# Patient Record
Sex: FEMALE | Race: WHITE | Employment: PART TIME | URBAN - METROPOLITAN AREA
[De-identification: names, ages, dates, MRNs, and addresses within clinical notes are randomized per-mention and may not be internally consistent; named-entity substitution may affect disease eponyms.]

---

## 2018-03-30 ENCOUNTER — HOSPITAL ENCOUNTER (EMERGENCY)
Age: 63
Discharge: HOME | End: 2018-03-30
Payer: COMMERCIAL

## 2018-03-30 VITALS
OXYGEN SATURATION: 95 % | WEIGHT: 235 LBS | BODY MASS INDEX: 39.15 KG/M2 | DIASTOLIC BLOOD PRESSURE: 82 MMHG | SYSTOLIC BLOOD PRESSURE: 130 MMHG | HEART RATE: 72 BPM | RESPIRATION RATE: 16 BRPM | TEMPERATURE: 98.1 F | HEIGHT: 65 IN

## 2018-03-30 DIAGNOSIS — H61.22 IMPACTED CERUMEN OF LEFT EAR: ICD-10-CM

## 2018-03-30 DIAGNOSIS — H92.01 RIGHT EAR PAIN: Primary | ICD-10-CM

## 2018-03-30 PROCEDURE — 99282 EMERGENCY DEPT VISIT SF MDM: CPT

## 2018-03-30 ASSESSMENT — PAIN DESCRIPTION - ORIENTATION: ORIENTATION: RIGHT;LEFT

## 2018-03-30 ASSESSMENT — PAIN SCALES - GENERAL: PAINLEVEL_OUTOF10: 7

## 2018-03-30 ASSESSMENT — PAIN DESCRIPTION - LOCATION: LOCATION: EAR

## 2018-04-02 ENCOUNTER — CARE COORDINATION (OUTPATIENT)
Dept: CARE COORDINATION | Age: 63
End: 2018-04-02

## 2018-05-07 ENCOUNTER — TELEPHONE (OUTPATIENT)
Dept: FAMILY MEDICINE CLINIC | Age: 63
End: 2018-05-07

## 2018-05-29 ENCOUNTER — OFFICE VISIT (OUTPATIENT)
Dept: FAMILY MEDICINE CLINIC | Age: 63
End: 2018-05-29
Payer: COMMERCIAL

## 2018-05-29 ENCOUNTER — HOSPITAL ENCOUNTER (OUTPATIENT)
Dept: ULTRASOUND IMAGING | Age: 63
Discharge: HOME | End: 2018-05-31
Payer: COMMERCIAL

## 2018-05-29 VITALS
DIASTOLIC BLOOD PRESSURE: 76 MMHG | HEART RATE: 68 BPM | TEMPERATURE: 98 F | OXYGEN SATURATION: 96 % | BODY MASS INDEX: 46.15 KG/M2 | WEIGHT: 277 LBS | HEIGHT: 65 IN | SYSTOLIC BLOOD PRESSURE: 132 MMHG

## 2018-05-29 DIAGNOSIS — M71.22 BAKER CYST, LEFT: ICD-10-CM

## 2018-05-29 DIAGNOSIS — M79.662 PAIN OF LEFT CALF: Primary | ICD-10-CM

## 2018-05-29 DIAGNOSIS — M79.662 PAIN OF LEFT CALF: ICD-10-CM

## 2018-05-29 PROCEDURE — 99214 OFFICE O/P EST MOD 30 MIN: CPT | Performed by: PHYSICIAN ASSISTANT

## 2018-05-29 PROCEDURE — 93971 EXTREMITY STUDY: CPT

## 2018-05-29 RX ORDER — NAPROXEN 500 MG/1
500 TABLET ORAL 2 TIMES DAILY
Qty: 14 TABLET | Refills: 0 | Status: SHIPPED | OUTPATIENT
Start: 2018-05-29 | End: 2019-04-17

## 2018-07-19 ENCOUNTER — OFFICE VISIT (OUTPATIENT)
Dept: FAMILY MEDICINE CLINIC | Age: 63
End: 2018-07-19
Payer: COMMERCIAL

## 2018-07-19 VITALS
SYSTOLIC BLOOD PRESSURE: 120 MMHG | WEIGHT: 279 LBS | HEART RATE: 78 BPM | DIASTOLIC BLOOD PRESSURE: 66 MMHG | HEIGHT: 65 IN | TEMPERATURE: 98.4 F | BODY MASS INDEX: 46.48 KG/M2 | OXYGEN SATURATION: 94 %

## 2018-07-19 DIAGNOSIS — H10.32 ACUTE BACTERIAL CONJUNCTIVITIS OF LEFT EYE: Primary | ICD-10-CM

## 2018-07-19 PROCEDURE — 99213 OFFICE O/P EST LOW 20 MIN: CPT | Performed by: NURSE PRACTITIONER

## 2018-07-19 NOTE — LETTER
[facility]  [address] [place] [id number]  Phone: [phone]  Fax: [address], APRN - CNP        July 19, 2018     Patient: Denise Hurtado   YOB: 1955   Date of Visit: 7/19/2018       To Whom it May Concern:    Denise Hurtado was seen in my clinic on 7/19/2018. She may return to work on 7/23/18. If you have any questions or concerns, please don't hesitate to call.     Sincerely,         Carlitos Lyons, GENTRY - CNP

## 2018-07-26 ENCOUNTER — OFFICE VISIT (OUTPATIENT)
Dept: FAMILY MEDICINE CLINIC | Age: 63
End: 2018-07-26
Payer: COMMERCIAL

## 2018-07-26 VITALS
BODY MASS INDEX: 46.23 KG/M2 | TEMPERATURE: 97.7 F | HEART RATE: 73 BPM | OXYGEN SATURATION: 94 % | DIASTOLIC BLOOD PRESSURE: 66 MMHG | WEIGHT: 277.5 LBS | HEIGHT: 65 IN | SYSTOLIC BLOOD PRESSURE: 128 MMHG

## 2018-07-26 DIAGNOSIS — E78.00 PURE HYPERCHOLESTEROLEMIA: Chronic | ICD-10-CM

## 2018-07-26 DIAGNOSIS — G89.29 CHRONIC PAIN OF BOTH KNEES: ICD-10-CM

## 2018-07-26 DIAGNOSIS — M25.562 CHRONIC PAIN OF BOTH KNEES: ICD-10-CM

## 2018-07-26 DIAGNOSIS — G47.33 OSA (OBSTRUCTIVE SLEEP APNEA): Chronic | ICD-10-CM

## 2018-07-26 DIAGNOSIS — M79.89 LEG SWELLING: ICD-10-CM

## 2018-07-26 DIAGNOSIS — I10 ESSENTIAL HYPERTENSION: Primary | Chronic | ICD-10-CM

## 2018-07-26 DIAGNOSIS — M25.561 CHRONIC PAIN OF BOTH KNEES: ICD-10-CM

## 2018-07-26 PROCEDURE — 99213 OFFICE O/P EST LOW 20 MIN: CPT | Performed by: FAMILY MEDICINE

## 2018-07-26 RX ORDER — CHLORTHALIDONE 25 MG/1
25 TABLET ORAL DAILY
Qty: 30 TABLET | Refills: 3 | Status: SHIPPED | OUTPATIENT
Start: 2018-07-26 | End: 2019-01-07 | Stop reason: SDUPTHER

## 2018-07-26 RX ORDER — LOSARTAN POTASSIUM 100 MG/1
100 TABLET ORAL DAILY
Qty: 30 TABLET | Refills: 3 | Status: SHIPPED | OUTPATIENT
Start: 2018-07-26 | End: 2018-12-07 | Stop reason: SDUPTHER

## 2018-07-26 RX ORDER — LOSARTAN POTASSIUM AND HYDROCHLOROTHIAZIDE 25; 100 MG/1; MG/1
TABLET ORAL
Qty: 90 TABLET | Refills: 0 | Status: CANCELLED | OUTPATIENT
Start: 2018-07-26

## 2018-07-26 ASSESSMENT — ENCOUNTER SYMPTOMS
WHEEZING: 0
COUGH: 0

## 2018-07-26 NOTE — PATIENT INSTRUCTIONS
Patient Education        Kegel Exercises: Care Instructions  Your Care Instructions    Kegel exercises strengthen muscles around the bladder. These muscles control the flow of urine. Kegel exercises are sometime called \"pelvic floor\" exercises. They can help prevent urine leakage and keep the pelvic organs in place. A woman who just had a baby might want to try Kegel exercises. They can strengthen pelvic muscles that have been weakened by pregnancy and childbirth. A man or woman may use Kegel exercises to treat urine leakage. You do Kegel exercises by tightening the muscles you use when you urinate. You will likely need to do these exercises for several weeks to get better. Follow-up care is a key part of your treatment and safety. Be sure to make and go to all appointments, and call your doctor if you are having problems. It's also a good idea to know your test results and keep a list of the medicines you take. How can you care for yourself at home? · Do Kegel exercises. ¨ Find the muscles you need to strengthen. To do this, tighten the muscles that stop your urine while you are going to the bathroom. These are the same muscles you squeeze during Kegel exercises. ¨ Squeeze the muscles as hard as you can. Your belly and thighs should not move. ¨ Hold the squeeze for 3 seconds. Then relax for 3 seconds. ¨ Start with 3 seconds, and then add 1 second each week until you are able to squeeze for 10 seconds. ¨ Repeat the exercise 10 to 15 times for each session. Do three or more sessions each day. · You can check to see if you are using the right muscles. Place a finger in your vagina and squeeze around it. You are doing them right when you feel pressure around your finger. Your doctor may also suggest that you put special weights in your vagina while you do the exercises. · Do not smoke. It can irritate the bladder. If you need help quitting, talk to your doctor about stop-smoking programs and medicines.

## 2018-07-26 NOTE — PROGRESS NOTES
tablet Take 1 tablet by mouth daily.  neomycin-polymyxin-hydrocortisone (CORTISPORIN) SUSP ophthalmic suspension 2 drops to left eye every 6 hours for 7 days. 1 Bottle 0    Homeopathic Products (EARACHE RELIEF) SOLN Place 3 drops in ear(s) 4 times daily as needed (ringing in ears) 1 Bottle 0     No current facility-administered medications for this visit. Past Medical/Surgical Hx;  Reviewed with patient      Diagnosis Date    Anxiety     Family history of early CAD     Hyperlipemia     Hypertension     Lightheadedness     Sleep apnea     SOBOE (shortness of breath on exertion)      Past Surgical History:   Procedure Laterality Date    BREAST SURGERY  2014    biopsy    CARDIOVASCULAR STRESS TEST      COLONOSCOPY      CYST REMOVAL  05/2017    mouth/under tongue    HYSTERECTOMY  1990    KNEE ARTHROSCOPY  2005    LEG SURGERY  1963    ligament repair    SALIVARY GLAND SURGERY Left 05/24/2017    TINO AND BSO      age 19 for menometrorrhagia    TONSILLECTOMY      childhood       Past Family Hx:  Reviewed with patient  Family History   Problem Relation Age of Onset   Annamaria Atul Pacemaker Mother     COPD Mother     Heart Failure Mother     Cancer Father         prostate    Hypertension Father     Hypertension Sister     Heart Disease Brother        Social Hx:  Reviewed with patient  Social History   Substance Use Topics    Smoking status: Never Smoker    Smokeless tobacco: Never Used    Alcohol use No       Immunization History   Administered Date(s) Administered    Influenza, Quadv, 3 yrs and older, IM, Preservative Free 10/25/2016, 11/02/2017    Tdap (Boostrix, Adacel) 10/25/2016    Zoster Live (Zostavax) 08/01/2017       Review of Systems  Review of Systems   Constitutional: Negative for chills and fever. Respiratory: Negative for cough and wheezing. Cardiovascular: Positive for leg swelling. Negative for chest pain.    Genitourinary:        Nocturia  Urinary incontinence Musculoskeletal: Negative for falls. Neurological: Negative for dizziness and headaches. PE:  VS:  /66   Pulse 73   Temp 97.7 °F (36.5 °C) (Oral)   Ht 5' 5\" (1.651 m)   Wt 277 lb 8 oz (125.9 kg)   SpO2 94%   Breastfeeding? No   BMI 46.18 kg/m²   Physical Exam   Constitutional: She is oriented to person, place, and time. She appears well-developed and well-nourished. Obesity     HENT:   Head: Normocephalic and atraumatic. Cardiovascular: Normal rate and regular rhythm. Exam reveals no gallop and no friction rub. No murmur heard. Pulmonary/Chest: Effort normal and breath sounds normal. She has no wheezes. She has no rales. Musculoskeletal: She exhibits edema (trace to 1+) and tenderness (joint line tenderness, posterior tenderness bilateral knees). Neurological: She is alert and oriented to person, place, and time. Skin: Skin is warm and dry. Psychiatric: She has a normal mood and affect. Assessment/Plan:  Neto Gilliland was seen today for 3 month follow-up and health maintenance. Diagnoses and all orders for this visit:    Essential hypertension  bp at goal but c/o edema  Change hctz to chlorthalidone  Cont losartan  -     losartan (COZAAR) 100 MG tablet; Take 1 tablet by mouth daily  -     chlorthalidone (HYGROTON) 25 MG tablet; Take 1 tablet by mouth daily  -     Comprehensive Metabolic Panel; Future  -     Lipid Panel; Future  -     CBC Auto Differential; Future    JES (obstructive sleep apnea)  Adherent to CPAP  -     CBC Auto Differential; Future    Pure hypercholesterolemia  Due for repeat flp  -     Comprehensive Metabolic Panel; Future  -     Lipid Panel;  Future    Leg swelling  As above    Chronic pain of both knees  Reluctant to consider injections, etc      Other orders  -     Cancel: losartan-hydrochlorothiazide (HYZAAR) 100-25 MG per tablet; TAKE 1 TABLET DAILY    Also discussed kegels for urinary incontinence    Return in about 2 months (around 9/26/2018) for

## 2018-09-28 ENCOUNTER — OFFICE VISIT (OUTPATIENT)
Dept: FAMILY MEDICINE CLINIC | Age: 63
End: 2018-09-28
Payer: COMMERCIAL

## 2018-09-28 ENCOUNTER — HOSPITAL ENCOUNTER (OUTPATIENT)
Age: 63
Discharge: HOME | End: 2018-09-30
Payer: COMMERCIAL

## 2018-09-28 VITALS
OXYGEN SATURATION: 93 % | RESPIRATION RATE: 16 BRPM | HEART RATE: 84 BPM | DIASTOLIC BLOOD PRESSURE: 78 MMHG | HEIGHT: 65 IN | WEIGHT: 272 LBS | BODY MASS INDEX: 45.32 KG/M2 | TEMPERATURE: 98.8 F | SYSTOLIC BLOOD PRESSURE: 120 MMHG

## 2018-09-28 DIAGNOSIS — G47.33 OSA (OBSTRUCTIVE SLEEP APNEA): Chronic | ICD-10-CM

## 2018-09-28 DIAGNOSIS — I10 ESSENTIAL HYPERTENSION: Chronic | ICD-10-CM

## 2018-09-28 DIAGNOSIS — E78.00 PURE HYPERCHOLESTEROLEMIA: Chronic | ICD-10-CM

## 2018-09-28 LAB
ALBUMIN SERPL-MCNC: 4 G/DL (ref 3.5–5.2)
ALP BLD-CCNC: 101 U/L (ref 35–104)
ALT SERPL-CCNC: 14 U/L (ref 0–32)
ANION GAP SERPL CALCULATED.3IONS-SCNC: 13 MMOL/L (ref 7–16)
AST SERPL-CCNC: 20 U/L (ref 0–31)
BASOPHILS ABSOLUTE: 0.06 E9/L (ref 0–0.2)
BASOPHILS RELATIVE PERCENT: 0.7 % (ref 0–2)
BILIRUB SERPL-MCNC: 0.5 MG/DL (ref 0–1.2)
BUN BLDV-MCNC: 16 MG/DL (ref 8–23)
CALCIUM SERPL-MCNC: 9.6 MG/DL (ref 8.6–10.2)
CHLORIDE BLD-SCNC: 100 MMOL/L (ref 98–107)
CHOLESTEROL, TOTAL: 190 MG/DL (ref 0–199)
CO2: 27 MMOL/L (ref 22–29)
CREAT SERPL-MCNC: 0.8 MG/DL (ref 0.5–1)
EOSINOPHILS ABSOLUTE: 0.17 E9/L (ref 0.05–0.5)
EOSINOPHILS RELATIVE PERCENT: 1.9 % (ref 0–6)
GFR AFRICAN AMERICAN: >60
GFR NON-AFRICAN AMERICAN: >60 ML/MIN/1.73
GLUCOSE BLD-MCNC: 97 MG/DL (ref 74–109)
HCT VFR BLD CALC: 42.3 % (ref 34–48)
HDLC SERPL-MCNC: 59 MG/DL
HEMOGLOBIN: 13.3 G/DL (ref 11.5–15.5)
IMMATURE GRANULOCYTES #: 0.04 E9/L
IMMATURE GRANULOCYTES %: 0.5 % (ref 0–5)
LDL CHOLESTEROL CALCULATED: 107 MG/DL (ref 0–99)
LYMPHOCYTES ABSOLUTE: 3.09 E9/L (ref 1.5–4)
LYMPHOCYTES RELATIVE PERCENT: 35.3 % (ref 20–42)
MCH RBC QN AUTO: 29.5 PG (ref 26–35)
MCHC RBC AUTO-ENTMCNC: 31.4 % (ref 32–34.5)
MCV RBC AUTO: 93.8 FL (ref 80–99.9)
MONOCYTES ABSOLUTE: 0.68 E9/L (ref 0.1–0.95)
MONOCYTES RELATIVE PERCENT: 7.8 % (ref 2–12)
NEUTROPHILS ABSOLUTE: 4.71 E9/L (ref 1.8–7.3)
NEUTROPHILS RELATIVE PERCENT: 53.8 % (ref 43–80)
PDW BLD-RTO: 14.3 FL (ref 11.5–15)
PLATELET # BLD: 324 E9/L (ref 130–450)
PMV BLD AUTO: 11.1 FL (ref 7–12)
POTASSIUM SERPL-SCNC: 4.5 MMOL/L (ref 3.5–5)
RBC # BLD: 4.51 E12/L (ref 3.5–5.5)
SODIUM BLD-SCNC: 140 MMOL/L (ref 132–146)
TOTAL PROTEIN: 7.3 G/DL (ref 6.4–8.3)
TRIGL SERPL-MCNC: 122 MG/DL (ref 0–149)
VLDLC SERPL CALC-MCNC: 24 MG/DL
WBC # BLD: 8.8 E9/L (ref 4.5–11.5)

## 2018-09-28 PROCEDURE — 80061 LIPID PANEL: CPT

## 2018-09-28 PROCEDURE — 85025 COMPLETE CBC W/AUTO DIFF WBC: CPT

## 2018-09-28 PROCEDURE — 80053 COMPREHEN METABOLIC PANEL: CPT

## 2018-09-28 PROCEDURE — 99213 OFFICE O/P EST LOW 20 MIN: CPT | Performed by: FAMILY MEDICINE

## 2018-09-28 ASSESSMENT — PATIENT HEALTH QUESTIONNAIRE - PHQ9
2. FEELING DOWN, DEPRESSED OR HOPELESS: 0
SUM OF ALL RESPONSES TO PHQ9 QUESTIONS 1 & 2: 0
SUM OF ALL RESPONSES TO PHQ QUESTIONS 1-9: 0
1. LITTLE INTEREST OR PLEASURE IN DOING THINGS: 0
SUM OF ALL RESPONSES TO PHQ QUESTIONS 1-9: 0

## 2018-09-28 NOTE — PROGRESS NOTES
9/28/2018    Bill Jasmine is a 70 y.o. female here for   Chief Complaint   Patient presents with    Medication Check     med check for hygroton, pt states she has been doing okay. Here for f/u change from hctz to chlorthalidone at last visit. She is feeling well. Her leg swelling has improved. She gets painbehind right knee - has been told she has a bakers cyst, but no leg cramps. She is taking as prescribed. Wt Readings from Last 3 Encounters:   09/28/18 272 lb (123.4 kg)   07/26/18 277 lb 8 oz (125.9 kg)   07/19/18 279 lb (126.6 kg)     Regarding hypertension. Patient is not monitoring home blood pressures. Cardiovascular risk factors: advanced age (older than 54 for men, 72 for women), dyslipidemia, hypertension, obesity (BMI >= 30 kg/m2) and sedentary lifestyle. Patient does not smoke. Currently on chlorthalidone, losartan 100 mg daily. Taking as prescribed. No adverse effects. Today,  BP: 120/78 and she is asymptomatic. BP Readings from Last 3 Encounters:   09/28/18 120/78   07/26/18 128/66   07/19/18 120/66     Patient denies chest pain, diaphoresis, dyspnea, dyspnea on exertion, peripheral edema, palpitations, headache, vision changes.       Allergies   Allergen Reactions    Penicillins Hives and Rash       Medications  Current Outpatient Prescriptions   Medication Sig Dispense Refill    losartan (COZAAR) 100 MG tablet Take 1 tablet by mouth daily 30 tablet 3    chlorthalidone (HYGROTON) 25 MG tablet Take 1 tablet by mouth daily 30 tablet 3    Handicap Placard MISC by Does not apply route Patient cannot walk 200 ft without stopping to rest.    Expiration 5/2021 1 each 0    losartan-hydrochlorothiazide (HYZAAR) 100-25 MG per tablet TAKE 1 TABLET DAILY 90 tablet 0    atorvastatin (LIPITOR) 10 MG tablet TAKE 1 TABLET EVERY EVENING 90 tablet 0    aspirin 81 MG tablet Take 81 mg by mouth daily      CPAP Machine MISC Please provide patient with an auto CPAP with ranges 5-15 cm water

## 2018-09-28 NOTE — PATIENT INSTRUCTIONS
You will be due for your mammogram in late November -just give us a call and we can send in your script  You can ask for refills at that time as well

## 2018-09-30 ASSESSMENT — ENCOUNTER SYMPTOMS
COUGH: 0
WHEEZING: 0

## 2018-10-01 ENCOUNTER — TELEPHONE (OUTPATIENT)
Dept: FAMILY MEDICINE CLINIC | Age: 63
End: 2018-10-01

## 2018-10-04 ENCOUNTER — TELEPHONE (OUTPATIENT)
Dept: FAMILY MEDICINE CLINIC | Age: 63
End: 2018-10-04

## 2018-10-04 RX ORDER — PRAVASTATIN SODIUM 10 MG
10 TABLET ORAL EVERY EVENING
Qty: 30 TABLET | Refills: 5 | Status: SHIPPED | OUTPATIENT
Start: 2018-10-04 | End: 2019-04-24 | Stop reason: SDUPTHER

## 2018-10-10 ENCOUNTER — TELEPHONE (OUTPATIENT)
Dept: ADMINISTRATIVE | Age: 63
End: 2018-10-10

## 2018-10-10 DIAGNOSIS — Z12.31 ENCOUNTER FOR SCREENING MAMMOGRAM FOR MALIGNANT NEOPLASM OF BREAST: Primary | ICD-10-CM

## 2018-10-10 DIAGNOSIS — Z13.820 SCREENING FOR OSTEOPOROSIS: ICD-10-CM

## 2018-11-28 ENCOUNTER — HOSPITAL ENCOUNTER (OUTPATIENT)
Dept: GENERAL RADIOLOGY | Age: 63
Discharge: HOME | End: 2018-11-30
Payer: COMMERCIAL

## 2018-11-28 DIAGNOSIS — Z12.31 ENCOUNTER FOR SCREENING MAMMOGRAM FOR MALIGNANT NEOPLASM OF BREAST: ICD-10-CM

## 2018-11-28 DIAGNOSIS — Z13.820 SCREENING FOR OSTEOPOROSIS: ICD-10-CM

## 2018-11-28 PROCEDURE — 77067 SCR MAMMO BI INCL CAD: CPT

## 2018-11-28 PROCEDURE — 77080 DXA BONE DENSITY AXIAL: CPT

## 2018-12-07 DIAGNOSIS — I10 ESSENTIAL HYPERTENSION: Chronic | ICD-10-CM

## 2018-12-07 RX ORDER — LOSARTAN POTASSIUM 100 MG/1
100 TABLET ORAL DAILY
Qty: 30 TABLET | Refills: 0 | Status: SHIPPED | OUTPATIENT
Start: 2018-12-07 | End: 2019-01-07 | Stop reason: SDUPTHER

## 2019-01-07 DIAGNOSIS — I10 ESSENTIAL HYPERTENSION: Chronic | ICD-10-CM

## 2019-01-08 RX ORDER — LOSARTAN POTASSIUM 100 MG/1
100 TABLET ORAL DAILY
Qty: 30 TABLET | Refills: 0 | Status: SHIPPED | OUTPATIENT
Start: 2019-01-08 | End: 2019-02-14 | Stop reason: SDUPTHER

## 2019-01-08 RX ORDER — CHLORTHALIDONE 25 MG/1
25 TABLET ORAL DAILY
Qty: 30 TABLET | Refills: 0 | Status: SHIPPED | OUTPATIENT
Start: 2019-01-08 | End: 2019-02-14 | Stop reason: SDUPTHER

## 2019-02-14 DIAGNOSIS — I10 ESSENTIAL HYPERTENSION: Chronic | ICD-10-CM

## 2019-02-14 RX ORDER — LOSARTAN POTASSIUM 100 MG/1
100 TABLET ORAL DAILY
Qty: 30 TABLET | Refills: 0 | Status: SHIPPED | OUTPATIENT
Start: 2019-02-14 | End: 2019-03-21 | Stop reason: SDUPTHER

## 2019-02-14 RX ORDER — CHLORTHALIDONE 25 MG/1
25 TABLET ORAL DAILY
Qty: 30 TABLET | Refills: 0 | Status: SHIPPED | OUTPATIENT
Start: 2019-02-14 | End: 2019-03-21 | Stop reason: SDUPTHER

## 2019-03-21 ENCOUNTER — OFFICE VISIT (OUTPATIENT)
Dept: FAMILY MEDICINE CLINIC | Age: 64
End: 2019-03-21
Payer: COMMERCIAL

## 2019-03-21 VITALS
OXYGEN SATURATION: 97 % | HEART RATE: 80 BPM | SYSTOLIC BLOOD PRESSURE: 112 MMHG | HEIGHT: 65 IN | DIASTOLIC BLOOD PRESSURE: 60 MMHG | TEMPERATURE: 98.1 F | WEIGHT: 266 LBS | BODY MASS INDEX: 44.32 KG/M2

## 2019-03-21 DIAGNOSIS — I10 ESSENTIAL HYPERTENSION: Chronic | ICD-10-CM

## 2019-03-21 DIAGNOSIS — J32.9 SINOBRONCHITIS: Primary | ICD-10-CM

## 2019-03-21 DIAGNOSIS — J40 SINOBRONCHITIS: Primary | ICD-10-CM

## 2019-03-21 PROCEDURE — 99213 OFFICE O/P EST LOW 20 MIN: CPT | Performed by: PHYSICIAN ASSISTANT

## 2019-03-21 RX ORDER — AZITHROMYCIN 250 MG/1
TABLET, FILM COATED ORAL
Qty: 1 PACKET | Refills: 0 | Status: SHIPPED | OUTPATIENT
Start: 2019-03-21 | End: 2019-04-17

## 2019-03-21 RX ORDER — CHLORTHALIDONE 25 MG/1
25 TABLET ORAL DAILY
Qty: 30 TABLET | Refills: 2 | Status: SHIPPED | OUTPATIENT
Start: 2019-03-21 | End: 2019-05-30 | Stop reason: SDUPTHER

## 2019-03-21 RX ORDER — BENZONATATE 100 MG/1
100 CAPSULE ORAL 3 TIMES DAILY PRN
Qty: 30 CAPSULE | Refills: 0 | Status: SHIPPED | OUTPATIENT
Start: 2019-03-21 | End: 2019-03-31

## 2019-03-21 RX ORDER — LOSARTAN POTASSIUM 100 MG/1
100 TABLET ORAL DAILY
Qty: 30 TABLET | Refills: 2 | Status: ON HOLD | OUTPATIENT
Start: 2019-03-21 | End: 2019-05-29 | Stop reason: HOSPADM

## 2019-04-24 RX ORDER — PRAVASTATIN SODIUM 10 MG
10 TABLET ORAL EVERY EVENING
Qty: 30 TABLET | Refills: 0 | Status: SHIPPED | OUTPATIENT
Start: 2019-04-24 | End: 2019-05-30 | Stop reason: SDUPTHER

## 2019-05-27 ENCOUNTER — APPOINTMENT (OUTPATIENT)
Dept: CT IMAGING | Age: 64
End: 2019-05-27
Payer: COMMERCIAL

## 2019-05-27 ENCOUNTER — APPOINTMENT (OUTPATIENT)
Dept: GENERAL RADIOLOGY | Age: 64
End: 2019-05-27
Payer: COMMERCIAL

## 2019-05-27 ENCOUNTER — HOSPITAL ENCOUNTER (EMERGENCY)
Age: 64
Discharge: ANOTHER HOSPITAL | End: 2019-05-28
Attending: EMERGENCY MEDICINE
Payer: COMMERCIAL

## 2019-05-27 ENCOUNTER — HOSPITAL ENCOUNTER (OUTPATIENT)
Age: 64
Discharge: HOME | End: 2019-05-27
Payer: COMMERCIAL

## 2019-05-27 ENCOUNTER — APPOINTMENT (OUTPATIENT)
Dept: ULTRASOUND IMAGING | Age: 64
End: 2019-05-27
Payer: COMMERCIAL

## 2019-05-27 VITALS
DIASTOLIC BLOOD PRESSURE: 85 MMHG | SYSTOLIC BLOOD PRESSURE: 141 MMHG | BODY MASS INDEX: 43.99 KG/M2 | RESPIRATION RATE: 16 BRPM | OXYGEN SATURATION: 96 % | WEIGHT: 264 LBS | HEART RATE: 81 BPM | HEIGHT: 65 IN | TEMPERATURE: 98 F

## 2019-05-27 DIAGNOSIS — Q25.40 ABNORMALITY OF THORACIC AORTA: ICD-10-CM

## 2019-05-27 DIAGNOSIS — I82.402 ACUTE DEEP VEIN THROMBOSIS (DVT) OF LEFT LOWER EXTREMITY, UNSPECIFIED VEIN (HCC): Primary | ICD-10-CM

## 2019-05-27 PROBLEM — H61.22 IMPACTED CERUMEN OF LEFT EAR: Status: RESOLVED | Noted: 2018-03-30 | Resolved: 2019-05-27

## 2019-05-27 PROBLEM — H92.01 RIGHT EAR PAIN: Status: RESOLVED | Noted: 2018-03-30 | Resolved: 2019-05-27

## 2019-05-27 LAB
ANION GAP SERPL CALCULATED.3IONS-SCNC: 12 MMOL/L (ref 7–16)
APTT: 30.4 SEC (ref 24.5–35.1)
BASOPHILS ABSOLUTE: 0.06 E9/L (ref 0–0.2)
BASOPHILS RELATIVE PERCENT: 0.6 % (ref 0–2)
BUN BLDV-MCNC: 25 MG/DL (ref 8–23)
CALCIUM SERPL-MCNC: 10.3 MG/DL (ref 8.6–10.2)
CHLORIDE BLD-SCNC: 100 MMOL/L (ref 98–107)
CO2: 26 MMOL/L (ref 22–29)
CREAT SERPL-MCNC: 0.9 MG/DL (ref 0.5–1)
EOSINOPHILS ABSOLUTE: 0.13 E9/L (ref 0.05–0.5)
EOSINOPHILS RELATIVE PERCENT: 1.3 % (ref 0–6)
GFR AFRICAN AMERICAN: >60
GFR NON-AFRICAN AMERICAN: >60 ML/MIN/1.73
GLUCOSE BLD-MCNC: 104 MG/DL (ref 74–99)
HCT VFR BLD CALC: 40.9 % (ref 34–48)
HCT VFR BLD CALC: 44.3 % (ref 34–48)
HEMOGLOBIN: 13.5 G/DL (ref 11.5–15.5)
HEMOGLOBIN: 14.3 G/DL (ref 11.5–15.5)
IMMATURE GRANULOCYTES #: 0.04 E9/L
IMMATURE GRANULOCYTES %: 0.4 % (ref 0–5)
LYMPHOCYTES ABSOLUTE: 3.1 E9/L (ref 1.5–4)
LYMPHOCYTES RELATIVE PERCENT: 30.9 % (ref 20–42)
MCH RBC QN AUTO: 29.9 PG (ref 26–35)
MCH RBC QN AUTO: 30.5 PG (ref 26–35)
MCHC RBC AUTO-ENTMCNC: 32.3 % (ref 32–34.5)
MCHC RBC AUTO-ENTMCNC: 33 % (ref 32–34.5)
MCV RBC AUTO: 92.5 FL (ref 80–99.9)
MCV RBC AUTO: 92.5 FL (ref 80–99.9)
MONOCYTES ABSOLUTE: 0.9 E9/L (ref 0.1–0.95)
MONOCYTES RELATIVE PERCENT: 9 % (ref 2–12)
NEUTROPHILS ABSOLUTE: 5.79 E9/L (ref 1.8–7.3)
NEUTROPHILS RELATIVE PERCENT: 57.8 % (ref 43–80)
PDW BLD-RTO: 14 FL (ref 11.5–15)
PDW BLD-RTO: 14.1 FL (ref 11.5–15)
PLATELET # BLD: 273 E9/L (ref 130–450)
PLATELET # BLD: 312 E9/L (ref 130–450)
PMV BLD AUTO: 10.1 FL (ref 7–12)
PMV BLD AUTO: 10.6 FL (ref 7–12)
POTASSIUM REFLEX MAGNESIUM: 3.8 MMOL/L (ref 3.5–5)
PRO-BNP: 164 PG/ML (ref 0–125)
RBC # BLD: 4.42 E12/L (ref 3.5–5.5)
RBC # BLD: 4.79 E12/L (ref 3.5–5.5)
SODIUM BLD-SCNC: 138 MMOL/L (ref 132–146)
TROPONIN: <0.01 NG/ML (ref 0–0.03)
WBC # BLD: 10 E9/L (ref 4.5–11.5)
WBC # BLD: 9.8 E9/L (ref 4.5–11.5)

## 2019-05-27 PROCEDURE — 93005 ELECTROCARDIOGRAM TRACING: CPT | Performed by: STUDENT IN AN ORGANIZED HEALTH CARE EDUCATION/TRAINING PROGRAM

## 2019-05-27 PROCEDURE — 84484 ASSAY OF TROPONIN QUANT: CPT

## 2019-05-27 PROCEDURE — 6360000004 HC RX CONTRAST MEDICATION: Performed by: RADIOLOGY

## 2019-05-27 PROCEDURE — 96365 THER/PROPH/DIAG IV INF INIT: CPT

## 2019-05-27 PROCEDURE — 96375 TX/PRO/DX INJ NEW DRUG ADDON: CPT

## 2019-05-27 PROCEDURE — A0426 ALS 1: HCPCS

## 2019-05-27 PROCEDURE — 36415 COLL VENOUS BLD VENIPUNCTURE: CPT

## 2019-05-27 PROCEDURE — 6360000002 HC RX W HCPCS: Performed by: STUDENT IN AN ORGANIZED HEALTH CARE EDUCATION/TRAINING PROGRAM

## 2019-05-27 PROCEDURE — 96376 TX/PRO/DX INJ SAME DRUG ADON: CPT

## 2019-05-27 PROCEDURE — 85730 THROMBOPLASTIN TIME PARTIAL: CPT

## 2019-05-27 PROCEDURE — 71045 X-RAY EXAM CHEST 1 VIEW: CPT

## 2019-05-27 PROCEDURE — 85027 COMPLETE CBC AUTOMATED: CPT

## 2019-05-27 PROCEDURE — 83880 ASSAY OF NATRIURETIC PEPTIDE: CPT

## 2019-05-27 PROCEDURE — 71275 CT ANGIOGRAPHY CHEST: CPT

## 2019-05-27 PROCEDURE — 80048 BASIC METABOLIC PNL TOTAL CA: CPT

## 2019-05-27 PROCEDURE — A0425 GROUND MILEAGE: HCPCS

## 2019-05-27 PROCEDURE — 93970 EXTREMITY STUDY: CPT

## 2019-05-27 PROCEDURE — 85025 COMPLETE CBC W/AUTO DIFF WBC: CPT

## 2019-05-27 PROCEDURE — 99284 EMERGENCY DEPT VISIT MOD MDM: CPT

## 2019-05-27 RX ORDER — SODIUM CHLORIDE 0.9 % (FLUSH) 0.9 %
SYRINGE (ML) INJECTION
Status: DISCONTINUED
Start: 2019-05-27 | End: 2019-05-28 | Stop reason: HOSPADM

## 2019-05-27 RX ORDER — HEPARIN SODIUM 10000 [USP'U]/100ML
18 INJECTION, SOLUTION INTRAVENOUS CONTINUOUS
Status: DISCONTINUED | OUTPATIENT
Start: 2019-05-27 | End: 2019-05-28 | Stop reason: HOSPADM

## 2019-05-27 RX ORDER — HEPARIN SODIUM 1000 [USP'U]/ML
80 INJECTION, SOLUTION INTRAVENOUS; SUBCUTANEOUS PRN
Status: DISCONTINUED | OUTPATIENT
Start: 2019-05-27 | End: 2019-05-28 | Stop reason: HOSPADM

## 2019-05-27 RX ORDER — HEPARIN SODIUM 1000 [USP'U]/ML
40 INJECTION, SOLUTION INTRAVENOUS; SUBCUTANEOUS PRN
Status: DISCONTINUED | OUTPATIENT
Start: 2019-05-27 | End: 2019-05-28 | Stop reason: HOSPADM

## 2019-05-27 RX ORDER — LORAZEPAM 2 MG/ML
1 INJECTION INTRAMUSCULAR ONCE
Status: COMPLETED | OUTPATIENT
Start: 2019-05-27 | End: 2019-05-27

## 2019-05-27 RX ORDER — HEPARIN SODIUM 1000 [USP'U]/ML
80 INJECTION, SOLUTION INTRAVENOUS; SUBCUTANEOUS ONCE
Status: COMPLETED | OUTPATIENT
Start: 2019-05-27 | End: 2019-05-27

## 2019-05-27 RX ADMIN — HEPARIN SODIUM 20.17 UNITS/KG/HR: 10000 INJECTION, SOLUTION INTRAVENOUS at 22:47

## 2019-05-27 RX ADMIN — HEPARIN SODIUM 9576 UNITS: 1000 INJECTION INTRAVENOUS; SUBCUTANEOUS at 22:31

## 2019-05-27 RX ADMIN — LORAZEPAM 1 MG: 2 INJECTION INTRAMUSCULAR; INTRAVENOUS at 20:18

## 2019-05-27 RX ADMIN — IOPAMIDOL 80 ML: 755 INJECTION, SOLUTION INTRAVENOUS at 20:56

## 2019-05-27 RX ADMIN — LORAZEPAM 1 MG: 2 INJECTION INTRAMUSCULAR; INTRAVENOUS at 17:31

## 2019-05-27 RX ADMIN — IOPAMIDOL 90 ML: 755 INJECTION, SOLUTION INTRAVENOUS at 19:19

## 2019-05-27 ASSESSMENT — ENCOUNTER SYMPTOMS
ABDOMINAL PAIN: 0
CONSTIPATION: 0
DIARRHEA: 0
BACK PAIN: 0
CHEST TIGHTNESS: 0
PHOTOPHOBIA: 0
VOMITING: 0
ABDOMINAL DISTENTION: 0
COUGH: 0
NAUSEA: 0
SHORTNESS OF BREATH: 1

## 2019-05-27 ASSESSMENT — PAIN SCALES - GENERAL: PAINLEVEL_OUTOF10: 9

## 2019-05-27 ASSESSMENT — PAIN DESCRIPTION - ORIENTATION: ORIENTATION: LEFT

## 2019-05-27 ASSESSMENT — PAIN DESCRIPTION - LOCATION: LOCATION: LEG

## 2019-05-28 ENCOUNTER — TELEPHONE (OUTPATIENT)
Dept: FAMILY MEDICINE CLINIC | Age: 64
End: 2019-05-28

## 2019-05-28 ENCOUNTER — HOSPITAL ENCOUNTER (INPATIENT)
Age: 64
LOS: 1 days | Discharge: HOME | DRG: 300 | End: 2019-05-29
Attending: HOSPITALIST | Admitting: HOSPITALIST
Payer: COMMERCIAL

## 2019-05-28 PROBLEM — I82.4Z2 ACUTE DEEP VEIN THROMBOSIS (DVT) OF DISTAL END OF LEFT LOWER EXTREMITY (HCC): Status: ACTIVE | Noted: 2019-05-28

## 2019-05-28 LAB
APTT: 182.1 SEC (ref 24.5–35.1)
APTT: 79.4 SEC (ref 24.5–35.1)
APTT: >240 SEC (ref 24.5–35.1)
EKG ATRIAL RATE: 64 BPM
EKG P AXIS: 51 DEGREES
EKG P-R INTERVAL: 152 MS
EKG Q-T INTERVAL: 406 MS
EKG QRS DURATION: 98 MS
EKG QTC CALCULATION (BAZETT): 418 MS
EKG R AXIS: -3 DEGREES
EKG T AXIS: -4 DEGREES
EKG VENTRICULAR RATE: 64 BPM

## 2019-05-28 PROCEDURE — 99223 1ST HOSP IP/OBS HIGH 75: CPT | Performed by: SURGERY

## 2019-05-28 PROCEDURE — 6360000002 HC RX W HCPCS: Performed by: HOSPITALIST

## 2019-05-28 PROCEDURE — 93010 ELECTROCARDIOGRAM REPORT: CPT | Performed by: INTERNAL MEDICINE

## 2019-05-28 PROCEDURE — 36415 COLL VENOUS BLD VENIPUNCTURE: CPT

## 2019-05-28 PROCEDURE — 2060000000 HC ICU INTERMEDIATE R&B

## 2019-05-28 PROCEDURE — 99254 IP/OBS CNSLTJ NEW/EST MOD 60: CPT | Performed by: THORACIC SURGERY (CARDIOTHORACIC VASCULAR SURGERY)

## 2019-05-28 PROCEDURE — 85730 THROMBOPLASTIN TIME PARTIAL: CPT

## 2019-05-28 PROCEDURE — 6370000000 HC RX 637 (ALT 250 FOR IP): Performed by: HOSPITALIST

## 2019-05-28 RX ORDER — PRAVASTATIN SODIUM 20 MG
10 TABLET ORAL EVERY EVENING
Status: DISCONTINUED | OUTPATIENT
Start: 2019-05-28 | End: 2019-05-29 | Stop reason: HOSPADM

## 2019-05-28 RX ORDER — SODIUM CHLORIDE 0.9 % (FLUSH) 0.9 %
10 SYRINGE (ML) INJECTION EVERY 12 HOURS SCHEDULED
Status: DISCONTINUED | OUTPATIENT
Start: 2019-05-28 | End: 2019-05-29 | Stop reason: HOSPADM

## 2019-05-28 RX ORDER — HEPARIN SODIUM 1000 [USP'U]/ML
40 INJECTION, SOLUTION INTRAVENOUS; SUBCUTANEOUS PRN
Status: DISCONTINUED | OUTPATIENT
Start: 2019-05-28 | End: 2019-05-29 | Stop reason: HOSPADM

## 2019-05-28 RX ORDER — HEPARIN SODIUM 10000 [USP'U]/100ML
18 INJECTION, SOLUTION INTRAVENOUS CONTINUOUS
Status: DISCONTINUED | OUTPATIENT
Start: 2019-05-28 | End: 2019-05-28 | Stop reason: CLARIF

## 2019-05-28 RX ORDER — CHLORTHALIDONE 25 MG/1
25 TABLET ORAL DAILY
Status: DISCONTINUED | OUTPATIENT
Start: 2019-05-28 | End: 2019-05-29 | Stop reason: HOSPADM

## 2019-05-28 RX ORDER — HEPARIN SODIUM 10000 [USP'U]/100ML
18 INJECTION, SOLUTION INTRAVENOUS CONTINUOUS
Status: DISCONTINUED | OUTPATIENT
Start: 2019-05-28 | End: 2019-05-29 | Stop reason: HOSPADM

## 2019-05-28 RX ORDER — SODIUM CHLORIDE 0.9 % (FLUSH) 0.9 %
10 SYRINGE (ML) INJECTION PRN
Status: DISCONTINUED | OUTPATIENT
Start: 2019-05-28 | End: 2019-05-29 | Stop reason: HOSPADM

## 2019-05-28 RX ORDER — ASPIRIN 81 MG/1
81 TABLET, CHEWABLE ORAL DAILY
Status: DISCONTINUED | OUTPATIENT
Start: 2019-05-28 | End: 2019-05-29 | Stop reason: HOSPADM

## 2019-05-28 RX ORDER — HEPARIN SODIUM 1000 [USP'U]/ML
80 INJECTION, SOLUTION INTRAVENOUS; SUBCUTANEOUS ONCE
Status: DISCONTINUED | OUTPATIENT
Start: 2019-05-28 | End: 2019-05-28 | Stop reason: ALTCHOICE

## 2019-05-28 RX ORDER — MECLIZINE HCL 12.5 MG/1
25 TABLET ORAL 2 TIMES DAILY PRN
Status: DISCONTINUED | OUTPATIENT
Start: 2019-05-28 | End: 2019-05-29 | Stop reason: HOSPADM

## 2019-05-28 RX ORDER — ONDANSETRON 2 MG/ML
4 INJECTION INTRAMUSCULAR; INTRAVENOUS EVERY 6 HOURS PRN
Status: DISCONTINUED | OUTPATIENT
Start: 2019-05-28 | End: 2019-05-29 | Stop reason: HOSPADM

## 2019-05-28 RX ORDER — M-VIT,TX,IRON,MINS/CALC/FOLIC 27MG-0.4MG
1 TABLET ORAL DAILY
Status: DISCONTINUED | OUTPATIENT
Start: 2019-05-28 | End: 2019-05-29 | Stop reason: HOSPADM

## 2019-05-28 RX ORDER — LOSARTAN POTASSIUM 50 MG/1
100 TABLET ORAL DAILY
Status: DISCONTINUED | OUTPATIENT
Start: 2019-05-28 | End: 2019-05-29 | Stop reason: HOSPADM

## 2019-05-28 RX ORDER — HEPARIN SODIUM 1000 [USP'U]/ML
80 INJECTION, SOLUTION INTRAVENOUS; SUBCUTANEOUS PRN
Status: DISCONTINUED | OUTPATIENT
Start: 2019-05-28 | End: 2019-05-29 | Stop reason: HOSPADM

## 2019-05-28 RX ADMIN — HEPARIN SODIUM 12 UNITS/KG/HR: 10000 INJECTION, SOLUTION INTRAVENOUS at 16:20

## 2019-05-28 RX ADMIN — LOSARTAN POTASSIUM 100 MG: 50 TABLET, FILM COATED ORAL at 09:29

## 2019-05-28 RX ADMIN — ASPIRIN 81 MG 81 MG: 81 TABLET ORAL at 09:29

## 2019-05-28 RX ADMIN — HEPARIN SODIUM 15 UNITS/KG/HR: 10000 INJECTION, SOLUTION INTRAVENOUS at 13:57

## 2019-05-28 RX ADMIN — MULTIPLE VITAMINS W/ MINERALS TAB 1 TABLET: TAB at 09:29

## 2019-05-28 RX ADMIN — PRAVASTATIN SODIUM 10 MG: 20 TABLET ORAL at 17:23

## 2019-05-28 RX ADMIN — CHLORTHALIDONE 25 MG: 25 TABLET ORAL at 09:29

## 2019-05-28 ASSESSMENT — PAIN SCALES - GENERAL
PAINLEVEL_OUTOF10: 9
PAINLEVEL_OUTOF10: 0
PAINLEVEL_OUTOF10: 9
PAINLEVEL_OUTOF10: 0

## 2019-05-28 ASSESSMENT — PAIN DESCRIPTION - DESCRIPTORS: DESCRIPTORS: ACHING;DISCOMFORT;NAGGING

## 2019-05-28 ASSESSMENT — PAIN DESCRIPTION - ONSET: ONSET: ON-GOING

## 2019-05-28 ASSESSMENT — PAIN DESCRIPTION - LOCATION: LOCATION: LEG

## 2019-05-28 ASSESSMENT — PAIN DESCRIPTION - FREQUENCY: FREQUENCY: INTERMITTENT

## 2019-05-28 NOTE — CONSULTS
Vascular Surgery Consultation Note    Reason for Consult:  DVT    HPI :    This is a 70 y.o. female who is admitted to the hospital for treatment of DVT and abnormal CTA. She states that for the last week she has been having SOTO and then 3 days ago she stared noticing LLE>RLE pitting edema. She was found to have a thrombus in her left common femoral and superficial femoral veins she then had a CTA and had an incidental finding of a filling defect in the distal third of her aortic arch . She denies chest pain, SOB, history of MI, A-fib or stroke. She has not traveled lately and states she is usually active. She denies smoking or alcohol use. PMH include HTN, and sleep apnea. Vascular surgery is consulted for evaluation and treatment the filling defect in the distal third of the aortic arch. She denies other complaints.        ROS : Negative if blank [], Positive if [x]  General Vascular   [] Fevers [] Claudication (Blocks)   [] Chills [] Rest Pain   [x] Weight Loss (trying to lose weight) [] Tissue Loss   [] Chest Pain [] Clotting Disorder    [] SOB at rest [] Leg Swelling   [] SOB with exertion [x] DVT/PE      [] Nausea    [] Vomitting [] Stroke/TIA   [] Abdominal Pain [] Focal weakness   [] Melena [] Slurred Speech   [] Hematochezia [] Vision Changes   [] Hematuria    [] Dysuria [] Hx of Central Catheters   [] Wears Glasses/Contacts  [] Dialysis and If so date initiated   [] Blindness    No prior dialysis    [] Difficulty swallowing        Past Medical History:   Diagnosis Date    Anxiety     Family history of early CAD     Hyperlipemia     Hypertension     Lightheadedness     Sleep apnea     SOBOE (shortness of breath on exertion)         Past Surgical History:   Procedure Laterality Date    BREAST SURGERY  2014    biopsy    CARDIOVASCULAR STRESS TEST      COLONOSCOPY      CYST REMOVAL  05/2017    mouth/under tongue    HYSTERECTOMY  1990    KNEE ARTHROSCOPY  2005    LEG SURGERY  1963    ligament right-sided or left-sided pain or discomfort other than her recent DVT. She denies any history of trauma. She denies any history of cellulitis. She denies any history of any long car or plane rides. Gen.: She is alert she's oriented she has a questions appropriately. She is morbidly obese. Skin: Is warm and dry no changes in turgor no jaundice no sclera icterus. HEENT: Head is normocephalic atraumatic trachea is midline no jugular venous distention no carotid bruits  Cardiac: Is currently regular rate and rhythm no murmur rub or gallop  Pulmonary: Clear to auscultation bilaterally no wheezes rales or rhonchi  Extremities: Motor and sensation are intact. Left extremity demonstrates swelling to Brother contralateral right side. It is soft it is nontender. She has palpable dorsalis pedis and posterior tibials bilaterally are symmetric at 3+. Upper extremities demonstrate palpable brachial radial pulses. I personally reviewed the ultrasound. She has a DVT of the left femoral vein. I've also reviewed the CT angiography. This was initially evaluated for PE. She has a very faint intraluminal defect otherwise it does not appear to be any gross dissection. The defect is in the center of the lumen. The rest of the aorta appears essentially unremarkable. When I talked with her she denied any trauma any falls any car accidents any history of chest pain or shortness of breath. Assessment and plan. 1 DVT. At this point she's been placed on anticoagulation I recommend anticoagulation for 6 months. I will leave this at the discretion of the primary care physician to place her on oral anticoagulants of their choice. #2 thoracic aorta filling defect. I would recommend repeat the CT scan in 3 months    Continue with blood pressure control and medical management. We will see her in the office in 3 months with a repeat CT scan I discussed this case with cardiothoracic surgery.       Josefa Witt M.D. 5/28/2019

## 2019-05-28 NOTE — CONSULTS
MD Fran   Stopped at 05/28/19 0750       Past Medical History:  Past Medical History:   Diagnosis Date    Anxiety     Family history of early CAD     Hyperlipemia     Hypertension     Lightheadedness     Sleep apnea     SOBOE (shortness of breath on exertion)        Past Surgical History:  Past Surgical History:   Procedure Laterality Date    BREAST SURGERY  2014    biopsy    CARDIOVASCULAR STRESS TEST      COLONOSCOPY      CYST REMOVAL  05/2017    mouth/under tongue    HYSTERECTOMY  1990    KNEE ARTHROSCOPY  2005    LEG SURGERY  1963    ligament repair    SALIVARY GLAND SURGERY Left 05/24/2017    TINO AND BSO      age 19 for menometrorrhagia    TONSILLECTOMY      childhood       Social History:  Social History     Socioeconomic History    Marital status:      Spouse name: Not on file    Number of children: Not on file    Years of education: Not on file    Highest education level: Not on file   Occupational History    Occupation: maintenance- housekeeping     Comment: [employer]   Social Needs    Financial resource strain: Not on file    Food insecurity:     Worry: Not on file     Inability: Not on file    Transportation needs:     Medical: Not on file     Non-medical: Not on file   Tobacco Use    Smoking status: Never Smoker    Smokeless tobacco: Never Used   Substance and Sexual Activity    Alcohol use: No    Drug use: No    Sexual activity: Not on file   Lifestyle    Physical activity:     Days per week: Not on file     Minutes per session: Not on file    Stress: Not on file   Relationships    Social connections:     Talks on phone: Not on file     Gets together: Not on file     Attends Yazdanism service: Not on file     Active member of club or organization: Not on file     Attends meetings of clubs or organizations: Not on file     Relationship status: Not on file    Intimate partner violence:     Fear of current or ex partner: Not on file     Emotionally abused: lesions. Assessment: Localized intimal flap        Plan: This is a very subtle finding (I can only see it in the coronals if I adjust the contrast) and she is asymptomatic. Her ascending aorta is pristine. I see no signs of dissection. I would defer to vascular surgery who manages the descending aorta here and recommend blood pressure control and beta blockade.       Electronically signed by Wojciech Velarde MD on 5/28/2019 at 8:37 AM

## 2019-05-28 NOTE — PROGRESS NOTES
2132  Called [facility] for transfer to [facility] for Dr. Sachin Connolly for Thoracic aorta abnormality.

## 2019-05-28 NOTE — PROGRESS NOTES
1048 [facility] called with an bed assignment at this time, patient going to room 7413A call Nurse to Nurse to [facility]

## 2019-05-28 NOTE — PROGRESS NOTES
Consult sent to Dr. Jannie Miranda for vascular surgery and Dr. Anisha Vilchis for cardiothoracic surgery. Both via perfect [facility].

## 2019-05-28 NOTE — PROGRESS NOTES
This nurse spoke to pt's son who requested updates on pt condition. This nurse explained that per consults, they are planning anticoagulation therapy. Pt's son became extremely agitated, stating his mother needed a heart doctor because \"her aorta is completely blocked. \" This nurse attempted to explain that pt had received vascular consults d/t their specialization in the vascular system. Pt's son stated that he disagrees with both of the physician's opinions and he is requesting another vascular consult. Nidia Myers RN  7:02 PM    This nurse spoke to pt who stated that she is entirely comfortable with both physicians' opinions and she does not want a second opinion and that Juliann Connor is not her POA and she is capable of making her medical decisions. Son updated per pt's request. Son, Juliann Connor stated \"She will be getting a second opinion I'm calling her right now. She doesn't have a choice. \"  Nidia Myers RN  7:08 PM

## 2019-05-28 NOTE — H&P
Hospital Medicine History & Physical      PCP: David Cheng MD    Date of Admission: 5/28/2019    Date of Service: 5-28-19    Chief Complaint:  Sob/leg edema      History Of Present Illness:     71 yo female hx anxiety htn hlp [facility] came to OSH with 1 wk of SOB SOTO and 3 days of left leg edema/pain, found to have LLE DVT and cta chest done showed negative PE but showed abnormality of thoracic aorta, sent here for vasc/thoraic eval, pt is on RA, no distress, EKG NSR , cxr clear, LLE DVT positive at left comm fem/superficial fem veins    Past Medical History:          Diagnosis Date    Anxiety     Family history of early CAD     Hyperlipemia     Hypertension     Lightheadedness     Sleep apnea     SOBOE (shortness of breath on exertion)        Past Surgical History:          Procedure Laterality Date    BREAST SURGERY  2014    biopsy    CARDIOVASCULAR STRESS TEST      COLONOSCOPY      CYST REMOVAL  05/2017    mouth/under tongue    HYSTERECTOMY  1990    KNEE ARTHROSCOPY  2005    LEG SURGERY  1963    ligament repair    SALIVARY GLAND SURGERY Left 05/24/2017    TINO AND BSO      age 19 for menometrorrhagia    TONSILLECTOMY      childhood       Medications Prior to Admission:      Prior to Admission medications    Medication Sig Start Date End Date Taking?  Authorizing Provider   Handicap Placard MISC by Does not apply route Patient cannot walk 200 ft without stopping to rest.    Expiration 5/2021 5/21/19  Yes David Cheng MD   pravastatin (PRAVACHOL) 10 MG tablet TAKE 1 TABLET BY MOUTH EVERY EVENING 4/24/19  Yes David Cheng MD   losartan (COZAAR) 100 MG tablet TAKE 1 TABLET BY MOUTH DAILY 3/21/19  Yes David Cheng MD   chlorthalidone (HYGROTON) 25 MG tablet TAKE 1 TABLET BY MOUTH DAILY 3/21/19  Yes David Cheng MD   aspirin 81 MG tablet Take 81 mg by mouth daily   Yes Historical Provider, MD   CPAP Machine MISC Please provide patient with an auto CPAP with ranges 5-15 cm water pressure with C-Flex of 3 and heated humidification. Please fax download after 2 weeks. Please also provide mask, tubing, filters, head gear, and water chambers. Dx:JES  Orders faxed to [facility]  Length of need 99 months 10/24/16  Yes Alonso Hernandez MD   meclizine (ANTIVERT) 25 MG tablet TK 1 T PO TID PRN 6/6/16  Yes Historical Provider, MD   therapeutic multivitamin-minerals (THERAGRAN-M) tablet Take 1 tablet by mouth daily. Yes Historical Provider, MD       Allergies:  Penicillins and Tape [adhesive tape]    Social History:      The patient currently lives at home    TOBACCO:   reports that she has never smoked. She has never used smokeless tobacco.  ETOH:   reports that she does not drink alcohol. Family History:       Reviewed in detail and negative for DM, CAD, Cancer, CVA. Positive as follows:        Problem Relation Age of Onset   Miller Pacemaker Mother     COPD Mother     Heart Failure Mother     Cancer Father         prostate    Hypertension Father     Hypertension Sister     Heart Disease Brother        REVIEW OF SYSTEMS:   Pertinent positives as noted in the HPI. All other systems reviewed and negative. PHYSICAL EXAM PERFORMED:    BP (!) 150/67   Pulse 82   Temp 97.8 °F (36.6 °C) (Temporal)   Resp 16   Ht 5' 5\" (1.651 m)   Wt 262 lb 7 oz (119 kg)   SpO2 94%   BMI 43.67 kg/m²     General appearance:  No apparent distress, appears stated age and cooperative. HEENT:  Normal cephalic, atraumatic without obvious deformity. Pupils equal, round, and reactive to light. Extra ocular muscles intact. Conjunctivae/corneas clear. Neck: Supple, with full range of motion. No jugular venous distention. Trachea midline. Respiratory:  Normal respiratory effort. Clear to auscultation, bilaterally without Rales/Wheezes/Rhonchi. Cardiovascular:  Regular rate and rhythm with normal S1/S2 without murmurs, rubs or gallops. Abdomen: Soft, non-tender, non-distended with normal bowel sounds.   Musculoskeletal:

## 2019-05-29 VITALS
RESPIRATION RATE: 17 BRPM | TEMPERATURE: 97.5 F | SYSTOLIC BLOOD PRESSURE: 108 MMHG | DIASTOLIC BLOOD PRESSURE: 57 MMHG | OXYGEN SATURATION: 95 % | WEIGHT: 264.3 LBS | HEART RATE: 86 BPM | BODY MASS INDEX: 44.03 KG/M2 | HEIGHT: 65 IN

## 2019-05-29 LAB
ANION GAP SERPL CALCULATED.3IONS-SCNC: 13 MMOL/L (ref 7–16)
APTT: 77.8 SEC (ref 24.5–35.1)
BASOPHILS ABSOLUTE: 0.05 E9/L (ref 0–0.2)
BASOPHILS RELATIVE PERCENT: 0.5 % (ref 0–2)
BUN BLDV-MCNC: 28 MG/DL (ref 8–23)
CALCIUM SERPL-MCNC: 9.3 MG/DL (ref 8.6–10.2)
CHLORIDE BLD-SCNC: 97 MMOL/L (ref 98–107)
CO2: 28 MMOL/L (ref 22–29)
CREAT SERPL-MCNC: 1.1 MG/DL (ref 0.5–1)
EOSINOPHILS ABSOLUTE: 0.12 E9/L (ref 0.05–0.5)
EOSINOPHILS RELATIVE PERCENT: 1.1 % (ref 0–6)
GFR AFRICAN AMERICAN: >60
GFR NON-AFRICAN AMERICAN: 50 ML/MIN/1.73
GLUCOSE BLD-MCNC: 128 MG/DL (ref 74–99)
HCT VFR BLD CALC: 37.4 % (ref 34–48)
HEMOGLOBIN: 12.1 G/DL (ref 11.5–15.5)
IMMATURE GRANULOCYTES #: 0.06 E9/L
IMMATURE GRANULOCYTES %: 0.6 % (ref 0–5)
LYMPHOCYTES ABSOLUTE: 3.19 E9/L (ref 1.5–4)
LYMPHOCYTES RELATIVE PERCENT: 29.4 % (ref 20–42)
MAGNESIUM: 1.5 MG/DL (ref 1.6–2.6)
MCH RBC QN AUTO: 29.9 PG (ref 26–35)
MCHC RBC AUTO-ENTMCNC: 32.4 % (ref 32–34.5)
MCV RBC AUTO: 92.3 FL (ref 80–99.9)
MONOCYTES ABSOLUTE: 0.82 E9/L (ref 0.1–0.95)
MONOCYTES RELATIVE PERCENT: 7.6 % (ref 2–12)
NEUTROPHILS ABSOLUTE: 6.6 E9/L (ref 1.8–7.3)
NEUTROPHILS RELATIVE PERCENT: 60.8 % (ref 43–80)
PDW BLD-RTO: 14 FL (ref 11.5–15)
PLATELET # BLD: 293 E9/L (ref 130–450)
PMV BLD AUTO: 10.5 FL (ref 7–12)
POTASSIUM REFLEX MAGNESIUM: 3.1 MMOL/L (ref 3.5–5)
RBC # BLD: 4.05 E12/L (ref 3.5–5.5)
SODIUM BLD-SCNC: 138 MMOL/L (ref 132–146)
WBC # BLD: 10.8 E9/L (ref 4.5–11.5)

## 2019-05-29 PROCEDURE — 36415 COLL VENOUS BLD VENIPUNCTURE: CPT

## 2019-05-29 PROCEDURE — 6370000000 HC RX 637 (ALT 250 FOR IP): Performed by: HOSPITALIST

## 2019-05-29 PROCEDURE — 80048 BASIC METABOLIC PNL TOTAL CA: CPT

## 2019-05-29 PROCEDURE — 6360000002 HC RX W HCPCS: Performed by: HOSPITALIST

## 2019-05-29 PROCEDURE — 85025 COMPLETE CBC W/AUTO DIFF WBC: CPT

## 2019-05-29 PROCEDURE — 83735 ASSAY OF MAGNESIUM: CPT

## 2019-05-29 PROCEDURE — 85730 THROMBOPLASTIN TIME PARTIAL: CPT

## 2019-05-29 RX ORDER — METOPROLOL SUCCINATE 25 MG/1
12.5 TABLET, EXTENDED RELEASE ORAL DAILY
Qty: 15 TABLET | Refills: 0 | Status: SHIPPED | OUTPATIENT
Start: 2019-05-29 | End: 2019-05-30 | Stop reason: SDUPTHER

## 2019-05-29 RX ADMIN — ASPIRIN 81 MG 81 MG: 81 TABLET ORAL at 08:43

## 2019-05-29 RX ADMIN — CHLORTHALIDONE 25 MG: 25 TABLET ORAL at 08:42

## 2019-05-29 RX ADMIN — MULTIPLE VITAMINS W/ MINERALS TAB 1 TABLET: TAB at 08:43

## 2019-05-29 RX ADMIN — HEPARIN SODIUM 12 UNITS/KG/HR: 10000 INJECTION, SOLUTION INTRAVENOUS at 08:46

## 2019-05-29 RX ADMIN — LOSARTAN POTASSIUM 100 MG: 50 TABLET, FILM COATED ORAL at 08:43

## 2019-05-29 ASSESSMENT — PAIN SCALES - GENERAL
PAINLEVEL_OUTOF10: 0
PAINLEVEL_OUTOF10: 0

## 2019-05-29 NOTE — PROGRESS NOTES
Called Lab at [facility] at 2130 Lab was not drawn. Recalled at 01-15-22-57 for Draw.

## 2019-05-29 NOTE — CARE COORDINATION
Met with pt and nephew at bedside with attending. Pt from home, denies DME needs, nephew at bedside to provide transportation home today, vouchers provided to charge [employer]. Per Dr. Pranav Hebert discharge home today, with no needs.

## 2019-05-29 NOTE — DISCHARGE SUMMARY
Hospital Medicine Discharge Summary    Patient ID: Manfred Miller      Patient's PCP: Elizabeth Whitmore MD    Admit Date: 5/28/2019     Discharge Date:   05/29/19    Admitting Physician: Lasha Pate MD     Discharge Physician: Raza Barrett MD     Discharge Diagnoses: Active Hospital Problems    Diagnosis Date Noted    Acute deep vein thrombosis (DVT) of distal end of left lower extremity ([address] [I82.4Z2] 05/28/2019    Acute deep vein thrombosis (DVT) of left lower extremity ([address] [I82.402] 05/27/2019       The patient was seen and examined on day of discharge and this discharge summary is in conjunction with any daily progress note from day of discharge. Admission HPI: 69 yo female hx anxiety htn hlp cyrus came to OSH with 1 wk of SOB SOTO and 3 days of left leg edema/pain, found to have LLE DVT and cta chest done showed negative PE but showed abnormality of thoracic aorta, sent here for vasc/thoraic eval, pt is on RA, no distress, EKG NSR , cxr clear, LLE DVT positive at left comm fem/superficial fem veins        Hospital Course:   Ms. Manfred Miller, a 70y.o. year old female  who  has a past medical history of Anxiety, Family history of early CAD, Hyperlipemia, Hypertension, Lightheadedness, Sleep apnea, and SOBOE (shortness of breath on exertion). During the course the patient's hospital stay   69 yo female hx anxiety htn hlp cyrus came to OSH with 1 wk of SOB SOTO and 3 days of left leg edema/pain, found to have LLE DVT and cta chest done showed negative PE but showed abnormality of thoracic aorta, sent here for vasc/thoraic eval, pt is on RA, no distress, EKG NSR , cxr clear, LLE DVT positive at left comm fem/superficial fem veins    VASCULAR SURGERY - thoracic aorta filling defect.  I would recommend repeat the CT scan in 3 months  CTS - No signs of dissection, no intervention needed    Of note as per nursing note patients singh Parra wanted her to have a second opinion, and as per nursing note - nurse spoke to pt who stated that she is entirely comfortable with both physicians' opinions and she does not want a second opinion and that Kameron Kurtz is not her POA and she is capable of making her medical decisions. Son updated per pt's request. Son, Kameron Kurtz stated \"She will be getting a second opinion I'm calling her right now. She doesn't have a choice. \"    Stable at time of discharge to home. Consults:     IP CONSULT TO CARDIOTHORACIC SURGERY  IP CONSULT TO VASCULAR SURGERY    Significant Diagnostic Studies:  As above      Discharge Instructions/Follow-up:  As above       Activity: activity as tolerated    Physical Exam:  Vitals:    05/29/19 0830   BP: (!) 108/57   Pulse: 86   Resp: 17   Temp: 97.5 °F (36.4 °C)   SpO2: 95%       General appearance:  No apparent distress, appears stated age and cooperative. HEENT:  Normal cephalic, atraumatic without obvious deformity. Pupils equal, round, and reactive to light. Extra ocular muscles intact. Conjunctivae/corneas clear. Neck: Supple, with full range of motion. No jugular venous distention. Trachea midline. Respiratory:  Normal respiratory effort. Clear to auscultation, bilaterally without Rales/Wheezes/Rhonchi. Cardiovascular:  Regular rate and rhythm with normal S1/S2 without murmurs, rubs or gallops. Abdomen: Soft, non-tender, non-distended with normal bowel sounds. Musculoskeletal:  No clubbing, cyanosis or edema bilaterally. Full range of motion without deformity. Skin: Skin color, texture, turgor normal.  No rashes or lesions. Neurologic:  Neurovascularly intact without any focal sensory/motor deficits. Cranial nerves: II-XII intact, grossly non-focal.  Psychiatric:  Alert and oriented, thought content appropriate, normal insight      Labs:  For convenience and continuity at follow-up the following most recent labs are provided:      CBC:    Lab Results   Component Value Date    WBC 9.8 05/27/2019    HGB 13.5 05/27/2019 have any questions or concerns please feel free to contact me. NOTE: This report was transcribed using voice recognition software. Every effort was made to ensure accuracy; however, inadvertent computerized transcription errors may be present.

## 2019-05-30 ENCOUNTER — OFFICE VISIT (OUTPATIENT)
Dept: FAMILY MEDICINE CLINIC | Age: 64
End: 2019-05-30
Payer: COMMERCIAL

## 2019-05-30 VITALS
DIASTOLIC BLOOD PRESSURE: 66 MMHG | WEIGHT: 266 LBS | HEART RATE: 78 BPM | OXYGEN SATURATION: 98 % | SYSTOLIC BLOOD PRESSURE: 124 MMHG | HEIGHT: 65 IN | BODY MASS INDEX: 44.32 KG/M2 | RESPIRATION RATE: 18 BRPM

## 2019-05-30 DIAGNOSIS — I10 ESSENTIAL HYPERTENSION: Chronic | ICD-10-CM

## 2019-05-30 DIAGNOSIS — I82.402 ACUTE DEEP VEIN THROMBOSIS (DVT) OF LEFT LOWER EXTREMITY, UNSPECIFIED VEIN (HCC): Primary | ICD-10-CM

## 2019-05-30 DIAGNOSIS — M71.22 BAKER CYST, LEFT: ICD-10-CM

## 2019-05-30 PROCEDURE — 99495 TRANSJ CARE MGMT MOD F2F 14D: CPT | Performed by: FAMILY MEDICINE

## 2019-05-30 RX ORDER — METOPROLOL SUCCINATE 25 MG/1
12.5 TABLET, EXTENDED RELEASE ORAL DAILY
Qty: 30 TABLET | Refills: 3 | Status: SHIPPED | OUTPATIENT
Start: 2019-05-30 | End: 2019-07-02 | Stop reason: SDUPTHER

## 2019-05-30 RX ORDER — LOSARTAN POTASSIUM 100 MG/1
100 TABLET ORAL DAILY
Qty: 30 TABLET | Refills: 3 | Status: SHIPPED | OUTPATIENT
Start: 2019-05-30 | End: 2019-07-29 | Stop reason: SDUPTHER

## 2019-05-30 RX ORDER — CHLORTHALIDONE 25 MG/1
25 TABLET ORAL DAILY
Qty: 30 TABLET | Refills: 3 | Status: SHIPPED | OUTPATIENT
Start: 2019-05-30 | End: 2019-07-29 | Stop reason: SDUPTHER

## 2019-05-30 RX ORDER — LOSARTAN POTASSIUM 100 MG/1
100 TABLET ORAL DAILY
COMMUNITY
End: 2019-05-30 | Stop reason: SDUPTHER

## 2019-05-30 RX ORDER — PRAVASTATIN SODIUM 10 MG
10 TABLET ORAL EVERY EVENING
Qty: 30 TABLET | Refills: 3 | Status: SHIPPED | OUTPATIENT
Start: 2019-05-30 | End: 2019-07-29 | Stop reason: SDUPTHER

## 2019-05-30 ASSESSMENT — PATIENT HEALTH QUESTIONNAIRE - PHQ9
SUM OF ALL RESPONSES TO PHQ QUESTIONS 1-9: 2
1. LITTLE INTEREST OR PLEASURE IN DOING THINGS: 2
SUM OF ALL RESPONSES TO PHQ QUESTIONS 1-9: 2
2. FEELING DOWN, DEPRESSED OR HOPELESS: 0
SUM OF ALL RESPONSES TO PHQ9 QUESTIONS 1 & 2: 2

## 2019-05-30 NOTE — LETTER
[facility] [address] [address]  Phone: [phone]  Fax: [phone]    Sandra Franco MD        May 30, 2019     Patient: Brad You   YOB: 1955   Date of Visit: 5/30/2019       To Whom It May Concern: It is my medical opinion that Brad You may return to work on 5/30/2019 with the following restrictions: no ladders . If you have any questions or concerns, please don't hesitate to call.     Sincerely,        Sandra Franco MD

## 2019-05-30 NOTE — PROGRESS NOTES
Post-Discharge Transitional Care Management Services      Jonas Quinn   YOB: 1955    Date of Visit:  5/30/2019    Allergies   Allergen Reactions    Penicillins Hives and Rash    Tape Meredith Spindle Tape] Hives     Outpatient Medications Marked as Taking for the 5/30/19 encounter (Office Visit) with Barrera Springer MD   Medication Sig Dispense Refill    losartan (COZAAR) 100 MG tablet Take 100 mg by mouth daily      rivaroxaban (XARELTO) 10 MG TABS tablet Take 1 tablet by mouth daily (with breakfast) 30 tablet 1    metoprolol succinate (TOPROL XL) 25 MG extended release tablet Take 0.5 tablets by mouth daily 15 tablet 0    Handicap Placard MISC by Does not apply route Patient cannot walk 200 ft without stopping to rest.    Expiration 5/2021 1 each 0    pravastatin (PRAVACHOL) 10 MG tablet TAKE 1 TABLET BY MOUTH EVERY EVENING 30 tablet 0    chlorthalidone (HYGROTON) 25 MG tablet TAKE 1 TABLET BY MOUTH DAILY 30 tablet 2    aspirin 81 MG tablet Take 81 mg by mouth daily      therapeutic multivitamin-minerals (THERAGRAN-M) tablet Take 1 tablet by mouth daily. Vitals:    05/30/19 0917   BP: 124/66   Pulse: 78   Resp: 18   SpO2: 98%   Weight: 266 lb (120.7 kg)   Height: 5' 5\" (1.651 m)     Body mass index is 44.26 kg/m². Wt Readings from Last 3 Encounters:   05/30/19 266 lb (120.7 kg)   05/29/19 264 lb 4.8 oz (119.9 kg)   05/27/19 264 lb (119.7 kg)     BP Readings from Last 3 Encounters:   05/30/19 124/66   05/29/19 (!) 108/57   05/27/19 (!) 141/85        Patient was admitted to [facility] from 5/28/19 to 5/29/2019 for acute DVT of left leg. There was also concern about a possible aneurysm of aorta - thoracic surgery and vascular surgery were consulted. She was started on xarelto for anticoagulation. She was also started on metoprolol xl to assist with blood pressure control in setting of possible aneurysm.       Inpatient course: Discharge summary reviewed- see

## 2019-06-07 ENCOUNTER — OFFICE VISIT (OUTPATIENT)
Dept: ORTHOPEDIC SURGERY | Age: 64
End: 2019-06-07
Payer: COMMERCIAL

## 2019-06-07 VITALS
WEIGHT: 265 LBS | DIASTOLIC BLOOD PRESSURE: 69 MMHG | BODY MASS INDEX: 44.15 KG/M2 | HEIGHT: 65 IN | SYSTOLIC BLOOD PRESSURE: 110 MMHG | HEART RATE: 71 BPM

## 2019-06-07 DIAGNOSIS — M25.562 LEFT KNEE PAIN, UNSPECIFIED CHRONICITY: Primary | ICD-10-CM

## 2019-06-07 PROCEDURE — 99213 OFFICE O/P EST LOW 20 MIN: CPT | Performed by: ORTHOPAEDIC SURGERY

## 2019-06-07 PROCEDURE — 20610 DRAIN/INJ JOINT/BURSA W/O US: CPT | Performed by: ORTHOPAEDIC SURGERY

## 2019-06-07 RX ORDER — TRIAMCINOLONE ACETONIDE 40 MG/ML
40 INJECTION, SUSPENSION INTRA-ARTICULAR; INTRAMUSCULAR ONCE
Status: COMPLETED | OUTPATIENT
Start: 2019-06-07 | End: 2019-06-07

## 2019-06-07 RX ORDER — LIDOCAINE HYDROCHLORIDE 10 MG/ML
4 INJECTION, SOLUTION INFILTRATION; PERINEURAL ONCE
Status: COMPLETED | OUTPATIENT
Start: 2019-06-07 | End: 2019-06-07

## 2019-06-07 RX ADMIN — LIDOCAINE HYDROCHLORIDE 4 ML: 10 INJECTION, SOLUTION INFILTRATION; PERINEURAL at 11:25

## 2019-06-07 RX ADMIN — TRIAMCINOLONE ACETONIDE 40 MG: 40 INJECTION, SUSPENSION INTRA-ARTICULAR; INTRAMUSCULAR at 11:25

## 2019-06-07 NOTE — PROGRESS NOTES
Follow Up Visit     Sandra Rojo returns today for follow up visit regarding Right knee osteoarthritis mainly of the medial compartment where she is bone-on-bone. We last saw her about 2 years ago and injected her knee. This gave her relief for a short period. She reports her pain has now returned. She complains of posterior knee pain. She was recently in hospital for acute DVT or left lower extremity and now on medication. Physical Exam:     Height: 5' 5\" (1.651 m), Weight: 265 lb (120.2 kg), BP: 110/69    On exam the knee, there is tenderness along the medial joint line. Range of motion is about 5-100°. There is mild stiffness. The knee is stable    Controlled Substances Monitoring:      Imaging:  X-rays of the knee including 4 views of the right knee were obtained today. These show medial compartment bone-on-bone arthritis. Impression: Right knee bone-on-bone arthritis    Procedure Note: Knee Cortisone injection     The right knee was identified as the injection site. The risk and benefits of a cortisone injection were explained and the patient consented to the injection. Under sterile conditions, the knee was injected with a mixture of 40 mg of Kenalog and 4 mL of 1% Lidocaine without complication. A sterile bandage was applied. Administrations This Visit     lidocaine 1 % injection 4 mL     Admin Date  06/07/2019 Action  Given Dose  4 mL Route  Intra-articular Administered By  Ileana Bryson RN          triamcinolone acetonide VIA [facility]) injection 40 mg     Admin Date  06/07/2019 Action  Given Dose  40 mg Route  Intra-articular Administered By  Ileana Bryson RN                    Assessment: Right knee osteoarthritis      Plan:   We discussed her knee today. She would like to try another steroid shot. She continues to work on weight loss but her BMI is 44. She would not be a good candidate for knee replacement without losing a sufficient amount of weight.   She understands. She would like to hold off on surgery for as long as possible.   We will see her back as needed if her pain returns    Garett Jade MD  Orthopaedic Surgery   6/7/19  10:21 AM

## 2019-06-11 ENCOUNTER — PATIENT MESSAGE (OUTPATIENT)
Dept: FAMILY MEDICINE CLINIC | Age: 64
End: 2019-06-11

## 2019-06-11 RX ORDER — MECLIZINE HYDROCHLORIDE 25 MG/1
TABLET ORAL
Qty: 30 TABLET | Refills: 1 | Status: SHIPPED | OUTPATIENT
Start: 2019-06-11 | End: 2019-07-28 | Stop reason: SDUPTHER

## 2019-06-11 NOTE — TELEPHONE ENCOUNTER
From: Verneta Schwab  To:  Jarad Bartlett MD  Sent: 6/11/2019 3:16 PM EDT  Subject: Prescription Question    I dont know the name u could ask the doctor name

## 2019-06-21 ENCOUNTER — HOSPITAL ENCOUNTER (EMERGENCY)
Age: 64
Discharge: HOME | End: 2019-06-21
Attending: EMERGENCY MEDICINE
Payer: COMMERCIAL

## 2019-06-21 VITALS
DIASTOLIC BLOOD PRESSURE: 64 MMHG | BODY MASS INDEX: 41.65 KG/M2 | WEIGHT: 250 LBS | TEMPERATURE: 98.3 F | SYSTOLIC BLOOD PRESSURE: 102 MMHG | HEART RATE: 74 BPM | RESPIRATION RATE: 16 BRPM | OXYGEN SATURATION: 96 % | HEIGHT: 65 IN

## 2019-06-21 DIAGNOSIS — M79.89 LEG SWELLING: Primary | ICD-10-CM

## 2019-06-21 PROCEDURE — 99283 EMERGENCY DEPT VISIT LOW MDM: CPT

## 2019-06-21 PROCEDURE — 6370000000 HC RX 637 (ALT 250 FOR IP): Performed by: EMERGENCY MEDICINE

## 2019-06-21 RX ORDER — NAPROXEN 250 MG/1
500 TABLET ORAL ONCE
Status: COMPLETED | OUTPATIENT
Start: 2019-06-21 | End: 2019-06-21

## 2019-06-21 RX ORDER — NAPROXEN 500 MG/1
500 TABLET ORAL 2 TIMES DAILY WITH MEALS
Qty: 14 TABLET | Refills: 3 | Status: SHIPPED | OUTPATIENT
Start: 2019-06-21 | End: 2019-07-23 | Stop reason: SDUPTHER

## 2019-06-21 RX ADMIN — NAPROXEN 500 MG: 250 TABLET ORAL at 17:37

## 2019-06-21 ASSESSMENT — ENCOUNTER SYMPTOMS
EYE DISCHARGE: 0
SINUS PRESSURE: 0
COUGH: 0
SHORTNESS OF BREATH: 0
WHEEZING: 0
DIARRHEA: 0
VOMITING: 0
SORE THROAT: 0
EYE PAIN: 0
EYE REDNESS: 0
BACK PAIN: 0
ABDOMINAL DISTENTION: 0
NAUSEA: 0

## 2019-06-21 ASSESSMENT — PAIN DESCRIPTION - LOCATION: LOCATION: ANKLE;FOOT

## 2019-06-21 ASSESSMENT — PAIN DESCRIPTION - FREQUENCY: FREQUENCY: CONTINUOUS

## 2019-06-21 ASSESSMENT — PAIN SCALES - GENERAL
PAINLEVEL_OUTOF10: 8
PAINLEVEL_OUTOF10: 8

## 2019-06-21 ASSESSMENT — PAIN DESCRIPTION - DESCRIPTORS: DESCRIPTORS: THROBBING

## 2019-06-21 ASSESSMENT — PAIN DESCRIPTION - PAIN TYPE: TYPE: ACUTE PAIN

## 2019-06-21 ASSESSMENT — PAIN DESCRIPTION - ORIENTATION: ORIENTATION: LEFT

## 2019-06-21 NOTE — ED PROVIDER NOTES
smokeless tobacco. She reports that she does not drink alcohol or use drugs. Family History: family history includes COPD in her mother; Cancer in her father; Heart Disease in her brother; Heart Failure in her mother; Hypertension in her father and sister; Pacemaker in her mother. The patients home medications have been reviewed. Allergies: Penicillins and Tape [adhesive tape]    -------------------------------------------------- RESULTS -------------------------------------------------  Labs:  No results found for this visit on 06/21/19. Radiology:  No orders to display       ------------------------- NURSING NOTES AND VITALS REVIEWED ---------------------------  Date / Time Roomed:  6/21/2019  5:01 PM  ED Bed Assignment:  24/24    The nursing notes within the ED encounter and vital signs as below have been reviewed. BP (!) 112/59   Pulse 66   Temp 98.3 °F (36.8 °C)   Resp 16   Ht 5' 5\" (1.651 m)   Wt 250 lb (113.4 kg)   SpO2 96%   BMI 41.60 kg/m²   Oxygen Saturation Interpretation: Normal      ------------------------------------------ PROGRESS NOTES ------------------------------------------         5:48 PM  I have spoken with the patient and discussed todays results, in addition to providing specific details for the plan of care and counseling regarding the diagnosis and prognosis. Their questions are answered at this time and they are agreeable with the plan. I discussed at length with them reasons for immediate return here for re evaluation. They will followup with their primary care physician by calling their office on Monday.      --------------------------------- ADDITIONAL PROVIDER NOTES ---------------------------------  At this time the patient is without objective evidence of an acute process requiring hospitalization or inpatient management. They have remained hemodynamically stable throughout their entire ED visit and are stable for discharge with outpatient follow-up. The plan has been discussed in detail and they are aware of the specific conditions for emergent return, as well as the importance of follow-up. New Prescriptions    NAPROXEN (NAPROXEN) 500 MG EC TABLET    Take 1 tablet by mouth 2 times daily (with meals)       Diagnosis:  1. Leg swelling        Disposition:  Patient's disposition: Discharge to home  Patient's condition is stable. NOTE: This report was transcribed using voice recognition software. Every effort was made to ensure accuracy; however, inadvertent computerized transcription errors may be present.             George Hamm,   Resident  06/21/19 0635

## 2019-06-21 NOTE — CARE COORDINATION
6/21/2019 Introduced myself to patient and described my role as a . The patient recently discharged to home on 5/28/2019. She is presenting to the ER with leg edema today. She is currently on an anticoagulant Xarelto for a previous DVT in her left leg. She continues to drive. She denies using [facility] or [facility] in the past. She is independent in mobility and has a ramp and a shower chair in her home. She denies any other needs at this time. She sees Dr Barrera Springer as her PCP. She goes to [facility] on [address] [place] [address] for her prescriptions. (PS)

## 2019-06-29 ENCOUNTER — PATIENT MESSAGE (OUTPATIENT)
Dept: FAMILY MEDICINE CLINIC | Age: 64
End: 2019-06-29

## 2019-06-29 DIAGNOSIS — I10 ESSENTIAL HYPERTENSION: Primary | Chronic | ICD-10-CM

## 2019-07-01 NOTE — TELEPHONE ENCOUNTER
From: Rosemarie Morales  To: Timbo Silveira MD  Sent: 6/29/2019 9:30 AM EDT  Subject: Prescription Question    Do l have to take this pill metoprolol er succinate 25 mg half.  If so l need a refill

## 2019-07-02 RX ORDER — METOPROLOL SUCCINATE 25 MG/1
12.5 TABLET, EXTENDED RELEASE ORAL DAILY
Qty: 30 TABLET | Refills: 1 | Status: SHIPPED | OUTPATIENT
Start: 2019-07-02 | End: 2019-07-29 | Stop reason: SDUPTHER

## 2019-07-05 ENCOUNTER — OFFICE VISIT (OUTPATIENT)
Dept: FAMILY MEDICINE CLINIC | Age: 64
End: 2019-07-05
Payer: COMMERCIAL

## 2019-07-05 VITALS
OXYGEN SATURATION: 98 % | RESPIRATION RATE: 18 BRPM | HEART RATE: 80 BPM | WEIGHT: 266 LBS | HEIGHT: 65 IN | BODY MASS INDEX: 44.32 KG/M2 | TEMPERATURE: 97.8 F | DIASTOLIC BLOOD PRESSURE: 86 MMHG | SYSTOLIC BLOOD PRESSURE: 136 MMHG

## 2019-07-05 DIAGNOSIS — R60.0 EDEMA OF LEFT LOWER EXTREMITY: Primary | ICD-10-CM

## 2019-07-05 PROCEDURE — 99213 OFFICE O/P EST LOW 20 MIN: CPT | Performed by: FAMILY MEDICINE

## 2019-07-05 ASSESSMENT — ENCOUNTER SYMPTOMS
DIARRHEA: 0
WHEEZING: 0
ABDOMINAL PAIN: 0
SHORTNESS OF BREATH: 0
BLOOD IN STOOL: 0
NAUSEA: 0
COUGH: 0
VOMITING: 0
CONSTIPATION: 0

## 2019-07-05 NOTE — PROGRESS NOTES
[facility]  Family Medicine Residency Program  Phone: [phone]  Fax: [phone]    Patient:  Wanda Weber 70 y.o. female                                 Date of Service: 7/5/19                            Chief Complaint:   Chief Complaint   Patient presents with   Ronit Christine     went to ed, right leg edematous, and hurting    Edema     left foot         History of Present Illness: The patient is a 70 y.o. female who presents to the clinic today for follow-up from ED. 1. F/u from ED  - Was in ED on 6/21/19 for swelling of left leg, hx of DVT in left leg. No diagnosis of DVT. Was given Naproxen 500 mg BID PRN for pain. - Denies fevers, chills, erythema, pain, edema, shortness of breath, chest pain, palpitations. - Able to ambulate without difficulty     Review of Systems:   Review of Systems   Constitutional: Negative for chills and fever. Respiratory: Negative for cough, shortness of breath and wheezing. Cardiovascular: Negative for chest pain, palpitations and leg swelling. Gastrointestinal: Negative for abdominal pain, blood in stool, constipation, diarrhea, nausea and vomiting.        Past Medical History:      Diagnosis Date    Anxiety     Deep vein blood clot of left lower extremity ([address] 05/27/2019    Family history of early CAD     Hyperlipemia     Hypertension     Lightheadedness     Sleep apnea     SOBOE (shortness of breath on exertion)        Past Surgical History:        Procedure Laterality Date    BREAST SURGERY  2014    biopsy    CARDIOVASCULAR STRESS TEST      COLONOSCOPY      CYST REMOVAL  05/2017    mouth/under tongue    HYSTERECTOMY  1990    KNEE ARTHROSCOPY  2005    LEG SURGERY  1963    ligament repair    SALIVARY GLAND SURGERY Left 05/24/2017    TINO AND BSO      age 19 for menometrorrhagia    TONSILLECTOMY      childhood       Allergies:    Penicillins and Tape [adhesive tape]    Social History:   Social History kg/m²      General Appearance: Well-developed. Awake and alert. In no acute distress. Lungs: Clear to auscultation bilaterally. Respirations unlabored. No rhonchi/wheezing/rales. Heart: RRR. Normal S1 and S2. No murmur. Abdomen: Bowel sounds normoactive. Soft. Nontender. Nondistended. Extremities:  No edema, erythema, or warmth. Mild ecchymosis distal to right patella. Bilateral lower extremities nontender to palpation. Psychiatric: Normal mood and affect. Behavior is normal. Thought content appropriate. Speech is normal.       Assessment and Plan:         1. Edema of left lower extremity  Resolved. Continue Naproxen PRN for pain. Encouraged patient to try Tylenol PRN for mild pain to avoid GI complications. Follow-up with Dr. Norma Dominguez at August appointment. Return to Office: Return in about 8 weeks (around 8/27/2019) for follow-up with Dr. Norma Dominguez . Medication List:    Current Outpatient Medications   Medication Sig Dispense Refill    metoprolol succinate (TOPROL XL) 25 MG extended release tablet Take 0.5 tablets by mouth daily 30 tablet 1    naproxen (NAPROXEN) 500 MG EC tablet Take 1 tablet by mouth 2 times daily (with meals) 14 tablet 3    meclizine (ANTIVERT) 25 MG tablet TK 1 T PO TID PRN vertigo 30 tablet 1    chlorthalidone (HYGROTON) 25 MG tablet Take 1 tablet by mouth daily 30 tablet 3    pravastatin (PRAVACHOL) 10 MG tablet Take 1 tablet by mouth every evening 30 tablet 3    losartan (COZAAR) 100 MG tablet Take 1 tablet by mouth daily 30 tablet 3    rivaroxaban (XARELTO) 10 MG TABS tablet Take 1 tablet by mouth daily (with breakfast) 30 tablet 1    Handicap Placard MISC by Does not apply route Patient cannot walk 200 ft without stopping to rest.    Expiration 5/2021 1 each 0    aspirin 81 MG tablet Take 81 mg by mouth daily      therapeutic multivitamin-minerals (THERAGRAN-M) tablet Take 1 tablet by mouth daily. No current facility-administered medications for this visit.

## 2019-07-05 NOTE — PROGRESS NOTES
S: 70 y.o. female with   Chief Complaint   Patient presents with   Silva Nguyen     went to ed, right leg edematous, and hurting    Edema     left foot       Pt is here because of swelling in her leg and follow up from the ED. BP Readings from Last 3 Encounters:   07/05/19 136/86   06/21/19 102/64   06/07/19 110/69       O: VS:  height is 5' 5\" (1.651 m) and weight is 266 lb (120.7 kg). Her oral temperature is 97.8 °F (36.6 °C). Her blood pressure is 136/86 and her pulse is 80. Her respiration is 18 and oxygen saturation is 98%. As per resident note. Impression/Plan:   1) leg swelling - pt reassured. Ed to use tylenol if possible instead of NSAID. Health Maintenance Due   Topic Date Due    Hepatitis C screen  1955    HIV screen  08/31/1968    Diabetes screen  08/31/1993    Annual Wellness Visit (AWV)  08/31/2016    Shingles Vaccine (2 of 3) 09/26/2017    Pneumococcal 65+ years Vaccine (1 of 2 - PCV13) 08/31/2018         Attending Physician Statement  I have discussed the case, including pertinent history and exam findings with the resident. I agree with the documented assessment and plan.       Kasie Medina MD

## 2019-07-21 ENCOUNTER — HOSPITAL ENCOUNTER (EMERGENCY)
Age: 64
Discharge: HOME | End: 2019-07-21
Attending: EMERGENCY MEDICINE
Payer: COMMERCIAL

## 2019-07-21 ENCOUNTER — APPOINTMENT (OUTPATIENT)
Dept: GENERAL RADIOLOGY | Age: 64
End: 2019-07-21
Payer: COMMERCIAL

## 2019-07-21 ENCOUNTER — APPOINTMENT (OUTPATIENT)
Dept: ULTRASOUND IMAGING | Age: 64
End: 2019-07-21
Payer: COMMERCIAL

## 2019-07-21 VITALS
DIASTOLIC BLOOD PRESSURE: 72 MMHG | WEIGHT: 266 LBS | BODY MASS INDEX: 44.32 KG/M2 | TEMPERATURE: 98.2 F | SYSTOLIC BLOOD PRESSURE: 123 MMHG | HEART RATE: 69 BPM | HEIGHT: 65 IN | OXYGEN SATURATION: 99 % | RESPIRATION RATE: 16 BRPM

## 2019-07-21 DIAGNOSIS — M25.572 ARTHRALGIA OF ANKLE, LEFT: ICD-10-CM

## 2019-07-21 DIAGNOSIS — R60.9 PERIPHERAL EDEMA: Primary | ICD-10-CM

## 2019-07-21 DIAGNOSIS — I87.2 VENOUS INSUFFICIENCY: ICD-10-CM

## 2019-07-21 LAB
ANION GAP SERPL CALCULATED.3IONS-SCNC: 12 MMOL/L (ref 7–16)
BASOPHILS ABSOLUTE: 0.09 E9/L (ref 0–0.2)
BASOPHILS RELATIVE PERCENT: 0.7 % (ref 0–2)
BUN BLDV-MCNC: 29 MG/DL (ref 8–23)
CALCIUM SERPL-MCNC: 9.8 MG/DL (ref 8.6–10.2)
CHLORIDE BLD-SCNC: 98 MMOL/L (ref 98–107)
CO2: 27 MMOL/L (ref 22–29)
CREAT SERPL-MCNC: 0.9 MG/DL (ref 0.5–1)
EOSINOPHILS ABSOLUTE: 0.22 E9/L (ref 0.05–0.5)
EOSINOPHILS RELATIVE PERCENT: 1.8 % (ref 0–6)
GFR AFRICAN AMERICAN: >60
GFR NON-AFRICAN AMERICAN: >60 ML/MIN/1.73
GLUCOSE BLD-MCNC: 96 MG/DL (ref 74–99)
HCT VFR BLD CALC: 40.1 % (ref 34–48)
HEMOGLOBIN: 13 G/DL (ref 11.5–15.5)
IMMATURE GRANULOCYTES #: 0.07 E9/L
IMMATURE GRANULOCYTES %: 0.6 % (ref 0–5)
LYMPHOCYTES ABSOLUTE: 3.47 E9/L (ref 1.5–4)
LYMPHOCYTES RELATIVE PERCENT: 28.2 % (ref 20–42)
MCH RBC QN AUTO: 30.2 PG (ref 26–35)
MCHC RBC AUTO-ENTMCNC: 32.4 % (ref 32–34.5)
MCV RBC AUTO: 93 FL (ref 80–99.9)
MONOCYTES ABSOLUTE: 1.12 E9/L (ref 0.1–0.95)
MONOCYTES RELATIVE PERCENT: 9.1 % (ref 2–12)
NEUTROPHILS ABSOLUTE: 7.35 E9/L (ref 1.8–7.3)
NEUTROPHILS RELATIVE PERCENT: 59.6 % (ref 43–80)
PDW BLD-RTO: 14.3 FL (ref 11.5–15)
PLATELET # BLD: 336 E9/L (ref 130–450)
PMV BLD AUTO: 10.2 FL (ref 7–12)
POTASSIUM SERPL-SCNC: 4.3 MMOL/L (ref 3.5–5)
RBC # BLD: 4.31 E12/L (ref 3.5–5.5)
SODIUM BLD-SCNC: 137 MMOL/L (ref 132–146)
TSH SERPL DL<=0.05 MIU/L-ACNC: 0.53 UIU/ML (ref 0.27–4.2)
WBC # BLD: 12.3 E9/L (ref 4.5–11.5)

## 2019-07-21 PROCEDURE — 99284 EMERGENCY DEPT VISIT MOD MDM: CPT

## 2019-07-21 PROCEDURE — 80048 BASIC METABOLIC PNL TOTAL CA: CPT

## 2019-07-21 PROCEDURE — 6370000000 HC RX 637 (ALT 250 FOR IP): Performed by: EMERGENCY MEDICINE

## 2019-07-21 PROCEDURE — 84443 ASSAY THYROID STIM HORMONE: CPT

## 2019-07-21 PROCEDURE — 85025 COMPLETE CBC W/AUTO DIFF WBC: CPT

## 2019-07-21 PROCEDURE — 73590 X-RAY EXAM OF LOWER LEG: CPT

## 2019-07-21 PROCEDURE — 93970 EXTREMITY STUDY: CPT

## 2019-07-21 RX ORDER — TRAMADOL HYDROCHLORIDE 50 MG/1
50 TABLET ORAL EVERY 8 HOURS PRN
Qty: 6 TABLET | Refills: 0 | Status: SHIPPED | OUTPATIENT
Start: 2019-07-21 | End: 2019-07-26

## 2019-07-21 RX ORDER — POTASSIUM CHLORIDE 20 MEQ/1
20 TABLET, EXTENDED RELEASE ORAL 2 TIMES DAILY
Qty: 12 TABLET | Refills: 0 | Status: SHIPPED | OUTPATIENT
Start: 2019-07-21 | End: 2019-08-27

## 2019-07-21 RX ORDER — FUROSEMIDE 40 MG/1
40 TABLET ORAL DAILY
Qty: 6 TABLET | Refills: 0 | Status: SHIPPED | OUTPATIENT
Start: 2019-07-21 | End: 2019-07-21 | Stop reason: SDUPTHER

## 2019-07-21 RX ORDER — ACETAMINOPHEN 500 MG
1000 TABLET ORAL ONCE
Status: COMPLETED | OUTPATIENT
Start: 2019-07-21 | End: 2019-07-21

## 2019-07-21 RX ORDER — FUROSEMIDE 40 MG/1
40 TABLET ORAL DAILY
Qty: 6 TABLET | Refills: 0 | Status: SHIPPED | OUTPATIENT
Start: 2019-07-21 | End: 2019-08-27

## 2019-07-21 RX ORDER — POTASSIUM CHLORIDE 20 MEQ/1
40 TABLET, EXTENDED RELEASE ORAL DAILY
Status: DISCONTINUED | OUTPATIENT
Start: 2019-07-22 | End: 2019-07-22 | Stop reason: HOSPADM

## 2019-07-21 RX ADMIN — ACETAMINOPHEN 1000 MG: 500 TABLET ORAL at 22:27

## 2019-07-21 ASSESSMENT — PAIN DESCRIPTION - FREQUENCY: FREQUENCY: CONTINUOUS

## 2019-07-21 ASSESSMENT — PAIN SCALES - GENERAL
PAINLEVEL_OUTOF10: 6
PAINLEVEL_OUTOF10: 6

## 2019-07-21 ASSESSMENT — PAIN DESCRIPTION - PROGRESSION: CLINICAL_PROGRESSION: NOT CHANGED

## 2019-07-21 ASSESSMENT — PAIN DESCRIPTION - PAIN TYPE: TYPE: ACUTE PAIN

## 2019-07-21 ASSESSMENT — PAIN DESCRIPTION - DESCRIPTORS: DESCRIPTORS: ACHING;CONSTANT;DISCOMFORT

## 2019-07-21 ASSESSMENT — PAIN DESCRIPTION - LOCATION: LOCATION: LEG

## 2019-07-21 ASSESSMENT — PAIN DESCRIPTION - ORIENTATION: ORIENTATION: RIGHT;LEFT

## 2019-07-21 ASSESSMENT — PAIN DESCRIPTION - ONSET: ONSET: ON-GOING

## 2019-07-22 ENCOUNTER — TELEPHONE (OUTPATIENT)
Dept: FAMILY MEDICINE CLINIC | Age: 64
End: 2019-07-22

## 2019-07-22 DIAGNOSIS — I82.4Z2 ACUTE DEEP VEIN THROMBOSIS (DVT) OF DISTAL END OF LEFT LOWER EXTREMITY (HCC): Primary | ICD-10-CM

## 2019-07-22 NOTE — TELEPHONE ENCOUNTER
Message from patient and hospital .  Patient was d/c'd  from ER yesterday needing an order for arterial studies with Dr. Dandre Ritchie in vascular surgery-only a note was on the d/c paperwork and no order    1 \"Follow up with Farida Castellanos MD (Family Medicine) in 3 days (7/24/2019)   2 Call Berto Woodall MD (Vascular Surgery) in 1 day (7/22/2019); (VASCULAR SURGEON) For follow up appointment RE: Peripheral Artery Disease and Poor Circulation \"

## 2019-07-24 ENCOUNTER — OFFICE VISIT (OUTPATIENT)
Dept: VASCULAR SURGERY | Age: 64
End: 2019-07-24
Payer: COMMERCIAL

## 2019-07-24 VITALS — SYSTOLIC BLOOD PRESSURE: 114 MMHG | DIASTOLIC BLOOD PRESSURE: 66 MMHG | HEART RATE: 80 BPM

## 2019-07-24 DIAGNOSIS — I71.019 DISSECTION OF THORACIC AORTA: ICD-10-CM

## 2019-07-24 DIAGNOSIS — I71.019 THORACIC AORTIC DISSECTION: ICD-10-CM

## 2019-07-24 DIAGNOSIS — I82.4Z2 ACUTE DEEP VEIN THROMBOSIS (DVT) OF DISTAL END OF LEFT LOWER EXTREMITY (HCC): ICD-10-CM

## 2019-07-24 DIAGNOSIS — I82.492 ACUTE DEEP VEIN THROMBOSIS (DVT) OF OTHER SPECIFIED VEIN OF LEFT LOWER EXTREMITY (HCC): Primary | ICD-10-CM

## 2019-07-24 PROCEDURE — 99213 OFFICE O/P EST LOW 20 MIN: CPT | Performed by: SURGERY

## 2019-07-28 ENCOUNTER — PATIENT MESSAGE (OUTPATIENT)
Dept: FAMILY MEDICINE CLINIC | Age: 64
End: 2019-07-28

## 2019-07-28 DIAGNOSIS — I10 ESSENTIAL HYPERTENSION: Chronic | ICD-10-CM

## 2019-07-28 DIAGNOSIS — E78.00 PURE HYPERCHOLESTEROLEMIA: Primary | Chronic | ICD-10-CM

## 2019-07-28 RX ORDER — METOPROLOL SUCCINATE 25 MG/1
12.5 TABLET, EXTENDED RELEASE ORAL DAILY
Qty: 30 TABLET | Refills: 1 | Status: CANCELLED | OUTPATIENT
Start: 2019-07-28 | End: 2019-08-27

## 2019-07-28 RX ORDER — CHLORTHALIDONE 25 MG/1
25 TABLET ORAL DAILY
Qty: 30 TABLET | Refills: 3 | Status: CANCELLED | OUTPATIENT
Start: 2019-07-28

## 2019-07-28 RX ORDER — PRAVASTATIN SODIUM 10 MG
10 TABLET ORAL EVERY EVENING
Qty: 30 TABLET | Refills: 3 | Status: CANCELLED | OUTPATIENT
Start: 2019-07-28

## 2019-07-29 RX ORDER — CHLORTHALIDONE 25 MG/1
25 TABLET ORAL DAILY
Qty: 30 TABLET | Refills: 5 | Status: SHIPPED | OUTPATIENT
Start: 2019-07-29 | End: 2019-12-16 | Stop reason: SDUPTHER

## 2019-07-29 RX ORDER — METOPROLOL SUCCINATE 25 MG/1
12.5 TABLET, EXTENDED RELEASE ORAL DAILY
Qty: 30 TABLET | Refills: 5 | Status: SHIPPED | OUTPATIENT
Start: 2019-07-29 | End: 2019-10-01

## 2019-07-29 RX ORDER — PRAVASTATIN SODIUM 10 MG
10 TABLET ORAL EVERY EVENING
Qty: 30 TABLET | Refills: 5 | Status: SHIPPED | OUTPATIENT
Start: 2019-07-29 | End: 2020-01-20

## 2019-07-29 RX ORDER — LOSARTAN POTASSIUM 100 MG/1
100 TABLET ORAL DAILY
Qty: 30 TABLET | Refills: 5 | Status: SHIPPED | OUTPATIENT
Start: 2019-07-29 | End: 2019-09-24 | Stop reason: DRUGHIGH

## 2019-07-29 RX ORDER — MECLIZINE HYDROCHLORIDE 25 MG/1
TABLET ORAL
Qty: 30 TABLET | Refills: 1 | Status: SHIPPED
Start: 2019-07-29 | End: 2020-02-21 | Stop reason: ALTCHOICE

## 2019-08-01 ENCOUNTER — TELEPHONE (OUTPATIENT)
Dept: VASCULAR SURGERY | Age: 64
End: 2019-08-01

## 2019-08-06 ENCOUNTER — HOSPITAL ENCOUNTER (OUTPATIENT)
Dept: CT IMAGING | Age: 64
Discharge: HOME | End: 2019-08-08
Payer: COMMERCIAL

## 2019-08-06 DIAGNOSIS — I82.492 ACUTE DEEP VEIN THROMBOSIS (DVT) OF OTHER SPECIFIED VEIN OF LEFT LOWER EXTREMITY (HCC): ICD-10-CM

## 2019-08-06 DIAGNOSIS — I82.4Z2 ACUTE DEEP VEIN THROMBOSIS (DVT) OF DISTAL END OF LEFT LOWER EXTREMITY (HCC): ICD-10-CM

## 2019-08-06 PROCEDURE — 71275 CT ANGIOGRAPHY CHEST: CPT

## 2019-08-06 PROCEDURE — 2580000003 HC RX 258: Performed by: RADIOLOGY

## 2019-08-06 PROCEDURE — 74174 CTA ABD&PLVS W/CONTRAST: CPT

## 2019-08-06 PROCEDURE — 6360000004 HC RX CONTRAST MEDICATION: Performed by: RADIOLOGY

## 2019-08-06 RX ORDER — SODIUM CHLORIDE 0.9 % (FLUSH) 0.9 %
10 SYRINGE (ML) INJECTION
Status: COMPLETED | OUTPATIENT
Start: 2019-08-06 | End: 2019-08-06

## 2019-08-06 RX ADMIN — Medication 10 ML: at 13:07

## 2019-08-06 RX ADMIN — IOPAMIDOL 100 ML: 755 INJECTION, SOLUTION INTRAVENOUS at 13:06

## 2019-08-08 ENCOUNTER — TELEPHONE (OUTPATIENT)
Dept: VASCULAR SURGERY | Age: 64
End: 2019-08-08

## 2019-08-27 ENCOUNTER — OFFICE VISIT (OUTPATIENT)
Dept: ORTHOPEDIC SURGERY | Age: 64
End: 2019-08-27
Payer: COMMERCIAL

## 2019-08-27 VITALS — DIASTOLIC BLOOD PRESSURE: 78 MMHG | SYSTOLIC BLOOD PRESSURE: 117 MMHG | HEART RATE: 68 BPM

## 2019-08-27 DIAGNOSIS — M25.562 PAIN IN BOTH KNEES, UNSPECIFIED CHRONICITY: ICD-10-CM

## 2019-08-27 DIAGNOSIS — M25.561 PAIN IN BOTH KNEES, UNSPECIFIED CHRONICITY: ICD-10-CM

## 2019-08-27 DIAGNOSIS — M25.562 LEFT KNEE PAIN, UNSPECIFIED CHRONICITY: Primary | ICD-10-CM

## 2019-08-27 DIAGNOSIS — M17.11 OSTEOARTHRITIS OF RIGHT KNEE, UNSPECIFIED OSTEOARTHRITIS TYPE: ICD-10-CM

## 2019-08-27 PROCEDURE — 99213 OFFICE O/P EST LOW 20 MIN: CPT | Performed by: ORTHOPAEDIC SURGERY

## 2019-08-27 RX ORDER — NAPROXEN 500 MG/1
TABLET ORAL
Refills: 3 | COMMUNITY
Start: 2019-08-06 | End: 2019-09-24

## 2019-09-02 ENCOUNTER — HOSPITAL ENCOUNTER (EMERGENCY)
Age: 64
Discharge: HOME | End: 2019-09-02
Payer: COMMERCIAL

## 2019-09-02 VITALS
SYSTOLIC BLOOD PRESSURE: 139 MMHG | RESPIRATION RATE: 16 BRPM | WEIGHT: 264 LBS | OXYGEN SATURATION: 96 % | BODY MASS INDEX: 43.99 KG/M2 | HEART RATE: 75 BPM | DIASTOLIC BLOOD PRESSURE: 68 MMHG | TEMPERATURE: 98 F | HEIGHT: 65 IN

## 2019-09-02 DIAGNOSIS — S39.012A STRAIN OF LUMBAR REGION, INITIAL ENCOUNTER: Primary | ICD-10-CM
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
## 2019-09-02 PROCEDURE — 6370000000 HC RX 637 (ALT 250 FOR IP): Performed by: PHYSICIAN ASSISTANT

## 2019-09-02 PROCEDURE — 99283 EMERGENCY DEPT VISIT LOW MDM: CPT

## 2019-09-02 RX ORDER — ACETAMINOPHEN 325 MG/1
650 TABLET ORAL ONCE
Status: COMPLETED | OUTPATIENT
Start: 2019-09-02 | End: 2019-09-02

## 2019-09-02 RX ADMIN — ACETAMINOPHEN 650 MG: 325 TABLET ORAL at 22:39

## 2019-09-02 ASSESSMENT — PAIN SCALES - GENERAL: PAINLEVEL_OUTOF10: 8
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
## 2019-09-24 ENCOUNTER — OFFICE VISIT (OUTPATIENT)
Dept: FAMILY MEDICINE CLINIC | Age: 64
End: 2019-09-24
Payer: COMMERCIAL

## 2019-09-24 VITALS
RESPIRATION RATE: 16 BRPM | WEIGHT: 264 LBS | HEIGHT: 65 IN | BODY MASS INDEX: 43.99 KG/M2 | SYSTOLIC BLOOD PRESSURE: 92 MMHG | DIASTOLIC BLOOD PRESSURE: 62 MMHG | OXYGEN SATURATION: 98 % | HEART RATE: 67 BPM

## 2019-09-24 DIAGNOSIS — I10 ESSENTIAL HYPERTENSION: Primary | ICD-10-CM

## 2019-09-24 DIAGNOSIS — G47.33 OSA ON CPAP: ICD-10-CM

## 2019-09-24 DIAGNOSIS — I82.492 ACUTE DEEP VEIN THROMBOSIS (DVT) OF OTHER SPECIFIED VEIN OF LEFT LOWER EXTREMITY (HCC): ICD-10-CM

## 2019-09-24 DIAGNOSIS — Z99.89 OSA ON CPAP: ICD-10-CM

## 2019-09-24 PROCEDURE — 99213 OFFICE O/P EST LOW 20 MIN: CPT | Performed by: FAMILY MEDICINE

## 2019-09-24 RX ORDER — LOSARTAN POTASSIUM 50 MG/1
50 TABLET ORAL DAILY
Qty: 30 TABLET | Refills: 2 | Status: SHIPPED | OUTPATIENT
Start: 2019-09-24 | End: 2019-12-17 | Stop reason: SDUPTHER

## 2019-09-24 NOTE — PROGRESS NOTES
daily (with breakfast) 30 tablet 2    losartan (COZAAR) 100 MG tablet Take 1 tablet by mouth daily 30 tablet 5    metoprolol succinate (TOPROL XL) 25 MG extended release tablet Take 0.5 tablets by mouth daily 30 tablet 5    pravastatin (PRAVACHOL) 10 MG tablet Take 1 tablet by mouth every evening 30 tablet 5    chlorthalidone (HYGROTON) 25 MG tablet Take 1 tablet by mouth daily 30 tablet 5    Handicap Placard MISC by Does not apply route Patient cannot walk 200 ft without stopping to rest.    Expiration 5/2021 1 each 0    therapeutic multivitamin-minerals (THERAGRAN-M) tablet Take 1 tablet by mouth daily. No current facility-administered medications for this visit.          Past Medical/Surgical Hx;  Reviewed with patient         Diagnosis Date    Anxiety     Deep vein blood clot of left lower extremity ([address] 05/27/2019    Family history of early CAD     Hyperlipemia     Hypertension     Lightheadedness     Sleep apnea     SOBOE (shortness of breath on exertion)      Past Surgical History:   Procedure Laterality Date    BREAST SURGERY  2014    biopsy    CARDIOVASCULAR STRESS TEST      COLONOSCOPY      CYST REMOVAL  05/2017    mouth/under tongue    HYSTERECTOMY  1990    KNEE ARTHROSCOPY  2005    LEG SURGERY  1963    ligament repair    SALIVARY GLAND SURGERY Left 05/24/2017    TINO AND BSO      age 19 for menometrorrhagia    TONSILLECTOMY      childhood       Past Family Hx:  Reviewed with patient      Problem Relation Age of Onset   [facility] Pacemaker Mother     COPD Mother     Heart Failure Mother     Cancer Father         prostate    Hypertension Father     Hypertension Sister     Heart Disease Brother        Social Hx:  Reviewed with patient  Social History     Tobacco Use    Smoking status: Never Smoker    Smokeless tobacco: Never Used   Substance Use Topics    Alcohol use: No       Immunization History   Administered Date(s) Administered    Influenza Vaccine, unspecified

## 2019-09-25 ENCOUNTER — TELEPHONE (OUTPATIENT)
Dept: ONCOLOGY | Age: 64
End: 2019-09-25

## 2019-09-25 ENCOUNTER — TELEPHONE (OUTPATIENT)
Dept: FAMILY MEDICINE CLINIC | Age: 64
End: 2019-09-25

## 2019-09-25 DIAGNOSIS — I10 ESSENTIAL HYPERTENSION: Primary | ICD-10-CM

## 2019-09-25 PROBLEM — I82.4Z2 ACUTE DEEP VEIN THROMBOSIS (DVT) OF DISTAL END OF LEFT LOWER EXTREMITY (HCC): Chronic | Status: ACTIVE | Noted: 2019-05-28

## 2019-09-25 RX ORDER — BLOOD PRESSURE TEST KIT
1 KIT MISCELLANEOUS DAILY
Qty: 1 KIT | Refills: 0 | Status: SHIPPED | OUTPATIENT
Start: 2019-09-25 | End: 2019-11-15

## 2019-09-25 ASSESSMENT — ENCOUNTER SYMPTOMS
CONSTIPATION: 0
WHEEZING: 0
DIARRHEA: 0
SHORTNESS OF BREATH: 0

## 2019-09-25 NOTE — TELEPHONE ENCOUNTER
Left message for Piper Luna to call in for appt info.   *She is scheduled with Dr Diogenes Valerio on 10/11/19 @ 3pm.

## 2019-10-01 ENCOUNTER — OFFICE VISIT (OUTPATIENT)
Dept: FAMILY MEDICINE CLINIC | Age: 64
End: 2019-10-01
Payer: COMMERCIAL

## 2019-10-01 VITALS
HEIGHT: 65 IN | WEIGHT: 264 LBS | DIASTOLIC BLOOD PRESSURE: 72 MMHG | BODY MASS INDEX: 43.99 KG/M2 | SYSTOLIC BLOOD PRESSURE: 118 MMHG | HEART RATE: 75 BPM | TEMPERATURE: 98.1 F | OXYGEN SATURATION: 97 %

## 2019-10-01 DIAGNOSIS — J32.9 SINOBRONCHITIS: Primary | ICD-10-CM

## 2019-10-01 DIAGNOSIS — J40 SINOBRONCHITIS: Primary | ICD-10-CM

## 2019-10-01 PROCEDURE — 99213 OFFICE O/P EST LOW 20 MIN: CPT | Performed by: PHYSICIAN ASSISTANT

## 2019-10-01 RX ORDER — AZITHROMYCIN 250 MG/1
TABLET, FILM COATED ORAL
Qty: 1 PACKET | Refills: 0 | Status: SHIPPED | OUTPATIENT
Start: 2019-10-01 | End: 2019-11-01 | Stop reason: ALTCHOICE

## 2019-10-01 RX ORDER — BENZONATATE 100 MG/1
100 CAPSULE ORAL 3 TIMES DAILY PRN
Qty: 30 CAPSULE | Refills: 0 | Status: SHIPPED | OUTPATIENT
Start: 2019-10-01 | End: 2019-10-11

## 2019-10-03 ENCOUNTER — TELEPHONE (OUTPATIENT)
Dept: ORTHOPEDIC SURGERY | Age: 64
End: 2019-10-03

## 2019-10-11 ENCOUNTER — HOSPITAL ENCOUNTER (OUTPATIENT)
Dept: INFUSION THERAPY | Age: 64
Discharge: HOME | End: 2019-10-11
Payer: COMMERCIAL

## 2019-10-11 ENCOUNTER — OFFICE VISIT (OUTPATIENT)
Dept: ONCOLOGY | Age: 64
End: 2019-10-11
Payer: COMMERCIAL

## 2019-10-11 VITALS
DIASTOLIC BLOOD PRESSURE: 74 MMHG | HEART RATE: 72 BPM | HEIGHT: 65 IN | SYSTOLIC BLOOD PRESSURE: 150 MMHG | BODY MASS INDEX: 44.2 KG/M2 | WEIGHT: 265.3 LBS | TEMPERATURE: 97.6 F

## 2019-10-11 DIAGNOSIS — I82.4Z2 ACUTE DEEP VEIN THROMBOSIS (DVT) OF DISTAL END OF LEFT LOWER EXTREMITY (HCC): Chronic | ICD-10-CM

## 2019-10-11 DIAGNOSIS — I82.4Z2 ACUTE DEEP VEIN THROMBOSIS (DVT) OF DISTAL END OF LEFT LOWER EXTREMITY (HCC): Primary | Chronic | ICD-10-CM

## 2019-10-11 LAB — D DIMER: 207 NG/ML DDU

## 2019-10-11 PROCEDURE — 85305 CLOT INHIBIT PROT S TOTAL: CPT

## 2019-10-11 PROCEDURE — 36415 COLL VENOUS BLD VENIPUNCTURE: CPT | Performed by: INTERNAL MEDICINE

## 2019-10-11 PROCEDURE — 85613 RUSSELL VIPER VENOM DILUTED: CPT

## 2019-10-11 PROCEDURE — 99214 OFFICE O/P EST MOD 30 MIN: CPT | Performed by: INTERNAL MEDICINE

## 2019-10-11 PROCEDURE — 86146 BETA-2 GLYCOPROTEIN ANTIBODY: CPT

## 2019-10-11 PROCEDURE — 99205 OFFICE O/P NEW HI 60 MIN: CPT | Performed by: INTERNAL MEDICINE

## 2019-10-11 PROCEDURE — 85303 CLOT INHIBIT PROT C ACTIVITY: CPT

## 2019-10-11 PROCEDURE — 85378 FIBRIN DEGRADE SEMIQUANT: CPT

## 2019-10-11 PROCEDURE — 81240 F2 GENE: CPT

## 2019-10-11 PROCEDURE — 85300 ANTITHROMBIN III ACTIVITY: CPT

## 2019-10-11 PROCEDURE — 86147 CARDIOLIPIN ANTIBODY EA IG: CPT

## 2019-10-14 LAB
ANTICARDIOLIPIN IGA ANTIBODY: 2 APL (ref 0–11)
ANTICARDIOLIPIN IGG ANTIBODY: 0 GPL (ref 0–14)
AT-III ACTIVITY: 115 % ACTIVITY (ref 83–121)
BETA-2 GLYCOPROTEIN 1 IGG ANTIBODY: 0 SGU (ref 0–20)
BETA-2 GLYCOPROTEIN 1 IGM ANTIBODY: 2 SMU (ref 0–20)
CARDIOLIPIN AB IGM: 5 MPL (ref 0–12)
LUPUS ANTICOAG DVVT: ABNORMAL
PROTEIN C ACTIVITY: >120 % ACTIVITY (ref 68–165)
PROTEIN S, FUNCTIONAL: 146 % (ref 57–131)

## 2019-10-18 LAB
PROTHROMBIN G20210A MUTATION: ABNORMAL
PT PCR SPECIMEN: ABNORMAL

## 2019-10-21 DIAGNOSIS — I10 ESSENTIAL HYPERTENSION: Chronic | ICD-10-CM

## 2019-10-21 RX ORDER — PRAVASTATIN SODIUM 10 MG
10 TABLET ORAL EVERY EVENING
Qty: 30 TABLET | Refills: 0 | OUTPATIENT
Start: 2019-10-21

## 2019-10-21 RX ORDER — CHLORTHALIDONE 25 MG/1
25 TABLET ORAL DAILY
Qty: 30 TABLET | Refills: 0 | OUTPATIENT
Start: 2019-10-21

## 2019-10-23 RX ORDER — RIVAROXABAN 10 MG/1
TABLET, FILM COATED ORAL
Qty: 30 TABLET | Refills: 3 | Status: SHIPPED | OUTPATIENT
Start: 2019-10-23 | End: 2019-12-06 | Stop reason: ALTCHOICE

## 2019-10-24 DIAGNOSIS — I10 ESSENTIAL HYPERTENSION: Primary | ICD-10-CM

## 2019-10-25 RX ORDER — LOSARTAN POTASSIUM 100 MG/1
100 TABLET ORAL DAILY
Qty: 90 TABLET | Refills: 1 | OUTPATIENT
Start: 2019-10-25

## 2019-10-31 ENCOUNTER — TELEPHONE (OUTPATIENT)
Dept: ORTHOPEDIC SURGERY | Age: 64
End: 2019-10-31
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
## 2019-11-01 PROBLEM — S39.012A LOW BACK STRAIN: Status: ACTIVE | Noted: 2019-11-01

## 2019-11-15 ENCOUNTER — OFFICE VISIT (OUTPATIENT)
Dept: FAMILY MEDICINE CLINIC | Age: 64
End: 2019-11-15
Payer: COMMERCIAL

## 2019-11-15 ENCOUNTER — HOSPITAL ENCOUNTER (OUTPATIENT)
Age: 64
Discharge: HOME | End: 2019-11-17
Payer: COMMERCIAL

## 2019-11-15 VITALS
HEIGHT: 65 IN | RESPIRATION RATE: 16 BRPM | SYSTOLIC BLOOD PRESSURE: 123 MMHG | DIASTOLIC BLOOD PRESSURE: 78 MMHG | HEART RATE: 71 BPM | WEIGHT: 265 LBS | BODY MASS INDEX: 44.15 KG/M2

## 2019-11-15 DIAGNOSIS — E01.0 THYROMEGALY: ICD-10-CM

## 2019-11-15 DIAGNOSIS — R31.21 ASYMPTOMATIC MICROSCOPIC HEMATURIA: ICD-10-CM

## 2019-11-15 DIAGNOSIS — R35.0 URINARY FREQUENCY: ICD-10-CM

## 2019-11-15 DIAGNOSIS — R32 URINARY INCONTINENCE, UNSPECIFIED TYPE: ICD-10-CM

## 2019-11-15 DIAGNOSIS — R35.0 URINARY FREQUENCY: Primary | ICD-10-CM

## 2019-11-15 LAB
BILIRUBIN, POC: NORMAL
BLOOD URINE, POC: NORMAL
CLARITY, POC: CLEAR
COLOR, POC: YELLOW
GLUCOSE URINE, POC: NORMAL
KETONES, POC: NORMAL
LEUKOCYTE EST, POC: NORMAL
NITRITE, POC: NORMAL
PH, POC: 7
PROTEIN, POC: NORMAL
SPECIFIC GRAVITY, POC: 1.01
UROBILINOGEN, POC: 1

## 2019-11-15 PROCEDURE — 99213 OFFICE O/P EST LOW 20 MIN: CPT | Performed by: FAMILY MEDICINE

## 2019-11-15 PROCEDURE — 87147 CULTURE TYPE IMMUNOLOGIC: CPT

## 2019-11-15 PROCEDURE — 81002 URINALYSIS NONAUTO W/O SCOPE: CPT | Performed by: FAMILY MEDICINE

## 2019-11-15 PROCEDURE — 87088 URINE BACTERIA CULTURE: CPT

## 2019-11-15 PROCEDURE — 87186 SC STD MICRODIL/AGAR DIL: CPT

## 2019-11-17 LAB
ORGANISM: ABNORMAL
URINE CULTURE, ROUTINE: ABNORMAL
URINE CULTURE, ROUTINE: ABNORMAL

## 2019-11-18 ENCOUNTER — TELEPHONE (OUTPATIENT)
Dept: FAMILY MEDICINE CLINIC | Age: 64
End: 2019-11-18

## 2019-11-18 DIAGNOSIS — N30.01 ACUTE CYSTITIS WITH HEMATURIA: Primary | ICD-10-CM

## 2019-11-18 RX ORDER — CEPHALEXIN 500 MG/1
500 CAPSULE ORAL 2 TIMES DAILY
Qty: 14 CAPSULE | Refills: 0 | Status: SHIPPED | OUTPATIENT
Start: 2019-11-18 | End: 2019-11-25

## 2019-11-27 ENCOUNTER — HOSPITAL ENCOUNTER (OUTPATIENT)
Dept: ULTRASOUND IMAGING | Age: 64
Discharge: HOME | End: 2019-11-29
Payer: COMMERCIAL

## 2019-11-27 ENCOUNTER — OFFICE VISIT (OUTPATIENT)
Dept: ORTHOPEDIC SURGERY | Age: 64
End: 2019-11-27
Payer: COMMERCIAL

## 2019-11-27 VITALS — HEIGHT: 65 IN | WEIGHT: 245 LBS | BODY MASS INDEX: 40.82 KG/M2

## 2019-11-27 DIAGNOSIS — M17.11 OSTEOARTHRITIS OF RIGHT KNEE, UNSPECIFIED OSTEOARTHRITIS TYPE: Primary | ICD-10-CM

## 2019-11-27 DIAGNOSIS — E04.1 THYROID NODULE: ICD-10-CM

## 2019-11-27 DIAGNOSIS — E01.0 THYROMEGALY: ICD-10-CM

## 2019-11-27 PROCEDURE — 20610 DRAIN/INJ JOINT/BURSA W/O US: CPT | Performed by: ORTHOPAEDIC SURGERY

## 2019-11-27 PROCEDURE — 76536 US EXAM OF HEAD AND NECK: CPT

## 2019-11-27 RX ORDER — TRIAMCINOLONE ACETONIDE 40 MG/ML
40 INJECTION, SUSPENSION INTRA-ARTICULAR; INTRAMUSCULAR ONCE
Status: COMPLETED | OUTPATIENT
Start: 2019-11-27 | End: 2019-11-27

## 2019-11-27 RX ORDER — LIDOCAINE HYDROCHLORIDE 10 MG/ML
4 INJECTION, SOLUTION INFILTRATION; PERINEURAL ONCE
Status: COMPLETED | OUTPATIENT
Start: 2019-11-27 | End: 2019-11-27

## 2019-11-27 RX ADMIN — TRIAMCINOLONE ACETONIDE 40 MG: 40 INJECTION, SUSPENSION INTRA-ARTICULAR; INTRAMUSCULAR at 13:37

## 2019-11-27 RX ADMIN — LIDOCAINE HYDROCHLORIDE 4 ML: 10 INJECTION, SOLUTION INFILTRATION; PERINEURAL at 13:36

## 2019-12-01 PROBLEM — E04.1 LEFT THYROID NODULE: Status: ACTIVE | Noted: 2019-12-01

## 2019-12-06 ENCOUNTER — HOSPITAL ENCOUNTER (OUTPATIENT)
Dept: INFUSION THERAPY | Age: 64
Discharge: HOME | End: 2019-12-06
Payer: COMMERCIAL

## 2019-12-06 ENCOUNTER — TELEPHONE (OUTPATIENT)
Dept: FAMILY MEDICINE CLINIC | Age: 64
End: 2019-12-06

## 2019-12-06 ENCOUNTER — OFFICE VISIT (OUTPATIENT)
Dept: ONCOLOGY | Age: 64
End: 2019-12-06
Payer: COMMERCIAL

## 2019-12-06 VITALS
SYSTOLIC BLOOD PRESSURE: 126 MMHG | TEMPERATURE: 97.8 F | DIASTOLIC BLOOD PRESSURE: 60 MMHG | OXYGEN SATURATION: 96 % | WEIGHT: 258.1 LBS | HEART RATE: 75 BPM | HEIGHT: 65 IN | BODY MASS INDEX: 43 KG/M2

## 2019-12-06 DIAGNOSIS — I82.492 ACUTE DEEP VEIN THROMBOSIS (DVT) OF OTHER SPECIFIED VEIN OF LEFT LOWER EXTREMITY (HCC): Primary | ICD-10-CM

## 2019-12-06 DIAGNOSIS — I82.4Z2 ACUTE DEEP VEIN THROMBOSIS (DVT) OF DISTAL END OF LEFT LOWER EXTREMITY (HCC): ICD-10-CM

## 2019-12-06 DIAGNOSIS — I10 ESSENTIAL HYPERTENSION: ICD-10-CM

## 2019-12-06 DIAGNOSIS — I82.4Z2 ACUTE DEEP VEIN THROMBOSIS (DVT) OF DISTAL END OF LEFT LOWER EXTREMITY (HCC): Primary | ICD-10-CM

## 2019-12-06 PROCEDURE — 81241 F5 GENE: CPT

## 2019-12-06 PROCEDURE — 85613 RUSSELL VIPER VENOM DILUTED: CPT

## 2019-12-06 PROCEDURE — 99212 OFFICE O/P EST SF 10 MIN: CPT | Performed by: INTERNAL MEDICINE

## 2019-12-06 PROCEDURE — 36415 COLL VENOUS BLD VENIPUNCTURE: CPT | Performed by: INTERNAL MEDICINE

## 2019-12-06 PROCEDURE — 99214 OFFICE O/P EST MOD 30 MIN: CPT | Performed by: INTERNAL MEDICINE

## 2019-12-08 LAB — LUPUS ANTICOAG DVVT: ABNORMAL

## 2019-12-12 ENCOUNTER — TELEPHONE (OUTPATIENT)
Dept: FAMILY MEDICINE CLINIC | Age: 64
End: 2019-12-12

## 2019-12-12 DIAGNOSIS — Z12.31 ENCOUNTER FOR SCREENING MAMMOGRAM FOR BREAST CANCER: Primary | ICD-10-CM

## 2019-12-12 LAB
FACTOR V LEIDEN: NEGATIVE
SPECIMEN: NORMAL

## 2019-12-13 ENCOUNTER — TELEPHONE (OUTPATIENT)
Dept: ONCOLOGY | Age: 64
End: 2019-12-13

## 2019-12-14 ENCOUNTER — PATIENT MESSAGE (OUTPATIENT)
Dept: FAMILY MEDICINE CLINIC | Age: 64
End: 2019-12-14

## 2019-12-14 DIAGNOSIS — E78.00 PURE HYPERCHOLESTEROLEMIA: Chronic | ICD-10-CM

## 2019-12-14 DIAGNOSIS — I10 ESSENTIAL HYPERTENSION: Chronic | ICD-10-CM

## 2019-12-16 RX ORDER — CHLORTHALIDONE 25 MG/1
25 TABLET ORAL DAILY
Qty: 30 TABLET | Refills: 5 | Status: CANCELLED | OUTPATIENT
Start: 2019-12-16

## 2019-12-16 RX ORDER — PRAVASTATIN SODIUM 10 MG
10 TABLET ORAL EVERY EVENING
Qty: 30 TABLET | Refills: 5 | Status: CANCELLED | OUTPATIENT
Start: 2019-12-16

## 2019-12-16 RX ORDER — PRAVASTATIN SODIUM 10 MG
10 TABLET ORAL EVERY EVENING
Qty: 30 TABLET | Refills: 2 | Status: CANCELLED | OUTPATIENT
Start: 2019-12-16

## 2019-12-17 RX ORDER — LOSARTAN POTASSIUM 50 MG/1
50 TABLET ORAL DAILY
Qty: 30 TABLET | Refills: 2 | Status: SHIPPED
Start: 2019-12-17 | End: 2020-03-06 | Stop reason: SDUPTHER

## 2019-12-17 RX ORDER — CHLORTHALIDONE 25 MG/1
25 TABLET ORAL DAILY
Qty: 30 TABLET | Refills: 2 | Status: SHIPPED | OUTPATIENT
Start: 2019-12-17 | End: 2020-01-20 | Stop reason: DRUGHIGH

## 2019-12-19 ENCOUNTER — HOSPITAL ENCOUNTER (OUTPATIENT)
Dept: ULTRASOUND IMAGING | Age: 64
Discharge: HOME | End: 2019-12-21
Payer: COMMERCIAL

## 2019-12-19 DIAGNOSIS — E04.1 THYROID NODULE: ICD-10-CM

## 2019-12-19 PROCEDURE — 88305 TISSUE EXAM BY PATHOLOGIST: CPT

## 2019-12-19 PROCEDURE — 88173 CYTOPATH EVAL FNA REPORT: CPT

## 2019-12-19 PROCEDURE — 10005 FNA BX W/US GDN 1ST LES: CPT

## 2019-12-19 PROCEDURE — 10006 FNA BX W/US GDN EA ADDL: CPT

## 2019-12-26 ENCOUNTER — CARE COORDINATION (OUTPATIENT)
Dept: CARE COORDINATION | Age: 64
End: 2019-12-26

## 2019-12-26 ENCOUNTER — OFFICE VISIT (OUTPATIENT)
Dept: FAMILY MEDICINE CLINIC | Age: 64
End: 2019-12-26
Payer: COMMERCIAL

## 2019-12-26 VITALS
WEIGHT: 263 LBS | HEIGHT: 65 IN | OXYGEN SATURATION: 97 % | BODY MASS INDEX: 43.82 KG/M2 | RESPIRATION RATE: 18 BRPM | DIASTOLIC BLOOD PRESSURE: 74 MMHG | SYSTOLIC BLOOD PRESSURE: 122 MMHG | HEART RATE: 68 BPM

## 2019-12-26 DIAGNOSIS — Z13.220 LIPID SCREENING: ICD-10-CM

## 2019-12-26 DIAGNOSIS — Z23 NEED FOR PROPHYLACTIC VACCINATION AGAINST STREPTOCOCCUS PNEUMONIAE (PNEUMOCOCCUS): ICD-10-CM

## 2019-12-26 DIAGNOSIS — I10 ESSENTIAL HYPERTENSION: Chronic | ICD-10-CM

## 2019-12-26 DIAGNOSIS — E04.1 LEFT THYROID NODULE: Primary | ICD-10-CM

## 2019-12-26 DIAGNOSIS — I82.4Z2 ACUTE DEEP VEIN THROMBOSIS (DVT) OF DISTAL END OF LEFT LOWER EXTREMITY (HCC): Chronic | ICD-10-CM

## 2019-12-26 DIAGNOSIS — Z23 INFLUENZA VACCINE NEEDED: ICD-10-CM

## 2019-12-26 PROCEDURE — 90732 PPSV23 VACC 2 YRS+ SUBQ/IM: CPT | Performed by: FAMILY MEDICINE

## 2019-12-26 PROCEDURE — 90472 IMMUNIZATION ADMIN EACH ADD: CPT | Performed by: FAMILY MEDICINE

## 2019-12-26 PROCEDURE — 90471 IMMUNIZATION ADMIN: CPT | Performed by: FAMILY MEDICINE

## 2019-12-26 PROCEDURE — 90653 IIV ADJUVANT VACCINE IM: CPT | Performed by: FAMILY MEDICINE

## 2019-12-26 PROCEDURE — 99213 OFFICE O/P EST LOW 20 MIN: CPT | Performed by: FAMILY MEDICINE

## 2020-01-06 ENCOUNTER — HOSPITAL ENCOUNTER (OUTPATIENT)
Dept: GENERAL RADIOLOGY | Age: 65
Discharge: HOME | End: 2020-01-08
Payer: COMMERCIAL

## 2020-01-06 ENCOUNTER — HOSPITAL ENCOUNTER (OUTPATIENT)
Age: 65
Discharge: HOME | End: 2020-01-06
Payer: COMMERCIAL

## 2020-01-06 LAB
ANION GAP SERPL CALCULATED.3IONS-SCNC: 15 MMOL/L (ref 7–16)
BUN BLDV-MCNC: 37 MG/DL (ref 8–23)
CALCIUM SERPL-MCNC: 10 MG/DL (ref 8.6–10.2)
CHLORIDE BLD-SCNC: 102 MMOL/L (ref 98–107)
CHOLESTEROL, TOTAL: 204 MG/DL (ref 0–199)
CO2: 23 MMOL/L (ref 22–29)
CREAT SERPL-MCNC: 1.1 MG/DL (ref 0.5–1)
GFR AFRICAN AMERICAN: >60
GFR NON-AFRICAN AMERICAN: 50 ML/MIN/1.73
GLUCOSE BLD-MCNC: 104 MG/DL (ref 74–99)
HDLC SERPL-MCNC: 64 MG/DL
LDL CHOLESTEROL CALCULATED: 120 MG/DL (ref 0–99)
POTASSIUM SERPL-SCNC: 3.8 MMOL/L (ref 3.5–5)
SODIUM BLD-SCNC: 140 MMOL/L (ref 132–146)
TRIGL SERPL-MCNC: 99 MG/DL (ref 0–149)
VLDLC SERPL CALC-MCNC: 20 MG/DL

## 2020-01-06 PROCEDURE — 36415 COLL VENOUS BLD VENIPUNCTURE: CPT

## 2020-01-06 PROCEDURE — 77063 BREAST TOMOSYNTHESIS BI: CPT

## 2020-01-06 PROCEDURE — 80048 BASIC METABOLIC PNL TOTAL CA: CPT

## 2020-01-06 PROCEDURE — 80061 LIPID PANEL: CPT

## 2020-01-20 ENCOUNTER — HOSPITAL ENCOUNTER (OUTPATIENT)
Age: 65
Discharge: HOME | End: 2020-01-22
Payer: COMMERCIAL

## 2020-01-20 ENCOUNTER — OFFICE VISIT (OUTPATIENT)
Dept: FAMILY MEDICINE CLINIC | Age: 65
End: 2020-01-20
Payer: COMMERCIAL

## 2020-01-20 VITALS
WEIGHT: 263 LBS | HEART RATE: 74 BPM | RESPIRATION RATE: 16 BRPM | TEMPERATURE: 97.6 F | SYSTOLIC BLOOD PRESSURE: 126 MMHG | DIASTOLIC BLOOD PRESSURE: 80 MMHG | HEIGHT: 65 IN | BODY MASS INDEX: 43.82 KG/M2 | OXYGEN SATURATION: 99 %

## 2020-01-20 LAB
APPEARANCE FLUID: ABNORMAL
BACTERIA: ABNORMAL /HPF
BILIRUBIN URINE: NEGATIVE
BILIRUBIN, POC: ABNORMAL
BLOOD URINE, POC: ABNORMAL
BLOOD, URINE: NEGATIVE
CLARITY, POC: CLEAR
CLARITY: ABNORMAL
COLOR, POC: YELLOW
COLOR: YELLOW
EPITHELIAL CELLS, UA: ABNORMAL /HPF
GLUCOSE URINE, POC: ABNORMAL
GLUCOSE URINE: NEGATIVE MG/DL
KETONES, POC: ABNORMAL
KETONES, URINE: NEGATIVE MG/DL
LEUKOCYTE EST, POC: ABNORMAL
LEUKOCYTE ESTERASE, URINE: NEGATIVE
NITRITE, POC: ABNORMAL
NITRITE, URINE: NEGATIVE
PH UA: 7 (ref 5–9)
PH, POC: 6.5
PROTEIN UA: NEGATIVE MG/DL
PROTEIN, POC: ABNORMAL
RBC UA: ABNORMAL /HPF (ref 0–2)
RENAL EPITHELIAL, UA: ABNORMAL /HPF
SPECIFIC GRAVITY UA: 1.02 (ref 1–1.03)
SPECIFIC GRAVITY, POC: 1.02
UROBILINOGEN, POC: 2
UROBILINOGEN, URINE: 2 E.U./DL
WBC UA: ABNORMAL /HPF (ref 0–5)

## 2020-01-20 PROCEDURE — 99213 OFFICE O/P EST LOW 20 MIN: CPT | Performed by: FAMILY MEDICINE

## 2020-01-20 PROCEDURE — 87186 SC STD MICRODIL/AGAR DIL: CPT

## 2020-01-20 PROCEDURE — 87088 URINE BACTERIA CULTURE: CPT

## 2020-01-20 PROCEDURE — 87147 CULTURE TYPE IMMUNOLOGIC: CPT

## 2020-01-20 PROCEDURE — 81002 URINALYSIS NONAUTO W/O SCOPE: CPT | Performed by: FAMILY MEDICINE

## 2020-01-20 PROCEDURE — 81001 URINALYSIS AUTO W/SCOPE: CPT

## 2020-01-20 RX ORDER — METOPROLOL SUCCINATE 25 MG/1
12.5 TABLET, EXTENDED RELEASE ORAL DAILY
COMMUNITY
Start: 2020-01-13 | End: 2020-02-21 | Stop reason: SDUPTHER

## 2020-01-20 RX ORDER — OXYBUTYNIN CHLORIDE 5 MG/1
5 TABLET, EXTENDED RELEASE ORAL EVERY EVENING
Qty: 30 TABLET | Refills: 2 | Status: SHIPPED
Start: 2020-01-20 | End: 2020-03-06 | Stop reason: SDUPTHER

## 2020-01-20 RX ORDER — CHLORTHALIDONE 25 MG/1
12.5 TABLET ORAL DAILY
Qty: 30 TABLET | Refills: 2 | Status: SHIPPED
Start: 2020-01-20 | End: 2020-03-06 | Stop reason: SDUPTHER

## 2020-01-20 RX ORDER — PRAVASTATIN SODIUM 20 MG
20 TABLET ORAL EVERY EVENING
Qty: 30 TABLET | Refills: 0
Start: 2020-01-20 | End: 2020-02-28

## 2020-01-20 ASSESSMENT — PATIENT HEALTH QUESTIONNAIRE - PHQ9
SUM OF ALL RESPONSES TO PHQ QUESTIONS 1-9: 0
1. LITTLE INTEREST OR PLEASURE IN DOING THINGS: 0
SUM OF ALL RESPONSES TO PHQ QUESTIONS 1-9: 0
2. FEELING DOWN, DEPRESSED OR HOPELESS: 0
SUM OF ALL RESPONSES TO PHQ9 QUESTIONS 1 & 2: 0

## 2020-01-20 ASSESSMENT — ENCOUNTER SYMPTOMS
WHEEZING: 0
CONSTIPATION: 0
DIARRHEA: 0
SHORTNESS OF BREATH: 0

## 2020-01-20 NOTE — PATIENT INSTRUCTIONS
Patient Education        Bladder Training: Care Instructions  Your Care Instructions    Bladder training is used to treat urge incontinence and stress incontinence. Urge incontinence means that the need to urinate comes on so fast that you can't get to a toilet in time. Stress incontinence means that you leak urine because of pressure on your bladder. For example, it may happen when you laugh, cough, or lift something heavy. Bladder training can increase how long you can wait before you have to urinate. It can also help your bladder hold more urine. And it can give you better control over the urge to urinate. It is important to remember that bladder training takes a few weeks to a few months to make a difference. You may not see results right away, but don't give up. Follow-up care is a key part of your treatment and safety. Be sure to make and go to all appointments, and call your doctor if you are having problems. It's also a good idea to know your test results and keep a list of the medicines you take. How can you care for yourself at home? Work with your doctor to come up with a bladder training program that is right for you. You may use one or more of the following methods. Delayed urination  · In the beginning, try to keep from urinating for 5 minutes after you first feel the need to go. · While you wait, take deep, slow breaths to relax. Kegel exercises can also help you delay the need to go to the bathroom. · After some practice, when you can easily wait 5 minutes to urinate, try to wait 10 minutes before you urinate. · Slowly increase the waiting period until you are able to control when you have to urinate. Scheduled urination  · Empty your bladder when you first wake up in the morning. · Schedule times throughout the day when you will urinate. · Start by going to the bathroom every hour, even if you don't need to go. · Slowly increase the time between trips to the bathroom.   · When you have found a schedule that works well for you, keep doing it. · If you wake up during the night and have to urinate, do it. Apply your schedule to waking hours only. Kegel exercises  These tighten and strengthen pelvic muscles, which can help you control the flow of urine. To do Kegel exercises:  · Squeeze the same muscles you would use to stop your urine. Your belly and thighs should not move. · Hold the squeeze for 3 seconds, and then relax for 3 seconds. · Start with 3 seconds. Then add 1 second each week until you are able to squeeze for 10 seconds. · Repeat the exercise 10 to 15 times a session. Do three or more sessions a day. When should you call for help? Watch closely for changes in your health, and be sure to contact your doctor if:    · Your incontinence is getting worse.     · You do not get better as expected. Where can you learn more? Go to https://[email or web address] Labs.SwipeClock. org and sign in to your [employer]Works account. Enter Q887 in the Cyanogen box to learn more about \"Bladder Training: Care Instructions. \"     If you do not have an account, please click on the \"Sign Up Now\" link. Current as of: August 21, 2019  Content Version: 12.3  © 6959-5034 Healthwise, GRID. Care instructions adapted under license by [facility] ([facility]). If you have questions about a medical condition or this instruction, always ask your healthcare professional. [address] any warranty or liability for your use of this information. Patient Education        Kegel Exercises: Care Instructions  Your Care Instructions    Kegel exercises strengthen muscles around the bladder. These muscles control the flow of urine. Kegel exercises are sometime called \"pelvic floor\" exercises. They can help prevent urine leakage and keep the pelvic organs in place. A woman who just had a baby might want to try Kegel exercises.  They can strengthen pelvic muscles that have been weakened by pregnancy and childbirth. A man or woman may use Kegel exercises to treat urine leakage. You do Kegel exercises by tightening the muscles you use when you urinate. You will likely need to do these exercises for several weeks to get better. Follow-up care is a key part of your treatment and safety. Be sure to make and go to all appointments, and call your doctor if you are having problems. It's also a good idea to know your test results and keep a list of the medicines you take. How can you care for yourself at home? · Do Kegel exercises. ? Find the muscles you need to strengthen. To do this, tighten the muscles that stop your urine while you are going to the bathroom. These are the same muscles you squeeze during Kegel exercises. ? Squeeze the muscles as hard as you can. Your belly and thighs should not move. ? Hold the squeeze for 3 seconds. Then relax for 3 seconds. ? Start with 3 seconds, and then add 1 second each week until you are able to squeeze for 10 seconds. ? Repeat the exercise 10 to 15 times for each session. Do three or more sessions each day. · You can check to see if you are using the right muscles. Place a finger in your vagina and squeeze around it. You are doing them right when you feel pressure around your finger. Your doctor may also suggest that you put special weights in your vagina while you do the exercises. · Do not smoke. It can irritate the bladder. If you need help quitting, talk to your doctor about stop-smoking programs and medicines. These can increase your chances of quitting for good. Where can you learn more? Go to https://Convodirk.Fiberspar. org and sign in to your [email or web address] account. Enter E143 in the Beep box to learn more about \"Kegel Exercises: Care Instructions. \"     If you do not have an account, please click on the \"Sign Up Now\" link. Current as of: August 21, 2019  Content Version: 12.3  © 1586-5643 Healthwise, Incorporated.  Care instructions adapted under license by [facility] ([facility]). If you have questions about a medical condition or this instruction, always ask your healthcare professional. [address] any warranty or liability for your use of this information.

## 2020-01-20 NOTE — PROGRESS NOTES
05/27/2019    Family history of early CAD     Hyperlipemia     Hypertension     Lightheadedness     Sleep apnea     SOBOE (shortness of breath on exertion)      Past Surgical History:   Procedure Laterality Date    BREAST SURGERY  2014    biopsy    CARDIOVASCULAR STRESS TEST      COLONOSCOPY      CYST REMOVAL  05/2017    mouth/under tongue    HYSTERECTOMY  1990    KNEE ARTHROSCOPY  2005    LEG SURGERY  1963    ligament repair    SALIVARY GLAND SURGERY Left 05/24/2017    TINO AND BSO      age 19 for menometrorrhagia    TONSILLECTOMY      childhood       Past Family Hx:  Reviewed with patient      Problem Relation Age of Onset   Gillian Ni Pacemaker Mother     COPD Mother     Heart Failure Mother     Cancer Father         prostate    Hypertension Father     Hypertension Sister     Heart Disease Brother        Social Hx:  Reviewed with patient  Social History     Tobacco Use    Smoking status: Never Smoker    Smokeless tobacco: Never Used   Substance Use Topics    Alcohol use: No       Immunization History   Administered Date(s) Administered    Influenza Vaccine, unspecified formulation 10/25/2016, 11/02/2017    Influenza, Quadv, IM, PF (6 mo and older Fluzone, Flulaval, Fluarix, and 3 yrs and older Afluria) 10/25/2016, 11/02/2017    Influenza, Triv, inactivated, subunit, adjuvanted, IM (Fluad 65 yrs and older) 12/26/2019    Pneumococcal Polysaccharide ([id number]) 12/26/2019    Tdap (Boostrix, Adacel) 10/25/2016    Zoster Live (Zostavax) 08/01/2017       Review of Systems  Review of Systems   Constitutional: Negative for chills, diaphoresis and fever. Eyes: Negative for visual disturbance. Respiratory: Negative for shortness of breath and wheezing. Cardiovascular: Positive for leg swelling. Negative for chest pain. Gastrointestinal: Negative for constipation and diarrhea. Genitourinary: Positive for urgency. Negative for dysuria and flank pain. Neurological: Negative for dizziness.

## 2020-01-23 ENCOUNTER — TELEPHONE (OUTPATIENT)
Dept: FAMILY MEDICINE CLINIC | Age: 65
End: 2020-01-23

## 2020-01-23 NOTE — TELEPHONE ENCOUNTER
Pt has a urinary tract infection. I am waiting on the sensitivities before sending in an antibiotic. We should have them back by tomorrow.

## 2020-01-24 ENCOUNTER — TELEPHONE (OUTPATIENT)
Dept: FAMILY MEDICINE CLINIC | Age: 65
End: 2020-01-24

## 2020-01-24 LAB
ORGANISM: ABNORMAL
URINE CULTURE, ROUTINE: ABNORMAL

## 2020-01-24 RX ORDER — CEFIXIME 400 MG/1
400 CAPSULE ORAL DAILY
Qty: 10 CAPSULE | Refills: 0 | Status: SHIPPED
Start: 2020-01-24 | End: 2020-02-21 | Stop reason: ALTCHOICE

## 2020-01-24 NOTE — RESULT ENCOUNTER NOTE
Pt has a urinary track infection. We have sent in an antibiotic for her to take for 10 days. Please come in for a test of cure in 2 weeks. I have put an order in for another urine culture at that time.

## 2020-02-03 ENCOUNTER — APPOINTMENT (OUTPATIENT)
Dept: GENERAL RADIOLOGY | Age: 65
End: 2020-02-03
Payer: COMMERCIAL

## 2020-02-03 ENCOUNTER — HOSPITAL ENCOUNTER (EMERGENCY)
Age: 65
Discharge: HOME | End: 2020-02-03
Payer: COMMERCIAL

## 2020-02-03 VITALS
DIASTOLIC BLOOD PRESSURE: 84 MMHG | HEART RATE: 76 BPM | BODY MASS INDEX: 44.15 KG/M2 | TEMPERATURE: 98 F | OXYGEN SATURATION: 96 % | HEIGHT: 65 IN | SYSTOLIC BLOOD PRESSURE: 133 MMHG | WEIGHT: 265 LBS | RESPIRATION RATE: 16 BRPM

## 2020-02-03 PROCEDURE — 99283 EMERGENCY DEPT VISIT LOW MDM: CPT

## 2020-02-03 PROCEDURE — 73630 X-RAY EXAM OF FOOT: CPT

## 2020-02-03 PROCEDURE — 73564 X-RAY EXAM KNEE 4 OR MORE: CPT

## 2020-02-03 RX ORDER — NAPROXEN 375 MG/1
375 TABLET ORAL 2 TIMES DAILY
Qty: 14 TABLET | Refills: 0 | Status: SHIPPED | OUTPATIENT
Start: 2020-02-03 | End: 2020-02-21 | Stop reason: ALTCHOICE

## 2020-02-03 ASSESSMENT — PAIN DESCRIPTION - PROGRESSION: CLINICAL_PROGRESSION: GRADUALLY WORSENING

## 2020-02-03 ASSESSMENT — PAIN DESCRIPTION - ORIENTATION: ORIENTATION: RIGHT

## 2020-02-03 ASSESSMENT — PAIN SCALES - WONG BAKER: WONGBAKER_NUMERICALRESPONSE: 2

## 2020-02-03 ASSESSMENT — PAIN - FUNCTIONAL ASSESSMENT: PAIN_FUNCTIONAL_ASSESSMENT: PREVENTS OR INTERFERES SOME ACTIVE ACTIVITIES AND ADLS

## 2020-02-03 ASSESSMENT — PAIN DESCRIPTION - PAIN TYPE: TYPE: ACUTE PAIN

## 2020-02-03 ASSESSMENT — PAIN SCALES - GENERAL: PAINLEVEL_OUTOF10: 9

## 2020-02-03 ASSESSMENT — PAIN DESCRIPTION - DESCRIPTORS: DESCRIPTORS: DISCOMFORT

## 2020-02-03 ASSESSMENT — PAIN DESCRIPTION - ONSET: ONSET: ON-GOING

## 2020-02-03 ASSESSMENT — PAIN DESCRIPTION - FREQUENCY: FREQUENCY: INTERMITTENT

## 2020-02-03 ASSESSMENT — PAIN DESCRIPTION - LOCATION: LOCATION: FOOT;KNEE

## 2020-02-03 NOTE — ED PROVIDER NOTES
HPI:  2/3/20,   Time: 12:58 PM         Dian Higgins is a 75 y.o. female presenting to the ED for right foot pain, beginning yesterday ago. The complaint has been persistent, moderate in severity, and worsened by walking. Denies any injuries or fall. States she is on her feet for several hours a day working with [employer]. She states her pain is 9 out of 10. Pain Is on the bottom of her foot and worse with walking. She denies any calf pain, chest pain, shortness of breath. She has a previous history of blood clots but is not on any blood thinners. He also complains of some right knee pain that is been ongoing for some time now. No injury to her knee either. ROS:   Pertinent positives and negatives are stated within HPI, all other systems reviewed and are negative.  --------------------------------------------- PAST HISTORY ---------------------------------------------  Past Medical History:  has a past medical history of Anxiety, Deep vein blood clot of left lower extremity ([address] Family history of early CAD, Hyperlipemia, Hypertension, Lightheadedness, Sleep apnea, and SOBOE (shortness of breath on exertion). Past Surgical History:  has a past surgical history that includes adeline and bso (cervix removed); Tonsillectomy; Leg Surgery (0897); Colonoscopy; cardiovascular stress test; Hysterectomy (1990); Knee arthroscopy (2005); Breast surgery (2014); Salivary gland surgery (Left, 05/24/2017); and cyst removal (05/2017). Social History:  reports that she has never smoked. She has never used smokeless tobacco. She reports that she does not drink alcohol or use drugs. Family History: family history includes COPD in her mother; Cancer in her father; Heart Disease in her brother; Heart Failure in her mother; Hypertension in her father and sister; Pacemaker in her mother. The patients home medications have been reviewed.     Allergies: Penicillins and Tape Fadi Cords Affect      ------------------------------ ED COURSE/MEDICAL DECISION MAKING----------------------  Medications - No data to display      Medical Decision Making:    Was seen independently. Results reviewed with the patient. Were to place her on an anti-inflammatory have her follow-up with podiatry. Warning signs discussed with the patient. She should return for any worsening symptoms. We will give her a work excuse for couple days to rest, ice and elevate the extremity. Counseling: The emergency provider has spoken with the patient and discussed todays results, in addition to providing specific details for the plan of care and counseling regarding the diagnosis and prognosis. Questions are answered at this time and they are agreeable with the plan.      --------------------------------- IMPRESSION AND DISPOSITION ---------------------------------    IMPRESSION  1. Right foot pain New Problem   2. Chronic pain of right knee New Problem   3.  Arthralgia of right foot New Problem       DISPOSITION  Disposition: Discharge to home  Patient condition is stable                 [address], [place]  02/04/20 [id number]

## 2020-02-21 ENCOUNTER — PATIENT MESSAGE (OUTPATIENT)
Dept: FAMILY MEDICINE CLINIC | Age: 65
End: 2020-02-21

## 2020-02-21 RX ORDER — METOPROLOL SUCCINATE 25 MG/1
12.5 TABLET, EXTENDED RELEASE ORAL DAILY
Qty: 45 TABLET | Refills: 0 | Status: SHIPPED
Start: 2020-02-21 | End: 2020-03-06 | Stop reason: SDUPTHER

## 2020-02-21 NOTE — TELEPHONE ENCOUNTER
From: Sofia Bowers  To:  Geno Kay MD  Sent: 2/21/2020 10:32 AM EST  Subject: Prescription Question    Would you send a prescription in for metoprlol ,25mg to [facility] thank you

## 2020-02-28 ENCOUNTER — OFFICE VISIT (OUTPATIENT)
Dept: ORTHOPEDIC SURGERY | Age: 65
End: 2020-02-28
Payer: COMMERCIAL

## 2020-02-28 VITALS
HEART RATE: 74 BPM | WEIGHT: 260 LBS | BODY MASS INDEX: 43.32 KG/M2 | HEIGHT: 65 IN | SYSTOLIC BLOOD PRESSURE: 157 MMHG | DIASTOLIC BLOOD PRESSURE: 81 MMHG

## 2020-02-28 PROCEDURE — 20610 DRAIN/INJ JOINT/BURSA W/O US: CPT | Performed by: ORTHOPAEDIC SURGERY

## 2020-02-28 PROCEDURE — 99213 OFFICE O/P EST LOW 20 MIN: CPT | Performed by: ORTHOPAEDIC SURGERY

## 2020-02-28 RX ORDER — LIDOCAINE HYDROCHLORIDE 10 MG/ML
4 INJECTION, SOLUTION INFILTRATION; PERINEURAL ONCE
Status: COMPLETED | OUTPATIENT
Start: 2020-02-28 | End: 2020-02-28

## 2020-02-28 RX ORDER — TRIAMCINOLONE ACETONIDE 40 MG/ML
40 INJECTION, SUSPENSION INTRA-ARTICULAR; INTRAMUSCULAR ONCE
Status: COMPLETED | OUTPATIENT
Start: 2020-02-28 | End: 2020-02-28

## 2020-02-28 RX ORDER — PRAVASTATIN SODIUM 10 MG
TABLET ORAL
COMMUNITY
Start: 2020-02-03 | End: 2020-03-02

## 2020-02-28 RX ADMIN — TRIAMCINOLONE ACETONIDE 40 MG: 40 INJECTION, SUSPENSION INTRA-ARTICULAR; INTRAMUSCULAR at 10:44

## 2020-02-28 RX ADMIN — LIDOCAINE HYDROCHLORIDE 4 ML: 10 INJECTION, SOLUTION INFILTRATION; PERINEURAL at 10:43

## 2020-02-28 NOTE — PROGRESS NOTES
Assisted Dr. Gael Hagen with injection of 4 cc lidocaine/1 cc kenalog in ezio knees. Patient tolerated procedure well. Verbal instructions were given.

## 2020-03-02 ENCOUNTER — TELEPHONE (OUTPATIENT)
Dept: FAMILY MEDICINE CLINIC | Age: 65
End: 2020-03-02

## 2020-03-02 NOTE — TELEPHONE ENCOUNTER
Patient called said that she needs a order for new CPAP machine and that our office needs to download all the information for the insurance. - I advised patient that dr placed an order for a repeat sleep study with the CPAP, she needs to have this done and follow up with the sleep center so that they can get a new machine ordered for her. That the insurance will have to have prior approval for this and our office can not do this. - patient verbally understands, she will call and get this set up and let our office know if a new referral or order is needed.

## 2020-03-03 RX ORDER — PRAVASTATIN SODIUM 10 MG
TABLET ORAL
Qty: 90 TABLET | Refills: 1 | Status: SHIPPED
Start: 2020-03-03 | End: 2020-07-21

## 2020-03-06 ENCOUNTER — OFFICE VISIT (OUTPATIENT)
Dept: FAMILY MEDICINE CLINIC | Age: 65
End: 2020-03-06
Payer: COMMERCIAL

## 2020-03-06 ENCOUNTER — HOSPITAL ENCOUNTER (OUTPATIENT)
Age: 65
Discharge: HOME | End: 2020-03-08
Payer: COMMERCIAL

## 2020-03-06 VITALS
HEART RATE: 61 BPM | SYSTOLIC BLOOD PRESSURE: 111 MMHG | DIASTOLIC BLOOD PRESSURE: 64 MMHG | WEIGHT: 265 LBS | RESPIRATION RATE: 16 BRPM | HEIGHT: 65 IN | OXYGEN SATURATION: 98 % | BODY MASS INDEX: 44.15 KG/M2

## 2020-03-06 LAB
ALBUMIN SERPL-MCNC: 3.8 G/DL (ref 3.5–5.2)
ALP BLD-CCNC: 91 U/L (ref 35–104)
ALT SERPL-CCNC: 8 U/L (ref 0–32)
ANION GAP SERPL CALCULATED.3IONS-SCNC: 15 MMOL/L (ref 7–16)
AST SERPL-CCNC: 10 U/L (ref 0–31)
BASOPHILS ABSOLUTE: 0.07 E9/L (ref 0–0.2)
BASOPHILS RELATIVE PERCENT: 0.5 % (ref 0–2)
BILIRUB SERPL-MCNC: 0.4 MG/DL (ref 0–1.2)
BUN BLDV-MCNC: 26 MG/DL (ref 8–23)
CALCIUM SERPL-MCNC: 10.2 MG/DL (ref 8.6–10.2)
CHLORIDE BLD-SCNC: 99 MMOL/L (ref 98–107)
CO2: 23 MMOL/L (ref 22–29)
CREAT SERPL-MCNC: 0.9 MG/DL (ref 0.5–1)
EOSINOPHILS ABSOLUTE: 0.16 E9/L (ref 0.05–0.5)
EOSINOPHILS RELATIVE PERCENT: 1.2 % (ref 0–6)
GFR AFRICAN AMERICAN: >60
GFR NON-AFRICAN AMERICAN: >60 ML/MIN/1.73
GLUCOSE BLD-MCNC: 79 MG/DL (ref 74–99)
HBA1C MFR BLD: 5.9 % (ref 4–5.6)
HCT VFR BLD CALC: 45.7 % (ref 34–48)
HEMOGLOBIN: 13.9 G/DL (ref 11.5–15.5)
IMMATURE GRANULOCYTES #: 0.13 E9/L
IMMATURE GRANULOCYTES %: 1 % (ref 0–5)
LYMPHOCYTES ABSOLUTE: 2.91 E9/L (ref 1.5–4)
LYMPHOCYTES RELATIVE PERCENT: 21.4 % (ref 20–42)
MCH RBC QN AUTO: 29.1 PG (ref 26–35)
MCHC RBC AUTO-ENTMCNC: 30.4 % (ref 32–34.5)
MCV RBC AUTO: 95.6 FL (ref 80–99.9)
MONOCYTES ABSOLUTE: 1.05 E9/L (ref 0.1–0.95)
MONOCYTES RELATIVE PERCENT: 7.7 % (ref 2–12)
NEUTROPHILS ABSOLUTE: 9.26 E9/L (ref 1.8–7.3)
NEUTROPHILS RELATIVE PERCENT: 68.2 % (ref 43–80)
PDW BLD-RTO: 14.4 FL (ref 11.5–15)
PLATELET # BLD: 335 E9/L (ref 130–450)
PMV BLD AUTO: 10.8 FL (ref 7–12)
POTASSIUM SERPL-SCNC: 4 MMOL/L (ref 3.5–5)
RBC # BLD: 4.78 E12/L (ref 3.5–5.5)
SODIUM BLD-SCNC: 137 MMOL/L (ref 132–146)
TOTAL PROTEIN: 7 G/DL (ref 6.4–8.3)
WBC # BLD: 13.6 E9/L (ref 4.5–11.5)

## 2020-03-06 PROCEDURE — 83036 HEMOGLOBIN GLYCOSYLATED A1C: CPT

## 2020-03-06 PROCEDURE — 87088 URINE BACTERIA CULTURE: CPT

## 2020-03-06 PROCEDURE — 86803 HEPATITIS C AB TEST: CPT

## 2020-03-06 PROCEDURE — 99213 OFFICE O/P EST LOW 20 MIN: CPT | Performed by: FAMILY MEDICINE

## 2020-03-06 PROCEDURE — 85025 COMPLETE CBC W/AUTO DIFF WBC: CPT

## 2020-03-06 PROCEDURE — 80053 COMPREHEN METABOLIC PANEL: CPT

## 2020-03-06 RX ORDER — CHLORTHALIDONE 25 MG/1
12.5 TABLET ORAL DAILY
Qty: 45 TABLET | Refills: 1 | Status: SHIPPED
Start: 2020-03-06 | End: 2020-06-05

## 2020-03-06 RX ORDER — LOSARTAN POTASSIUM 50 MG/1
50 TABLET ORAL DAILY
Qty: 90 TABLET | Refills: 1 | Status: SHIPPED
Start: 2020-03-06 | End: 2020-09-01

## 2020-03-06 RX ORDER — OXYBUTYNIN CHLORIDE 5 MG/1
5 TABLET, EXTENDED RELEASE ORAL EVERY EVENING
Qty: 90 TABLET | Refills: 1 | Status: SHIPPED
Start: 2020-03-06 | End: 2020-07-21

## 2020-03-06 RX ORDER — METOPROLOL SUCCINATE 25 MG/1
12.5 TABLET, EXTENDED RELEASE ORAL DAILY
Qty: 45 TABLET | Refills: 1 | Status: SHIPPED
Start: 2020-03-06 | End: 2020-07-31

## 2020-03-06 NOTE — PROGRESS NOTES
Afluria) 10/25/2016, 11/02/2017    Influenza, Triv, inactivated, subunit, adjuvanted, IM (Fluad 65 yrs and older) 12/26/2019    Pneumococcal Polysaccharide ([id number]) 12/26/2019    Tdap (Boostrix, Adacel) 10/25/2016    Zoster Live (Zostavax) 08/01/2017       Review of Systems  Review of Systems    PE:  VS:  /64   Pulse 61   Resp 16   Ht 5' 5\" (1.651 m)   Wt 265 lb (120.2 kg)   SpO2 98%   Breastfeeding No   BMI 44.10 kg/m²   Physical Exam  Constitutional:       Appearance: She is well-developed. She is obese. HENT:      Head: Normocephalic and atraumatic. Cardiovascular:      Rate and Rhythm: Normal rate and regular rhythm. Heart sounds: No murmur. No friction rub. No gallop. Pulmonary:      Effort: Pulmonary effort is normal.      Breath sounds: Normal breath sounds. No wheezing or rales. Musculoskeletal:      Right lower leg: Edema (non pitting) present. Left lower leg: Edema present. Skin:     General: Skin is warm and dry. Neurological:      Mental Status: She is alert and oriented to person, place, and time. Assessment/Plan:  Bernice Moura was seen today for hypertension and urinary frequency. Diagnoses and all orders for this visit:    Essential hypertension  At goal  Cont same meds  -     chlorthalidone (HYGROTON) 25 MG tablet; Take 0.5 tablets by mouth daily  -     losartan (COZAAR) 50 MG tablet; Take 1 tablet by mouth daily  -     metoprolol succinate (TOPROL XL) 25 MG extended release tablet; Take 0.5 tablets by mouth daily  -     CBC Auto Differential; Future  -     Comprehensive Metabolic Panel; Future    Urge incontinence of urine  Improved  Cont oxybutynin  -     POCT Urinalysis no Micro  -     oxybutynin (DITROPAN-XL) 5 MG extended release tablet; Take 1 tablet by mouth every evening    Lipid screening    Diabetes mellitus screening  -     Comprehensive Metabolic Panel;  Future  -     Hemoglobin A1C; Future    Thyroid disorder

## 2020-03-08 LAB — URINE CULTURE, ROUTINE: NORMAL

## 2020-03-09 LAB — HEPATITIS C ANTIBODY INTERPRETATION: NORMAL

## 2020-05-15 ENCOUNTER — HOSPITAL ENCOUNTER (OUTPATIENT)
Dept: INFUSION THERAPY | Age: 65
Discharge: HOME | End: 2020-05-15
Payer: COMMERCIAL

## 2020-05-15 ENCOUNTER — OFFICE VISIT (OUTPATIENT)
Dept: ONCOLOGY | Age: 65
End: 2020-05-15
Payer: COMMERCIAL

## 2020-05-15 VITALS
OXYGEN SATURATION: 95 % | WEIGHT: 265 LBS | HEART RATE: 75 BPM | DIASTOLIC BLOOD PRESSURE: 68 MMHG | SYSTOLIC BLOOD PRESSURE: 138 MMHG | TEMPERATURE: 98 F | BODY MASS INDEX: 44.15 KG/M2 | HEIGHT: 65 IN

## 2020-05-15 DIAGNOSIS — I82.4Z2 ACUTE DEEP VEIN THROMBOSIS (DVT) OF DISTAL END OF LEFT LOWER EXTREMITY (HCC): ICD-10-CM

## 2020-05-15 LAB
BASOPHILS ABSOLUTE: 0.07 E9/L (ref 0–0.2)
BASOPHILS RELATIVE PERCENT: 0.8 % (ref 0–2)
EOSINOPHILS ABSOLUTE: 0.14 E9/L (ref 0.05–0.5)
EOSINOPHILS RELATIVE PERCENT: 1.7 % (ref 0–6)
HCT VFR BLD CALC: 44.4 % (ref 34–48)
HEMOGLOBIN: 14.5 G/DL (ref 11.5–15.5)
IMMATURE GRANULOCYTES #: 0.04 E9/L
IMMATURE GRANULOCYTES %: 0.5 % (ref 0–5)
LUPUS ANTICOAG DVVT: NEGATIVE
LYMPHOCYTES ABSOLUTE: 2.37 E9/L (ref 1.5–4)
LYMPHOCYTES RELATIVE PERCENT: 28.5 % (ref 20–42)
MCH RBC QN AUTO: 29.4 PG (ref 26–35)
MCHC RBC AUTO-ENTMCNC: 32.7 % (ref 32–34.5)
MCV RBC AUTO: 90.1 FL (ref 80–99.9)
MONOCYTES ABSOLUTE: 0.72 E9/L (ref 0.1–0.95)
MONOCYTES RELATIVE PERCENT: 8.7 % (ref 2–12)
NEUTROPHILS ABSOLUTE: 4.98 E9/L (ref 1.8–7.3)
NEUTROPHILS RELATIVE PERCENT: 59.8 % (ref 43–80)
PDW BLD-RTO: 14.3 FL (ref 11.5–15)
PLATELET # BLD: 316 E9/L (ref 130–450)
PMV BLD AUTO: 9.8 FL (ref 7–12)
RBC # BLD: 4.93 E12/L (ref 3.5–5.5)
WBC # BLD: 8.3 E9/L (ref 4.5–11.5)

## 2020-05-15 PROCEDURE — 99212 OFFICE O/P EST SF 10 MIN: CPT

## 2020-05-15 PROCEDURE — 36415 COLL VENOUS BLD VENIPUNCTURE: CPT

## 2020-05-15 PROCEDURE — 85025 COMPLETE CBC W/AUTO DIFF WBC: CPT

## 2020-05-15 PROCEDURE — 99214 OFFICE O/P EST MOD 30 MIN: CPT | Performed by: INTERNAL MEDICINE

## 2020-05-15 PROCEDURE — 85613 RUSSELL VIPER VENOM DILUTED: CPT
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
## 2020-06-02 RX ORDER — CHLORTHALIDONE 25 MG/1
12.5 TABLET ORAL DAILY
Qty: 45 TABLET | Refills: 1 | Status: CANCELLED | OUTPATIENT
Start: 2020-06-02 | End: 2020-08-31

## 2020-06-04 ENCOUNTER — PATIENT MESSAGE (OUTPATIENT)
Dept: FAMILY MEDICINE CLINIC | Age: 65
End: 2020-06-04

## 2020-06-04 RX ORDER — CHLORTHALIDONE 25 MG/1
25 TABLET ORAL DAILY
Qty: 90 TABLET | Refills: 0 | Status: CANCELLED | OUTPATIENT
Start: 2020-06-04 | End: 2020-09-02

## 2020-06-05 RX ORDER — HYDROCHLOROTHIAZIDE 25 MG/1
25 TABLET ORAL EVERY MORNING
Qty: 30 TABLET | Refills: 3 | Status: SHIPPED
Start: 2020-06-05 | End: 2020-07-07 | Stop reason: ALTCHOICE

## 2020-07-07 ENCOUNTER — OFFICE VISIT (OUTPATIENT)
Dept: ENDOCRINOLOGY | Age: 65
End: 2020-07-07
Payer: COMMERCIAL

## 2020-07-07 VITALS — WEIGHT: 268.4 LBS | TEMPERATURE: 98.9 F | BODY MASS INDEX: 44.66 KG/M2

## 2020-07-07 PROCEDURE — 4040F PNEUMOC VAC/ADMIN/RCVD: CPT | Performed by: INTERNAL MEDICINE

## 2020-07-07 PROCEDURE — G8417 CALC BMI ABV UP PARAM F/U: HCPCS | Performed by: INTERNAL MEDICINE

## 2020-07-07 PROCEDURE — 99203 OFFICE O/P NEW LOW 30 MIN: CPT | Performed by: INTERNAL MEDICINE

## 2020-07-07 PROCEDURE — 3017F COLORECTAL CA SCREEN DOC REV: CPT | Performed by: INTERNAL MEDICINE

## 2020-07-07 PROCEDURE — 1090F PRES/ABSN URINE INCON ASSESS: CPT | Performed by: INTERNAL MEDICINE

## 2020-07-07 PROCEDURE — G8399 PT W/DXA RESULTS DOCUMENT: HCPCS | Performed by: INTERNAL MEDICINE

## 2020-07-07 PROCEDURE — 1123F ACP DISCUSS/DSCN MKR DOCD: CPT | Performed by: INTERNAL MEDICINE

## 2020-07-07 PROCEDURE — G8428 CUR MEDS NOT DOCUMENT: HCPCS | Performed by: INTERNAL MEDICINE

## 2020-07-07 PROCEDURE — 1036F TOBACCO NON-USER: CPT | Performed by: INTERNAL MEDICINE

## 2020-07-07 RX ORDER — ASPIRIN 81 MG/1
81 TABLET, CHEWABLE ORAL DAILY
COMMUNITY
End: 2021-08-26

## 2020-07-07 NOTE — PROGRESS NOTES
CC -   In-Person Visit  NT-MNG    Background -     Non-toxic MNG  Discovered incidentally on CT 8/6/19 performed for eval of possible dissection of aortic aneurysm  11/27/19 US thyroid: 1.0 cm solid nodule inf R lobe, 2.0 cm complex predominantly solid nodule with punctate calcifications in mid L lobe and 2.4 cm solid isoechoic nodule inf L lobe    12/19/19 FNA of two left nodules: both non-malignant    Has never had obstructive symptoms of dysphagia or hoarseness    ______________________    [facility] - Medical Prob: Thyroid nodules, JES, HTN, HLD, DVT  Surgeries: Tons, TSH BSO  Fam Hx: Brother - CAD age 54, Dad CAD age 63's  Soc Hx: no Tob or ETOH  Medications: Pravastatin, Ditropan, Metoprolol, ASA, Losartan, HCTZ    REVIEW OF SYSTEMS (Past 1 month): Negative if  [], Positive if [x]     Const  [] F/C  [] Wt change  Psych  [] Depression  [] Anxiety Neuro   [] HA   [] Parasthesias  Eyes  [] Blurriness  [] Dyplopia    ENT  [] Hoarseness  [] Dysphagia  Skin  [] Ulcers  [] Rashes Heme/Lymph  [] Bleeding  [] LA   MS  [] Joint pain  [] Back pain    Lungs  [] Cough  [] SOB   Heart  [] CP   [x] Edema    [x] Frequency  [] Dysuria  GI  [] N/V  [] C/D          PE -   Gen - Temp 98.9 °F (37.2 °C)   Wt 268 lb 6.4 oz (121.7 kg)   BMI 44.66 kg/m²        WN, WD, NAD   Eyes - sclera without injection, no edema of eyelids, EOMI   Neck - + left thyroid nodule, trachea midline   Cardio -  RRR without MGR, + LE edema present b/l   Pulm - CTA b/l, nml respiratory effort   Abd - soft, NT/ND, no masses (examined in seated position)   Neuro - moves all extremities well, biceps reflexes 2+ b/l,         no deficits in soft touch over extremities, no tremors of outstretched hands   Psych -  nml mood, full affect   H/L - No cervical LA    Tests:  Images of 11/27/19 US of thyroid reviewed with pt  ______________________      HPI & A/P -   Problem 1  - Non-toxic MNG (new problem for me)   HPI   - See above background for description      The mid-L nodule is very suspicious, but FNA neg. Most likely needs repeat FNA with Affirma      Need to repeat US to assess for growth/changes in each of the three nodules      She has no hoarseness or dysphagia and no signs of hyper or hypothyroidism  Assessment  - Uncontrolled  Plan   - Repeat Thyroid US now      Check TFT's    Follow up  Return to see me in one month to review images of thyroid US    I spent 40 minutes in a face to face encounter with Ace Arvizu today. >30 minutes of this was in education and counseling regarding management of her thyroid nodule and also in reviewing the information in her medical record with her and updating it as well as reviewing the images of her thyroid US with her.     Funmilayo Yi MD   [address] [facility]  7/7/20

## 2020-07-13 ENCOUNTER — HOSPITAL ENCOUNTER (OUTPATIENT)
Age: 65
Discharge: HOME | End: 2020-07-13
Payer: COMMERCIAL

## 2020-07-13 ENCOUNTER — HOSPITAL ENCOUNTER (OUTPATIENT)
Dept: ULTRASOUND IMAGING | Age: 65
Discharge: HOME | End: 2020-07-15
Payer: COMMERCIAL

## 2020-07-13 LAB
T4 FREE: 1.12 NG/DL (ref 0.93–1.7)
TSH SERPL DL<=0.05 MIU/L-ACNC: 1.17 UIU/ML (ref 0.27–4.2)

## 2020-07-13 PROCEDURE — 84443 ASSAY THYROID STIM HORMONE: CPT

## 2020-07-13 PROCEDURE — 76536 US EXAM OF HEAD AND NECK: CPT

## 2020-07-13 PROCEDURE — 36415 COLL VENOUS BLD VENIPUNCTURE: CPT

## 2020-07-13 PROCEDURE — 84439 ASSAY OF FREE THYROXINE: CPT

## 2020-07-18 ENCOUNTER — TELEPHONE (OUTPATIENT)
Dept: ENDOCRINOLOGY | Age: 65
End: 2020-07-18

## 2020-07-18 NOTE — TELEPHONE ENCOUNTER
Spoke with pt by phone  Labs and [address] from 7/13/20 reviewed with her. TSH 1.170, fT4 1.12  US thyroid: 2.14x1.84x2. 40 cm heterogeneous hyperechoic nodule in left lower pole  2.1x2x1.7 cm spongiform nodule in left upper pole  1.12x0.71x0.84 cm heterogeneous hypoechoic nodule in the right lower pole  Possibly needs FNA of 2.4 and 1.12 cm nodules  Will refer pt to Dr. Ezequiel Washington for further evaluation and management.   Radha 43  07/18/20

## 2020-07-21 ENCOUNTER — OFFICE VISIT (OUTPATIENT)
Dept: ORTHOPEDIC SURGERY | Age: 65
End: 2020-07-21
Payer: COMMERCIAL

## 2020-07-21 VITALS — HEIGHT: 65 IN | BODY MASS INDEX: 44.65 KG/M2 | WEIGHT: 268 LBS | TEMPERATURE: 98.1 F

## 2020-07-21 PROCEDURE — 20610 DRAIN/INJ JOINT/BURSA W/O US: CPT | Performed by: ORTHOPAEDIC SURGERY

## 2020-07-21 PROCEDURE — 99213 OFFICE O/P EST LOW 20 MIN: CPT | Performed by: ORTHOPAEDIC SURGERY

## 2020-07-21 RX ORDER — HYDROCHLOROTHIAZIDE 25 MG/1
25 TABLET ORAL DAILY
COMMUNITY
End: 2020-09-28 | Stop reason: SDUPTHER

## 2020-07-21 RX ORDER — OXYBUTYNIN CHLORIDE 5 MG/1
5 TABLET ORAL 3 TIMES DAILY
COMMUNITY
End: 2020-09-04 | Stop reason: SDUPTHER

## 2020-07-21 RX ORDER — TRIAMCINOLONE ACETONIDE 40 MG/ML
40 INJECTION, SUSPENSION INTRA-ARTICULAR; INTRAMUSCULAR ONCE
Status: COMPLETED | OUTPATIENT
Start: 2020-07-21 | End: 2020-07-21

## 2020-07-21 RX ORDER — PRAVASTATIN SODIUM 10 MG
10 TABLET ORAL DAILY
COMMUNITY
End: 2020-11-30 | Stop reason: SDUPTHER

## 2020-07-21 RX ORDER — CHLORTHALIDONE 25 MG/1
TABLET ORAL
COMMUNITY
Start: 2020-07-02 | End: 2020-07-21

## 2020-07-21 RX ORDER — LIDOCAINE HYDROCHLORIDE 10 MG/ML
4 INJECTION, SOLUTION INFILTRATION; PERINEURAL ONCE
Status: COMPLETED | OUTPATIENT
Start: 2020-07-21 | End: 2020-07-21

## 2020-07-21 RX ADMIN — LIDOCAINE HYDROCHLORIDE 4 ML: 10 INJECTION, SOLUTION INFILTRATION; PERINEURAL at 09:00

## 2020-07-21 RX ADMIN — TRIAMCINOLONE ACETONIDE 40 MG: 40 INJECTION, SUSPENSION INTRA-ARTICULAR; INTRAMUSCULAR at 09:00

## 2020-07-21 RX ADMIN — LIDOCAINE HYDROCHLORIDE 4 ML: 10 INJECTION, SOLUTION INFILTRATION; PERINEURAL at 08:59

## 2020-07-21 NOTE — PROGRESS NOTES
Follow Up Visit     Kenn Syed returns today for follow up visit regarding bilateral knee osteoarthritis. Treatment thus far has included steroid injections with good relief. She reports symptoms after her last injection in February but the pain has returned the last couple of weeks. She would like to proceed with another round of  steroid injections. Physical Exam:     Height: 5' 5\" (1.651 m), Weight: 268 lb (121.6 kg)    General: Alert and oriented x3, no acute distress  Cardiovascular/pulmonary: No labored breathing, peripheral perfusion intact  Musculoskeletal:    On exam, she continues to have medial joint line tenderness. Range of motion is about 5 to 110 degrees. The knees are grossly stable. No swelling    Controlled Substances Monitoring:      Imaging:  No new images. Previous images reviewed showing osteoarthritis    Procedure Note: Knee Cortisone injection     The bilateral knees were identified as the injection site. The risk and benefits of a cortisone injection were explained and the patient consented to the injection. Under sterile conditions, each knee was injected with a mixture of 40 mg of Kenalog and 4 mL of 1% Lidocaine without complication. A sterile bandage was applied.     Administrations This Visit     lidocaine 1 % injection 4 mL     Admin Date  07/21/2020 Action  Given Dose  4 mL Route  Intra-articular Administered By  Elana Glaser RN           Admin Date  07/21/2020 Action  Given Dose  4 mL Route  Intra-articular Administered By  Elana Glaser RN          triamcinolone acetonide [facility]) injection 40 mg     Admin Date  07/21/2020 Action  Given Dose  40 mg Route  Intra-articular Administered By  Elana Glaser RN    Admin Date  07/21/2020 Action  Given Dose  40 mg Route  Intra-articular Administered By  Elana Glaser RN                Assessment: Bilateral knee osteoarthritis      Plan:   We gave her steroid injections in both knees

## 2020-07-30 NOTE — TELEPHONE ENCOUNTER
Last Appointment   3/6/2020  Next Appointment  9/18/2020    Patient due for refill on Metoprolol before upcoming appointment.   Med pending drs approval.

## 2020-07-31 ENCOUNTER — HOSPITAL ENCOUNTER (OUTPATIENT)
Age: 65
Discharge: HOME | End: 2020-07-31
Payer: COMMERCIAL

## 2020-07-31 LAB
ANION GAP SERPL CALCULATED.3IONS-SCNC: 14 MMOL/L (ref 7–16)
BUN BLDV-MCNC: 29 MG/DL (ref 8–23)
CALCIUM SERPL-MCNC: 9.7 MG/DL (ref 8.6–10.2)
CHLORIDE BLD-SCNC: 100 MMOL/L (ref 98–107)
CO2: 26 MMOL/L (ref 22–29)
CREAT SERPL-MCNC: 1.1 MG/DL (ref 0.5–1)
GFR AFRICAN AMERICAN: 60
GFR NON-AFRICAN AMERICAN: 50 ML/MIN/1.73
GLUCOSE BLD-MCNC: 111 MG/DL (ref 74–99)
POTASSIUM SERPL-SCNC: 3.6 MMOL/L (ref 3.5–5)
SODIUM BLD-SCNC: 140 MMOL/L (ref 132–146)

## 2020-07-31 PROCEDURE — 80048 BASIC METABOLIC PNL TOTAL CA: CPT

## 2020-07-31 PROCEDURE — 36415 COLL VENOUS BLD VENIPUNCTURE: CPT

## 2020-07-31 RX ORDER — METOPROLOL SUCCINATE 25 MG/1
TABLET, EXTENDED RELEASE ORAL
Qty: 45 TABLET | Refills: 1 | Status: SHIPPED
Start: 2020-07-31 | End: 2020-09-18 | Stop reason: SDUPTHER

## 2020-08-07 ENCOUNTER — TELEPHONE (OUTPATIENT)
Dept: VASCULAR SURGERY | Age: 65
End: 2020-08-07

## 2020-08-11 ENCOUNTER — TELEPHONE (OUTPATIENT)
Dept: VASCULAR SURGERY | Age: 65
End: 2020-08-11

## 2020-08-11 NOTE — TELEPHONE ENCOUNTER
Message left on pt's voicemail for a return call to schedule CTA chest, abd, pelvis at [facility] as [facility] does not do CT's.

## 2020-09-01 RX ORDER — LOSARTAN POTASSIUM 50 MG/1
50 TABLET ORAL DAILY
Qty: 90 TABLET | Refills: 0 | Status: SHIPPED
Start: 2020-09-01 | End: 2020-11-30 | Stop reason: SDUPTHER

## 2020-09-03 ENCOUNTER — OFFICE VISIT (OUTPATIENT)
Dept: ENDOCRINOLOGY | Age: 65
End: 2020-09-03
Payer: COMMERCIAL

## 2020-09-03 VITALS
BODY MASS INDEX: 44.76 KG/M2 | TEMPERATURE: 97.7 F | HEART RATE: 67 BPM | DIASTOLIC BLOOD PRESSURE: 84 MMHG | SYSTOLIC BLOOD PRESSURE: 128 MMHG | RESPIRATION RATE: 16 BRPM | WEIGHT: 269 LBS | OXYGEN SATURATION: 97 %

## 2020-09-03 PROBLEM — E66.01 MORBIDLY OBESE (HCC): Status: ACTIVE | Noted: 2020-09-03

## 2020-09-03 PROCEDURE — 99215 OFFICE O/P EST HI 40 MIN: CPT | Performed by: INTERNAL MEDICINE

## 2020-09-03 RX ORDER — PRAVASTATIN SODIUM 10 MG
TABLET ORAL
Qty: 90 TABLET | OUTPATIENT
Start: 2020-09-03

## 2020-09-03 NOTE — PROGRESS NOTES
[address] [place] [address] Department of Endocrinology Diabetes and Metabolism   [address] [facility] [address]   Phone: [phone]  Fax: [phone]    Date of Service: 9/3/2020    Primary Care Physician: Evie Tafoya MD  Provider: Jessa Uriostegui MD            Reason for the visit:  Multinodular goiter     History of Present Illness: The history is provided by the patient. No  was used. Accuracy of the patient data is excellent. Jennifer Collins is a very pleasant 77 y.o. female seen today for evaluation and management of multinodular goiter     The patient was found to have thyroid nodule on CT 8/6/19 performed for eval of possible dissection of aortic aneurysm    Dedicated 11/27/19 US thyroid: 1.0 cm solid nodule inf R lobe, 2.0 cm complex predominantly solid nodule with punctate calcifications in mid L lobe and 2.4 cm solid isoechoic nodule inf L lobe     12/19/19 FNA of two left nodules: both non-malignant    Recent thyroid US 7/13/2020, I have reviewed images myself   Rt lobe measures is 4.8 x 1.9 x 1.8 cm --> 1.1 cm hypoechoic nodule in lower pole, stable   Lt lobe measures is 5.2 x 2.4 x 2.8 cm --> two dominant nodules, 2.1  cm nodule in the upper pole, 2.4 cm nodule in lower pole       Jennifer Collins denies any new lumps, bumps in her neck, voice change, or shortness of breath. No family history of thyroid cancer. No prior history of radiation to head or neck region. Lab Results   Component Value Date/Time    TSH 1.170 07/13/2020 01:07 PM    T4FREE 1.12 07/13/2020 01:07 PM     Patient denied any history of  swelling in the area of the thyroid gland, weight loss, change in appetite, nervousness, anxiety, irritability, tremor, sweating, heat intolerance, changes in bowel habits, muscle weakness or difficulty sleeping.   Patient also denied any h/o unexplained weight gain, new fatigue, increased sensitivity to cold, dry skin, brittle fingernails, brittle hair, facial swelling/puffiness, or depressed mood. PAST MEDICAL HISTORY   Past Medical History:   Diagnosis Date    Anxiety     Deep vein blood clot of left lower extremity ([address] 05/27/2019    Family history of early CAD     Hyperlipemia     Hypertension     Lightheadedness     Multinodular goiter     Sleep apnea     SOBOE (shortness of breath on exertion)        PAST SURGICAL HISTORY   Past Surgical History:   Procedure Laterality Date    BREAST SURGERY  2014    biopsy    CARDIOVASCULAR STRESS TEST      COLONOSCOPY      CYST REMOVAL  05/2017    mouth/under tongue    HYSTERECTOMY  1990    KNEE ARTHROSCOPY  2005    LEG SURGERY  1963    ligament repair    SALIVARY GLAND SURGERY Left 05/24/2017    TONSILLECTOMY      childhood       SOCIAL HISTORY   Tobacco:   reports that she has never smoked. She has never used smokeless tobacco.  Alcohol:   reports no history of alcohol use. Drugs:   reports no history of drug use.     FAMILY HISTORY   Family History   Problem Relation Age of Onset   [facility] Pacemaker Mother     COPD Mother     Heart Failure Mother     Cancer Father         prostate    Hypertension Father     Coronary Art Dis Father         63's    Hypertension Sister     Heart Disease Brother         age 54       ALLERGIES AND DRUG REACTIONS   Allergies   Allergen Reactions    Penicillins Hives and Rash    [facility] Hives       CURRENT MEDICATIONS   Current Outpatient Medications   Medication Sig Dispense Refill    losartan (COZAAR) 50 MG tablet TAKE 1 TABLET BY MOUTH DAILY 90 tablet 0    metoprolol succinate (TOPROL XL) 25 MG extended release tablet TAKE 1/2 TABLET BY MOUTH DAILY 45 tablet 1    hydroCHLOROthiazide (HYDRODIURIL) 25 MG tablet Take 25 mg by mouth daily      pravastatin (PRAVACHOL) 10 MG tablet Take 10 mg by mouth daily      oxybutynin (DITROPAN) 5 MG tablet Take 5 mg by mouth 3 times daily      aspirin 81 MG chewable tablet Take 81 mg by mouth daily Data:  I personally reviewed the following labs:   Lab Results   Component Value Date/Time    WBC 8.3 05/15/2020 09:35 AM    RBC 4.93 05/15/2020 09:35 AM    HGB 14.5 05/15/2020 09:35 AM    HCT 44.4 05/15/2020 09:35 AM    MCV 90.1 05/15/2020 09:35 AM    MCH 29.4 05/15/2020 09:35 AM    MCHC 32.7 05/15/2020 09:35 AM    RDW 14.3 05/15/2020 09:35 AM     05/15/2020 09:35 AM    MPV 9.8 05/15/2020 09:35 AM      Lab Results   Component Value Date/Time     07/31/2020 07:15 AM    K 3.6 07/31/2020 07:15 AM    K 3.1 (L) 05/29/2019 09:06 AM    CO2 26 07/31/2020 07:15 AM    BUN 29 (H) 07/31/2020 07:15 AM    CREATININE 1.1 (H) 07/31/2020 07:15 AM    CALCIUM 9.7 07/31/2020 07:15 AM    LABGLOM 50 07/31/2020 07:15 AM    GFRAA 60 07/31/2020 07:15 AM      Lab Results   Component Value Date/Time    TSH 1.170 07/13/2020 01:07 PM    T4FREE 1.12 07/13/2020 01:07 PM     Lab Results   Component Value Date    LABA1C 5.9 03/06/2020    GLUCOSE 111 07/31/2020    GLUCOSE 130 02/10/2012     Lab Results   Component Value Date    TRIG 99 01/06/2020    HDL 64 01/06/2020    LDLCALC 120 01/06/2020    CHOL 204 01/06/2020     No results found for: VITD25    Medical Records/Labs/Images Review:   I personally reviewed and summarized previous records   All labs and imaging were reviewed independently    [address], a 77 y.o.-old female seen in for the following issues     Multinodular goiter   · Prevalence of thyroid nodule on thyroid ultrasound is 50% and 95 % of these nodules are benign. · 12/2019 FNA to dominant Lt side 2.1 and 2.4 cm nodule was benign   · Recent US 7/2020 --> stable   · Will continue following with US. Next US in a year   · Thyroid hormones normal     Return in about 47 weeks (around 7/29/2021) for Multinodular goiter . The above issues were reviewed with the patient who understood and agreed with the plan. Thank you for allowing us to participate in the care of this patient. Please do not hesitate to contact us with any additional questions. Diagnosis Orders   1. Multinodular goiter  TSH without Reflex    T4, Free    US THYROID   2. Morbidly obese ([address]     3. Vitamin D deficiency  Vitamin D 25 Hydroxy       Ceferino Ramírez MD  Endocrinologist, [facility])   [address] [facility], [address] [facility],[address] [address]   Phone: [phone]  Fax: [phone]  -------------------------------------------------------------  An electronic signature was used to authenticate this note.  Kaiden Berumen MD on 9/6/2020 at 2:09 PM

## 2020-09-03 NOTE — LETTER
[address] [place] [address] Department of Endocrinology Diabetes and Metabolism   [address] [place] [address]   Phone: [phone]  Fax: [phone]      Provider: Jessa Uriostegui MD  Primary Care Physician: Evie Tafoya MD   Referring Provider: No ref. provider found    Patient: Jennifer Collins  YOB: 1955  Date of Visit: 9/3/2020      Dear Dr. Evie Tafoya MD   I had the pleasure of seeing your patient Jennifer Collins today at endocrine clinic for consultation visit and I enclosed a copy of the office visit completed today. Thank you very much for asking us to participate in the care of this very pleasant patient. Please don't hesitate to call if there are any further questions or concerns. Sincerely   Jessa Uriostegui MD  Endocrinologist, [address] [address]   Phone: [phone]  Fax: [phone]      [address] [place] [address] Department of Endocrinology Diabetes and Metabolism   [address] [place] [address]   Phone: [phone]  Fax: [phone]    Date of Service: 9/3/2020    Primary Care Physician: Evie Tafoya MD  Provider: Jessa Uriostegui MD            Reason for the visit:  Multinodular goiter     History of Present Illness: The history is provided by the patient. No  was used. Accuracy of the patient data is excellent.   Jennifer Collins is a very pleasant 77 y.o. female seen today for evaluation and management of multinodular goiter     The patient was found to have thyroid nodule on CT 8/6/19 performed for eval of possible dissection of aortic aneurysm    Dedicated 11/27/19 US thyroid: 1.0 cm solid nodule inf R lobe, 2.0 cm complex predominantly solid nodule with punctate calcifications in mid L lobe and 2.4 cm solid isoechoic nodule inf L lobe     12/19/19 FNA of two left nodules: both non-malignant    Recent thyroid US 7/13/2020, I have reviewed images myself Rt lobe measures is 4.8 x 1.9 x 1.8 cm --> 1.1 cm hypoechoic nodule in lower pole, stable   Lt lobe measures is 5.2 x 2.4 x 2.8 cm --> two dominant nodules, 2.1  cm nodule in the upper pole, 2.4 cm nodule in lower pole       Atif Paez denies any new lumps, bumps in her neck, voice change, or shortness of breath. No family history of thyroid cancer. No prior history of radiation to head or neck region. Lab Results   Component Value Date/Time    TSH 1.170 07/13/2020 01:07 PM    T4FREE 1.12 07/13/2020 01:07 PM     Patient denied any history of  swelling in the area of the thyroid gland, weight loss, change in appetite, nervousness, anxiety, irritability, tremor, sweating, heat intolerance, changes in bowel habits, muscle weakness or difficulty sleeping. Patient also denied any h/o unexplained weight gain, new fatigue, increased sensitivity to cold, dry skin, brittle fingernails, brittle hair, facial swelling/puffiness, or depressed mood. PAST MEDICAL HISTORY   Past Medical History:   Diagnosis Date    Anxiety     Deep vein blood clot of left lower extremity ([address] 05/27/2019    Family history of early CAD     Hyperlipemia     Hypertension     Lightheadedness     Multinodular goiter     Sleep apnea     SOBOE (shortness of breath on exertion)        PAST SURGICAL HISTORY   Past Surgical History:   Procedure Laterality Date    BREAST SURGERY  2014    biopsy    CARDIOVASCULAR STRESS TEST      COLONOSCOPY      CYST REMOVAL  05/2017    mouth/under tongue    HYSTERECTOMY  1990    KNEE ARTHROSCOPY  2005    LEG SURGERY  1963    ligament repair    SALIVARY GLAND SURGERY Left 05/24/2017    TONSILLECTOMY      childhood       SOCIAL HISTORY   Tobacco:   reports that she has never smoked. She has never used smokeless tobacco.  Alcohol:   reports no history of alcohol use. Drugs:   reports no history of drug use.     FAMILY HISTORY   Family History   Problem Relation Age of Onset [employer] Pacemaker Mother     COPD Mother     Heart Failure Mother     Cancer Father         prostate    Hypertension Father     Coronary Art Dis Father         63's    Hypertension Sister     Heart Disease Brother         age 54       ALLERGIES AND DRUG REACTIONS   Allergies   Allergen Reactions    Penicillins Hives and Rash    Tape [employer] Collet Tape] Hives       CURRENT MEDICATIONS   Current Outpatient Medications   Medication Sig Dispense Refill    losartan (COZAAR) 50 MG tablet TAKE 1 TABLET BY MOUTH DAILY 90 tablet 0    metoprolol succinate (TOPROL XL) 25 MG extended release tablet TAKE 1/2 TABLET BY MOUTH DAILY 45 tablet 1    hydroCHLOROthiazide (HYDRODIURIL) 25 MG tablet Take 25 mg by mouth daily      pravastatin (PRAVACHOL) 10 MG tablet Take 10 mg by mouth daily      oxybutynin (DITROPAN) 5 MG tablet Take 5 mg by mouth 3 times daily      aspirin 81 MG chewable tablet Take 81 mg by mouth daily       No current facility-administered medications for this visit. Review of Systems  Constitutional: No fever, no chills, no diaphoresis, no generalized weakness. HEENT: No blurred vision, No sore throat, no ear pain, no hair loss  Neck: denied any neck swelling, difficulty swallowing,   Cadrdiopulomary: No CP, SOB or palpitation, No orthopnea or PND. No cough or wheezing. GI: No N/V/D, no constipation, No abdominal pain, no melena or hematochezia   : Denied any dysuria, hematuria, flank pain, discharge, or incontinence. Skin: denied any rash, ulcer, Hirsute, or hyperpigmentation. MSK: denied any joint deformity, joint pain/swelling, muscle pain, or back pain.   Neuro: no numbess, no tingling, no weakness,     OBJECTIVE    /84 (Site: Right Upper Arm, Position: Sitting, Cuff Size: Medium Adult)   Pulse 67   Temp 97.7 °F (36.5 °C)   Resp 16   Wt 269 lb (122 kg)   SpO2 97%   BMI 44.76 kg/m²   BP Readings from Last 4 Encounters:   09/03/20 128/84   05/15/20 138/68   03/06/20 111/64 TRIG 99 01/06/2020    HDL 64 01/06/2020    LDLCALC 120 01/06/2020    CHOL 204 01/06/2020     No results found for: [facility] [address] Records/Labs/Images Review:   I personally reviewed and summarized previous records   All labs and imaging were reviewed independently    [address], a 77 y.o.-old female seen in for the following issues     Multinodular goiter   · Prevalence of thyroid nodule on thyroid ultrasound is 50% and 95 % of these nodules are benign. · 12/2019 FNA to dominant Lt side 2.1 and 2.4 cm nodule was benign   · Recent US 7/2020 --> stable   · Will continue following with US. Next US in a year   · Thyroid hormones normal     Return in about 47 weeks (around 7/29/2021) for Multinodular goiter . The above issues were reviewed with the patient who understood and agreed with the plan. Thank you for allowing us to participate in the care of this patient. Please do not hesitate to contact us with any additional questions. Diagnosis Orders   1. Multinodular goiter  TSH without Reflex    T4, Free    US THYROID   2. Morbidly obese ([address]     3. Vitamin D deficiency  Vitamin D 25 Hydroxy       Rebecca Mcduffie MD  Endocrinologist, [facility])   [address] [facility], [address] [place] [address],[facility] [address] [address]   Phone: [phone]  Fax: [phone]  -------------------------------------------------------------  An electronic signature was used to authenticate this note.  Tamika Quintanilla MD on 9/6/2020 at 2:09 PM

## 2020-09-04 RX ORDER — OXYBUTYNIN CHLORIDE 5 MG/1
5 TABLET ORAL 3 TIMES DAILY
Qty: 90 TABLET | Refills: 0 | OUTPATIENT
Start: 2020-09-04 | End: 2020-09-18 | Stop reason: DRUGHIGH

## 2020-09-05 ENCOUNTER — PATIENT MESSAGE (OUTPATIENT)
Dept: FAMILY MEDICINE CLINIC | Age: 65
End: 2020-09-05

## 2020-09-06 ENCOUNTER — PATIENT MESSAGE (OUTPATIENT)
Dept: FAMILY MEDICINE CLINIC | Age: 65
End: 2020-09-06

## 2020-09-18 ENCOUNTER — OFFICE VISIT (OUTPATIENT)
Dept: FAMILY MEDICINE CLINIC | Age: 65
End: 2020-09-18
Payer: COMMERCIAL

## 2020-09-18 VITALS
OXYGEN SATURATION: 98 % | DIASTOLIC BLOOD PRESSURE: 61 MMHG | RESPIRATION RATE: 16 BRPM | HEIGHT: 65 IN | WEIGHT: 273 LBS | BODY MASS INDEX: 45.48 KG/M2 | HEART RATE: 78 BPM | TEMPERATURE: 97.3 F | SYSTOLIC BLOOD PRESSURE: 133 MMHG

## 2020-09-18 PROBLEM — I71.019 DISSECTION OF THORACIC AORTA (HCC): Status: ACTIVE | Noted: 2020-09-18

## 2020-09-18 PROCEDURE — 90471 IMMUNIZATION ADMIN: CPT | Performed by: FAMILY MEDICINE

## 2020-09-18 PROCEDURE — 99213 OFFICE O/P EST LOW 20 MIN: CPT | Performed by: FAMILY MEDICINE

## 2020-09-18 PROCEDURE — 90694 VACC AIIV4 NO PRSRV 0.5ML IM: CPT | Performed by: FAMILY MEDICINE

## 2020-09-18 RX ORDER — METOPROLOL SUCCINATE 25 MG/1
TABLET, EXTENDED RELEASE ORAL
Qty: 45 TABLET | Refills: 0 | Status: SHIPPED
Start: 2020-09-18 | End: 2020-11-30 | Stop reason: SDUPTHER

## 2020-09-18 RX ORDER — LORAZEPAM 0.5 MG/1
0.5 TABLET ORAL
Qty: 2 TABLET | Refills: 0 | Status: SHIPPED | OUTPATIENT
Start: 2020-09-18 | End: 2020-09-18

## 2020-09-18 RX ORDER — OXYBUTYNIN CHLORIDE 5 MG/1
5 TABLET ORAL DAILY
Qty: 90 TABLET | Refills: 0 | Status: SHIPPED | OUTPATIENT
Start: 2020-09-18 | End: 2020-10-12 | Stop reason: SDUPTHER

## 2020-09-18 NOTE — PROGRESS NOTES
9/18/2020    Terrell Barrett is a 77 y.o. female here for   Chief Complaint   Patient presents with    Hypertension    Anxiety     pt having a CTA Chest,would like med to calm down     Regarding hypertension. Patient is not monitoring home blood pressures. Cardiovascular risk factors: advanced age (older than 54 for men, 72 for women), dyslipidemia, hypertension, obesity (BMI >= 30 kg/m2) and sedentary lifestyle. Patient does not smoke. Currently on metoprolol XL, hctz 25 mg daily and losartan. Taking as prescribed. No adverse effects. Today,  BP: 133/61 and she is asymptomatic. BP Readings from Last 3 Encounters:   09/18/20 133/61   09/03/20 128/84   05/15/20 138/68     Patient denies chest pain, diaphoresis, dyspnea, dyspnea on exertion, peripheral edema, palpitations, headache, vision changes. She has f/u with vascular surgery - she needs to have repeat CTA  Experienced a great deal fo anxiety / claustrophobia  Requests a medications to assist with this  She would have someone driving her to procedure and back. Overall doing well.     Wt Readings from Last 3 Encounters:   09/18/20 273 lb (123.8 kg)   09/03/20 269 lb (122 kg)   07/21/20 268 lb (121.6 kg)       Allergies   Allergen Reactions    Penicillins Hives and Rash    Tape [Adhesive Tape] Hives       Medications  Current Outpatient Medications   Medication Sig Dispense Refill    oxybutynin (DITROPAN) 5 MG tablet Take 1 tablet by mouth daily 90 tablet 0    CALCIUM CITRATE PO Take 1,000 mg by mouth daily OTC      losartan (COZAAR) 50 MG tablet TAKE 1 TABLET BY MOUTH DAILY 90 tablet 0    metoprolol succinate (TOPROL XL) 25 MG extended release tablet TAKE 1/2 TABLET BY MOUTH DAILY 45 tablet 1    hydroCHLOROthiazide (HYDRODIURIL) 25 MG tablet Take 25 mg by mouth daily      pravastatin (PRAVACHOL) 10 MG tablet Take 10 mg by mouth daily      aspirin 81 MG chewable tablet Take 81 mg by mouth daily       No current facility-administered kg)   SpO2 98%   Breastfeeding No   BMI 45.43 kg/m²   Physical Exam  Constitutional:       Appearance: She is well-developed. HENT:      Head: Normocephalic and atraumatic. Cardiovascular:      Rate and Rhythm: Normal rate and regular rhythm. Heart sounds: No murmur. No friction rub. No gallop. Pulmonary:      Effort: Pulmonary effort is normal.      Breath sounds: Normal breath sounds. No wheezing or rales. Musculoskeletal:      Right lower leg: Edema present. Left lower leg: Edema (non pitting) present. Skin:     General: Skin is warm and dry. Neurological:      Mental Status: She is alert and oriented to person, place, and time. Assessment/Plan:  Payton Barrios was seen today for hypertension and anxiety. Diagnoses and all orders for this visit:    Situational anxiety  oarrs reviewed  No driving  Do not mix with alcohol  -     LORazepam (ATIVAN) 0.5 MG tablet; Take 1 tablet by mouth once as needed for Anxiety (15-30 minutes prior to imaging study. may repeat x 1 as needed.) for up to 1 dose. Essential hypertension  At goal on arb, thiazide, bb  Note strict bp control important with known aortic dissection  F/u with vascular  -     metoprolol succinate (TOPROL XL) 25 MG extended release tablet; TAKE 1/2 TABLET BY MOUTH DAILY    Dissection of thoracic aorta ([address]  Annual f/u with vascular is scheduled    Need for influenza vaccination  -     INFLUENZA, QUADV, ADJUVANTED, 65 YRS =, IM, PF, PREFILL SYR, 0.5ML (FLUAD)    Other orders  -     oxybutynin (DITROPAN) 5 MG tablet; Take 1 tablet by mouth daily        Return in about 6 months (around 3/18/2021). Advised patient to call with any new medication issues. Allquestions answered.   Call or go to ED immediately if symptoms worsen or persist.

## 2020-09-18 NOTE — TELEPHONE ENCOUNTER
Oxybutynin frequency updated, previous prescription was reordered at tid, patient takes medication qd.

## 2020-09-18 NOTE — PROGRESS NOTES
Vaccine Information Sheet, \"Influenza - Inactivated\"  given to Anna Bob, or parent/legal guardian of  Anna Bob and verbalized understanding. Patient responses:    Have you ever had a reaction to a flu vaccine? No  Do you have any current illness? No  Have you ever had Guillian Cleveland Syndrome? No  Do you have a serious allergy to any of the follow: Neomycin, Polymyxin, Thimerosal, eggs or egg products? No    Flu vaccine given per order. Please see immunization tab. Risks and benefits explained. Current VIS given.

## 2020-09-22 ENCOUNTER — PATIENT MESSAGE (OUTPATIENT)
Dept: FAMILY MEDICINE CLINIC | Age: 65
End: 2020-09-22

## 2020-09-22 NOTE — TELEPHONE ENCOUNTER
From: Adam Germain  To: Ivana Multani MD  Sent: 9/22/2020 3:44 PM EDT  Subject: Prescription Question    I have that pill at home. I need something  to call my nerves down

## 2020-09-23 RX ORDER — LORAZEPAM 0.5 MG/1
0.5 TABLET ORAL
Qty: 2 TABLET | Refills: 0 | Status: CANCELLED | OUTPATIENT
Start: 2020-09-23 | End: 2020-09-23

## 2020-09-23 NOTE — TELEPHONE ENCOUNTER
Medication was gven and printed at time of visit. If she can't find script then I can resend to pharmacy as needed.

## 2020-09-23 NOTE — TELEPHONE ENCOUNTER
Pt has that prescription, she wants something to take on a daily basis for anxiety.   Video visit scheduled 9/24/20

## 2020-09-24 ENCOUNTER — VIRTUAL VISIT (OUTPATIENT)
Dept: FAMILY MEDICINE CLINIC | Age: 65
End: 2020-09-24
Payer: COMMERCIAL

## 2020-09-24 PROCEDURE — G0444 DEPRESSION SCREEN ANNUAL: HCPCS | Performed by: FAMILY MEDICINE

## 2020-09-24 PROCEDURE — G8431 POS CLIN DEPRES SCRN F/U DOC: HCPCS | Performed by: FAMILY MEDICINE

## 2020-09-24 PROCEDURE — 99213 OFFICE O/P EST LOW 20 MIN: CPT | Performed by: FAMILY MEDICINE

## 2020-09-24 RX ORDER — ESCITALOPRAM OXALATE 5 MG/1
5 TABLET ORAL DAILY
Qty: 30 TABLET | Refills: 0 | Status: SHIPPED
Start: 2020-09-24 | End: 2020-10-09

## 2020-09-24 ASSESSMENT — PATIENT HEALTH QUESTIONNAIRE - PHQ9
1. LITTLE INTEREST OR PLEASURE IN DOING THINGS: 2
4. FEELING TIRED OR HAVING LITTLE ENERGY: 2
SUM OF ALL RESPONSES TO PHQ QUESTIONS 1-9: 14
2. FEELING DOWN, DEPRESSED OR HOPELESS: 3
SUM OF ALL RESPONSES TO PHQ9 QUESTIONS 1 & 2: 5
SUM OF ALL RESPONSES TO PHQ QUESTIONS 1-9: 14
7. TROUBLE CONCENTRATING ON THINGS, SUCH AS READING THE NEWSPAPER OR WATCHING TELEVISION: 0
9. THOUGHTS THAT YOU WOULD BE BETTER OFF DEAD, OR OF HURTING YOURSELF: 0
5. POOR APPETITE OR OVEREATING: 3
10. IF YOU CHECKED OFF ANY PROBLEMS, HOW DIFFICULT HAVE THESE PROBLEMS MADE IT FOR YOU TO DO YOUR WORK, TAKE CARE OF THINGS AT HOME, OR GET ALONG WITH OTHER PEOPLE: 1
8. MOVING OR SPEAKING SO SLOWLY THAT OTHER PEOPLE COULD HAVE NOTICED. OR THE OPPOSITE, BEING SO FIGETY OR RESTLESS THAT YOU HAVE BEEN MOVING AROUND A LOT MORE THAN USUAL: 1
6. FEELING BAD ABOUT YOURSELF - OR THAT YOU ARE A FAILURE OR HAVE LET YOURSELF OR YOUR FAMILY DOWN: 0
3. TROUBLE FALLING OR STAYING ASLEEP: 3

## 2020-09-24 ASSESSMENT — COLUMBIA-SUICIDE SEVERITY RATING SCALE - C-SSRS
1. WITHIN THE PAST MONTH, HAVE YOU WISHED YOU WERE DEAD OR WISHED YOU COULD GO TO SLEEP AND NOT WAKE UP?: NO
2. HAVE YOU ACTUALLY HAD ANY THOUGHTS OF KILLING YOURSELF?: NO
6. HAVE YOU EVER DONE ANYTHING, STARTED TO DO ANYTHING, OR PREPARED TO DO ANYTHING TO END YOUR LIFE?: NO

## 2020-09-24 NOTE — PROGRESS NOTES
9/24/2020    Jesse Ferris is a 77 y.o. female here for   Chief Complaint   Patient presents with    Anxiety     increased anxiety, anger, irratable    Depression     positive score of 14     Primary concern today is mood. Reports irritability, anger, anxiety, down mood for \"a while\", several months at least. Other people have brought this to her attention. She has also noted this herself. +anheondia +psychomotor slowing. +depressed mood. No suicidal ideations. No passive death wis. No auditory or visual hallucinations. She has been having more trouble sleeping. poro appetite as well. She has been feeling anxious as well. PHQ Scores 9/24/2020 1/20/2020 5/30/2019 9/28/2018 11/2/2017 10/25/2016 10/25/2016   PHQ2 Score 5 0 2 0 0 0 0   PHQ9 Score 14 0 2 0 0 0 0     Interpretation of Total Score Depression Severity: 1-4 = Minimal depression, 5-9 = Mild depression, 10-14 = Moderate depression, 15-19 = Moderately severe depression, 20-27 = Severe depression    Wt Readings from Last 3 Encounters:   09/18/20 273 lb (123.8 kg)   09/03/20 269 lb (122 kg)   07/21/20 268 lb (121.6 kg)       She  reports that she has never smoked. She has never used smokeless tobacco.    Medications and allergies reviewed and updated in chart. Current Outpatient Medications   Medication Sig Dispense Refill    oxybutynin (DITROPAN) 5 MG tablet Take 1 tablet by mouth daily 90 tablet 0    CALCIUM CITRATE PO Take 1,000 mg by mouth daily OTC      metoprolol succinate (TOPROL XL) 25 MG extended release tablet TAKE 1/2 TABLET BY MOUTH DAILY 45 tablet 0    losartan (COZAAR) 50 MG tablet TAKE 1 TABLET BY MOUTH DAILY 90 tablet 0    hydroCHLOROthiazide (HYDRODIURIL) 25 MG tablet Take 25 mg by mouth daily      pravastatin (PRAVACHOL) 10 MG tablet Take 10 mg by mouth daily      aspirin 81 MG chewable tablet Take 81 mg by mouth daily       No current facility-administered medications for this visit.          Patient'spa medical, surgical, social and/or family history reviewed, updated in chart, and are non-contributory (unless otherwise stated). Review of Systems  Review of Systems    PE:  VS:    Physical Exam  Mood is depressed  Eye contact is good  No suicidal/ homicidal ideations  Judgement and insight are fair  Assessment/Plan:  Jory Garcia was seen today for anxiety and depression. Diagnoses and all orders for this visit:    Current moderate episode of major depressive disorder without prior episode ([facility] [address] [facility]  New episode  Start ssri at low dose  Will start SSRI. Contracted for safety. Discussed risks and benefits of medication. Advised on side effects. Advised to not abruptly discontinue medication. Advised to call office if any change in symptoms. Short term f/u in 2 weeks to assess for side effects    Positive depression screening  -     Positive Screen for Clinical Depression with a Documented Follow-up Plan     Other orders  -     escitalopram (LEXAPRO) 5 MG tablet; Take 1 tablet by mouth daily        No follow-ups on file. Advised patient to call with any new medication issues. All questions answered.   Call or go to emergency department ifsymptoms worsen or persist.

## 2020-09-27 ENCOUNTER — PATIENT MESSAGE (OUTPATIENT)
Dept: FAMILY MEDICINE CLINIC | Age: 65
End: 2020-09-27

## 2020-09-28 NOTE — TELEPHONE ENCOUNTER
From: Ace Arvizu  To:  Tiffanie Kelly MD  Sent: 9/27/2020 9:19 AM EDT  Subject: Prescription Question    I need a prescription on this pill hydrochlorothiazide 25mg every morning

## 2020-09-29 RX ORDER — HYDROCHLOROTHIAZIDE 25 MG/1
25 TABLET ORAL DAILY
Qty: 90 TABLET | Refills: 0 | Status: SHIPPED
Start: 2020-09-29 | End: 2020-11-30 | Stop reason: SDUPTHER

## 2020-10-01 ENCOUNTER — PATIENT MESSAGE (OUTPATIENT)
Dept: FAMILY MEDICINE CLINIC | Age: 65
End: 2020-10-01

## 2020-10-01 ENCOUNTER — TELEPHONE (OUTPATIENT)
Dept: FAMILY MEDICINE CLINIC | Age: 65
End: 2020-10-01

## 2020-10-01 NOTE — TELEPHONE ENCOUNTER
Derian Reinoso called, he would like to know if  can write a letter stating that patient needs to retire due to medical reasons. I advised that I will check with  and let them know.

## 2020-10-01 NOTE — TELEPHONE ENCOUNTER
This is not something that I have ever done in the past.    A list of job duties/ description may be helpful . FMLA form for medical reason could be completed if time is needed away from work. This is something I would recommend discussing in person.

## 2020-10-02 NOTE — TELEPHONE ENCOUNTER
From: Yves Tavarez  To:  oRdrigo Nicholas MD  Sent: 10/1/2020 3:22 PM EDT  Subject: Prescription Question    I would like to know if send my prescription to the store

## 2020-10-02 NOTE — TELEPHONE ENCOUNTER
9/29/20 hydrochlorothiazide was sent  9/18/20 Oxybutynin, metoprolol was sent  9/1/20 Losartan was sent

## 2020-10-09 ENCOUNTER — VIRTUAL VISIT (OUTPATIENT)
Dept: FAMILY MEDICINE CLINIC | Age: 65
End: 2020-10-09
Payer: COMMERCIAL

## 2020-10-09 PROCEDURE — 99213 OFFICE O/P EST LOW 20 MIN: CPT | Performed by: STUDENT IN AN ORGANIZED HEALTH CARE EDUCATION/TRAINING PROGRAM

## 2020-10-09 RX ORDER — ESCITALOPRAM OXALATE 10 MG/1
10 TABLET ORAL DAILY
Qty: 30 TABLET | Refills: 1 | Status: SHIPPED
Start: 2020-10-10 | End: 2020-10-27 | Stop reason: SDUPTHER

## 2020-10-09 NOTE — PROGRESS NOTES
[address]  Precepting Note    Subjective:  Mood disorder  MDD  Started on Lexapro 2 weeks ago. Takes 5 mg  Feels that it is helping- less anxious, less depressed. Better sleep  No Si or Hi      ROS otherwise negative     Past medical, surgical, family and social history were reviewed, non-contributory, and unchanged unless otherwise stated. Objective: There were no vitals taken for this visit. Exam is as noted by resident with the following changes, additions or corrections:    General:  NAD; alert & oriented x 3   Psych: mood and affect normal      Assessment/Plan:  MDD   Encourage upping dose to 10 mg      Attending Physician Statement  I have reviewed the chart, including any radiology or labs. I have discussed the case, including pertinent history and exam findings with the resident. I agree with the assessment, plan and orders as documented by the resident. Please refer to the resident note for additional information.       Electronically signed by Tammi Stevens MD on 10/9/2020 at 8:43 AM

## 2020-10-09 NOTE — PROGRESS NOTES
10/9/2020    TELEHEALTH EVALUATION -- Audio/Visual (During IKPBI-85 public health emergency)    HPI:    Dona Morse (:  1955) has requested an audio/video evaluation for the following concern(s):    Mood disorder  -f/u  -started on lexapro 2 weeks ago per PCP  -currently, going ok  -patient feels the medication is helping  -less anxiety, less yelling, less depressed  -sleeping better  -denies any HI or SI     Review of Systems - as above     Prior to Visit Medications    Medication Sig Taking?  Authorizing Provider   escitalopram (LEXAPRO) 10 MG tablet Take 1 tablet by mouth daily Yes Kandi Frye DO   hydroCHLOROthiazide (HYDRODIURIL) 25 MG tablet Take 1 tablet by mouth daily Yes Sally Domingo MD   oxybutynin (DITROPAN) 5 MG tablet Take 1 tablet by mouth daily Yes Sally Domingo MD   CALCIUM CITRATE PO Take 1,000 mg by mouth daily OTC Yes Historical Provider, MD   metoprolol succinate (TOPROL XL) 25 MG extended release tablet TAKE 1/2 TABLET BY MOUTH DAILY Yes Sally Domingo MD   losartan (COZAAR) 50 MG tablet TAKE 1 TABLET BY MOUTH DAILY Yes Sally Domingo MD   pravastatin (PRAVACHOL) 10 MG tablet Take 10 mg by mouth daily Yes Historical Provider, MD   aspirin 81 MG chewable tablet Take 81 mg by mouth daily Yes Historical Provider, MD       Social History     Tobacco Use    Smoking status: Never Smoker    Smokeless tobacco: Never Used   Substance Use Topics    Alcohol use: No    Drug use: No        PHYSICAL EXAMINATION:  [ INSTRUCTIONS:  \"[x]\" Indicates a positive item  \"[]\" Indicates a negative item  -- DELETE ALL ITEMS NOT EXAMINED]    Constitutional: [x] Appears well-developed and well-nourished [x] No apparent distress      [] Abnormal-   Mental status  [x] Alert and awake  [] Oriented to person/place/time []Able to follow commands      Eyes:  EOM    [x]  Normal  [] Abnormal-  Sclera  [x]  Normal  [] Abnormal -         Discharge []  None visible  [] Abnormal -    HENT:   [x] Normocephalic, atraumatic. [] Abnormal   [] Mouth/Throat: Mucous membranes are moist.     External Ears [x] Normal  [] Abnormal-     Neck: [x] No visualized mass     Pulmonary/Chest: [x] Respiratory effort normal.  [x] No visualized signs of difficulty breathing or respiratory distress        [] Abnormal-      Musculoskeletal:   [x] Normal gait with no signs of ataxia         [] Normal range of motion of neck        [] Abnormal-       Neurological:        [x] No Facial Asymmetry (Cranial nerve 7 motor function) (limited exam to video visit)          [x] No gaze palsy        [] Abnormal-         Skin:        [x] No significant exanthematous lesions or discoloration noted on facial skin         [] Abnormal-            Psychiatric:       [x] Normal Affect [] No Hallucinations        [] Abnormal-        ASSESSMENT/PLAN:  1. Current moderate episode of major depressive disorder without prior episode ([facility] [address]  F/u of new issue  -Symptoms improving on lexapro, patient agreeable to increasing medication, therefore will increase form 5 to 10 as below and have close follow up in a month to see how her mood is  -INCREASED FROM 5 TO 10 MG DURING THIS VISIT - escitalopram (LEXAPRO) 10 MG tablet; Take 1 tablet by mouth daily  Dispense: 30 tablet; Refill: 1      Return in about 1 month (around 11/9/2020) for f/u mood. Fabian Hercules is a 77 y.o. female being evaluated by a Virtual Visit (video visit) encounter to address concerns as mentioned above. A caregiver was present when appropriate. Due to this being a TeleHealth encounter (During [place] [address] public [facility] emergency), evaluation of the following organ systems was limited: Vitals/Constitutional/EENT/Resp/CV/GI//MS/Neuro/Skin/Heme-Lymph-Imm.   Pursuant to the emergency declaration under the [address] [facility] waiver authority and the [employer] Act, this Virtual Visit was conducted with patient's (and/or legal guardian's) consent, to reduce the patient's risk of exposure to COVID-19 and provide necessary medical care. The patient (and/or legal guardian) has also been advised to contact this office for worsening conditions or problems, and seek emergency medical treatment and/or call 911 if deemed necessary. Patient identification was verified at the start of the visit: Yes    Total time spent on this encounter: Not billed by time    Services were provided through a video synchronous discussion virtually to substitute for in-person clinic visit. Patient and provider were located at their individual homes. --Fantasma Bartlett DO on 10/9/2020 at 9:58 AM    An electronic signature was used to authenticate this note.

## 2020-10-10 ENCOUNTER — PATIENT MESSAGE (OUTPATIENT)
Dept: FAMILY MEDICINE CLINIC | Age: 65
End: 2020-10-10

## 2020-10-12 NOTE — TELEPHONE ENCOUNTER
From: Jenni Doss  To:  Lary Springer MD  Sent: 10/10/2020 12:25 PM EDT  Subject: Prescription Question    I need a refill on oxbutynin 5 mg take one pill three times daily

## 2020-10-13 RX ORDER — OXYBUTYNIN CHLORIDE 5 MG/1
5 TABLET ORAL 3 TIMES DAILY
Qty: 270 TABLET | Refills: 0 | Status: SHIPPED
Start: 2020-10-13 | End: 2020-11-30 | Stop reason: ALTCHOICE

## 2020-10-15 ENCOUNTER — HOSPITAL ENCOUNTER (EMERGENCY)
Age: 65
Discharge: HOME | End: 2020-10-15
Attending: FAMILY MEDICINE
Payer: COMMERCIAL

## 2020-10-15 VITALS
TEMPERATURE: 98.4 F | OXYGEN SATURATION: 95 % | BODY MASS INDEX: 44.15 KG/M2 | HEART RATE: 66 BPM | WEIGHT: 265 LBS | DIASTOLIC BLOOD PRESSURE: 74 MMHG | SYSTOLIC BLOOD PRESSURE: 130 MMHG | HEIGHT: 65 IN | RESPIRATION RATE: 16 BRPM

## 2020-10-15 PROCEDURE — 87077 CULTURE AEROBIC IDENTIFY: CPT

## 2020-10-15 PROCEDURE — 87186 SC STD MICRODIL/AGAR DIL: CPT

## 2020-10-15 PROCEDURE — 87070 CULTURE OTHR SPECIMN AEROBIC: CPT

## 2020-10-15 PROCEDURE — 99282 EMERGENCY DEPT VISIT SF MDM: CPT

## 2020-10-15 RX ORDER — DIAPER,BRIEF,INFANT-TODD,DISP
EACH MISCELLANEOUS ONCE
Status: DISCONTINUED | OUTPATIENT
Start: 2020-10-15 | End: 2020-10-15 | Stop reason: HOSPADM

## 2020-10-15 RX ORDER — SULFAMETHOXAZOLE AND TRIMETHOPRIM 800; 160 MG/1; MG/1
1 TABLET ORAL 2 TIMES DAILY
Qty: 20 TABLET | Refills: 0 | Status: SHIPPED | OUTPATIENT
Start: 2020-10-15 | End: 2020-10-23 | Stop reason: ALTCHOICE

## 2020-10-15 ASSESSMENT — PAIN DESCRIPTION - PAIN TYPE: TYPE: ACUTE PAIN

## 2020-10-15 ASSESSMENT — PAIN DESCRIPTION - LOCATION: LOCATION: LEG

## 2020-10-15 ASSESSMENT — PAIN DESCRIPTION - ORIENTATION: ORIENTATION: RIGHT;LOWER

## 2020-10-15 ASSESSMENT — PAIN SCALES - GENERAL: PAINLEVEL_OUTOF10: 8

## 2020-10-15 NOTE — ED NOTES
Wound to right lower leg clean and dry. Bacitracin and dry dressing applied. Wound culture sent to lab.       Keisha Cisneros RN  10/15/20 [id number]

## 2020-10-15 NOTE — LETTER
[address] [facility] Emergency Department  [id number] [facility] [address]  Phone: [phone]               October 15, 2020    Patient: Mary Laird   YOB: 1955   Date of Visit: 10/15/2020       To Whom It May Concern:    Dora Rizo was seen and treated in our emergency department on 10/15/2020. She may return to work on 10/16/2020.        Sincerely,       Eri Chatman RN         Signature:__________________________________

## 2020-10-15 NOTE — ED PROVIDER NOTES
Department of Emergency Medicine   ED  Provider Note  Admit Date/RoomTime: 10/15/2020  7:49 AM  ED Room: 06/06  Chief Complaint   Wound Check (right lower leg beginning a couple days ago with no injury)    History of Present Illness   Source of history provided by:  patient. History/Exam Limitations: none. Yves Tavarez is a 77 y.o. old female with has a past medical history of:   Past Medical History:   Diagnosis Date    Anxiety     Deep vein blood clot of left lower extremity ([address] 05/27/2019    Family history of early CAD     Hyperlipemia     Hypertension     Lightheadedness     Multinodular goiter     Sleep apnea     SOBOE (shortness of breath on exertion)     presents to the emergency department by private vehicle and ambulatory, for complaint of gradual onset, still present, worse since 2 days red area on  Anteromedial aspect RLL which began 2 day(s) prior to arrival.  The symptoms were caused by unknown cause. Since onset the symptoms have been persistent. Prior history of similar episodes: No.   Her symptoms are associated with erythema with central eschar and relieved by nothing. She denies any drainage any pain any fever. ROS    Pertinent positives and negatives are stated within HPI, all other systems reviewed and are negative. Past Surgical History:   Procedure Laterality Date    BREAST SURGERY  2014    biopsy    CARDIOVASCULAR STRESS TEST      COLONOSCOPY      CYST REMOVAL  05/2017    mouth/under tongue    HYSTERECTOMY  1990    KNEE ARTHROSCOPY  2005    LEG SURGERY  1963    ligament repair    SALIVARY GLAND SURGERY Left 05/24/2017    TONSILLECTOMY      childhood   Social History:  reports that she has never smoked. She has never used smokeless tobacco. She reports that she does not drink alcohol or use drugs.   Family History: family history includes COPD in her mother; Cancer in her father; Coronary Art Dis in her father; Heart Disease in her brother; Heart Failure in her mother; Hypertension in her father and sister; Pacemaker in her mother. Allergies: Penicillins and Tape [adhesive tape]    Physical Exam           ED Triage Vitals   BP Temp Temp Source Pulse Resp SpO2 Height Weight   10/15/20 0750 10/15/20 0750 10/15/20 0750 10/15/20 0750 10/15/20 0750 10/15/20 0750 10/15/20 0754 10/15/20 0754   130/74 98.4 °F (36.9 °C) Temporal 66 16 95 % 5' 5\" (1.651 m) 265 lb (120.2 kg)     Oxygen Saturation Interpretation: Normal.      Constitutional:  Alert, development consistent with age. HEENT:  NC/NT. Airway patent. Eyes:  PERRL, EOMI, no discharge. Respiratory:  Clear to auscultation and breath sounds equal.  CV:  Regular rate and rhythm. Integument:  Skin turgor: Normal.              3 cm erythema round with 0.5 cm central eschar no drainage . Neurological:  Orientation age-appropriate unless noted elseware. Motor functions intact. Lab / Imaging Results   (All laboratory and radiology results have been personally reviewed by myself)  Labs:  No results found for this visit on 10/15/20. Imaging: All Radiology results interpreted by Radiologist unless otherwise noted. No orders to display       ED Course / Medical Decision Making     Medications   bacitracin zinc ointment (has no administration in time range)        Consults:   None    Procedures:   Area cleansed eschar debrided culture obtained dressing applied    MDM:   Possible insect bite with secondary infection no foreign body noted there is been no fluctuance no lymphangitis no signs of DVT will treat for infection follow-up with PMD return as needed    Counseling: The emergency provider has spoken with the patient and discussed todays results, in addition to providing specific details for the plan of care and counseling regarding the diagnosis and prognosis. Questions are answered at this time and they are agreeable with the plan. Assessment      1.  Cellulitis of right lower extremity      Plan Discharge to home  Patient condition is good    New Medications     New Prescriptions    No medications on file     Electronically signed by Hellen Monreal MD   DD: 10/15/20  **This report was transcribed using voice recognition software. Every effort was made to ensure accuracy; however, inadvertent computerized transcription errors may be present.   END OF ED PROVIDER NOTE        Hellen Monreal MD  10/15/20 3540

## 2020-10-17 LAB
ORGANISM: ABNORMAL
WOUND/ABSCESS: ABNORMAL

## 2020-10-18 ENCOUNTER — TELEPHONE (OUTPATIENT)
Dept: FAMILY MEDICINE CLINIC | Age: 65
End: 2020-10-18

## 2020-10-18 NOTE — TELEPHONE ENCOUNTER
Please see if patients infection has improved  Her culture results showed staph which was sensitive to antibiotic prescribed. Let us know.

## 2020-10-18 NOTE — TELEPHONE ENCOUNTER
----- Message from Isabel Ferreira RN sent at 10/18/2020  1:32 PM EDT -----  Patient was placed on bactrim

## 2020-10-22 RX ORDER — ESCITALOPRAM OXALATE 5 MG/1
5 TABLET ORAL DAILY
Qty: 30 TABLET | Refills: 0 | OUTPATIENT
Start: 2020-10-22 | End: 2020-11-21

## 2020-10-23 ENCOUNTER — OFFICE VISIT (OUTPATIENT)
Dept: ORTHOPEDIC SURGERY | Age: 65
End: 2020-10-23
Payer: COMMERCIAL

## 2020-10-23 VITALS — TEMPERATURE: 97.2 F | HEIGHT: 65 IN | BODY MASS INDEX: 44.15 KG/M2 | WEIGHT: 265 LBS

## 2020-10-23 PROCEDURE — 99213 OFFICE O/P EST LOW 20 MIN: CPT | Performed by: ORTHOPAEDIC SURGERY

## 2020-10-23 NOTE — PROGRESS NOTES
Follow Up Visit     Joann Sharma returns today for follow up visit regarding bilateral knee osteoarthritis. Treatment thus far has included steroid injections with good relief. She reports symptoms improved after her last injection in July but the pain has returned the last couple of weeks. She is not sure that it is bad enough for injections at this time    Physical Exam:     Height: 5' 5\" (1.651 m), Weight: 265 lb (120.2 kg) (per pt)    General: Alert and oriented x3, no acute distress  Cardiovascular/pulmonary: No labored breathing, peripheral perfusion intact  Musculoskeletal:    Exam displays some mild joint line tenderness. Range of motion is about 5 to 110 degrees. The knees are stable. There is no swelling    Controlled Substances Monitoring:      Imaging:  No new images. Previous images reviewed showing bilateral knee osteoarthritis      Assessment: Bilateral knee osteoarthritis      Plan:   We discussed her knees today. Overall she is doing okay. She does have some intermittent pain but does not feel that she needs injections just yet. She will continue to work on strengthening and take anti-inflammatories as needed. Follow-up as needed for injections when her pain returns. We also again counseled her on the importance of weight loss.     Aditi Reagan MD  Orthopaedic Surgery   10/23/20  8:09 AM

## 2020-10-24 ENCOUNTER — PATIENT MESSAGE (OUTPATIENT)
Dept: FAMILY MEDICINE CLINIC | Age: 65
End: 2020-10-24

## 2020-10-26 NOTE — TELEPHONE ENCOUNTER
From: Jerson Layton  To:  Jose Stein MD  Sent: 10/24/2020 9:56 AM EDT  Subject: Prescription Question    Good morning would you please refill my escitallpram  10mg daily   Thank You

## 2020-10-28 RX ORDER — ESCITALOPRAM OXALATE 10 MG/1
10 TABLET ORAL DAILY
Qty: 30 TABLET | Refills: 2 | Status: SHIPPED
Start: 2020-10-28 | End: 2020-11-30 | Stop reason: SDUPTHER

## 2020-11-10 ENCOUNTER — PATIENT MESSAGE (OUTPATIENT)
Dept: FAMILY MEDICINE CLINIC | Age: 65
End: 2020-11-10

## 2020-11-11 NOTE — TELEPHONE ENCOUNTER
Let's see if we can get patient on for a video visit to discuss. I think this would be very reasonable, but I would like to see how she is doing.

## 2020-11-13 ENCOUNTER — PATIENT MESSAGE (OUTPATIENT)
Dept: FAMILY MEDICINE CLINIC | Age: 65
End: 2020-11-13

## 2020-11-18 ENCOUNTER — PATIENT MESSAGE (OUTPATIENT)
Dept: FAMILY MEDICINE CLINIC | Age: 65
End: 2020-11-18

## 2020-11-24 ENCOUNTER — PATIENT MESSAGE (OUTPATIENT)
Dept: FAMILY MEDICINE CLINIC | Age: 65
End: 2020-11-24

## 2020-11-30 ENCOUNTER — OFFICE VISIT (OUTPATIENT)
Dept: FAMILY MEDICINE CLINIC | Age: 65
End: 2020-11-30
Payer: COMMERCIAL

## 2020-11-30 VITALS
HEART RATE: 79 BPM | BODY MASS INDEX: 45.15 KG/M2 | WEIGHT: 271 LBS | TEMPERATURE: 97.2 F | HEIGHT: 65 IN | RESPIRATION RATE: 16 BRPM | DIASTOLIC BLOOD PRESSURE: 55 MMHG | OXYGEN SATURATION: 98 % | SYSTOLIC BLOOD PRESSURE: 115 MMHG

## 2020-11-30 PROCEDURE — 99213 OFFICE O/P EST LOW 20 MIN: CPT | Performed by: STUDENT IN AN ORGANIZED HEALTH CARE EDUCATION/TRAINING PROGRAM

## 2020-11-30 RX ORDER — METOPROLOL SUCCINATE 25 MG/1
12.5 TABLET, EXTENDED RELEASE ORAL DAILY
Qty: 45 TABLET | Refills: 0 | Status: SHIPPED
Start: 2020-11-30 | End: 2021-03-02 | Stop reason: SDUPTHER

## 2020-11-30 RX ORDER — HYDROCHLOROTHIAZIDE 25 MG/1
25 TABLET ORAL DAILY
Qty: 90 TABLET | Refills: 0 | Status: SHIPPED
Start: 2020-11-30 | End: 2021-03-02 | Stop reason: SDUPTHER

## 2020-11-30 RX ORDER — OXYBUTYNIN CHLORIDE 10 MG/1
10 TABLET, EXTENDED RELEASE ORAL DAILY
Qty: 30 TABLET | Refills: 3 | Status: SHIPPED
Start: 2020-11-30 | End: 2021-03-02 | Stop reason: SDUPTHER

## 2020-11-30 RX ORDER — LOSARTAN POTASSIUM 50 MG/1
50 TABLET ORAL DAILY
Qty: 90 TABLET | Refills: 0 | Status: SHIPPED
Start: 2020-11-30 | End: 2021-01-04

## 2020-11-30 RX ORDER — PRAVASTATIN SODIUM 10 MG
10 TABLET ORAL DAILY
Qty: 90 TABLET | Refills: 0 | Status: SHIPPED
Start: 2020-11-30 | End: 2021-01-04

## 2020-11-30 RX ORDER — ESCITALOPRAM OXALATE 20 MG/1
20 TABLET ORAL DAILY
Qty: 30 TABLET | Refills: 2 | Status: SHIPPED
Start: 2020-11-30 | End: 2021-01-04

## 2020-11-30 ASSESSMENT — PATIENT HEALTH QUESTIONNAIRE - PHQ9
3. TROUBLE FALLING OR STAYING ASLEEP: 1
2. FEELING DOWN, DEPRESSED OR HOPELESS: 1
10. IF YOU CHECKED OFF ANY PROBLEMS, HOW DIFFICULT HAVE THESE PROBLEMS MADE IT FOR YOU TO DO YOUR WORK, TAKE CARE OF THINGS AT HOME, OR GET ALONG WITH OTHER PEOPLE: 1
8. MOVING OR SPEAKING SO SLOWLY THAT OTHER PEOPLE COULD HAVE NOTICED. OR THE OPPOSITE, BEING SO FIGETY OR RESTLESS THAT YOU HAVE BEEN MOVING AROUND A LOT MORE THAN USUAL: 1
6. FEELING BAD ABOUT YOURSELF - OR THAT YOU ARE A FAILURE OR HAVE LET YOURSELF OR YOUR FAMILY DOWN: 0
4. FEELING TIRED OR HAVING LITTLE ENERGY: 0
1. LITTLE INTEREST OR PLEASURE IN DOING THINGS: 2
SUM OF ALL RESPONSES TO PHQ QUESTIONS 1-9: 7
SUM OF ALL RESPONSES TO PHQ QUESTIONS 1-9: 7
5. POOR APPETITE OR OVEREATING: 2
SUM OF ALL RESPONSES TO PHQ9 QUESTIONS 1 & 2: 3
9. THOUGHTS THAT YOU WOULD BE BETTER OFF DEAD, OR OF HURTING YOURSELF: 0
7. TROUBLE CONCENTRATING ON THINGS, SUCH AS READING THE NEWSPAPER OR WATCHING TELEVISION: 0
SUM OF ALL RESPONSES TO PHQ QUESTIONS 1-9: 7

## 2020-11-30 NOTE — PROGRESS NOTES
S: 77 y.o. female with   Chief Complaint   Patient presents with    Incontinence     Urinary: taking oxybutynin 5 mg tid    Depression     Discuss increasing Lexapro       Pt is here for urinary incontinence and depression. She is feeling more anxious. COVID has made her feel more depressed. She saw Dr. Carmen tSokes and was put on diptropan. BP Readings from Last 3 Encounters:   11/30/20 (!) 115/55   10/15/20 130/74   09/18/20 133/61       O: VS:  height is 5' 5\" (1.651 m) and weight is 271 lb (122.9 kg). Her temporal temperature is 97.2 °F (36.2 °C). Her blood pressure is 115/55 (abnormal) and her pulse is 79. Her respiration is 16 and oxygen saturation is 98%. As per resident note. Impression/Plan:   1) anxiety - try therapy. Also will increase dose until therapy starts to help. 2) overactive bladder - pt given options for her meds. 3) HTN - will stop HCTZ      There are no preventive care reminders to display for this patient. Attending Physician Statement  I have discussed the case, including pertinent history and exam findings with the resident. I agree with the documented assessment and plan.       Monica Jacques MD

## 2020-11-30 NOTE — PROGRESS NOTES
[facility] Medicine Residency Program  Phone: [phone]  Fax: [phone]    Patient:  Chadwick Lamar 77 y.o. female                                 Date of Service: 11/30/20                            Chiefcomplaint:   Chief Complaint   Patient presents with    Incontinence     Urinary: taking oxybutynin 5 mg tid    Depression     Discuss increasing Lexapro         History of Present Illness: The patient is a 77 y.o. 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 She denied any suicidal ideation, hurting someone else. Patient has had urinary incontinence for long time, she takes oxybutynin 5 mg 3 times a day, she visits Dr. Nadine Valdes gynecologist, she has appointment in 3-month. She has had incontinence. She will be happy if we can increase the dose of oxybutynin 5 mg 4 times a day. She denied any dysuria burning suprapubic pain, fever chills. She is a patient with essential hypertension she is on Toprol 25, Cozaar 50 and hydrochlorthiazide 25, her blood pressure normal most of the time, today's blood pressure is 115/55, no dizziness, no headache, no palpitation, no shortness of breath. Patient denied chest pain or tightness, calf swelling, limb weakness, diaphoresis, nausea vomiting, fever chills      Review of Systems:   Review of Systems   Constitutional: Negative. Negative for chills and fever. HENT: Negative for congestion and sore throat. Eyes: Negative. Negative for redness. Respiratory: Negative. Negative for cough, chest tightness and wheezing. Gastrointestinal: Negative. Negative for abdominal distention, diarrhea, nausea and vomiting. Endocrine: Negative. Genitourinary: Negative. Negative for frequency and hematuria. Musculoskeletal: Negative. Skin: Negative. Negative for pallor, rash and wound. Allergic/Immunologic: Negative. Neurological: Negative. Negative for syncope, numbness and headaches. Hematological: Negative. Negative for adenopathy. Psychiatric/Behavioral: Positive for agitation, decreased concentration, dysphoric mood and sleep disturbance. Negative for behavioral problems, confusion, hallucinations and suicidal ideas. The patient is nervous/anxious.         Past Medical History:      Diagnosis Date    Anxiety     Deep vein blood clot of left lower extremity ([facility] [address] 05/27/2019    Family history of early CAD     Hyperlipemia     Hypertension     Lightheadedness     Multinodular goiter     Sleep apnea     SOBOE (shortness of breath on exertion)        Past Surgical History:        Procedure Laterality Date    BREAST SURGERY  2014    biopsy    CARDIOVASCULAR STRESS TEST      COLONOSCOPY      CYST REMOVAL  05/2017    mouth/under tongue    HYSTERECTOMY  1990    KNEE ARTHROSCOPY  2005    LEG SURGERY  1963    ligament repair    SALIVARY GLAND SURGERY Left 05/24/2017    TONSILLECTOMY      childhood       Allergies:    Penicillins and Tape Paramjit Simon tape]    Social History:   Social History     Socioeconomic History    Marital status:      Spouse name: Not on file    Number of children: Not on file    Years of education: Not on file    Highest education level: Not on file   Occupational History    Occupation: maintenance- housekeeping     Comment: [employer]   Social Needs    Financial resource strain: Not on file    Food insecurity     Worry: Not on file     Inability: Not on file    Transportation needs     Medical: Not on file     Non-medical: Not on file   Tobacco Use    Smoking status: no organomegaly  Extremities:  Extremities normal, atraumatic, no cyanosis. no edema. Skin: Skin color, texture, turgor normal, no rashes or lesions  Musculokeletal: ROM grossly normal in all joints of extremities, no obvious joint swelling. Lymph nodes: no lymph node enlargement appreciated  Neurologic:   Alert&Oriented. Normal gait and coordination  No focal neurological deficits appreaciated         Psychiatric: has a normal mood and affect. Behavior is normal.       Assessment and Plan:         Gosia Holguin was seen today for incontinence and depression. Diagnoses and all orders for this visit:    Essential hypertension  -     hydroCHLOROthiazide (HYDRODIURIL) 25 MG tablet; Take 1 tablet by mouth daily  -     metoprolol succinate (TOPROL XL) 25 MG extended release tablet; Take 0.5 tablets by mouth daily TAKE 1/2 TABLET BY MOUTH DAILY  -     losartan (COZAAR) 50 MG tablet; Take 1 tablet by mouth daily  -     pravastatin (PRAVACHOL) 10 MG tablet;  Take 1 tablet by mouth daily  -Known patient with essential hypertension  -Today's blood pressure at 105/55, no dizziness, no headache, no palpitation  -She is on hydrochlorothiazide 25, losartan 50, Toprol 25  -We have suggested to decrease the dose of hydrochlorothiazide to 12.5 and gradually stop        Major depressive disorder with current active episode, unspecified depression episode severity, unspecified whether recurrent  -Patient has had history of depression and anxiety, she is on Lexapro 10mg  -She wants to increase the dose as due to Covid 19 pandemic she is feeling more anxious  -As medication is working well in the past and tolerating well, she wants to increase the dose  -We have given Lexapro 20 mg daily  -We have suggested to visit therapist/psychologist  -She will call us if she does not get better          Urinary incontinence, unspecified type  -She is known patient with urinary incontinence, takes oxybutynin 5 mg 3 times a day  -We have increased extended-release 10 mg daily  -She used to follow-up with Dr. Yeni Park gynecologist, we suggested to make a follow-up visit with her soon      Other orders  -     oxybutynin (DITROPAN XL) 10 MG extended release tablet; Take 1 tablet by mouth daily          I encourage further reading and education about your health conditions. Information on many healthconditions is provided by the American Academy [employer]icians: https://familydoctor. org/  Please bring any questions to me at your next visit. Return to Office: Return in about 6 weeks (around 1/11/2021). Medication List:    Current Outpatient Medications   Medication Sig Dispense Refill    hydroCHLOROthiazide (HYDRODIURIL) 25 MG tablet Take 1 tablet by mouth daily 90 tablet 0    metoprolol succinate (TOPROL XL) 25 MG extended release tablet Take 0.5 tablets by mouth daily TAKE 1/2 TABLET BY MOUTH DAILY 45 tablet 0    losartan (COZAAR) 50 MG tablet Take 1 tablet by mouth daily 90 tablet 0    pravastatin (PRAVACHOL) 10 MG tablet Take 1 tablet by mouth daily 90 tablet 0    oxybutynin (DITROPAN XL) 10 MG extended release tablet Take 1 tablet by mouth daily 30 tablet 3    escitalopram (LEXAPRO) 20 MG tablet Take 1 tablet by mouth daily 30 tablet 2    CALCIUM CITRATE PO Take 1,000 mg by mouth daily OTC      aspirin 81 MG chewable tablet Take 81 mg by mouth daily       No current facility-administered medications for this visit. Olivier Boothe MD       This document may have been prepared at least partiallythrough the use of voice recognition software. Although effort is taken to assure the accuracy of this document, it is possible that grammatical, syntax,  or spelling errors may occur.

## 2020-12-01 RX ORDER — LOSARTAN POTASSIUM 50 MG/1
50 TABLET ORAL DAILY
Qty: 90 TABLET | Refills: 0 | OUTPATIENT
Start: 2020-12-01

## 2020-12-03 ASSESSMENT — ENCOUNTER SYMPTOMS
WHEEZING: 0
RESPIRATORY NEGATIVE: 1
EYES NEGATIVE: 1
ALLERGIC/IMMUNOLOGIC NEGATIVE: 1
CHEST TIGHTNESS: 0
EYE REDNESS: 0
SORE THROAT: 0
GASTROINTESTINAL NEGATIVE: 1
DIARRHEA: 0
VOMITING: 0
ABDOMINAL DISTENTION: 0
NAUSEA: 0
COUGH: 0

## 2021-01-04 DIAGNOSIS — F32.1 CURRENT MODERATE EPISODE OF MAJOR DEPRESSIVE DISORDER WITHOUT PRIOR EPISODE (HCC): ICD-10-CM

## 2021-01-04 DIAGNOSIS — I10 ESSENTIAL HYPERTENSION: ICD-10-CM

## 2021-01-04 RX ORDER — ESCITALOPRAM OXALATE 20 MG/1
TABLET ORAL
Qty: 30 TABLET | Refills: 11 | Status: SHIPPED | OUTPATIENT
Start: 2021-01-04 | End: 2021-12-22 | Stop reason: SDUPTHER

## 2021-01-04 RX ORDER — LOSARTAN POTASSIUM 50 MG/1
TABLET ORAL
Qty: 30 TABLET | Refills: 11 | Status: SHIPPED | OUTPATIENT
Start: 2021-01-04 | End: 2021-11-22

## 2021-01-04 RX ORDER — PRAVASTATIN SODIUM 10 MG
TABLET ORAL
Qty: 30 TABLET | Refills: 11 | Status: SHIPPED
Start: 2021-01-04 | End: 2021-03-18 | Stop reason: ALTCHOICE

## 2021-02-15 ENCOUNTER — HOSPITAL ENCOUNTER (OUTPATIENT)
Dept: GENERAL RADIOLOGY | Age: 66
Discharge: HOME | End: 2021-02-17
Payer: COMMERCIAL

## 2021-02-15 DIAGNOSIS — Z12.31 ENCOUNTER FOR SCREENING MAMMOGRAM FOR MALIGNANT NEOPLASM OF BREAST: ICD-10-CM

## 2021-02-15 PROCEDURE — 77063 BREAST TOMOSYNTHESIS BI: CPT

## 2021-03-01 DIAGNOSIS — I10 ESSENTIAL HYPERTENSION: ICD-10-CM

## 2021-03-02 RX ORDER — OXYBUTYNIN CHLORIDE 10 MG/1
10 TABLET, EXTENDED RELEASE ORAL DAILY
Qty: 30 TABLET | Refills: 0 | Status: SHIPPED
Start: 2021-03-02 | End: 2021-03-02

## 2021-03-02 RX ORDER — METOPROLOL SUCCINATE 25 MG/1
12.5 TABLET, EXTENDED RELEASE ORAL DAILY
Qty: 45 TABLET | Refills: 0 | Status: SHIPPED
Start: 2021-03-02 | End: 2021-03-19 | Stop reason: SDUPTHER

## 2021-03-02 RX ORDER — HYDROCHLOROTHIAZIDE 25 MG/1
25 TABLET ORAL DAILY
Qty: 30 TABLET | Refills: 0 | Status: SHIPPED
Start: 2021-03-02 | End: 2021-03-19 | Stop reason: SDUPTHER

## 2021-03-10 ENCOUNTER — OFFICE VISIT (OUTPATIENT)
Dept: ORTHOPEDIC SURGERY | Age: 66
End: 2021-03-10
Payer: COMMERCIAL

## 2021-03-10 VITALS — BODY MASS INDEX: 43.32 KG/M2 | HEIGHT: 65 IN | WEIGHT: 260 LBS

## 2021-03-10 DIAGNOSIS — M17.0 PRIMARY OSTEOARTHRITIS OF BOTH KNEES: Primary | ICD-10-CM

## 2021-03-10 PROCEDURE — 20610 DRAIN/INJ JOINT/BURSA W/O US: CPT | Performed by: NURSE PRACTITIONER

## 2021-03-10 PROCEDURE — 3017F COLORECTAL CA SCREEN DOC REV: CPT | Performed by: ORTHOPAEDIC SURGERY

## 2021-03-10 PROCEDURE — 99213 OFFICE O/P EST LOW 20 MIN: CPT | Performed by: NURSE PRACTITIONER

## 2021-03-10 PROCEDURE — G8427 DOCREV CUR MEDS BY ELIG CLIN: HCPCS | Performed by: NURSE PRACTITIONER

## 2021-03-10 PROCEDURE — G8417 CALC BMI ABV UP PARAM F/U: HCPCS | Performed by: NURSE PRACTITIONER

## 2021-03-10 PROCEDURE — 1123F ACP DISCUSS/DSCN MKR DOCD: CPT | Performed by: NURSE PRACTITIONER

## 2021-03-10 PROCEDURE — G8484 FLU IMMUNIZE NO ADMIN: HCPCS | Performed by: NURSE PRACTITIONER

## 2021-03-10 PROCEDURE — 1090F PRES/ABSN URINE INCON ASSESS: CPT | Performed by: NURSE PRACTITIONER

## 2021-03-10 PROCEDURE — 4040F PNEUMOC VAC/ADMIN/RCVD: CPT | Performed by: ORTHOPAEDIC SURGERY

## 2021-03-10 PROCEDURE — 1036F TOBACCO NON-USER: CPT | Performed by: ORTHOPAEDIC SURGERY

## 2021-03-10 PROCEDURE — G8399 PT W/DXA RESULTS DOCUMENT: HCPCS | Performed by: NURSE PRACTITIONER

## 2021-03-10 RX ORDER — TRIAMCINOLONE ACETONIDE 40 MG/ML
40 INJECTION, SUSPENSION INTRA-ARTICULAR; INTRAMUSCULAR ONCE
Status: COMPLETED | OUTPATIENT
Start: 2021-03-10 | End: 2021-03-10

## 2021-03-10 RX ORDER — LIDOCAINE HYDROCHLORIDE 10 MG/ML
4 INJECTION, SOLUTION INFILTRATION; PERINEURAL ONCE
Status: COMPLETED | OUTPATIENT
Start: 2021-03-10 | End: 2021-03-10

## 2021-03-10 RX ADMIN — LIDOCAINE HYDROCHLORIDE 4 ML: 10 INJECTION, SOLUTION INFILTRATION; PERINEURAL at 10:24

## 2021-03-10 RX ADMIN — TRIAMCINOLONE ACETONIDE 40 MG: 40 INJECTION, SUSPENSION INTRA-ARTICULAR; INTRAMUSCULAR at 10:25

## 2021-03-10 NOTE — PROGRESS NOTES
Assisted Malvin Allison NP with injection of 4 cc lidocaine/1 cc kenalog in ezio knees. Patient tolerated procedure well. Verbal instructions were given.

## 2021-03-19 ENCOUNTER — OFFICE VISIT (OUTPATIENT)
Dept: FAMILY MEDICINE CLINIC | Age: 66
End: 2021-03-19
Payer: COMMERCIAL

## 2021-03-19 ENCOUNTER — TELEPHONE (OUTPATIENT)
Dept: FAMILY MEDICINE CLINIC | Age: 66
End: 2021-03-19

## 2021-03-19 VITALS
HEIGHT: 65 IN | WEIGHT: 261 LBS | SYSTOLIC BLOOD PRESSURE: 115 MMHG | HEART RATE: 70 BPM | RESPIRATION RATE: 16 BRPM | DIASTOLIC BLOOD PRESSURE: 70 MMHG | OXYGEN SATURATION: 98 % | TEMPERATURE: 97.4 F | BODY MASS INDEX: 43.49 KG/M2

## 2021-03-19 DIAGNOSIS — M15.9 GENERALIZED OSTEOARTHRITIS: Primary | ICD-10-CM

## 2021-03-19 DIAGNOSIS — R32 URINARY INCONTINENCE, UNSPECIFIED TYPE: ICD-10-CM

## 2021-03-19 DIAGNOSIS — I10 ESSENTIAL HYPERTENSION: Primary | ICD-10-CM

## 2021-03-19 DIAGNOSIS — Z13.220 LIPID SCREENING: ICD-10-CM

## 2021-03-19 DIAGNOSIS — Z13.1 DIABETES MELLITUS SCREENING: ICD-10-CM

## 2021-03-19 PROCEDURE — 3017F COLORECTAL CA SCREEN DOC REV: CPT | Performed by: FAMILY MEDICINE

## 2021-03-19 PROCEDURE — 0509F URINE INCON PLAN DOCD: CPT | Performed by: FAMILY MEDICINE

## 2021-03-19 PROCEDURE — G8427 DOCREV CUR MEDS BY ELIG CLIN: HCPCS | Performed by: FAMILY MEDICINE

## 2021-03-19 PROCEDURE — 1036F TOBACCO NON-USER: CPT | Performed by: FAMILY MEDICINE

## 2021-03-19 PROCEDURE — 1123F ACP DISCUSS/DSCN MKR DOCD: CPT | Performed by: FAMILY MEDICINE

## 2021-03-19 PROCEDURE — 4040F PNEUMOC VAC/ADMIN/RCVD: CPT | Performed by: FAMILY MEDICINE

## 2021-03-19 PROCEDURE — 1090F PRES/ABSN URINE INCON ASSESS: CPT | Performed by: FAMILY MEDICINE

## 2021-03-19 PROCEDURE — 99214 OFFICE O/P EST MOD 30 MIN: CPT | Performed by: FAMILY MEDICINE

## 2021-03-19 PROCEDURE — G8417 CALC BMI ABV UP PARAM F/U: HCPCS | Performed by: FAMILY MEDICINE

## 2021-03-19 PROCEDURE — G8484 FLU IMMUNIZE NO ADMIN: HCPCS | Performed by: FAMILY MEDICINE

## 2021-03-19 PROCEDURE — G8399 PT W/DXA RESULTS DOCUMENT: HCPCS | Performed by: FAMILY MEDICINE

## 2021-03-19 RX ORDER — METOPROLOL SUCCINATE 25 MG/1
12.5 TABLET, EXTENDED RELEASE ORAL DAILY
Qty: 15 TABLET | Refills: 5 | Status: SHIPPED
Start: 2021-03-19 | Stop reason: SDUPTHER

## 2021-03-19 RX ORDER — OXYBUTYNIN CHLORIDE 15 MG/1
15 TABLET, EXTENDED RELEASE ORAL DAILY
Qty: 30 TABLET | Refills: 5 | Status: SHIPPED
Start: 2021-03-19 | End: 2021-06-01

## 2021-03-19 RX ORDER — HYDROCHLOROTHIAZIDE 25 MG/1
25 TABLET ORAL DAILY
Qty: 30 TABLET | Refills: 5 | Status: SHIPPED
Start: 2021-03-19 | End: 2021-09-14 | Stop reason: SDUPTHER

## 2021-03-19 ASSESSMENT — ENCOUNTER SYMPTOMS
WHEEZING: 0
COUGH: 0

## 2021-03-19 ASSESSMENT — PATIENT HEALTH QUESTIONNAIRE - PHQ9
SUM OF ALL RESPONSES TO PHQ QUESTIONS 1-9: 0
SUM OF ALL RESPONSES TO PHQ9 QUESTIONS 1 & 2: 0
2. FEELING DOWN, DEPRESSED OR HOPELESS: 0

## 2021-03-19 NOTE — TELEPHONE ENCOUNTER
Mailed to patient as requested. Made her aware there is delay with the [employer], and to let us know if it is not received within 2 weeks.

## 2021-03-19 NOTE — TELEPHONE ENCOUNTER
Patient called requesting a new order for Handicap Placard (renewal).  She'd like to have mailed to her home at [address], [place], [address]

## 2021-03-19 NOTE — PROGRESS NOTES
3/19/2021    Charlyn Sandhoff is a 77 y.o. female here for   Chief Complaint   Patient presents with    Hypertension    Depression     Here for f/u chronic conditions. Also due for routine labs. She is generally feeling well. 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 She had her scond shot a few weeks ago. She is generally feeling a little tire/ somnolent, but overall doing well. Regarding urinary incontinence she is on ditropan 15 mg,   Sometimes has trouble at night with incontinence but does well during the day  She is doing kegels exercises. She has more accidents when she drinks fluids in the evening. Regarding hypertension. Patient is  monitoring home blood pressures. Cardiovascular risk factors: advanced age (older than 54 for men, 72 for women), dyslipidemia, hypertension, obesity (BMI >= 30 kg/m2) and sedentary lifestyle. Patient does not smoke. Currently on metoprolol xl 12.5 mg daily, hctz 25 mg daily, losartan 50 mg daily. Taking as prescribed. No adverse effects. Today,  BP: 115/70 and she is asymptomatic. BP Readings from Last 3 Encounters:   03/19/21 115/70   11/30/20 (!) 115/55   10/15/20 130/74     Patient denies chest pain, diaphoresis, dyspnea, dyspnea on exertion,  palpitations, headache, vision changes. On lexapro 20 mg daily for anxiety. Her mood has been good. \"nothing bothers me\". Still connecting and enjoying the people she cares about , but not as bothered by daily irritations and worries. Wt Readings from Last 3 Encounters:   03/19/21 261 lb (118.4 kg)   03/10/21 260 lb (117.9 kg)   11/30/20 271 lb (122.9 kg)       She  reports that she has never smoked. She has never used smokeless tobacco.    Medications and allergies reviewed and updated in chart.     Current Outpatient Medications   Medication Sig Dispense Refill    metoprolol succinate (TOPROL XL) 25 MG extended release tablet Take 0.5 tablets by mouth daily TAKE 1/2 TABLET BY MOUTH DAILY 45 tablet 0    hydroCHLOROthiazide (HYDRODIURIL) 25 MG tablet Take 1 tablet by mouth daily 30 tablet 0    oxybutynin (DITROPAN XL) 15 MG extended release tablet Take 1 tablet by mouth daily 30 tablet 1    losartan (COZAAR) 50 MG tablet TAKE 1 TABLET BY MOUTH ONCE DAILY 30 tablet 11    escitalopram (LEXAPRO) 20 MG tablet TAKE 1 TABLET BY MOUTH ONCE DAILY 30 tablet 11    CALCIUM CITRATE PO Take 1,000 mg by mouth daily OTC      aspirin 81 MG chewable tablet Take 81 mg by mouth daily       No current facility-administered medications for this visit. Patient'spast medical, surgical, social and/or family history reviewed, updated in chart, and are non-contributory (unless otherwise stated). Review of Systems  Review of Systems   Constitutional: Negative for chills and fever. HENT: Positive for nosebleeds. Respiratory: Negative for cough and wheezing. Cardiovascular: Negative for chest pain and leg swelling. Neurological: Negative for dizziness and headaches. Occasional bloody nose, will wake up with crusting in the front of her nose. PE:  VS:  /70   Pulse 70   Temp 97.4 °F (36.3 °C) (Temporal)   Resp 16   Ht 5' 5\" (1.651 m)   Wt 261 lb (118.4 kg)   SpO2 98%   BMI 43.43 kg/m²   Physical Exam  Constitutional:       Appearance: She is well-developed. HENT:      Head: Normocephalic and atraumatic. Cardiovascular:      Rate and Rhythm: Normal rate and regular rhythm. Heart sounds: No murmur. No friction rub. No gallop. Pulmonary:      Effort: Pulmonary effort is normal.      Breath sounds: Normal breath sounds. No wheezing or rales. Skin:     General: Skin is warm and dry. Neurological:      Mental Status: She is alert and oriented to person, place, and time. Psychiatric:         Mood and Affect: Mood normal.         Behavior: Behavior normal.         Assessment/Plan:  Nessa Ozuna was seen today for hypertension and depression.     Diagnoses and all orders for this visit:    Essential hypertension  At goal   Cont hctz and metoprolol   Routine labs today  -     Hemoglobin A1C; Future  -     metoprolol succinate (TOPROL XL) 25 MG extended release tablet; Take 0.5 tablets by mouth daily TAKE 1/2 TABLET BY MOUTH DAILY  -     hydroCHLOROthiazide (HYDRODIURIL) 25 MG tablet; Take 1 tablet by mouth daily  -     Lipid Panel; Future  -     CBC; Future  -     Comprehensive Metabolic Panel; Future    Lipid screening  -     Lipid Panel; Future    Diabetes mellitus screening  -     Hemoglobin A1C; Future    Urinary incontinence, unspecified typecontinue kegels  Restrict fluids in evening  Cont oxybutynin  -     oxybutynin (DITROPAN XL) 15 MG extended release tablet; Take 1 tablet by mouth daily    Counseled re; use of nasal saline gel raj moisturize nares     Using cpap as prescribed    Return in about 6 months (around 9/19/2021) for hypertension. Advised patient to call with any new medication issues. All questions answered.   Call or go to emergency department ifsymptoms worsen or persist.

## 2021-03-28 ENCOUNTER — APPOINTMENT (OUTPATIENT)
Dept: GENERAL RADIOLOGY | Age: 66
End: 2021-03-28
Payer: COMMERCIAL

## 2021-03-28 ENCOUNTER — HOSPITAL ENCOUNTER (EMERGENCY)
Age: 66
Discharge: HOME | End: 2021-03-28
Payer: COMMERCIAL

## 2021-03-28 VITALS
BODY MASS INDEX: 43.32 KG/M2 | TEMPERATURE: 96 F | OXYGEN SATURATION: 96 % | SYSTOLIC BLOOD PRESSURE: 136 MMHG | DIASTOLIC BLOOD PRESSURE: 84 MMHG | RESPIRATION RATE: 16 BRPM | HEIGHT: 65 IN | WEIGHT: 260 LBS | HEART RATE: 69 BPM

## 2021-03-28 DIAGNOSIS — M19.071 ARTHRITIS OF RIGHT FOOT: ICD-10-CM

## 2021-03-28 DIAGNOSIS — M77.31 CALCANEAL SPUR OF RIGHT FOOT: Primary | ICD-10-CM

## 2021-03-28 DIAGNOSIS — M21.41 PES PLANUS OF RIGHT FOOT: ICD-10-CM

## 2021-03-28 PROCEDURE — 73610 X-RAY EXAM OF ANKLE: CPT

## 2021-03-28 PROCEDURE — 99282 EMERGENCY DEPT VISIT SF MDM: CPT

## 2021-03-28 PROCEDURE — 73630 X-RAY EXAM OF FOOT: CPT

## 2021-03-28 ASSESSMENT — PAIN DESCRIPTION - DESCRIPTORS: DESCRIPTORS: THROBBING;SHARP

## 2021-03-28 ASSESSMENT — PAIN DESCRIPTION - ONSET: ONSET: SUDDEN

## 2021-03-28 ASSESSMENT — PAIN DESCRIPTION - ORIENTATION: ORIENTATION: RIGHT

## 2021-03-28 NOTE — ED NOTES
Patient verbalized understanding of d/c and f/u instructions. Patient wheeled to exit.       Alan Augustine RN  03/28/21 1936

## 2021-03-29 NOTE — ED PROVIDER NOTES
brother; Heart Failure in her mother; Hypertension in her father and sister; Pacemaker in her mother. Allergies: Penicillins and Tape [adhesive tape]    Physical Exam   Oxygen Saturation Interpretation: Normal.        ED Triage Vitals   BP Temp Temp Source Pulse Resp SpO2 Height Weight   03/28/21 1754 03/28/21 1754 03/28/21 1754 03/28/21 1754 03/28/21 1754 03/28/21 1754 03/28/21 1813 03/28/21 1813   136/84 96 °F (35.6 °C) Infrared 69 16 96 % 5' 5\" (1.651 m) 260 lb (117.9 kg)         Constitutional:  Alert, development consistent with age. Neck:  Normal ROM. Supple. Foot: Right posterior 2nd, 3rd, 4th metatarsal(s). Tenderness:  moderate. Swelling: None. Deformity: no.              ROM: full range with pain. Skin:  no erythema, rash or wounds noted. Neurovascular: Motor deficit: none. Sensory deficit:   none. Pulse deficit: none. Capillary refill: normal.  Ankle:               Tenderness:  none. Swelling: None. Deformity: no.             ROM: full range of motion. Skin:  normal exam; no wounds, erythema, or swelling. Gait:  normal.  Lymphatics: No lymphangitis or adenopathy noted. Neurological:  Oriented. Motor functions intact. Lab / Imaging Results   (All laboratory and radiology results have been personally reviewed by myself)  Labs:  No results found for this visit on 03/28/21. Imaging: All Radiology results interpreted by Radiologist unless otherwise noted. XR FOOT RIGHT (MIN 3 VIEWS)   Final Result   No acute bony pathology. Arthritic changes, as noted and calcaneal spur. Pes planus. XR ANKLE RIGHT (MIN 3 VIEWS)   Final Result   No acute bony pathology. Soft tissue swelling. Calcaneal spur. ED Course / Medical Decision Making   Medications - No data to display     Consult(s):   none.     Procedure(s):   none    MDM: Imaging was obtained based on moderate suspicion for fracture / bony abnormality as per history/physical findings. Plan is subsequently for symptom control, limited use and  immobilization with appropriate outpatient follow-up. Patient states that she does have a podiatrist.  And she will follow-up with her primary care physician. Patient at this time is nontoxic in appearance in no distress. Patient agreeable to plan of care. Plan of Care/Counseling:  I reviewed today's visit with the patient in addition to providing specific details for the plan of care and counseling regarding the diagnosis and prognosis. Questions are answered at this time and are agreeable with the plan. Assessment      1. Calcaneal spur of right foot    2. Arthritis of right foot    3. Pes planus of right foot      Plan   Discharge home. Patient condition is stable    New Medications     Discharge Medication List as of 3/28/2021  7:22 PM        Electronically signed by GENTRY Fraire CNP   DD: 3/29/21  **This report was transcribed using voice recognition software. Every effort was made to ensure accuracy; however, inadvertent computerized transcription errors may be present.   END OF ED PROVIDER NOTE       GENTRY Correa CNP  03/29/21 [id number]

## 2021-04-02 ENCOUNTER — HOSPITAL ENCOUNTER (OUTPATIENT)
Age: 66
Discharge: HOME | End: 2021-04-02
Payer: COMMERCIAL

## 2021-04-02 LAB
ANION GAP SERPL CALCULATED.3IONS-SCNC: 10 MMOL/L (ref 7–16)
BUN BLDV-MCNC: 28 MG/DL (ref 8–23)
CALCIUM SERPL-MCNC: 9.4 MG/DL (ref 8.6–10.2)
CHLORIDE BLD-SCNC: 100 MMOL/L (ref 98–107)
CO2: 29 MMOL/L (ref 22–29)
CREAT SERPL-MCNC: 0.9 MG/DL (ref 0.5–1)
GFR AFRICAN AMERICAN: >60
GFR NON-AFRICAN AMERICAN: >60 ML/MIN/1.73
GLUCOSE BLD-MCNC: 78 MG/DL (ref 74–99)
POTASSIUM SERPL-SCNC: 4.3 MMOL/L (ref 3.5–5)
SODIUM BLD-SCNC: 139 MMOL/L (ref 132–146)
URIC ACID, SERUM: 5.7 MG/DL (ref 2.4–5.7)

## 2021-04-02 PROCEDURE — 36415 COLL VENOUS BLD VENIPUNCTURE: CPT

## 2021-04-02 PROCEDURE — 84550 ASSAY OF BLOOD/URIC ACID: CPT

## 2021-04-02 PROCEDURE — 80048 BASIC METABOLIC PNL TOTAL CA: CPT

## 2021-04-15 ENCOUNTER — APPOINTMENT (OUTPATIENT)
Dept: ULTRASOUND IMAGING | Age: 66
End: 2021-04-15
Payer: COMMERCIAL

## 2021-04-15 ENCOUNTER — HOSPITAL ENCOUNTER (EMERGENCY)
Age: 66
Discharge: HOME | End: 2021-04-15
Attending: EMERGENCY MEDICINE
Payer: COMMERCIAL

## 2021-04-15 VITALS
OXYGEN SATURATION: 94 % | BODY MASS INDEX: 44.15 KG/M2 | HEART RATE: 69 BPM | TEMPERATURE: 98 F | DIASTOLIC BLOOD PRESSURE: 79 MMHG | WEIGHT: 265 LBS | HEIGHT: 65 IN | RESPIRATION RATE: 16 BRPM | SYSTOLIC BLOOD PRESSURE: 129 MMHG

## 2021-04-15 DIAGNOSIS — M79.89 RIGHT LEG SWELLING: Primary | ICD-10-CM

## 2021-04-15 DIAGNOSIS — R29.898 WEAKNESS OF BOTH LEGS: ICD-10-CM

## 2021-04-15 DIAGNOSIS — M71.21 BAKER CYST, RIGHT: ICD-10-CM

## 2021-04-15 LAB
ALBUMIN SERPL-MCNC: 3.6 G/DL (ref 3.5–5.2)
ALP BLD-CCNC: 101 U/L (ref 35–104)
ALT SERPL-CCNC: 9 U/L (ref 0–32)
ANION GAP SERPL CALCULATED.3IONS-SCNC: 12 MMOL/L (ref 7–16)
APTT: 32 SEC (ref 24.5–35.1)
AST SERPL-CCNC: 15 U/L (ref 0–31)
BASOPHILS ABSOLUTE: 0.06 E9/L (ref 0–0.2)
BASOPHILS RELATIVE PERCENT: 0.6 % (ref 0–2)
BILIRUB SERPL-MCNC: 0.3 MG/DL (ref 0–1.2)
BUN BLDV-MCNC: 31 MG/DL (ref 8–23)
CALCIUM SERPL-MCNC: 9.6 MG/DL (ref 8.6–10.2)
CHLORIDE BLD-SCNC: 99 MMOL/L (ref 98–107)
CO2: 26 MMOL/L (ref 22–29)
CREAT SERPL-MCNC: 0.9 MG/DL (ref 0.5–1)
EOSINOPHILS ABSOLUTE: 0.13 E9/L (ref 0.05–0.5)
EOSINOPHILS RELATIVE PERCENT: 1.3 % (ref 0–6)
GFR AFRICAN AMERICAN: >60
GFR NON-AFRICAN AMERICAN: >60 ML/MIN/1.73
GLUCOSE BLD-MCNC: 92 MG/DL (ref 74–99)
HCT VFR BLD CALC: 42.2 % (ref 34–48)
HEMOGLOBIN: 13.8 G/DL (ref 11.5–15.5)
IMMATURE GRANULOCYTES #: 0.06 E9/L
IMMATURE GRANULOCYTES %: 0.6 % (ref 0–5)
INR BLD: 1
LYMPHOCYTES ABSOLUTE: 2.21 E9/L (ref 1.5–4)
LYMPHOCYTES RELATIVE PERCENT: 21.4 % (ref 20–42)
MCH RBC QN AUTO: 29.6 PG (ref 26–35)
MCHC RBC AUTO-ENTMCNC: 32.7 % (ref 32–34.5)
MCV RBC AUTO: 90.6 FL (ref 80–99.9)
MONOCYTES ABSOLUTE: 1.05 E9/L (ref 0.1–0.95)
MONOCYTES RELATIVE PERCENT: 10.2 % (ref 2–12)
NEUTROPHILS ABSOLUTE: 6.83 E9/L (ref 1.8–7.3)
NEUTROPHILS RELATIVE PERCENT: 65.9 % (ref 43–80)
PDW BLD-RTO: 14.6 FL (ref 11.5–15)
PLATELET # BLD: 330 E9/L (ref 130–450)
PMV BLD AUTO: 9.9 FL (ref 7–12)
POTASSIUM REFLEX MAGNESIUM: 3.8 MMOL/L (ref 3.5–5)
PROTHROMBIN TIME: 11.3 SEC (ref 9.3–12.4)
RBC # BLD: 4.66 E12/L (ref 3.5–5.5)
SODIUM BLD-SCNC: 137 MMOL/L (ref 132–146)
TOTAL PROTEIN: 6.8 G/DL (ref 6.4–8.3)
TROPONIN: <0.01 NG/ML (ref 0–0.03)
WBC # BLD: 10.3 E9/L (ref 4.5–11.5)

## 2021-04-15 PROCEDURE — 85730 THROMBOPLASTIN TIME PARTIAL: CPT

## 2021-04-15 PROCEDURE — 80053 COMPREHEN METABOLIC PANEL: CPT

## 2021-04-15 PROCEDURE — 85025 COMPLETE CBC W/AUTO DIFF WBC: CPT

## 2021-04-15 PROCEDURE — 99283 EMERGENCY DEPT VISIT LOW MDM: CPT

## 2021-04-15 PROCEDURE — 85610 PROTHROMBIN TIME: CPT

## 2021-04-15 PROCEDURE — 93005 ELECTROCARDIOGRAM TRACING: CPT

## 2021-04-15 PROCEDURE — 84484 ASSAY OF TROPONIN QUANT: CPT

## 2021-04-15 PROCEDURE — 93971 EXTREMITY STUDY: CPT

## 2021-04-15 ASSESSMENT — PAIN DESCRIPTION - FREQUENCY: FREQUENCY: CONTINUOUS

## 2021-04-15 ASSESSMENT — PAIN DESCRIPTION - ORIENTATION: ORIENTATION: RIGHT;POSTERIOR

## 2021-04-15 ASSESSMENT — PAIN DESCRIPTION - PAIN TYPE: TYPE: ACUTE PAIN

## 2021-04-15 ASSESSMENT — PAIN SCALES - GENERAL: PAINLEVEL_OUTOF10: 8

## 2021-04-15 ASSESSMENT — PAIN DESCRIPTION - DESCRIPTORS: DESCRIPTORS: ACHING;SORE

## 2021-04-15 NOTE — ED PROVIDER NOTES
Patient is a 49-year-old female with past medical history of DVT not on anticoagulation, hypertension, hyperlipidemia who presents to the emergency department with weakness and numbness to bilateral legs. Patient states she was able to ambulate this morning and went to sit down on the couch at approximately 11:30 AM.  Patient went to get up approximately 1 hour later and had bilateral numbness to her legs, was unable to bear weight. Patient stayed on the couch until approximately 145pm when son returned home and patient stating she had feeling to her bilateral legs, but was weak when standing. Patient was unable to bear weight and stand. Patient states recent pain noted to the right lower extremity when walking and was seen by her PCP diagnosed with a fracture in the ankle and was placed in a brace. Patient has been ambulatory since that. Patient has a history of a DVT to the right lower extremity that was treated with anticoagulation, patient is not currently on anticoagulation. Patient denies any recent trauma, no other neuro deficits. Patient denies blurred vision, double vision, headache, paresthesias or numbness to the upper extremities, nausea vomiting, diarrhea, chills, fever. Review of Systems   Constitutional: Negative for appetite change, chills, fatigue and fever. HENT: Negative for congestion, rhinorrhea, trouble swallowing and voice change. Eyes: Negative for photophobia and visual disturbance. Respiratory: Negative for cough and shortness of breath. Cardiovascular: Positive for leg swelling. Negative for chest pain and palpitations. Gastrointestinal: Negative for abdominal distention, blood in stool, constipation, diarrhea, nausea and vomiting. Endocrine: Negative for polyuria. Genitourinary: Negative for dysuria, frequency, hematuria and urgency. Musculoskeletal: Negative for arthralgias, back pain, myalgias and neck pain. Skin: Negative for rash and wound. Allergic/Immunologic: Negative for immunocompromised state. Neurological: Positive for weakness and numbness. Negative for dizziness, syncope, speech difficulty, light-headedness and headaches. Psychiatric/Behavioral: Negative for agitation. The patient is not nervous/anxious. Physical Exam  Vitals signs and nursing note reviewed. Constitutional:       General: She is not in acute distress. Appearance: She is not ill-appearing or toxic-appearing. HENT:      Head: Normocephalic and atraumatic. Nose: Nose normal.      Mouth/Throat:      Mouth: Mucous membranes are moist.      Pharynx: Oropharynx is clear. Eyes:      Extraocular Movements: Extraocular movements intact. Conjunctiva/sclera: Conjunctivae normal.      Pupils: Pupils are equal, round, and reactive to light. Neck:      Musculoskeletal: Normal range of motion and neck supple. Cardiovascular:      Rate and Rhythm: Normal rate and regular rhythm. Pulses: Normal pulses. Radial pulses are 2+ on the right side and 2+ on the left side. Dorsalis pedis pulses are 2+ on the right side and 2+ on the left side. Heart sounds: Normal heart sounds. Pulmonary:      Effort: Pulmonary effort is normal.      Breath sounds: Normal breath sounds. Abdominal:      General: Bowel sounds are normal. There is no distension. Palpations: Abdomen is soft. Tenderness: There is no abdominal tenderness. Musculoskeletal:      Right lower leg: 3+ Edema present. Left lower leg: She exhibits tenderness and swelling. Comments: Right lower extremity from the knee distal is noted to be swollen, tender especially at the calf. Pulses, sensation and motor are intact. There is no evidence of cellulitis, limb is not hot to the touch. Skin:     General: Skin is warm and dry. Capillary Refill: Capillary refill takes less than 2 seconds. Neurological:      General: No focal deficit present.       Mental Status: She is alert and oriented to person, place, and time. GCS: GCS eye subscore is 4. GCS verbal subscore is 5. GCS motor subscore is 6. Cranial Nerves: Cranial nerves are intact. Sensory: Sensation is intact. Motor: Motor function is intact. MDM  Number of Diagnoses or Management Options  Baker cyst, right  Right leg swelling  Weakness of both legs  Diagnosis management comments: Patient is a 68-year-old female presented to the emergency department with a complaint that earlier she had bilateral leg numbness and weakness. Patient states she had been on the couch and when she went to get up she had weakness to her legs and was unable to remain standing, also stated bilateral leg numbness that has resolved. Patient with no other neuro deficits recorded, no problem speaking, slurred speech. Patient was in the presence of her son who verifies this. Patient presents awake, alert, neurologically intact, hemodynamically stable. Physical exam shows swelling to the right lower extremity, no evidence of cellulitis or infection. No lesions. Patient dorsalis pedis pulse as well as sensation and motor are equal in both limbs. Patient with good strength. Patient without any other neuro deficits. There is suspicion for DVT to the right leg as patient has history of DVT to that leg as well as a Baker's cyst.  Labs were obtained showing no leukocytosis or anemia, electrolytes within normal limits, troponin negative. EKG shows sinus bradycardia without ectopy or signs of ischemia. DVT study of the right lower extremity was obtained showing no DVT however Baker's cyst is still present. Swelling from the leg is likely due to Baker's cyst as well as weakness and pain. Patient was monitored in the emergency department and ambulated with nurses prior to discharge. Patient ambulated well and had good strength. Patient was safe for discharge and able to be discharged in stable condition. Patient will follow up outpatient. Patient given return instructions. Patient is agreeable to this plan. Amount and/or Complexity of Data Reviewed  Clinical lab tests: ordered and reviewed  Tests in the radiology section of CPT®: ordered and reviewed         ED Course as of Apr 15 2217   Thu Apr 15, 2021   1508 Patient declines analgesia or antiemetic.    [QC]   [phone] [id number] Nurse reporting patient did well ambulating. Would like to go home.    [QC]      ED Course User Index  [QC] Antonia Woodruff MD        ED Course as of Apr 15 2217   Thu Apr 15, 2021   1508 Patient declines analgesia or antiemetic.    [QC]   [phone] Nurse reporting patient did well ambulating. Would like to go home.    [QC]      ED Course User Index  [QC] Antonia Woodruff MD       --------------------------------------------- PAST HISTORY ---------------------------------------------  Past Medical History:  has a past medical history of Anxiety, Deep vein blood clot of left lower extremity ([address] Family history of early CAD, Hyperlipemia, Hypertension, Lightheadedness, Multinodular goiter, Sleep apnea, and SOBOE (shortness of breath on exertion). Past Surgical History:  has a past surgical history that includes Tonsillectomy; Leg Surgery (4124); Colonoscopy; cardiovascular stress test; Hysterectomy (1990); Knee arthroscopy (2005); Breast surgery (2014); Salivary gland surgery (Left, 05/24/2017); and cyst removal (05/2017). Social History:  reports that she has never smoked. She has never used smokeless tobacco. She reports that she does not drink alcohol or use drugs. Family History: family history includes COPD in her mother; Cancer in her father; Coronary Art Dis in her father; Heart Disease in her brother; Heart Failure in her mother; Hypertension in her father and sister; Pacemaker in her mother. The patients home medications have been reviewed.     Allergies: Penicillins and Tape Liudmila Hull 51 BPM    P-R Interval 172 ms    QRS Duration 96 ms    Q-T Interval 486 ms    QTc Calculation (Bazett) 447 ms    P Axis 70 degrees    R Axis 3 degrees    T Axis 0 degrees       Radiology:  US DUP LOWER EXTREMITY RIGHT PERICO   Final Result   No evidence of DVT in the right lower extremity. Popliteal cyst.           EKG: This EKG is signed and interpreted by me. Rate: 51  Rhythm: Sinus  Interpretation: Sinus bradycardia, normal SC interval, normal QRS, normal QT interval, no acute ST or T wave changes  Comparison: stable as compared to patient's most recent EKG     ------------------------- NURSING NOTES AND VITALS REVIEWED ---------------------------  Date / Time Roomed:  4/15/2021  2:43 PM  ED Bed Assignment:  26/26    The nursing notes within the ED encounter and vital signs as below have been reviewed. /79   Pulse 69   Temp 98 °F (36.7 °C) (Oral)   Resp 16   Ht 5' 5\" (1.651 m)   Wt 265 lb (120.2 kg)   SpO2 94%   BMI 44.10 kg/m²   Oxygen Saturation Interpretation: Normal      ------------------------------------------ PROGRESS NOTES ------------------------------------------  10:17 PM EDT  I have spoken with the patient and discussed todays results, in addition to providing specific details for the plan of care and counseling regarding the diagnosis and prognosis. Their questions are answered at this time and they are agreeable with the plan. I discussed at length with them reasons for immediate return here for re evaluation. They will followup with their primary care physician by calling their office tomorrow. --------------------------------- ADDITIONAL PROVIDER NOTES ---------------------------------  At this time the patient is without objective evidence of an acute process requiring hospitalization or inpatient management. They have remained hemodynamically stable throughout their entire ED visit and are stable for discharge with outpatient follow-up.      The plan has been discussed in detail and they are aware of the specific conditions for emergent return, as well as the importance of follow-up. Discharge Medication List as of 4/15/2021  6:03 PM        Diagnosis:  1. Right leg swelling    2. Baker cyst, right    3. Weakness of both legs      Disposition:  Patient's disposition: Discharge to home  Patient's condition is stable. 4/15/21, 10:17 PM EDT.     This note is prepared by Romain Willis MD -PGY-2           Romain Willis MD  Resident  04/16/21 2005

## 2021-04-16 ENCOUNTER — TELEPHONE (OUTPATIENT)
Dept: ORTHOPEDIC SURGERY | Age: 66
End: 2021-04-16

## 2021-04-16 LAB
EKG ATRIAL RATE: 51 BPM
EKG P AXIS: 70 DEGREES
EKG P-R INTERVAL: 172 MS
EKG Q-T INTERVAL: 486 MS
EKG QRS DURATION: 96 MS
EKG QTC CALCULATION (BAZETT): 447 MS
EKG R AXIS: 3 DEGREES
EKG T AXIS: 0 DEGREES
EKG VENTRICULAR RATE: 51 BPM

## 2021-04-16 ASSESSMENT — ENCOUNTER SYMPTOMS
RHINORRHEA: 0
TROUBLE SWALLOWING: 0
SHORTNESS OF BREATH: 0
COUGH: 0
PHOTOPHOBIA: 0
DIARRHEA: 0
VOICE CHANGE: 0
BLOOD IN STOOL: 0
CONSTIPATION: 0
ABDOMINAL DISTENTION: 0
NAUSEA: 0
BACK PAIN: 0
VOMITING: 0

## 2021-04-16 NOTE — TELEPHONE ENCOUNTER
pt has questions about Bakers cyst and having it removed. I explained to pt that we don't typically remove Baker's cyst but, treat the OA. Pt verbalized understanding and she is agreeable.

## 2021-04-20 ENCOUNTER — TELEPHONE (OUTPATIENT)
Dept: ADMINISTRATIVE | Age: 66
End: 2021-04-20

## 2021-04-20 NOTE — TELEPHONE ENCOUNTER
Pt is being referred by Dr. Elisabeth Calix to see Dr. Jetty Councilman for Essential hypertension, Family history of early CAD, SOTO (dyspnea on exertion). Her last visit with Dr. Jetty Councilman was in 2016 and she wants to be seen by him again. 's schedule is full through May. Please advise.

## 2021-04-21 PROBLEM — M71.20 BAKER'S CYST: Status: ACTIVE | Noted: 2021-04-21

## 2021-04-23 ENCOUNTER — OFFICE VISIT (OUTPATIENT)
Dept: FAMILY MEDICINE CLINIC | Age: 66
End: 2021-04-23
Payer: COMMERCIAL

## 2021-04-23 VITALS
WEIGHT: 279.4 LBS | RESPIRATION RATE: 16 BRPM | TEMPERATURE: 97.5 F | DIASTOLIC BLOOD PRESSURE: 72 MMHG | BODY MASS INDEX: 46.55 KG/M2 | OXYGEN SATURATION: 96 % | HEART RATE: 63 BPM | SYSTOLIC BLOOD PRESSURE: 126 MMHG | HEIGHT: 65 IN

## 2021-04-23 DIAGNOSIS — R06.09 DOE (DYSPNEA ON EXERTION): ICD-10-CM

## 2021-04-23 DIAGNOSIS — I71.019 DISSECTION OF THORACIC AORTA: Primary | ICD-10-CM

## 2021-04-23 DIAGNOSIS — M79.89 LEG SWELLING: ICD-10-CM

## 2021-04-23 PROCEDURE — 99213 OFFICE O/P EST LOW 20 MIN: CPT | Performed by: STUDENT IN AN ORGANIZED HEALTH CARE EDUCATION/TRAINING PROGRAM

## 2021-04-23 PROCEDURE — 4040F PNEUMOC VAC/ADMIN/RCVD: CPT | Performed by: STUDENT IN AN ORGANIZED HEALTH CARE EDUCATION/TRAINING PROGRAM

## 2021-04-23 PROCEDURE — G8399 PT W/DXA RESULTS DOCUMENT: HCPCS | Performed by: STUDENT IN AN ORGANIZED HEALTH CARE EDUCATION/TRAINING PROGRAM

## 2021-04-23 PROCEDURE — 1090F PRES/ABSN URINE INCON ASSESS: CPT | Performed by: STUDENT IN AN ORGANIZED HEALTH CARE EDUCATION/TRAINING PROGRAM

## 2021-04-23 PROCEDURE — G8417 CALC BMI ABV UP PARAM F/U: HCPCS | Performed by: STUDENT IN AN ORGANIZED HEALTH CARE EDUCATION/TRAINING PROGRAM

## 2021-04-23 PROCEDURE — G8427 DOCREV CUR MEDS BY ELIG CLIN: HCPCS | Performed by: STUDENT IN AN ORGANIZED HEALTH CARE EDUCATION/TRAINING PROGRAM

## 2021-04-23 PROCEDURE — 1123F ACP DISCUSS/DSCN MKR DOCD: CPT | Performed by: STUDENT IN AN ORGANIZED HEALTH CARE EDUCATION/TRAINING PROGRAM

## 2021-04-23 PROCEDURE — 1036F TOBACCO NON-USER: CPT | Performed by: STUDENT IN AN ORGANIZED HEALTH CARE EDUCATION/TRAINING PROGRAM

## 2021-04-23 PROCEDURE — 3017F COLORECTAL CA SCREEN DOC REV: CPT | Performed by: STUDENT IN AN ORGANIZED HEALTH CARE EDUCATION/TRAINING PROGRAM

## 2021-04-23 RX ORDER — PRAVASTATIN SODIUM 10 MG
10 TABLET ORAL DAILY
COMMUNITY
End: 2021-11-24 | Stop reason: SDUPTHER

## 2021-04-23 NOTE — PROGRESS NOTES
S: 77 y.o. female with   Chief Complaint   Patient presents with    Leg Swelling     ED 4/15    Leg Pain     R side       LE swelling and some SOTO. Neg for DVT at ER.     O: VS:  height is 5' 5\" (1.651 m) and weight is 279 lb 6.4 oz (126.7 kg). Her temporal temperature is 97.5 °F (36.4 °C). Her blood pressure is 126/72 and her pulse is 63. Her respiration is 16 and oxygen saturation is 96%. BP Readings from Last 3 Encounters:   04/23/21 126/72   04/15/21 129/79   03/28/21 136/84     See resident note      Impression/Plan:   1) LE swelling and SOTO - will get ECHO and cardio eval        Health Maintenance Due   Topic Date Due    A1C test (Diabetic or Prediabetic)  03/06/2021         Attending Physician Statement  I have discussed the case, including pertinent history and exam findings with the resident. I agree with the documented assessment and plan.       Linda Hawk MD

## 2021-04-23 NOTE — PROGRESS NOTES
Patient:  Richa Tan 77 y.o. female     Date of Service: 4/23/21      Chiefcomplaint:   Chief Complaint   Patient presents with    Leg Swelling     ED 4/15    Leg Pain     R side     History of Present Illness     ED fu: went in for b/l leg weakness. Evaluated for dvt that was negative. Weakness resolved and patient sent home without other intervention. Has hx of aortic dissection. Not evaluated in 2 years. Has some soto, increased leg swelling. Has risk factors for cvd. Pertinent Medical, Family, Surgical, Social History:  Past Medical History:   Diagnosis Date    Anxiety     Deep vein blood clot of left lower extremity ([address] 05/27/2019    Family history of early CAD     Hyperlipemia     Hypertension     Lightheadedness     Multinodular goiter     Sleep apnea     SOBOE (shortness of breath on exertion)        Physical Exam   Vitals: /72 (Site: Left Upper Arm, Position: Sitting, Cuff Size: Large Adult)   Pulse 63   Temp 97.5 °F (36.4 °C) (Temporal)   Resp 16   Ht 5' 5\" (1.651 m)   Wt 279 lb 6.4 oz (126.7 kg)   SpO2 96%   BMI 46.49 kg/m²   General Appearance: Alert, oriented, no acute distress  HEENT: No scleral icterus. No visible discharge from eyes  Neck: Not rigid. No visible masses  Chest wall/Lung: Clear to auscultation bilaterally,  respirations unlabored. No ronchi/wheezing/rales  Heart: RRR, no murmur  Abdomen: Soft, nontender  Extremities:  Edema to b/l lower extremities 2+. Bakers cyst behind right knee. Skin: No rashes. No jaundice  Neuro: Alert and oriented        Psych: Appropriate mood and appropriate affect    Assessment and Plan     1. Dissection of thoracic aorta (HCC)  Chronic problem. Uncertain if this has progressed. Needs reimaging. Plan to see cardio as well. Has appointment in a month. No chest pain. Does have soto and swelling.   - Echocardiogram complete; Future    2. SOTO (dyspnea on exertion)  Newer problem. May represent cardiac exacerbation. Recommend echo for further eval given risk factors and history. Last echo 9432 with diastolic dysfunction. Heart sounds ok today. Lungs clear. - Echocardiogram complete; Future    3. Leg swelling  As above. Echo and cardio follow up. Advised to go to ED if sx worsen or develops mor sob, cough, chest pain. - Echocardiogram complete; Future    Medical Decision Making  Chronic illness with exacerbation, progression or side effects of treatment, Undiagnosed new problem with uncertain prognosis  Review of prior notes from external source, Review of results of unique test, Ordering of unique test  Moderate risk      Return to Office: Return in about 2 weeks (around 5/7/2021) for results of echo. Javier Holliday, DO       This document may have been prepared at least partially through the use of voice recognition software. Although effort is taken to assure the accuracy of this document, it is possible that grammatical, syntax,  or spelling errors may occur.

## 2021-04-27 ENCOUNTER — TELEPHONE (OUTPATIENT)
Dept: CARDIOLOGY CLINIC | Age: 66
End: 2021-04-27

## 2021-04-27 NOTE — TELEPHONE ENCOUNTER
I called the patient to schedule an appt. With  after receiving a referral from , I offered her an appt. On 5/14 but she declined due to her work schedule and asked to be put on the cancellation list for a morning appt.

## 2021-04-29 ENCOUNTER — HOSPITAL ENCOUNTER (OUTPATIENT)
Dept: NON INVASIVE DIAGNOSTICS | Age: 66
Discharge: HOME | End: 2021-04-29
Payer: COMMERCIAL

## 2021-04-29 DIAGNOSIS — I71.019 DISSECTION OF THORACIC AORTA: ICD-10-CM

## 2021-04-29 DIAGNOSIS — R06.09 DOE (DYSPNEA ON EXERTION): ICD-10-CM

## 2021-04-29 DIAGNOSIS — M79.89 LEG SWELLING: ICD-10-CM

## 2021-04-29 LAB
LV EF: 60 %
LVEF MODALITY: NORMAL

## 2021-04-29 PROCEDURE — 93306 TTE W/DOPPLER COMPLETE: CPT

## 2021-05-10 PROBLEM — Z92.89 HX OF ECHOCARDIOGRAM: Status: ACTIVE | Noted: 2021-05-10

## 2021-05-10 NOTE — PROGRESS NOTES
Patient:  Haley Gusman 77 y.o. female     Date of Service: 4/23/21      Chiefcomplaint:   Chief Complaint   Patient presents with    2 Week Follow-Up     echo results      History of Present Illness     Leg swelling  Echo reviewed. No significant abnormalities to explain swelling. Still having swelling. Mostly feet to calves b/l. No change. No new sob. No new cough. No orthopnea. Using cpap. Diet is fair. Lots of salt. Using nsaids. The 10-year ASCVD risk score (Toan Calderon, et al., 2013) is: 8.8%    Values used to calculate the score:      Age: 77 years      Sex: Female      Is Non- : No      Diabetic: No      Tobacco smoker: No      Systolic Blood Pressure: 498 mmHg      Is BP treated: Yes      HDL Cholesterol: 64 mg/dL      Total Cholesterol: 204 mg/dL       Pertinent Medical, Family, Surgical, Social History:  Past Medical History:   Diagnosis Date    Anxiety     Deep vein blood clot of left lower extremity ([address] 05/27/2019    Family history of early CAD     Hyperlipemia     Hypertension     Lightheadedness     Multinodular goiter     Sleep apnea     SOBOE (shortness of breath on exertion)        Physical Exam   Vitals: /66   Pulse 64   Temp 97.3 °F (36.3 °C) (Temporal)   Ht 5' 5\" (1.651 m)   Wt 280 lb 6.4 oz (127.2 kg)   BMI 46.66 kg/m²   General Appearance: Alert, oriented, no acute distress  HEENT: No scleral icterus. No visible discharge from eyes  Neck: Not rigid. No visible masses  Chest wall/Lung: Clear to auscultation bilaterally,  respirations unlabored. No ronchi/wheezing/rales  Heart: RRR, no murmur  Abdomen: Soft, nontender  Extremities:  Edema to b/l lower extremities 2+ up to calves. Bakers cyst behind right knee. Skin: No rashes. No jaundice  Neuro: Alert and oriented        Psych: Appropriate mood and appropriate affect    Assessment and Plan       Magdalene Rinne was seen today for 2 week follow-up.     Diagnoses and all orders for this visit:    Leg swelling  Plan to change diet to decrease salt intake. Some vascular insufficiency likely as well. Recommend elevating legs as much as possible and use compression as able. Stop nsaids and switch to tylenol. Will monitor for a couple weeks with these changes and then see her again soon. No significant issues on echo to suggest cardiac cause. Renal and hepatic function good. Thyroid nodule  -     TSH; Future  -     T4, FREE; Future          Return to Office: Return in about 2 weeks (around 5/25/2021). Aaron Almaguer,        This document may have been prepared at least partially through the use of voice recognition software. Although effort is taken to assure the accuracy of this document, it is possible that grammatical, syntax,  or spelling errors may occur.

## 2021-05-11 ENCOUNTER — OFFICE VISIT (OUTPATIENT)
Dept: FAMILY MEDICINE CLINIC | Age: 66
End: 2021-05-11
Payer: MEDICARE

## 2021-05-11 VITALS
SYSTOLIC BLOOD PRESSURE: 128 MMHG | WEIGHT: 280.4 LBS | DIASTOLIC BLOOD PRESSURE: 66 MMHG | HEART RATE: 64 BPM | TEMPERATURE: 97.3 F | BODY MASS INDEX: 46.72 KG/M2 | HEIGHT: 65 IN

## 2021-05-11 DIAGNOSIS — M79.89 LEG SWELLING: Primary | ICD-10-CM

## 2021-05-11 DIAGNOSIS — E04.1 THYROID NODULE: ICD-10-CM

## 2021-05-11 PROCEDURE — 1123F ACP DISCUSS/DSCN MKR DOCD: CPT | Performed by: STUDENT IN AN ORGANIZED HEALTH CARE EDUCATION/TRAINING PROGRAM

## 2021-05-11 PROCEDURE — G8427 DOCREV CUR MEDS BY ELIG CLIN: HCPCS | Performed by: STUDENT IN AN ORGANIZED HEALTH CARE EDUCATION/TRAINING PROGRAM

## 2021-05-11 PROCEDURE — 99213 OFFICE O/P EST LOW 20 MIN: CPT | Performed by: STUDENT IN AN ORGANIZED HEALTH CARE EDUCATION/TRAINING PROGRAM

## 2021-05-11 PROCEDURE — G8417 CALC BMI ABV UP PARAM F/U: HCPCS | Performed by: STUDENT IN AN ORGANIZED HEALTH CARE EDUCATION/TRAINING PROGRAM

## 2021-05-11 PROCEDURE — 1090F PRES/ABSN URINE INCON ASSESS: CPT | Performed by: STUDENT IN AN ORGANIZED HEALTH CARE EDUCATION/TRAINING PROGRAM

## 2021-05-11 PROCEDURE — G8399 PT W/DXA RESULTS DOCUMENT: HCPCS | Performed by: STUDENT IN AN ORGANIZED HEALTH CARE EDUCATION/TRAINING PROGRAM

## 2021-05-11 PROCEDURE — 4040F PNEUMOC VAC/ADMIN/RCVD: CPT | Performed by: STUDENT IN AN ORGANIZED HEALTH CARE EDUCATION/TRAINING PROGRAM

## 2021-05-11 PROCEDURE — 3017F COLORECTAL CA SCREEN DOC REV: CPT | Performed by: STUDENT IN AN ORGANIZED HEALTH CARE EDUCATION/TRAINING PROGRAM

## 2021-05-11 PROCEDURE — 1036F TOBACCO NON-USER: CPT | Performed by: STUDENT IN AN ORGANIZED HEALTH CARE EDUCATION/TRAINING PROGRAM

## 2021-05-11 NOTE — PROGRESS NOTES
S: 77 y.o. female with   Chief Complaint   Patient presents with    2 Week Follow-Up     echo results        LE swelling - had ECHO. O: VS:  height is 5' 5\" (1.651 m) and weight is 280 lb 6.4 oz (127.2 kg). Her temporal temperature is 97.3 °F (36.3 °C). Her blood pressure is 128/66 and her pulse is 64. BP Readings from Last 3 Encounters:   05/11/21 128/66   04/23/21 126/72   04/15/21 129/79     See resident note      Impression/Plan:   1) LE swelling - reduce salt, stop NSAID, ECHO normal        Health Maintenance Due   Topic Date Due    Lipid screen  01/06/2021    A1C test (Diabetic or Prediabetic)  03/06/2021         Attending Physician Statement  I have discussed the case, including pertinent history and exam findings with the resident. I agree with the documented assessment and plan.       Claudean Heater, MD

## 2021-05-15 ENCOUNTER — HOSPITAL ENCOUNTER (OUTPATIENT)
Age: 66
Discharge: HOME | End: 2021-05-15
Payer: COMMERCIAL

## 2021-05-15 DIAGNOSIS — Z13.1 DIABETES MELLITUS SCREENING: ICD-10-CM

## 2021-05-15 DIAGNOSIS — Z13.220 LIPID SCREENING: ICD-10-CM

## 2021-05-15 DIAGNOSIS — E04.1 THYROID NODULE: ICD-10-CM

## 2021-05-15 DIAGNOSIS — I10 ESSENTIAL HYPERTENSION: ICD-10-CM

## 2021-05-15 LAB
ALBUMIN SERPL-MCNC: 3.6 G/DL (ref 3.5–5.2)
ALP BLD-CCNC: 91 U/L (ref 35–104)
ALT SERPL-CCNC: 11 U/L (ref 0–32)
ANION GAP SERPL CALCULATED.3IONS-SCNC: 12 MMOL/L (ref 7–16)
AST SERPL-CCNC: 14 U/L (ref 0–31)
BILIRUB SERPL-MCNC: 0.4 MG/DL (ref 0–1.2)
BUN BLDV-MCNC: 31 MG/DL (ref 6–23)
CALCIUM SERPL-MCNC: 9.6 MG/DL (ref 8.6–10.2)
CHLORIDE BLD-SCNC: 98 MMOL/L (ref 98–107)
CHOLESTEROL, TOTAL: 168 MG/DL (ref 0–199)
CO2: 25 MMOL/L (ref 22–29)
CREAT SERPL-MCNC: 0.9 MG/DL (ref 0.5–1)
GFR AFRICAN AMERICAN: >60
GFR NON-AFRICAN AMERICAN: >60 ML/MIN/1.73
GLUCOSE BLD-MCNC: 134 MG/DL (ref 74–99)
HBA1C MFR BLD: 6.1 % (ref 4–5.6)
HCT VFR BLD CALC: 41.8 % (ref 34–48)
HDLC SERPL-MCNC: 59 MG/DL
HEMOGLOBIN: 13.2 G/DL (ref 11.5–15.5)
LDL CHOLESTEROL CALCULATED: 85 MG/DL (ref 0–99)
MCH RBC QN AUTO: 28.9 PG (ref 26–35)
MCHC RBC AUTO-ENTMCNC: 31.6 % (ref 32–34.5)
MCV RBC AUTO: 91.7 FL (ref 80–99.9)
PDW BLD-RTO: 14.7 FL (ref 11.5–15)
PLATELET # BLD: 278 E9/L (ref 130–450)
PMV BLD AUTO: 10.3 FL (ref 7–12)
POTASSIUM SERPL-SCNC: 3.3 MMOL/L (ref 3.5–5)
RBC # BLD: 4.56 E12/L (ref 3.5–5.5)
SODIUM BLD-SCNC: 135 MMOL/L (ref 132–146)
T4 FREE: 1.35 NG/DL (ref 0.93–1.7)
TOTAL PROTEIN: 6.4 G/DL (ref 6.4–8.3)
TRIGL SERPL-MCNC: 118 MG/DL (ref 0–149)
TSH SERPL DL<=0.05 MIU/L-ACNC: 0.77 UIU/ML (ref 0.27–4.2)
VLDLC SERPL CALC-MCNC: 24 MG/DL
WBC # BLD: 7.1 E9/L (ref 4.5–11.5)

## 2021-05-15 PROCEDURE — 36415 COLL VENOUS BLD VENIPUNCTURE: CPT

## 2021-05-15 PROCEDURE — 84443 ASSAY THYROID STIM HORMONE: CPT

## 2021-05-15 PROCEDURE — 84439 ASSAY OF FREE THYROXINE: CPT

## 2021-05-15 PROCEDURE — 80053 COMPREHEN METABOLIC PANEL: CPT

## 2021-05-15 PROCEDURE — 85027 COMPLETE CBC AUTOMATED: CPT

## 2021-05-15 PROCEDURE — 83036 HEMOGLOBIN GLYCOSYLATED A1C: CPT

## 2021-05-15 PROCEDURE — 80061 LIPID PANEL: CPT

## 2021-05-17 DIAGNOSIS — I10 ESSENTIAL HYPERTENSION: Primary | ICD-10-CM

## 2021-05-24 ENCOUNTER — TELEPHONE (OUTPATIENT)
Dept: FAMILY MEDICINE CLINIC | Age: 66
End: 2021-05-24

## 2021-05-25 ENCOUNTER — OFFICE VISIT (OUTPATIENT)
Dept: FAMILY MEDICINE CLINIC | Age: 66
End: 2021-05-25
Payer: COMMERCIAL

## 2021-05-25 VITALS
TEMPERATURE: 97.4 F | SYSTOLIC BLOOD PRESSURE: 110 MMHG | OXYGEN SATURATION: 96 % | DIASTOLIC BLOOD PRESSURE: 67 MMHG | WEIGHT: 274.4 LBS | HEIGHT: 65 IN | RESPIRATION RATE: 20 BRPM | HEART RATE: 68 BPM | BODY MASS INDEX: 45.72 KG/M2

## 2021-05-25 DIAGNOSIS — M79.89 LEG SWELLING: Primary | ICD-10-CM

## 2021-05-25 DIAGNOSIS — N39.41 URGE INCONTINENCE: ICD-10-CM

## 2021-05-25 DIAGNOSIS — E87.6 HYPOKALEMIA: ICD-10-CM

## 2021-05-25 PROCEDURE — 1090F PRES/ABSN URINE INCON ASSESS: CPT | Performed by: STUDENT IN AN ORGANIZED HEALTH CARE EDUCATION/TRAINING PROGRAM

## 2021-05-25 PROCEDURE — 0509F URINE INCON PLAN DOCD: CPT | Performed by: STUDENT IN AN ORGANIZED HEALTH CARE EDUCATION/TRAINING PROGRAM

## 2021-05-25 PROCEDURE — G8427 DOCREV CUR MEDS BY ELIG CLIN: HCPCS | Performed by: STUDENT IN AN ORGANIZED HEALTH CARE EDUCATION/TRAINING PROGRAM

## 2021-05-25 PROCEDURE — 4040F PNEUMOC VAC/ADMIN/RCVD: CPT | Performed by: STUDENT IN AN ORGANIZED HEALTH CARE EDUCATION/TRAINING PROGRAM

## 2021-05-25 PROCEDURE — A4335 INCONTINENCE SUPPLY: HCPCS | Performed by: STUDENT IN AN ORGANIZED HEALTH CARE EDUCATION/TRAINING PROGRAM

## 2021-05-25 PROCEDURE — 1036F TOBACCO NON-USER: CPT | Performed by: STUDENT IN AN ORGANIZED HEALTH CARE EDUCATION/TRAINING PROGRAM

## 2021-05-25 PROCEDURE — G8417 CALC BMI ABV UP PARAM F/U: HCPCS | Performed by: STUDENT IN AN ORGANIZED HEALTH CARE EDUCATION/TRAINING PROGRAM

## 2021-05-25 PROCEDURE — 3017F COLORECTAL CA SCREEN DOC REV: CPT | Performed by: STUDENT IN AN ORGANIZED HEALTH CARE EDUCATION/TRAINING PROGRAM

## 2021-05-25 PROCEDURE — 1123F ACP DISCUSS/DSCN MKR DOCD: CPT | Performed by: STUDENT IN AN ORGANIZED HEALTH CARE EDUCATION/TRAINING PROGRAM

## 2021-05-25 PROCEDURE — 99213 OFFICE O/P EST LOW 20 MIN: CPT | Performed by: STUDENT IN AN ORGANIZED HEALTH CARE EDUCATION/TRAINING PROGRAM

## 2021-05-25 PROCEDURE — G8399 PT W/DXA RESULTS DOCUMENT: HCPCS | Performed by: STUDENT IN AN ORGANIZED HEALTH CARE EDUCATION/TRAINING PROGRAM

## 2021-05-25 RX ORDER — POTASSIUM CHLORIDE 20 MEQ/1
20 TABLET, EXTENDED RELEASE ORAL EVERY OTHER DAY
Qty: 30 TABLET | Refills: 0 | Status: CANCELLED | OUTPATIENT
Start: 2021-05-25

## 2021-05-25 NOTE — PROGRESS NOTES
and all orders for this visit:    Leg swelling  Await results of MRI. Asked her to have those faxed when done. Continue other interventions as discussed. Hypokalemia  Recheck labs. -     BASIC METABOLIC PANEL; Future    Urge incontinence  Adult diapers. Consider change in medication dosing at fu. Return to Office: No follow-ups on file. Roxann Jolly,        This document may have been prepared at least partially through the use of voice recognition software. Although effort is taken to assure the accuracy of this document, it is possible that grammatical, syntax,  or spelling errors may occur.

## 2021-05-25 NOTE — PROGRESS NOTES
[facility]  Precepting Note    Subjective:  78 yo F here for f/u leg swelling  She had an ankle swelling   Having lateral ankle pain  Has been seeing podiatry  Though she has had bilateral leg swelling  Reducing nsaids, salt, elevating extremities  She is on hctz and metoprolol xl 12.5 mg daily, as wlel as losartan 50 mg daily    Regarding hypertension. Patient is  monitoring home blood pressures. Cardiovascular risk factors: advanced age (older than 54 for men, 72 for women), dyslipidemia, hypertension, obesity (BMI >= 30 kg/m2) and sedentary lifestyle. Patient does not smoke. Currently on metoprolol xl 12.5 mg daily, hctz 25 mg daily, losartan 50 mg daily. Taking as prescribed. No adverse effects. Today,  BP: 105/61 and she is asymptomatic. BP Readings from Last 3 Encounters:   05/25/21 105/61   05/11/21 128/66   04/23/21 126/72     Patient denies chest pain, diaphoresis, dyspnea, dyspnea on exertion, peripheral edema, palpitations, headache, vision changes. Wt Readings from Last 3 Encounters:   05/25/21 274 lb 6.4 oz (124.5 kg)   05/11/21 280 lb 6.4 oz (127.2 kg)   04/23/21 279 lb 6.4 oz (126.7 kg)       ROS  Urinary incontinence - urge incontinence   For which she takes oxybutynin XL 15 mg   Otherwise as per resident note    Past medical, surgical, family and social history were reviewed, non-contributory, and unchanged unless otherwise stated. Objective:    /61   Pulse 68   Temp 97.4 °F (36.3 °C)   Resp 20   Ht 5' 5\" (1.651 m)   Wt 274 lb 6.4 oz (124.5 kg)   SpO2 96%   BMI 45.66 kg/m²     Exam is as noted by resident with the following changes, additions or corrections:    General:  NAD; alert & oriented x 3   Heart:  RRR, no murmurs, gallops, or rubs.   Lungs:  CTA bilaterally, no wheeze, rales or rhonchi  Abd: bowel sounds present, nontender, nondistended, no masses  Extrem:  +edema right greater than left to level of ankle    Assessment/Plan:  Edema - improved  Hypokalemia - repeat BMP, may need potassium supplement pending results  HTN - bp on lower end of normal; will monitor and consider adjusting medications     Attending Physician Statement  I have reviewed the chart, including any radiology or labs. I have discussed the case, including pertinent history and exam findings with the resident. I agree with the assessment, plan and orders as documented by the resident. Please refer to the resident note for additional information.       Electronically signed by Alejandro Pascual MD on 5/25/2021 at 10:59 AM

## 2021-05-27 ENCOUNTER — TELEPHONE (OUTPATIENT)
Dept: FAMILY MEDICINE CLINIC | Age: 66
End: 2021-05-27

## 2021-05-27 NOTE — TELEPHONE ENCOUNTER
----- Message from Roe Bailey DO sent at 5/26/2021  4:57 PM EDT -----  Regarding: incontinence supplies  Can we help patient get adult diapers. I think this would be through a med supply company?

## 2021-05-28 ENCOUNTER — PATIENT MESSAGE (OUTPATIENT)
Dept: FAMILY MEDICINE CLINIC | Age: 66
End: 2021-05-28

## 2021-05-28 DIAGNOSIS — N39.41 URGE INCONTINENCE: Primary | ICD-10-CM

## 2021-05-28 NOTE — TELEPHONE ENCOUNTER
Contacted [employer] to set up account for incontinence supplies. Company will reach out to patient to confirm orders and shipment/payment information. Detailed message left for patient informing her to expect a call from [facility] so supplies can be sent out.

## 2021-06-01 DIAGNOSIS — N39.41 URGE INCONTINENCE: Primary | ICD-10-CM

## 2021-06-01 RX ORDER — OXYBUTYNIN CHLORIDE 10 MG/1
20 TABLET, EXTENDED RELEASE ORAL DAILY
Qty: 60 TABLET | Refills: 1 | Status: SHIPPED
Start: 2021-06-01 | End: 2021-06-01 | Stop reason: SDUPTHER

## 2021-06-01 RX ORDER — OXYBUTYNIN CHLORIDE 10 MG/1
20 TABLET, EXTENDED RELEASE ORAL DAILY
Qty: 60 TABLET | Refills: 1 | Status: SHIPPED
Start: 2021-06-01 | Stop reason: SDUPTHER

## 2021-06-01 NOTE — TELEPHONE ENCOUNTER
From: Denise Espinosa  To:  Mckenzie Crocker DO  Sent: 5/28/2021 8:48 PM EDT  Subject: Prescription Question    Did he send a script to [facility] babber pill he up it my phone ,[phone] [birth year]

## 2021-06-01 NOTE — TELEPHONE ENCOUNTER
After reviewing the last note. I do not see where patient was instructed to increase the Oxybutynin.   Please advise

## 2021-06-15 ENCOUNTER — TELEPHONE (OUTPATIENT)
Dept: FAMILY MEDICINE CLINIC | Age: 66
End: 2021-06-15

## 2021-06-15 NOTE — TELEPHONE ENCOUNTER
Patient called requesting order for new CPAP machine (used for almost 5 years, not working properly). Order can be faxed to [facility] in [place]. She is not currently following with pulmonology, last saw Dr. Frederick Son over 3 years ago. She was told she would be a new patient if she returns to see him and needs a new referral if necessary.

## 2021-06-16 NOTE — TELEPHONE ENCOUNTER
Locate sleep study and prepare paper script - I will probably be able to sign.   If too old we may need to retest/refer

## 2021-06-17 NOTE — TELEPHONE ENCOUNTER
Last sleep study was in 2016.  See notes from 9/28/16  Paper script prepared and ready for signature

## 2021-06-22 ENCOUNTER — APPOINTMENT (OUTPATIENT)
Dept: GENERAL RADIOLOGY | Age: 66
DRG: 176 | End: 2021-06-22
Payer: COMMERCIAL

## 2021-06-22 ENCOUNTER — HOSPITAL ENCOUNTER (INPATIENT)
Age: 66
LOS: 2 days | Discharge: HOME | DRG: 176 | End: 2021-06-24
Attending: EMERGENCY MEDICINE | Admitting: FAMILY MEDICINE
Payer: COMMERCIAL

## 2021-06-22 ENCOUNTER — APPOINTMENT (OUTPATIENT)
Dept: CT IMAGING | Age: 66
DRG: 176 | End: 2021-06-22
Payer: COMMERCIAL

## 2021-06-22 DIAGNOSIS — I26.99 PULMONARY EMBOLISM ON LEFT (HCC): Primary | ICD-10-CM

## 2021-06-22 LAB
ALBUMIN SERPL-MCNC: 3.5 G/DL (ref 3.5–5.2)
ALP BLD-CCNC: 94 U/L (ref 35–104)
ALT SERPL-CCNC: 10 U/L (ref 0–32)
ANION GAP SERPL CALCULATED.3IONS-SCNC: 12 MMOL/L (ref 7–16)
AST SERPL-CCNC: 13 U/L (ref 0–31)
BACTERIA: ABNORMAL /HPF
BILIRUB SERPL-MCNC: 0.5 MG/DL (ref 0–1.2)
BILIRUBIN URINE: NEGATIVE
BLOOD, URINE: NEGATIVE
BUN BLDV-MCNC: 37 MG/DL (ref 6–23)
CALCIUM SERPL-MCNC: 9.7 MG/DL (ref 8.6–10.2)
CHLORIDE BLD-SCNC: 102 MMOL/L (ref 98–107)
CLARITY: CLEAR
CO2: 26 MMOL/L (ref 22–29)
COLOR: YELLOW
CREAT SERPL-MCNC: 1.1 MG/DL (ref 0.5–1)
EKG ATRIAL RATE: 66 BPM
EKG P AXIS: 69 DEGREES
EKG P-R INTERVAL: 166 MS
EKG Q-T INTERVAL: 410 MS
EKG QRS DURATION: 82 MS
EKG QTC CALCULATION (BAZETT): 429 MS
EKG R AXIS: 7 DEGREES
EKG T AXIS: 25 DEGREES
EKG VENTRICULAR RATE: 66 BPM
EPITHELIAL CELLS, UA: ABNORMAL /HPF
GFR AFRICAN AMERICAN: 60
GFR NON-AFRICAN AMERICAN: 49 ML/MIN/1.73
GLUCOSE BLD-MCNC: 136 MG/DL (ref 74–99)
GLUCOSE URINE: NEGATIVE MG/DL
HCT VFR BLD CALC: 41.8 % (ref 34–48)
HEMOGLOBIN: 13.7 G/DL (ref 11.5–15.5)
KETONES, URINE: NEGATIVE MG/DL
LEUKOCYTE ESTERASE, URINE: ABNORMAL
MCH RBC QN AUTO: 30 PG (ref 26–35)
MCHC RBC AUTO-ENTMCNC: 32.8 % (ref 32–34.5)
MCV RBC AUTO: 91.7 FL (ref 80–99.9)
NITRITE, URINE: NEGATIVE
PDW BLD-RTO: 14.3 FL (ref 11.5–15)
PH UA: 6 (ref 5–9)
PLATELET # BLD: 298 E9/L (ref 130–450)
PMV BLD AUTO: 10.2 FL (ref 7–12)
POTASSIUM SERPL-SCNC: 4 MMOL/L (ref 3.5–5)
PRO-BNP: 533 PG/ML (ref 0–125)
PROTEIN UA: NEGATIVE MG/DL
RBC # BLD: 4.56 E12/L (ref 3.5–5.5)
RBC UA: ABNORMAL /HPF (ref 0–2)
SARS-COV-2, NAAT: NOT DETECTED
SODIUM BLD-SCNC: 140 MMOL/L (ref 132–146)
SPECIFIC GRAVITY UA: 1.02 (ref 1–1.03)
TOTAL PROTEIN: 6.6 G/DL (ref 6.4–8.3)
TROPONIN, HIGH SENSITIVITY: 12 NG/L (ref 0–9)
TROPONIN, HIGH SENSITIVITY: 14 NG/L (ref 0–9)
TROPONIN, HIGH SENSITIVITY: 15 NG/L (ref 0–9)
UROBILINOGEN, URINE: 0.2 E.U./DL
WBC # BLD: 9.8 E9/L (ref 4.5–11.5)
WBC UA: ABNORMAL /HPF (ref 0–5)

## 2021-06-22 PROCEDURE — 99284 EMERGENCY DEPT VISIT MOD MDM: CPT

## 2021-06-22 PROCEDURE — 81001 URINALYSIS AUTO W/SCOPE: CPT

## 2021-06-22 PROCEDURE — 84484 ASSAY OF TROPONIN QUANT: CPT

## 2021-06-22 PROCEDURE — 6360000002 HC RX W HCPCS: Performed by: EMERGENCY MEDICINE

## 2021-06-22 PROCEDURE — 87088 URINE BACTERIA CULTURE: CPT

## 2021-06-22 PROCEDURE — 83880 ASSAY OF NATRIURETIC PEPTIDE: CPT

## 2021-06-22 PROCEDURE — 87186 SC STD MICRODIL/AGAR DIL: CPT

## 2021-06-22 PROCEDURE — G0378 HOSPITAL OBSERVATION PER HR: HCPCS

## 2021-06-22 PROCEDURE — 2060000000 HC ICU INTERMEDIATE R&B

## 2021-06-22 PROCEDURE — 71046 X-RAY EXAM CHEST 2 VIEWS: CPT

## 2021-06-22 PROCEDURE — 96372 THER/PROPH/DIAG INJ SC/IM: CPT

## 2021-06-22 PROCEDURE — 6360000004 HC RX CONTRAST MEDICATION: Performed by: RADIOLOGY

## 2021-06-22 PROCEDURE — 80053 COMPREHEN METABOLIC PANEL: CPT

## 2021-06-22 PROCEDURE — 85027 COMPLETE CBC AUTOMATED: CPT

## 2021-06-22 PROCEDURE — 93010 ELECTROCARDIOGRAM REPORT: CPT | Performed by: INTERNAL MEDICINE

## 2021-06-22 PROCEDURE — 87077 CULTURE AEROBIC IDENTIFY: CPT

## 2021-06-22 PROCEDURE — 2580000003 HC RX 258: Performed by: RADIOLOGY

## 2021-06-22 PROCEDURE — 71275 CT ANGIOGRAPHY CHEST: CPT

## 2021-06-22 PROCEDURE — 96374 THER/PROPH/DIAG INJ IV PUSH: CPT

## 2021-06-22 PROCEDURE — 87635 SARS-COV-2 COVID-19 AMP PRB: CPT

## 2021-06-22 PROCEDURE — 36415 COLL VENOUS BLD VENIPUNCTURE: CPT

## 2021-06-22 PROCEDURE — 93005 ELECTROCARDIOGRAM TRACING: CPT | Performed by: NURSE PRACTITIONER

## 2021-06-22 RX ORDER — LOSARTAN POTASSIUM 50 MG/1
50 TABLET ORAL DAILY
Status: DISCONTINUED | OUTPATIENT
Start: 2021-06-23 | End: 2021-06-24 | Stop reason: HOSPADM

## 2021-06-22 RX ORDER — ONDANSETRON 2 MG/ML
4 INJECTION INTRAMUSCULAR; INTRAVENOUS EVERY 6 HOURS PRN
Status: DISCONTINUED | OUTPATIENT
Start: 2021-06-22 | End: 2021-06-24 | Stop reason: HOSPADM

## 2021-06-22 RX ORDER — HYDROCHLOROTHIAZIDE 25 MG/1
25 TABLET ORAL DAILY
Status: DISCONTINUED | OUTPATIENT
Start: 2021-06-23 | End: 2021-06-24 | Stop reason: HOSPADM

## 2021-06-22 RX ORDER — SODIUM CHLORIDE 0.9 % (FLUSH) 0.9 %
10 SYRINGE (ML) INJECTION PRN
Status: DISCONTINUED | OUTPATIENT
Start: 2021-06-22 | End: 2021-06-24 | Stop reason: HOSPADM

## 2021-06-22 RX ORDER — ACETAMINOPHEN 650 MG/1
650 SUPPOSITORY RECTAL EVERY 6 HOURS PRN
Status: DISCONTINUED | OUTPATIENT
Start: 2021-06-22 | End: 2021-06-24 | Stop reason: HOSPADM

## 2021-06-22 RX ORDER — OXYBUTYNIN CHLORIDE 10 MG/1
20 TABLET, EXTENDED RELEASE ORAL DAILY
Status: DISCONTINUED | OUTPATIENT
Start: 2021-06-23 | End: 2021-06-24 | Stop reason: HOSPADM

## 2021-06-22 RX ORDER — LORAZEPAM 2 MG/ML
1 INJECTION INTRAMUSCULAR ONCE
Status: COMPLETED | OUTPATIENT
Start: 2021-06-22 | End: 2021-06-22

## 2021-06-22 RX ORDER — SODIUM CHLORIDE 9 MG/ML
25 INJECTION, SOLUTION INTRAVENOUS PRN
Status: DISCONTINUED | OUTPATIENT
Start: 2021-06-22 | End: 2021-06-24 | Stop reason: HOSPADM

## 2021-06-22 RX ORDER — PROMETHAZINE HYDROCHLORIDE 25 MG/1
12.5 TABLET ORAL EVERY 6 HOURS PRN
Status: DISCONTINUED | OUTPATIENT
Start: 2021-06-22 | End: 2021-06-24 | Stop reason: HOSPADM

## 2021-06-22 RX ORDER — SODIUM CHLORIDE 0.9 % (FLUSH) 0.9 %
10 SYRINGE (ML) INJECTION ONCE
Status: COMPLETED | OUTPATIENT
Start: 2021-06-22 | End: 2021-06-22

## 2021-06-22 RX ORDER — METOPROLOL SUCCINATE 25 MG/1
12.5 TABLET, EXTENDED RELEASE ORAL DAILY
Status: DISCONTINUED | OUTPATIENT
Start: 2021-06-23 | End: 2021-06-24 | Stop reason: HOSPADM

## 2021-06-22 RX ORDER — PRAVASTATIN SODIUM 10 MG
10 TABLET ORAL DAILY
Status: DISCONTINUED | OUTPATIENT
Start: 2021-06-23 | End: 2021-06-24 | Stop reason: HOSPADM

## 2021-06-22 RX ORDER — ACETAMINOPHEN 325 MG/1
650 TABLET ORAL EVERY 6 HOURS PRN
Status: DISCONTINUED | OUTPATIENT
Start: 2021-06-22 | End: 2021-06-24 | Stop reason: HOSPADM

## 2021-06-22 RX ORDER — SODIUM CHLORIDE 0.9 % (FLUSH) 0.9 %
10 SYRINGE (ML) INJECTION EVERY 12 HOURS SCHEDULED
Status: DISCONTINUED | OUTPATIENT
Start: 2021-06-22 | End: 2021-06-24 | Stop reason: HOSPADM

## 2021-06-22 RX ORDER — POLYETHYLENE GLYCOL 3350 17 G/17G
17 POWDER, FOR SOLUTION ORAL DAILY PRN
Status: DISCONTINUED | OUTPATIENT
Start: 2021-06-22 | End: 2021-06-24 | Stop reason: HOSPADM

## 2021-06-22 RX ORDER — ASPIRIN 81 MG/1
81 TABLET, CHEWABLE ORAL DAILY
Status: DISCONTINUED | OUTPATIENT
Start: 2021-06-23 | End: 2021-06-24 | Stop reason: HOSPADM

## 2021-06-22 RX ORDER — ESCITALOPRAM OXALATE 10 MG/1
20 TABLET ORAL DAILY
Status: DISCONTINUED | OUTPATIENT
Start: 2021-06-23 | End: 2021-06-24 | Stop reason: HOSPADM

## 2021-06-22 RX ADMIN — ENOXAPARIN SODIUM 120 MG: 120 INJECTION SUBCUTANEOUS at 22:19

## 2021-06-22 RX ADMIN — LORAZEPAM 1 MG: 2 INJECTION INTRAMUSCULAR; INTRAVENOUS at 18:58

## 2021-06-22 RX ADMIN — IOPAMIDOL 60 ML: 755 INJECTION, SOLUTION INTRAVENOUS at 19:37

## 2021-06-22 RX ADMIN — Medication 10 ML: at 19:38

## 2021-06-22 ASSESSMENT — PAIN SCALES - GENERAL
PAINLEVEL_OUTOF10: 0
PAINLEVEL_OUTOF10: 8
PAINLEVEL_OUTOF10: 0
PAINLEVEL_OUTOF10: 0

## 2021-06-22 ASSESSMENT — PAIN DESCRIPTION - LOCATION: LOCATION: LEG

## 2021-06-22 ASSESSMENT — PAIN DESCRIPTION - PAIN TYPE: TYPE: ACUTE PAIN

## 2021-06-22 ASSESSMENT — PAIN DESCRIPTION - DESCRIPTORS: DESCRIPTORS: ACHING

## 2021-06-22 NOTE — ED PROVIDER NOTES
HPI:  6/22/21, Time: 4:13 PM EDT         Malorie Valles is a 77 y.o. female presenting to the ED for shortness of breath, cough, lower extremity edema, beginning 1 week ago. The complaint has been persistent, moderate in severity, and worsened by nothing. Patient has a history of CHF. She's on Bumex. Is following up with Dr. Agustin Olivas, cardiology. Says that over the last week she's had worsening shortness of breath. No alleviating or exacerbating factors. Denies fever, chills, chest pain, abdominal pain, nausea, vomiting, diarrhea. Family at bedside. Say that the patient was unable to get out of bed and ambulate today because so weak and short of breath. Not on home oxygen. ROS:   Pertinent positives and negatives are stated within HPI, all other systems reviewed and are negative.  --------------------------------------------- PAST HISTORY ---------------------------------------------  Past Medical History:  has a past medical history of Anxiety, Deep vein blood clot of left lower extremity ([address] Family history of early CAD, Hyperlipemia, Hypertension, Lightheadedness, Multinodular goiter, Sleep apnea, and SOBOE (shortness of breath on exertion). Past Surgical History:  has a past surgical history that includes Tonsillectomy; Leg Surgery (7917); Colonoscopy; cardiovascular stress test; Hysterectomy (1990); Knee arthroscopy (2005); Breast surgery (2014); Salivary gland surgery (Left, 05/24/2017); and cyst removal (05/2017). Social History:  reports that she has never smoked. She has never used smokeless tobacco. She reports that she does not drink alcohol and does not use drugs. Family History: family history includes COPD in her mother; Cancer in her father; Coronary Art Dis in her father; Heart Disease in her brother; Heart Failure in her mother; Hypertension in her father and sister; Pacemaker in her mother. The patients home medications have been reviewed.     Allergies: Penicillins and Tape [place] tape]    ---------------------------------------------------PHYSICAL EXAM--------------------------------------    Constitutional/General: Alert and oriented x3, well appearing, non toxic in NAD  Head: Normocephalic and atraumatic  Eyes: PERRL, EOMI  Mouth: Oropharynx clear, handling secretions, no trismus  Neck: Supple, full ROM, non tender to palpation in the midline, no stridor, no crepitus, no meningeal signs  Pulmonary: Lungs clear to auscultation bilaterally, no wheezes, rales, or rhonchi. Not in respiratory distress  Cardiovascular:  Regular rate. Regular rhythm. No murmurs, gallops, or rubs. 2+ distal pulses  Chest: no chest wall tenderness  Abdomen: Soft. Non tender. Non distended. +BS. No rebound, guarding, or rigidity. No pulsatile masses appreciated. Musculoskeletal: Moves all extremities x 4. Warm and well perfused, no clubbing, cyanosis. 2+ pitting edema. Capillary refill <3 seconds  Skin: warm and dry. No rashes. Neurologic: GCS 15, CN 2-12 grossly intact, no focal deficits, symmetric strength 5/5 in the upper and lower extremities bilaterally  Psych: Normal Affect    -------------------------------------------------- RESULTS -------------------------------------------------  I have personally reviewed all laboratory and imaging results for this patient. Results are listed below.      LABS:  Results for orders placed or performed during the hospital encounter of 06/22/21   COVID-19, Rapid   Result Value Ref Range    SARS-CoV-2, NAAT Not Detected Not Detected   CBC   Result Value Ref Range    WBC 9.8 4.5 - 11.5 E9/L    RBC 4.56 3.50 - 5.50 E12/L    Hemoglobin 13.7 11.5 - 15.5 g/dL    Hematocrit 41.8 34.0 - 48.0 %    MCV 91.7 80.0 - 99.9 fL    MCH 30.0 26.0 - 35.0 pg    MCHC 32.8 32.0 - 34.5 %    RDW 14.3 11.5 - 15.0 fL    Platelets 360 017 - 567 E9/L    MPV 10.2 7.0 - 12.0 fL   Comprehensive Metabolic Panel   Result Value Ref Range    Sodium 140 132 - 146 mmol/L    Potassium 4.0 3.5 - 5.0 mmol/L    Chloride 102 98 - 107 mmol/L    CO2 26 22 - 29 mmol/L    Anion Gap 12 7 - 16 mmol/L    Glucose 136 (H) 74 - 99 mg/dL    BUN 37 (H) 6 - 23 mg/dL    CREATININE 1.1 (H) 0.5 - 1.0 mg/dL    GFR Non-African American 49 >=60 mL/min/1.73    GFR African American 60     Calcium 9.7 8.6 - 10.2 mg/dL    Total Protein 6.6 6.4 - 8.3 g/dL    Albumin 3.5 3.5 - 5.2 g/dL    Total Bilirubin 0.5 0.0 - 1.2 mg/dL    Alkaline Phosphatase 94 35 - 104 U/L    ALT 10 0 - 32 U/L    AST 13 0 - 31 U/L   Brain Natriuretic Peptide   Result Value Ref Range    Pro- (H) 0 - 125 pg/mL   Troponin   Result Value Ref Range    Troponin, High Sensitivity 15 (H) 0 - 9 ng/L   Troponin   Result Value Ref Range    Troponin, High Sensitivity 14 (H) 0 - 9 ng/L   Urinalysis   Result Value Ref Range    Color, UA Yellow Straw/Yellow    Clarity, UA Clear Clear    Glucose, Ur Negative Negative mg/dL    Bilirubin Urine Negative Negative    Ketones, Urine Negative Negative mg/dL    Specific Gravity, UA 1.020 1.005 - 1.030    Blood, Urine Negative Negative    pH, UA 6.0 5.0 - 9.0    Protein, UA Negative Negative mg/dL    Urobilinogen, Urine 0.2 <2.0 E.U./dL    Nitrite, Urine Negative Negative    Leukocyte Esterase, Urine TRACE (A) Negative   Troponin   Result Value Ref Range    Troponin, High Sensitivity 12 (H) 0 - 9 ng/L   Microscopic Urinalysis   Result Value Ref Range    WBC, UA 0-1 0 - 5 /HPF    RBC, UA 0-1 0 - 2 /HPF    Epithelial Cells, UA FEW /HPF    Bacteria, UA RARE (A) None Seen /HPF   EKG 12 Lead   Result Value Ref Range    Ventricular Rate 66 BPM    Atrial Rate 66 BPM    P-R Interval 166 ms    QRS Duration 82 ms    Q-T Interval 410 ms    QTc Calculation (Bazett) 429 ms    P Axis 69 degrees    R Axis 7 degrees    T Axis 25 degrees       RADIOLOGY:  Interpreted by Radiologist.  CTA PULMONARY W CONTRAST   Final Result   There is no central pulmonary embolism or aortic dissection.       Partially occlusive filling defects in the distal subsegmental pulmonary   artery of the left lower lobe concerning for distal subsegmental pulmonary   embolism. XR CHEST (2 VW)   Final Result   No acute process. EKG Interpretation  Interpreted by emergency department physician    Rhythm: normal sinus   Rate: normal  Axis: normal  Conduction: normal  ST Segments: no acute change  T Waves: no acute change    Clinical Impression: non-specific EKG  Comparison to prior EKG: stable as compared to patient's most recent EKG      ------------------------- NURSING NOTES AND VITALS REVIEWED ---------------------------   The nursing notes within the ED encounter and vital signs as below have been reviewed by myself. /72   Pulse 82   Temp 97 °F (36.1 °C) (Oral)   Resp 22   Ht 5' 5\" (1.651 m)   Wt 270 lb (122.5 kg)   SpO2 96%   BMI 44.93 kg/m²   Oxygen Saturation Interpretation: Normal    The patients available past medical records and past encounters were reviewed.         ------------------------------ ED COURSE/MEDICAL DECISION MAKING----------------------  Medications   metoprolol succinate (TOPROL XL) extended release tablet 12.5 mg (has no administration in time range)   aspirin chewable tablet 81 mg (has no administration in time range)   escitalopram (LEXAPRO) tablet 20 mg (has no administration in time range)   hydroCHLOROthiazide (HYDRODIURIL) tablet 25 mg (has no administration in time range)   losartan (COZAAR) tablet 50 mg (has no administration in time range)   oxybutynin (DITROPAN-XL) extended release tablet 20 mg (has no administration in time range)   pravastatin (PRAVACHOL) tablet 10 mg (has no administration in time range)   sodium chloride flush 0.9 % injection 10 mL (has no administration in time range)   sodium chloride flush 0.9 % injection 10 mL (has no administration in time range)   0.9 % sodium chloride infusion (has no administration in time range)   enoxaparin (LOVENOX) injection 120 mg (has no administration in time range)   promethazine (PHENERGAN) tablet 12.5 mg (has no administration in time range)     Or   ondansetron (ZOFRAN) injection 4 mg (has no administration in time range)   polyethylene glycol (GLYCOLAX) packet 17 g (has no administration in time range)   acetaminophen (TYLENOL) tablet 650 mg (has no administration in time range)     Or   acetaminophen (TYLENOL) suppository 650 mg (has no administration in time range)   LORazepam (ATIVAN) injection 1 mg (1 mg Intravenous Given 6/22/21 1858)   iopamidol (ISOVUE-370) 76 % injection 60 mL (60 mLs Intravenous Given 6/22/21 1937)   sodium chloride flush 0.9 % injection 10 mL (10 mLs Intravenous Given 6/22/21 1938)   enoxaparin (LOVENOX) injection 120 mg (120 mg Subcutaneous Given 6/22/21 2219)             Medical Decision Making:    Vitals stable. Physical exam remarkable for 2+ pitting edema to the lower extremities. Labs and imaging obtained. WBC is normal. Creatinine is 1.1. COVID is negative. Troponin is 14 and repeat was 12. Troponin trending down. Patient denies any chest pain. Chest XR did not show an acute process. CTA chest obtained. Partially occlusive filling defect in the distal subsegmental pulmonary artery of the left lower lobe concerning for distal subsegmental pulmonary embolism. Lovenox given. I discussed results with the patient. She is agreeable with admission. I spoke with Dr. Kenneth Turner. He accepts patient. Re-Evaluations:             Re-evaluation. Patients symptoms show no change    Critical care: 31 minutes     This patient's ED course included: a personal history and physicial examination, re-evaluation prior to disposition, multiple bedside re-evaluations, IV medications, cardiac monitoring, continuous pulse oximetry, complex medical decision making and emergency management and a personal history and physicial eaxmination    This patient has remained hemodynamically stable during their ED course. Counseling:    The emergency provider has spoken with the patient and discussed todays results, in addition to providing specific details for the plan of care and counseling regarding the diagnosis and prognosis. Questions are answered at this time and they are agreeable with the plan.       --------------------------------- IMPRESSION AND DISPOSITION ---------------------------------    IMPRESSION  1. Pulmonary embolism on left [facility])        DISPOSITION  Disposition: Admit to telemetry  Patient condition is stable        NOTE: This report was transcribed using voice recognition software.  Every effort was made to ensure accuracy; however, inadvertent computerized transcription errors may be present          Gertrude Joseph MD  06/22/21 [id number]

## 2021-06-22 NOTE — ED NOTES
Bed: 01  Expected date:   Expected time:   Means of arrival:   Comments:  triage     Rafael Mccall RN  06/22/21 [id number]

## 2021-06-22 NOTE — ED TRIAGE NOTES
FIRST PROVIDER CONTACT ASSESSMENT NOTE        Department of Emergency Medicine            ED  First Provider Note            6/22/21  1:09 PM EDT    Chief Complaint: Shortness of Breath (X 2 weeks.), Leg Swelling, and Cough      History of Present Illness:    Rhett Campbell is a 77 y.o. female who presents to the emergency department for BLE swelling, SOB  Focused Screening Exam:  Constitutional:  Alert, appears stated age and is in no distress.     *ALLERGIES*     Penicillins and Tape [adhesive tape]     ED Triage Vitals   BP Temp Temp src Pulse Resp SpO2 Height Weight   06/22/21 1159 06/22/21 1142 -- 06/22/21 1142 06/22/21 1159 06/22/21 1142 06/22/21 1159 06/22/21 1159   132/77 97.1 °F (36.2 °C)  75 16 94 % 5' 5\" (1.651 m) 270 lb (122.5 kg)        Initial Plan of Care:  Initiate Treatment-Testing, Proceed toTreatment Area When Bed Available for ED Attending/MLP to Continue Care    -----------------END OF FIRST PROVIDER CONTACT ASSESSMENT NOTE--------------  Electronically signed by GENTRY Smart CNP   DD: 6/22/21

## 2021-06-23 ENCOUNTER — APPOINTMENT (OUTPATIENT)
Dept: ULTRASOUND IMAGING | Age: 66
DRG: 176 | End: 2021-06-23
Payer: COMMERCIAL

## 2021-06-23 LAB
ALBUMIN SERPL-MCNC: 3.2 G/DL (ref 3.5–5.2)
ALP BLD-CCNC: 86 U/L (ref 35–104)
ALT SERPL-CCNC: 10 U/L (ref 0–32)
ANION GAP SERPL CALCULATED.3IONS-SCNC: 10 MMOL/L (ref 7–16)
AST SERPL-CCNC: 14 U/L (ref 0–31)
BILIRUB SERPL-MCNC: 0.3 MG/DL (ref 0–1.2)
BUN BLDV-MCNC: 33 MG/DL (ref 6–23)
CALCIUM SERPL-MCNC: 9.2 MG/DL (ref 8.6–10.2)
CHLORIDE BLD-SCNC: 104 MMOL/L (ref 98–107)
CO2: 27 MMOL/L (ref 22–29)
CREAT SERPL-MCNC: 1 MG/DL (ref 0.5–1)
GFR AFRICAN AMERICAN: >60
GFR NON-AFRICAN AMERICAN: 55 ML/MIN/1.73
GLUCOSE BLD-MCNC: 86 MG/DL (ref 74–99)
HCT VFR BLD CALC: 39.1 % (ref 34–48)
HEMOGLOBIN: 12.6 G/DL (ref 11.5–15.5)
MCH RBC QN AUTO: 29.7 PG (ref 26–35)
MCHC RBC AUTO-ENTMCNC: 32.2 % (ref 32–34.5)
MCV RBC AUTO: 92.2 FL (ref 80–99.9)
PDW BLD-RTO: 14.4 FL (ref 11.5–15)
PLATELET # BLD: 276 E9/L (ref 130–450)
PMV BLD AUTO: 10.6 FL (ref 7–12)
POTASSIUM REFLEX MAGNESIUM: 3.7 MMOL/L (ref 3.5–5)
RBC # BLD: 4.24 E12/L (ref 3.5–5.5)
SODIUM BLD-SCNC: 141 MMOL/L (ref 132–146)
TOTAL PROTEIN: 6.3 G/DL (ref 6.4–8.3)
WBC # BLD: 10.3 E9/L (ref 4.5–11.5)

## 2021-06-23 PROCEDURE — 6370000000 HC RX 637 (ALT 250 FOR IP): Performed by: FAMILY MEDICINE

## 2021-06-23 PROCEDURE — 93970 EXTREMITY STUDY: CPT | Performed by: RADIOLOGY

## 2021-06-23 PROCEDURE — 85027 COMPLETE CBC AUTOMATED: CPT

## 2021-06-23 PROCEDURE — 6370000000 HC RX 637 (ALT 250 FOR IP): Performed by: INTERNAL MEDICINE

## 2021-06-23 PROCEDURE — 36415 COLL VENOUS BLD VENIPUNCTURE: CPT

## 2021-06-23 PROCEDURE — G0378 HOSPITAL OBSERVATION PER HR: HCPCS

## 2021-06-23 PROCEDURE — 2060000000 HC ICU INTERMEDIATE R&B

## 2021-06-23 PROCEDURE — 6360000002 HC RX W HCPCS: Performed by: FAMILY MEDICINE

## 2021-06-23 PROCEDURE — 2580000003 HC RX 258: Performed by: FAMILY MEDICINE

## 2021-06-23 PROCEDURE — 80053 COMPREHEN METABOLIC PANEL: CPT

## 2021-06-23 PROCEDURE — 96372 THER/PROPH/DIAG INJ SC/IM: CPT

## 2021-06-23 PROCEDURE — 93970 EXTREMITY STUDY: CPT

## 2021-06-23 RX ADMIN — HYDROCHLOROTHIAZIDE 25 MG: 25 TABLET ORAL at 12:45

## 2021-06-23 RX ADMIN — APIXABAN 10 MG: 5 TABLET, FILM COATED ORAL at 20:49

## 2021-06-23 RX ADMIN — SODIUM CHLORIDE, PRESERVATIVE FREE 10 ML: 5 INJECTION INTRAVENOUS at 09:11

## 2021-06-23 RX ADMIN — ENOXAPARIN SODIUM 120 MG: 120 INJECTION SUBCUTANEOUS at 00:15

## 2021-06-23 RX ADMIN — PRAVASTATIN SODIUM 10 MG: 20 TABLET ORAL at 20:49

## 2021-06-23 RX ADMIN — ESCITALOPRAM 20 MG: 10 TABLET, FILM COATED ORAL at 12:45

## 2021-06-23 RX ADMIN — OXYBUTYNIN CHLORIDE 20 MG: 10 TABLET, EXTENDED RELEASE ORAL at 12:46

## 2021-06-23 RX ADMIN — SODIUM CHLORIDE, PRESERVATIVE FREE 10 ML: 5 INJECTION INTRAVENOUS at 00:13

## 2021-06-23 RX ADMIN — ASPIRIN 81 MG CHEWABLE TABLET 81 MG: 81 TABLET CHEWABLE at 12:45

## 2021-06-23 RX ADMIN — LOSARTAN POTASSIUM 50 MG: 50 TABLET, FILM COATED ORAL at 12:45

## 2021-06-23 RX ADMIN — METOPROLOL SUCCINATE 12.5 MG: 25 TABLET, EXTENDED RELEASE ORAL at 12:46

## 2021-06-23 ASSESSMENT — PAIN SCALES - GENERAL
PAINLEVEL_OUTOF10: 0
PAINLEVEL_OUTOF10: 0

## 2021-06-23 NOTE — H&P
Hospitalist History & Physical      PCP: Familia Lopez MD    Date of Admission: 6/22/2021    Date of Service: Pt seen/examined on 6/22/2021     Chief Complaint:  had concerns including Shortness of Breath (X 2 weeks.), Leg Swelling, and Cough. History Of Present Illness:    Ms. Albert Encarnacion, a 77y.o. year old female  who  has a past medical history of Anxiety, Deep vein blood clot of left lower extremity ([address] Family history of early CAD, Hyperlipemia, Hypertension, Lightheadedness, Multinodular goiter, Sleep apnea, and SOBOE (shortness of breath on exertion). Patient presented to the emergency department with complaints of shortness of breath, cough, lower extremity edema. This began about a week ago. Over the week shortness of breath has been getting worse. Laboratory studies largely unremarkable with the exception of troponin XII, proBNP 533. On exam noted to have 2+ pitting edema bilateral lower extremities. Chest x-ray is unremarkable. CTA chest revealed partially occlusive filling defects in the distal segment segmental pulmonary artery of the left lobe concerning for distal subsegmental pulmonary embolism. Was started on Lovenox in the emergency department.       Past Medical History:   Diagnosis Date    Anxiety     Deep vein blood clot of left lower extremity ([address] 05/27/2019    Family history of early CAD     Hyperlipemia     Hypertension     Lightheadedness     Multinodular goiter     Sleep apnea     SOBOE (shortness of breath on exertion)        Past Surgical History:   Procedure Laterality Date    BREAST SURGERY  2014    biopsy    CARDIOVASCULAR STRESS TEST      COLONOSCOPY      CYST REMOVAL  05/2017    mouth/under tongue    HYSTERECTOMY  1990    KNEE ARTHROSCOPY  2005    LEG SURGERY  1963    ligament repair    SALIVARY GLAND SURGERY Left 05/24/2017    TONSILLECTOMY      childhood       Prior to Admission medications    Medication Sig Start Date End Date Taking? Authorizing Provider   oxybutynin (DITROPAN-XL) 10 MG extended release tablet Take 2 tablets by mouth daily 6/1/21  Yes Mahendra Edwards,    pravastatin (PRAVACHOL) 10 MG tablet Take 10 mg by mouth daily   Yes Historical Provider, MD   metoprolol succinate (TOPROL XL) 25 MG extended release tablet Take 0.5 tablets by mouth daily TAKE 1/2 TABLET BY MOUTH DAILY 3/19/21 6/22/21 Yes Selma Merritt MD   hydroCHLOROthiazide (HYDRODIURIL) 25 MG tablet Take 1 tablet by mouth daily 3/19/21  Yes Selma Merritt MD   Handicap Jonas MISC by Does not apply route Patient cannot walk 200 ft without stopping to rest.    Expiration 3/2026 3/19/21  Yes Selma Merritt MD   losartan (COZAAR) 50 MG tablet TAKE 1 TABLET BY MOUTH ONCE DAILY 1/4/21  Yes Ed Moreno MD   escitalopram (LEXAPRO) 20 MG tablet TAKE 1 TABLET BY MOUTH ONCE DAILY 1/4/21  Yes Ed Moreno MD   CALCIUM CITRATE PO Take 1,000 mg by mouth daily OTC   Yes Historical Provider, MD   aspirin 81 MG chewable tablet Take 81 mg by mouth daily   Yes Historical Provider, MD         Allergies:  Penicillins and Tape [adhesive tape]    Social History:    TOBACCO:   reports that she has never smoked. She has never used smokeless tobacco.  ETOH:   reports no history of alcohol use. Family History:    Reviewed in detail and negative for DM, CAD, Cancer, CVA. Positive as follows\"      Problem Relation Age of Onset   Miller Pacemaker Mother     COPD Mother     Heart Failure Mother     Cancer Father         prostate    Hypertension Father     Coronary Art Dis Father         63's    Hypertension Sister     Heart Disease Brother         age 54       REVIEW OF SYSTEMS:   Pertinent positives as noted in the HPI. All other systems reviewed and negative.     PHYSICAL EXAM:  BP (!) 143/90   Pulse 54   Temp 97.1 °F (36.2 °C)   Resp 26   Ht 5' 5\" (1.651 m)   Wt 270 lb (122.5 kg)   SpO2 92%   BMI 44.93 kg/m²   General appearance: No apparent distress, appears stated age and cooperative. HEENT: Normal cephalic, atraumatic without obvious deformity. Pupils equal, round, and reactive to light. Extra ocular muscles intact. Conjunctivae/corneas clear. Neck: Supple, with full range of motion. No jugular venous distention. Trachea midline. Respiratory: CTA  Cardiovascular: RRR  Abdomen: Soft, nontender, nondistended  Musculoskeletal: 2+ pitting edema bilateral extremities  Skin: Normal skin color. No rashes or lesions. Neurologic:  Neurovascularly intact without any focal sensory/motor deficits. Cranial nerves: II-XII intact, grossly non-focal.    Reviewed EKG and CXR personally      CBC:   Recent Labs     06/22/21  1217   WBC 9.8   RBC 4.56   HGB 13.7   HCT 41.8   MCV 91.7   RDW 14.3        BMP:   Recent Labs     06/22/21  1217      K 4.0      CO2 26   BUN 37*   CREATININE 1.1*     LFT:  Recent Labs     06/22/21  1217   PROT 6.6   ALKPHOS 94   ALT 10   AST 13   BILITOT 0.5     CE:  No results for input(s): Ethelene Soulier in the last 72 hours. PT/INR: No results for input(s): INR, APTT in the last 72 hours. BNP: No results for input(s): BNP in the last 72 hours.   ESR: No results found for: SEDRATE  CRP: No results found for: CRP  D Dimer:   Lab Results   Component Value Date    DDIMER 207 10/11/2019      Folate and B12: No results found for: SCOCPOVS03, No results found for: FOLATE  Lactic Acid: No results found for: LACTA  Thyroid Studies:   Lab Results   Component Value Date    TSH 0.770 05/15/2021       Oupatient labs:  Lab Results   Component Value Date    CHOL 168 05/15/2021    TRIG 118 05/15/2021    HDL 59 05/15/2021    LDLCALC 85 05/15/2021    TSH 0.770 05/15/2021    INR 1.0 04/15/2021    LABA1C 6.1 (H) 05/15/2021       Urinalysis:    Lab Results   Component Value Date    NITRU Negative 06/22/2021    WBCUA 0-1 06/22/2021    BACTERIA RARE 06/22/2021    RBCUA 0-1 06/22/2021    BLOODU Negative 06/22/2021    SPECGRAV 1.020 06/22/2021 GLUCOSEU Negative 06/22/2021       Imaging:  XR CHEST (2 VW)    Result Date: 6/22/2021  EXAMINATION: TWO XRAY VIEWS OF THE CHEST 6/22/2021 2:23 pm COMPARISON: None. HISTORY: ORDERING SYSTEM PROVIDED HISTORY: dyspnea TECHNOLOGIST PROVIDED HISTORY: Reason for exam:->dyspnea What reading provider will be dictating this exam?->CRC FINDINGS: The lungs are without acute focal process. There is no effusion or pneumothorax. The cardiomediastinal silhouette is without acute process. The osseous structures are without acute process. No acute process. CTA PULMONARY W CONTRAST    Result Date: 6/22/2021  EXAMINATION: CTA OF THE CHEST 6/22/2021 7:28 pm TECHNIQUE: CTA of the chest was performed after the administration of intravenous contrast.  Multiplanar reformatted images are provided for review. MIP images are provided for review. Dose modulation, iterative reconstruction, and/or weight based adjustment of the mA/kV was utilized to reduce the radiation dose to as low as reasonably achievable. COMPARISON: None. HISTORY: ORDERING SYSTEM PROVIDED HISTORY: shortness of breath TECHNOLOGIST PROVIDED HISTORY: Reason for exam:->shortness of breath Decision Support Exception - unselect if not a suspected or confirmed emergency medical condition->Emergency Medical Condition (MA) What reading provider will be dictating this exam?->CRC FINDINGS: Heart and the great vessels are normal.  Trachea and major bronchi are patent. There are no filling defects in the main pulmonary artery and the central branches. However, partially occluding filling defects are identified in the distal subsegmental pulmonary artery of the left lower lobe. There is minimal atelectasis in the lung bases. There is no focal consolidation or pleural effusion. The liver is of normal architecture. Small hiatal hernia is noted. Calcified splenic artery aneurysm is identified in the splenic hilum. Degenerative changes are identified in the thoracic spine. There is no central pulmonary embolism or aortic dissection. Partially occlusive filling defects in the distal subsegmental pulmonary artery of the left lower lobe concerning for distal subsegmental pulmonary embolism. ASSESSMENT:  -Left pulmonary embolism  -Bilateral 2+ pitting edema  -History of previous DVT  -Hypertension  -Hyperlipidemia      PLAN:  -Admit to medicine  -Lovenox 1 mg/kg subcutaneous twice daily  -Obtain bilateral lower extremity ultrasound  -Continue home medications        Diet: No diet orders on file  Code Status: Prior  Surrogate decision maker confirmed with patient:   Extended Emergency Contact Information  Primary Emergency Contact: Jose Oseguera [address] Phone: [phone]  Mobile Phone: [phone]  Relation: Child  Secondary Emergency Contact: Kulwant Arnoldo  Address: [address] [place] [address] Phone: [phone]  Mobile Phone: [phone]  Relation: Brother/Sister    DVT Prophylaxis: []Lovenox []Heparin []PCD [] [address] []Encouraged ambulation  Disposition: []Med/Surg [] Intermediate [] ICU/CCU  Admit status: [] Observation [] Inpatient     +++++++++++++++++++++++++++++++++++++++++++++++++  Juan Ramírez DO  [address]  +++++++++++++++++++++++++++++++++++++++++++++++++  NOTE: This report was transcribed using voice recognition software. Every effort was made to ensure accuracy; however, inadvertent computerized transcription errors may be present.

## 2021-06-23 NOTE — CARE COORDINATION
Patient from home, typically independent with all needs. She works for [employer]. Uses a walker at home PRN. Her son and grandchildren live with her. PCP is Dr. Rupert Goldsmith. She plans to return home when released. Eliquis cards provided to patient. For questions I can be reached at [phone].  [facility]

## 2021-06-23 NOTE — PROGRESS NOTES
Hospitalist Progress Note      SYNOPSIS: Patient admitted on 2021 presented to the emergency department with complaints of shortness of breath, cough, lower extremity edema. This began about a week ago. Over the week shortness of breath has been getting worse. Laboratory studies largely unremarkable with the exception of troponin XII, proBNP 533. On exam noted to have 2+ pitting edema bilateral lower extremities. Chest x-ray is unremarkable. CTA chest revealed partially occlusive filling defects in the distal segment segmental pulmonary artery of the left lobe concerning for distal subsegmental pulmonary embolism. Was started on Lovenox in the emergency department. SUBJECTIVE:  Stable overnight. No other overnight issues reported. Patient seen and examined  Records reviewed. She states SOB improving  US lowers negative for DVT  Denies any long trips or prolonged immobilization      Temp (24hrs), Av.1 °F (36.2 °C), Min:97 °F (36.1 °C), Max:97.1 °F (36.2 °C)    DIET: ADULT DIET; Regular  CODE: Full Code  No intake or output data in the 24 hours ending 21 0816    Review of Systems  All bolded are positive; please see HPI  General:  Fever, chills, diaphoresis, fatigue, malaise, night sweats, weight loss  Psychological:  Anxiety, disorientation, hallucinations. ENT:  Epistaxis, headaches, vertigo, visual changes. Cardiovascular:  Chest pain, irregular heartbeats, palpitations, paroxysmal nocturnal dyspnea. Respiratory:  Shortness of breath, coughing, sputum production, hemoptysis, wheezing, orthopnea.   Gastrointestinal:  Nausea, vomiting, diarrhea, heartburn, constipation, abdominal pain, hematemesis, hematochezia, melena, acholic stools  Genito-Urinary:  Dysuria, urgency, frequency, hematuria  Musculoskeletal:  Joint pain, joint stiffness, joint swelling, muscle pain  Neurology:  Headache, focal neurological deficits, weakness, numbness, paresthesia  Derm:  Rashes, ulcers, excoriations, bruising  Extremities:  Decreased ROM, peripheral edema, mottling      OBJECTIVE:    /72   Pulse 82   Temp 97 °F (36.1 °C) (Oral)   Resp 22   Ht 5' 5\" (1.651 m)   Wt 270 lb (122.5 kg)   SpO2 96%   BMI 44.93 kg/m²     General appearance:  awake, alert, and oriented to person, place, time, and purpose; appears stated age and cooperative; no apparent distress no labored breathing  HEENT:  Conjunctivae/corneas clear. Neck: Supple. No jugular venous distention. Respiratory: symmetrical; clear to auscultation bilaterally; no wheezes; no rhonchi; no rales  Cardiovascular: rhythm regular; rate controlled; no murmurs  Abdomen: Soft, nontender, nondistended  Extremities:  peripheral pulses present; no peripheral edema; no ulcers  Musculoskeletal: No clubbing, cyanosis, no bilateral lower extremity edema. Brisk capillary refill. Skin:  No rashes  on visible skin  Neurologic: awake, alert and following commands     ASSESSMENT and PLAN:  Left pulmonary embolism- given lovenox, discussed transitioning timing of Eliquis with pharmacy, will start tonight. Will need to follow up with primary care. She is on room air. Bilateral 2+ pitting edema- US lowers negative for DVT  History of previous DVT  Hypertension  Hyperlipidemia  Asymptomatic bacteriuria         DISPOSITION: Continue current plan of care.  Possible DC tomorrow    Medications:  REVIEWED DAILY    Infusion Medications    sodium chloride       Scheduled Medications    apixaban  10 mg Oral BID    Followed by   En Dorsey ON 6/30/2021] apixaban  5 mg Oral BID    metoprolol succinate  12.5 mg Oral Daily    aspirin  81 mg Oral Daily    escitalopram  20 mg Oral Daily    hydroCHLOROthiazide  25 mg Oral Daily    losartan  50 mg Oral Daily    oxybutynin  20 mg Oral Daily    pravastatin  10 mg Oral Daily    sodium chloride flush  10 mL Intravenous 2 times per day    [Held by provider] enoxaparin  1 mg/kg Subcutaneous BID     PRN Meds: sodium chloride flush, sodium chloride, promethazine **OR** ondansetron, polyethylene glycol, acetaminophen **OR** acetaminophen    Labs:     Recent Labs     06/22/21  1217 06/23/21  0504   WBC 9.8 10.3   HGB 13.7 12.6   HCT 41.8 39.1    276       Recent Labs     06/22/21  1217 06/23/21  0504    141   K 4.0 3.7    104   CO2 26 27   BUN 37* 33*   CREATININE 1.1* 1.0   CALCIUM 9.7 9.2       Recent Labs     06/22/21  1217 06/23/21  0504   PROT 6.6 6.3*   ALKPHOS 94 86   ALT 10 10   AST 13 14   BILITOT 0.5 0.3       No results for input(s): INR in the last 72 hours. No results for input(s): Bennetta Downy in the last 72 hours. Chronic labs:    Lab Results   Component Value Date    CHOL 168 05/15/2021    TRIG 118 05/15/2021    HDL 59 05/15/2021    LDLCALC 85 05/15/2021    TSH 0.770 05/15/2021    INR 1.0 04/15/2021    LABA1C 6.1 (H) 05/15/2021       Radiology: REVIEWED DAILY    +++++++++++++++++++++++++++++++++++++++++++++++++  DO Roxie Lynne Physician - [address] [place], [place]  +++++++++++++++++++++++++++++++++++++++++++++++++  NOTE: This report was transcribed using voice recognition software. Every effort was made to ensure accuracy; however, inadvertent computerized transcription errors may be present.

## 2021-06-24 VITALS
BODY MASS INDEX: 44.98 KG/M2 | WEIGHT: 270 LBS | RESPIRATION RATE: 16 BRPM | OXYGEN SATURATION: 94 % | HEART RATE: 69 BPM | SYSTOLIC BLOOD PRESSURE: 119 MMHG | HEIGHT: 65 IN | DIASTOLIC BLOOD PRESSURE: 69 MMHG | TEMPERATURE: 98.4 F

## 2021-06-24 LAB
HCT VFR BLD CALC: 38.4 % (ref 34–48)
HEMOGLOBIN: 12.2 G/DL (ref 11.5–15.5)
ORGANISM: ABNORMAL
URINE CULTURE, ROUTINE: ABNORMAL

## 2021-06-24 PROCEDURE — G0378 HOSPITAL OBSERVATION PER HR: HCPCS

## 2021-06-24 PROCEDURE — 85014 HEMATOCRIT: CPT

## 2021-06-24 PROCEDURE — 6370000000 HC RX 637 (ALT 250 FOR IP): Performed by: INTERNAL MEDICINE

## 2021-06-24 PROCEDURE — 36415 COLL VENOUS BLD VENIPUNCTURE: CPT

## 2021-06-24 PROCEDURE — 85018 HEMOGLOBIN: CPT

## 2021-06-24 PROCEDURE — 6370000000 HC RX 637 (ALT 250 FOR IP): Performed by: FAMILY MEDICINE

## 2021-06-24 PROCEDURE — 2580000003 HC RX 258: Performed by: FAMILY MEDICINE

## 2021-06-24 RX ADMIN — ASPIRIN 81 MG CHEWABLE TABLET 81 MG: 81 TABLET CHEWABLE at 09:38

## 2021-06-24 RX ADMIN — ESCITALOPRAM 20 MG: 10 TABLET, FILM COATED ORAL at 09:39

## 2021-06-24 RX ADMIN — METOPROLOL SUCCINATE 12.5 MG: 25 TABLET, EXTENDED RELEASE ORAL at 09:38

## 2021-06-24 RX ADMIN — APIXABAN 10 MG: 5 TABLET, FILM COATED ORAL at 09:37

## 2021-06-24 RX ADMIN — HYDROCHLOROTHIAZIDE 25 MG: 25 TABLET ORAL at 09:39

## 2021-06-24 RX ADMIN — LOSARTAN POTASSIUM 50 MG: 50 TABLET, FILM COATED ORAL at 09:39

## 2021-06-24 RX ADMIN — SODIUM CHLORIDE, PRESERVATIVE FREE 10 ML: 5 INJECTION INTRAVENOUS at 09:39

## 2021-06-24 RX ADMIN — OXYBUTYNIN CHLORIDE 20 MG: 10 TABLET, EXTENDED RELEASE ORAL at 09:38

## 2021-06-24 ASSESSMENT — PAIN SCALES - GENERAL
PAINLEVEL_OUTOF10: 0
PAINLEVEL_OUTOF10: 0

## 2021-06-24 NOTE — PROGRESS NOTES
CLINICAL PHARMACY NOTE: MEDS TO BEDS    Total # of Prescriptions Filled: 1   The following medications were delivered to the patient:  · eliquis starter pack    Additional Documentation:    Delivered to patient 6/24 @11:40am

## 2021-06-24 NOTE — DISCHARGE SUMMARY
Discharge Summary    Admit date: 6/22/2021    Discharge date and time: No discharge date for patient encounter. Admitting Physician: Tawnya Combs DO     Consultants: None    Admission Diagnoses:  Pulmonary embolism    Discharge Diagnoses and  Hospital Course:  · Left pulmonary embolism- given lovenox, transitioned to eliquis. Will need to follow up with primary care. She is on room air. Denies immobility, surgery. Recommend outpatient work up for hypercoagulable state. · Bilateral 2+ pitting edema R>L- US lowers negative for DVT. Recommended compression stockings. Had recent echocardiogram EF 60%  · History of previous DVT  · Hypertension  · Hyperlipidemia  · Asymptomatic bacteriuria    Discharge Exam:  Vitals:    06/24/21 0815   BP: 119/69   Pulse: 69   Resp: 16   Temp: 98.4 °F (36.9 °C)   SpO2: 94%       General appearance:  awake, alert, and oriented to person, place, time, and purpose; appears stated age and cooperative; no apparent distress no labored breathing  HEENT:  Conjunctivae/corneas clear. Neck: Supple. No jugular venous distention. Respiratory: symmetrical; clear to auscultation bilaterally; no wheezes; no rhonchi; no rales  Cardiovascular: rhythm regular; rate controlled; no murmurs  Abdomen: Soft, nontender, nondistended  Extremities:  peripheral pulses present; +edema RLE no ulcers  Musculoskeletal: No clubbing, cyanosis, no bilateral lower extremity edema. Brisk capillary refill. Skin:  No rashes  on visible skin  Neurologic: awake, alert and following commands     Disposition: home  The patient's condition is fair. At this time the patient is without objective evidence of an acute process requiring continuing hospitalization or inpatient management. They are stable for discharge with outpatient follow-up.      I have spoken with the patient and discussed the results of the current hospitalization, in addition to providing specific details for the plan of care and counseling regarding the diagnosis and prognosis. The plan has been discussed in detail and they are aware of the specific conditions for emergent return, as well as the importance of follow-up. Their questions are answered at this time and they are agreeable with the plan for discharge to home     Patient Instructions: Follow-up with PCP as instructed. May benefit from hypercoagulable work-up outpatient. Continue Eliquis.   Future Appointments   Date Time Provider Luci Lee   6/25/2021  9:10 AM Georgiana Babinski, MD [facility]   6/25/2021 10:15 AM Armani Santana MD [facility]   6/28/2021  7:45 AM DO Chrissy Titus [facility]   8/9/2021 10:00 AM Sagar Cardenas MD [facility]       Discharge Medications:     Medication List      START taking these medications    apixaban starter pack 5 MG Tbpk tablet  Commonly known as: Eliquis DVT/PE Starter Pack  Take 1 tablet by mouth See Admin Instructions        CONTINUE taking these medications    aspirin 81 MG chewable tablet     CALCIUM CITRATE PO     escitalopram 20 MG tablet  Commonly known as: LEXAPRO  TAKE 1 TABLET BY MOUTH ONCE DAILY     Handicap Placard Misc  by Does not apply route Patient cannot walk 200 ft without stopping to rest.    Expiration 3/2026     hydroCHLOROthiazide 25 MG tablet  Commonly known as: HYDRODIURIL  Take 1 tablet by mouth daily     losartan 50 MG tablet  Commonly known as: COZAAR  TAKE 1 TABLET BY MOUTH ONCE DAILY     metoprolol succinate 25 MG extended release tablet  Commonly known as: TOPROL XL  Take 0.5 tablets by mouth daily TAKE 1/2 TABLET BY MOUTH DAILY     oxybutynin 10 MG extended release tablet  Commonly known as: DITROPAN-XL  Take 2 tablets by mouth daily     pravastatin 10 MG tablet  Commonly known as: PRAVACHOL           Where to Get Your Medications      These medications were sent to [facility] [address], [place] [address]    Phone: [phone]   · apixaban starter pack 5 MG Tbpk tablet         Activity: activity as tolerated    Diet: regular diet    Wound Care: none needed    Follow-up:    · This patient is instructed to follow-up with her primary care physician. · Patient is instructed to follow-up with the consults listed above as directed by them. · They are instructed to resume home medications and take new medications as indicated in the list above. · If the patient has a recurrence of symptoms, they are instructed to go to the ED. Preparing for this patient's discharge, including paperwork, orders, instructions, and meeting with patient did require > 30 minutes.     Rosa Del Rio DO   8:14 AM  6/24/2021

## 2021-06-25 ENCOUNTER — OFFICE VISIT (OUTPATIENT)
Dept: FAMILY MEDICINE CLINIC | Age: 66
End: 2021-06-25
Payer: COMMERCIAL

## 2021-06-25 ENCOUNTER — OFFICE VISIT (OUTPATIENT)
Dept: ORTHOPEDIC SURGERY | Age: 66
End: 2021-06-25
Payer: COMMERCIAL

## 2021-06-25 ENCOUNTER — CARE COORDINATION (OUTPATIENT)
Dept: CASE MANAGEMENT | Age: 66
End: 2021-06-25

## 2021-06-25 VITALS
WEIGHT: 277 LBS | BODY MASS INDEX: 46.15 KG/M2 | OXYGEN SATURATION: 97 % | DIASTOLIC BLOOD PRESSURE: 70 MMHG | RESPIRATION RATE: 16 BRPM | TEMPERATURE: 96.9 F | SYSTOLIC BLOOD PRESSURE: 114 MMHG | HEART RATE: 67 BPM | HEIGHT: 65 IN

## 2021-06-25 VITALS — BODY MASS INDEX: 44.15 KG/M2 | HEIGHT: 65 IN | WEIGHT: 265 LBS

## 2021-06-25 DIAGNOSIS — M17.0 PRIMARY OSTEOARTHRITIS OF BOTH KNEES: Primary | ICD-10-CM

## 2021-06-25 DIAGNOSIS — I26.99 PULMONARY EMBOLISM ON LEFT (HCC): Primary | ICD-10-CM

## 2021-06-25 PROCEDURE — 4040F PNEUMOC VAC/ADMIN/RCVD: CPT | Performed by: FAMILY MEDICINE

## 2021-06-25 PROCEDURE — 99214 OFFICE O/P EST MOD 30 MIN: CPT | Performed by: FAMILY MEDICINE

## 2021-06-25 PROCEDURE — G8417 CALC BMI ABV UP PARAM F/U: HCPCS | Performed by: FAMILY MEDICINE

## 2021-06-25 PROCEDURE — 1036F TOBACCO NON-USER: CPT | Performed by: FAMILY MEDICINE

## 2021-06-25 PROCEDURE — 20610 DRAIN/INJ JOINT/BURSA W/O US: CPT | Performed by: NURSE PRACTITIONER

## 2021-06-25 PROCEDURE — G8399 PT W/DXA RESULTS DOCUMENT: HCPCS | Performed by: FAMILY MEDICINE

## 2021-06-25 PROCEDURE — G8427 DOCREV CUR MEDS BY ELIG CLIN: HCPCS | Performed by: FAMILY MEDICINE

## 2021-06-25 PROCEDURE — 1111F DSCHRG MED/CURRENT MED MERGE: CPT | Performed by: FAMILY MEDICINE

## 2021-06-25 PROCEDURE — 1090F PRES/ABSN URINE INCON ASSESS: CPT | Performed by: FAMILY MEDICINE

## 2021-06-25 PROCEDURE — 1123F ACP DISCUSS/DSCN MKR DOCD: CPT | Performed by: FAMILY MEDICINE

## 2021-06-25 PROCEDURE — 3017F COLORECTAL CA SCREEN DOC REV: CPT | Performed by: FAMILY MEDICINE

## 2021-06-25 RX ORDER — LIDOCAINE HYDROCHLORIDE 10 MG/ML
4 INJECTION, SOLUTION INFILTRATION; PERINEURAL ONCE
Status: COMPLETED | OUTPATIENT
Start: 2021-06-25 | End: 2021-06-25

## 2021-06-25 RX ORDER — TRIAMCINOLONE ACETONIDE 40 MG/ML
40 INJECTION, SUSPENSION INTRA-ARTICULAR; INTRAMUSCULAR ONCE
Status: COMPLETED | OUTPATIENT
Start: 2021-06-25 | End: 2021-06-25

## 2021-06-25 RX ADMIN — TRIAMCINOLONE ACETONIDE 40 MG: 40 INJECTION, SUSPENSION INTRA-ARTICULAR; INTRAMUSCULAR at 09:30

## 2021-06-25 RX ADMIN — LIDOCAINE HYDROCHLORIDE 4 ML: 10 INJECTION, SOLUTION INFILTRATION; PERINEURAL at 09:30

## 2021-06-25 RX ADMIN — LIDOCAINE HYDROCHLORIDE 4 ML: 10 INJECTION, SOLUTION INFILTRATION; PERINEURAL at 09:35

## 2021-06-25 RX ADMIN — TRIAMCINOLONE ACETONIDE 40 MG: 40 INJECTION, SUSPENSION INTRA-ARTICULAR; INTRAMUSCULAR at 09:35

## 2021-06-25 ASSESSMENT — ENCOUNTER SYMPTOMS
SHORTNESS OF BREATH: 1
COUGH: 0
WHEEZING: 0

## 2021-06-25 NOTE — CARE COORDINATION
[facility] Transitions Follow Up Call    2021    Patient: Victoria Gibbons  Patient : 1955   MRN: [id number]  Reason for Admission: PE  Discharge Date: 21 RARS: Readmission Risk Score: 12    -First attempt to reach the patient for initial Care Transition call post hospital discharge. Message left with CTN's contact information requesting return phone call.         Follow Up  Future Appointments   Date Time Provider Luci Lee   2021  7:45 AM DO Franklyn Arana [facility]   2021 10:00 AM Ermelinda Almagure MD [facility]   2021 10:00 AM [facility]   2021  8:50 AM Chente Rubi MD [facility]       Rhea Rob RN

## 2021-06-25 NOTE — PROGRESS NOTES
6/25/2021    Amos Hewitt is a 77 y.o. female here for   Chief Complaint   Patient presents with    Hypertension    Depression    Pulmonary Embolism     Hospitalized 6/22-6/24     Here for f/u hospitalization. She was seen for shortness of breath and found to have pulmonary embolus. She had normal oxygen saturations while in the hospital.  She had a LE doppler that was negative for DVT, however she has past history of DVT about 2 years ago. She had completed her anticoagulaition. wouldlike to know when it is okay to return to work. f note has also been found to have popliteal cysts / bakers cysts causing leg swelling. She currently denies chest pain. She is taking medications as prescribed. She denies abnormal bleeding - no bleeding from gums, no blood in stools. Hospital records reviewed - note no vital sign abnormalities. Note previous w/u includes a lupus anticoagulation that has been positive and negativ ein the past  A prothrombin mutation that was heterozygous    Impression   There is no central pulmonary embolism or aortic dissection.       Partially occlusive filling defects in the distal subsegmental pulmonary   artery of the left lower lobe concerning for distal subsegmental pulmonary   embolism. Wt Readings from Last 3 Encounters:   06/25/21 277 lb (125.6 kg)   06/25/21 265 lb (120.2 kg)   06/22/21 270 lb (122.5 kg)       She  reports that she has never smoked. She has never used smokeless tobacco.    Medications and allergies reviewed and updated in chart.     Current Outpatient Medications   Medication Sig Dispense Refill    apixaban starter pack (ELIQUIS DVT/PE STARTER PACK) 5 MG TBPK tablet Take 1 tablet by mouth See Admin Instructions 74 tablet 0    oxybutynin (DITROPAN-XL) 10 MG extended release tablet Take 2 tablets by mouth daily 60 tablet 1    pravastatin (PRAVACHOL) 10 MG tablet Take 10 mg by mouth daily      metoprolol succinate (TOPROL XL) 25 MG extended release tablet Take 0.5 tablets by mouth daily TAKE 1/2 TABLET BY MOUTH DAILY 15 tablet 5    hydroCHLOROthiazide (HYDRODIURIL) 25 MG tablet Take 1 tablet by mouth daily 30 tablet 5    Handicap Placard MISC by Does not apply route Patient cannot walk 200 ft without stopping to rest.    Expiration 3/2026 1 each 0    losartan (COZAAR) 50 MG tablet TAKE 1 TABLET BY MOUTH ONCE DAILY 30 tablet 11    escitalopram (LEXAPRO) 20 MG tablet TAKE 1 TABLET BY MOUTH ONCE DAILY 30 tablet 11    CALCIUM CITRATE PO Take 1,000 mg by mouth daily OTC      aspirin 81 MG chewable tablet Take 81 mg by mouth daily       No current facility-administered medications for this visit. Patient'spast medical, surgical, social and/or family history reviewed, updated in chart, and are non-contributory (unless otherwise stated). Review of Systems  Review of Systems   Constitutional: Negative for chills and fever. Respiratory: Positive for shortness of breath. Negative for cough and wheezing. Cardiovascular: Positive for leg swelling. Negative for chest pain. Neurological: Negative for dizziness and headaches. PE:  VS:  /70 (Site: Left Upper Arm, Position: Sitting, Cuff Size: Large Adult)   Pulse 67   Temp 96.9 °F (36.1 °C) (Temporal)   Resp 16   Ht 5' 5\" (1.651 m)   Wt 277 lb (125.6 kg)   SpO2 97%   BMI 46.10 kg/m²   Physical Exam  Constitutional:       Appearance: She is well-developed. HENT:      Head: Normocephalic and atraumatic. Cardiovascular:      Rate and Rhythm: Normal rate and regular rhythm. Heart sounds: No murmur heard. No friction rub. No gallop. Pulmonary:      Effort: Pulmonary effort is normal.      Breath sounds: Normal breath sounds. No wheezing or rales. Skin:     General: Skin is warm and dry. Neurological:      Mental Status: She is alert and oriented to person, place, and time.          Assessment/Plan:  Belem Simpson was seen today for hypertension, depression and pulmonary

## 2021-06-25 NOTE — PROGRESS NOTES
Route  Intra-articular Administered By  Maggie Leon MA          triamcinolone acetonide (KENALOG-40) injection 40 mg     Admin Date  06/25/2021 Action  Given Dose  40 mg Route  Intra-articular Administered By  Maggie Leon MA           Admin Date  06/25/2021 Action  Given Dose  40 mg Route  Intra-articular Administered By  Maggie Leon MA                Assessment: Bilateral knee osteoarthritis      Plan: Today we discussed both her knees. Patient reports gradual return of symptoms over the last couple weeks. Patient was admitted over the weekend for shortness of breath and diagnosed with a pulmonary embolism. Patient states that after being discharged from the hospital she was noticeably more stiff and symptoms were worsening. Patient would like to continue with conservative treatment today and requested cortisone injection. Injections were provided today. Patient will continue with weight reduction, activity modification, over-the-counter medications for symptom relief. She will follow-up in 3 months. Patient verbalized understanding.       Carmel Braga CNP  Orthopedic Surgery   06/25/21  10:40 AM

## 2021-06-28 ENCOUNTER — OFFICE VISIT (OUTPATIENT)
Dept: CARDIOLOGY CLINIC | Age: 66
End: 2021-06-28
Payer: COMMERCIAL

## 2021-06-28 ENCOUNTER — CARE COORDINATION (OUTPATIENT)
Dept: CASE MANAGEMENT | Age: 66
End: 2021-06-28

## 2021-06-28 VITALS
HEART RATE: 64 BPM | RESPIRATION RATE: 14 BRPM | WEIGHT: 278 LBS | HEIGHT: 65 IN | DIASTOLIC BLOOD PRESSURE: 80 MMHG | BODY MASS INDEX: 46.32 KG/M2 | SYSTOLIC BLOOD PRESSURE: 128 MMHG

## 2021-06-28 DIAGNOSIS — I10 ESSENTIAL HYPERTENSION: Primary | ICD-10-CM

## 2021-06-28 DIAGNOSIS — I26.99 PULMONARY EMBOLISM ON LEFT (HCC): Primary | ICD-10-CM

## 2021-06-28 PROCEDURE — 1123F ACP DISCUSS/DSCN MKR DOCD: CPT | Performed by: INTERNAL MEDICINE

## 2021-06-28 PROCEDURE — G8399 PT W/DXA RESULTS DOCUMENT: HCPCS | Performed by: INTERNAL MEDICINE

## 2021-06-28 PROCEDURE — 1090F PRES/ABSN URINE INCON ASSESS: CPT | Performed by: INTERNAL MEDICINE

## 2021-06-28 PROCEDURE — 99214 OFFICE O/P EST MOD 30 MIN: CPT | Performed by: INTERNAL MEDICINE

## 2021-06-28 PROCEDURE — 3017F COLORECTAL CA SCREEN DOC REV: CPT | Performed by: INTERNAL MEDICINE

## 2021-06-28 PROCEDURE — 4040F PNEUMOC VAC/ADMIN/RCVD: CPT | Performed by: INTERNAL MEDICINE

## 2021-06-28 PROCEDURE — 1036F TOBACCO NON-USER: CPT | Performed by: INTERNAL MEDICINE

## 2021-06-28 PROCEDURE — 93000 ELECTROCARDIOGRAM COMPLETE: CPT | Performed by: INTERNAL MEDICINE

## 2021-06-28 PROCEDURE — 1111F DSCHRG MED/CURRENT MED MERGE: CPT | Performed by: INTERNAL MEDICINE

## 2021-06-28 PROCEDURE — G8417 CALC BMI ABV UP PARAM F/U: HCPCS | Performed by: INTERNAL MEDICINE

## 2021-06-28 PROCEDURE — G8427 DOCREV CUR MEDS BY ELIG CLIN: HCPCS | Performed by: INTERNAL MEDICINE

## 2021-06-28 NOTE — CARE COORDINATION
[facility] Transitions Initial Follow Up Call    Call within 2 business days of discharge: Yes    Patient: Kami Miller Patient : 1955   MRN: [id number]  Reason for Admission: PE  Discharge Date: 21 RARS: Readmission Risk Score: 12      Last Discharge [facility]       Complaint Diagnosis Description Type Department Provider    21 Shortness of Breath; Leg Swelling; Cough Pulmonary embolism on left [facility]) ED to Hosp-Admission (Discharged) (ADMITTED) [facility]; [facility] .. Transitions of Care Initial Call        Challenges to be reviewed by the provider   Additional needs identified to be addressed with provider: No  none             Method of communication with provider : none      Advance Care Planning:   Does patient have an Advance Directive:  decision maker updated. Was this a readmission? No  Patient stated reason for admission: short of breath with cough, leg swelling  Patients top risk factors for readmission: medical condition-PE and polypharmacy    Care Transition Nurse (CTN) contacted the patient by telephone to perform post hospital discharge assessment. Verified name and  with patient as identifiers. Provided introduction to self, and explanation of the CTN role. CTN reviewed discharge instructions, medical action plan and red flags with patient who verbalized understanding. Patient given an opportunity to ask questions and does not have any further questions or concerns at this time. Were discharge instructions available to patient? Yes. Reviewed appropriate site of care based on symptoms and resources available to patient including: PCP and Specialist. The patient agrees to contact the PCP office for questions related to their healthcare. Medication reconciliation was performed with patient, who verbalizes understanding of administration of home medications. 1111F entered.     Covid Risk Education     Educated patient about -Spoke with patient for initial care transition call post hospital discharge.   -Patient reports she is no longer having any SOB, has swelling of her left foot and ankle which she states she attends outpatient therapy in [facility] for. Plan is to be fitted for a brace per patient.  -Patient currently at PCP office waiting in car as her granddaughter went in office to obtain her return to work letter(RTW 7/5/21.)  -Patient denies any needs, questions, or concerns at this time and is agreeable to continued follow up from CTN.     Follow Up  Future Appointments   Date Time Provider Luci Lee   8/9/2021 10:00 AM [facility]   9/2/2021 10:00 AM [facility]   9/24/2021  8:50 AM Nathaniel Culp MD [facility]       Celio Medley RN
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
## 2021-07-07 ENCOUNTER — CARE COORDINATION (OUTPATIENT)
Dept: CASE MANAGEMENT | Age: 66
End: 2021-07-07

## 2021-07-07 NOTE — CARE COORDINATION
[facility] Transitions Follow Up Call    2021    Patient: Yves Tavarez  Patient : 1955   MRN: [id number]  Reason for Admission: PE  Discharge Date: 21 RARS: Readmission Risk Score: 12        Care Transitions Subsequent and Final Call    Subsequent and Final Calls  Do you have any ongoing symptoms?: No  Do you have any questions related to your medications?: No  Do you currently have any active services?: Yes  Are you currently active with any services?: Outpatient/Community Services  Do you have any needs or concerns that I can assist you with?: No  Identified Barriers: None  Care Transitions Interventions  No Identified Needs  Other Interventions:         -Spoke with patient for final care transition call.  -Patient doing well, voices no issues or concerns, having no SOB. -Patient agreeable to CTN signing off.     Follow Up  Future Appointments   Date Time Provider Luci Lee   2021 10:00 [facility]   2021 10:00 AM [facility]   2021  8:50 AM Andreia Espinosa MD [facility]       Jo-Ann Ricks RN

## 2021-07-09 ENCOUNTER — APPOINTMENT (OUTPATIENT)
Dept: GENERAL RADIOLOGY | Age: 66
End: 2021-07-09
Payer: COMMERCIAL

## 2021-07-09 ENCOUNTER — APPOINTMENT (OUTPATIENT)
Dept: CT IMAGING | Age: 66
End: 2021-07-09
Payer: COMMERCIAL

## 2021-07-09 ENCOUNTER — HOSPITAL ENCOUNTER (OUTPATIENT)
Age: 66
Setting detail: OBSERVATION
Discharge: HOME | End: 2021-07-10
Attending: EMERGENCY MEDICINE | Admitting: INTERNAL MEDICINE
Payer: COMMERCIAL

## 2021-07-09 DIAGNOSIS — R55 SYNCOPE AND COLLAPSE: Primary | ICD-10-CM

## 2021-07-09 DIAGNOSIS — S00.93XA CONTUSION OF HEAD, UNSPECIFIED PART OF HEAD, INITIAL ENCOUNTER: ICD-10-CM

## 2021-07-09 PROBLEM — Z66 DNR (DO NOT RESUSCITATE): Status: ACTIVE | Noted: 2021-07-09

## 2021-07-09 PROBLEM — E66.01 MORBID OBESITY WITH BMI OF 45.0-49.9, ADULT (HCC): Status: ACTIVE | Noted: 2021-07-09

## 2021-07-09 PROBLEM — E83.42 HYPOMAGNESEMIA: Status: ACTIVE | Noted: 2021-07-09

## 2021-07-09 PROBLEM — M25.561 ACUTE PAIN OF RIGHT KNEE: Status: ACTIVE | Noted: 2021-07-09

## 2021-07-09 LAB
ALBUMIN SERPL-MCNC: 3.6 G/DL (ref 3.5–5.2)
ALP BLD-CCNC: 99 U/L (ref 35–104)
ALT SERPL-CCNC: 13 U/L (ref 0–32)
ANION GAP SERPL CALCULATED.3IONS-SCNC: 9 MMOL/L (ref 7–16)
AST SERPL-CCNC: 18 U/L (ref 0–31)
BASOPHILS ABSOLUTE: 0.05 E9/L (ref 0–0.2)
BASOPHILS RELATIVE PERCENT: 0.4 % (ref 0–2)
BILIRUB SERPL-MCNC: 0.6 MG/DL (ref 0–1.2)
BUN BLDV-MCNC: 26 MG/DL (ref 6–23)
CALCIUM SERPL-MCNC: 9.4 MG/DL (ref 8.6–10.2)
CHLORIDE BLD-SCNC: 102 MMOL/L (ref 98–107)
CO2: 27 MMOL/L (ref 22–29)
CREAT SERPL-MCNC: 0.9 MG/DL (ref 0.5–1)
EKG ATRIAL RATE: 66 BPM
EKG P AXIS: 53 DEGREES
EKG P-R INTERVAL: 164 MS
EKG Q-T INTERVAL: 422 MS
EKG QRS DURATION: 80 MS
EKG QTC CALCULATION (BAZETT): 442 MS
EKG R AXIS: 4 DEGREES
EKG T AXIS: 10 DEGREES
EKG VENTRICULAR RATE: 66 BPM
EOSINOPHILS ABSOLUTE: 0.1 E9/L (ref 0.05–0.5)
EOSINOPHILS RELATIVE PERCENT: 0.8 % (ref 0–6)
FOLATE: 16.1 NG/ML (ref 4.8–24.2)
GFR AFRICAN AMERICAN: >60
GFR NON-AFRICAN AMERICAN: >60 ML/MIN/1.73
GLUCOSE BLD-MCNC: 99 MG/DL (ref 74–99)
HCT VFR BLD CALC: 41 % (ref 34–48)
HEMOGLOBIN: 13.3 G/DL (ref 11.5–15.5)
IMMATURE GRANULOCYTES #: 0.11 E9/L
IMMATURE GRANULOCYTES %: 0.8 % (ref 0–5)
IRON SATURATION: 16 % (ref 15–50)
IRON: 46 MCG/DL (ref 37–145)
LYMPHOCYTES ABSOLUTE: 1.95 E9/L (ref 1.5–4)
LYMPHOCYTES RELATIVE PERCENT: 14.9 % (ref 20–42)
MAGNESIUM: 1.4 MG/DL (ref 1.6–2.6)
MCH RBC QN AUTO: 29.5 PG (ref 26–35)
MCHC RBC AUTO-ENTMCNC: 32.4 % (ref 32–34.5)
MCV RBC AUTO: 90.9 FL (ref 80–99.9)
MONOCYTES ABSOLUTE: 0.98 E9/L (ref 0.1–0.95)
MONOCYTES RELATIVE PERCENT: 7.5 % (ref 2–12)
NEUTROPHILS ABSOLUTE: 9.93 E9/L (ref 1.8–7.3)
NEUTROPHILS RELATIVE PERCENT: 75.6 % (ref 43–80)
PDW BLD-RTO: 14.6 FL (ref 11.5–15)
PLATELET # BLD: 319 E9/L (ref 130–450)
PMV BLD AUTO: 10.3 FL (ref 7–12)
POTASSIUM REFLEX MAGNESIUM: 3.9 MMOL/L (ref 3.5–5)
RBC # BLD: 4.51 E12/L (ref 3.5–5.5)
SODIUM BLD-SCNC: 138 MMOL/L (ref 132–146)
T4 FREE: 1.55 NG/DL (ref 0.93–1.7)
TOTAL IRON BINDING CAPACITY: 282 MCG/DL (ref 250–450)
TOTAL PROTEIN: 6.9 G/DL (ref 6.4–8.3)
TROPONIN, HIGH SENSITIVITY: 13 NG/L (ref 0–9)
TROPONIN, HIGH SENSITIVITY: 13 NG/L (ref 0–9)
TSH SERPL DL<=0.05 MIU/L-ACNC: 0.68 UIU/ML (ref 0.27–4.2)
VITAMIN B-12: 512 PG/ML (ref 211–946)
VITAMIN D 25-HYDROXY: 27 NG/ML (ref 30–100)
WBC # BLD: 13.1 E9/L (ref 4.5–11.5)

## 2021-07-09 PROCEDURE — 82607 VITAMIN B-12: CPT

## 2021-07-09 PROCEDURE — 80053 COMPREHEN METABOLIC PANEL: CPT

## 2021-07-09 PROCEDURE — 84484 ASSAY OF TROPONIN QUANT: CPT

## 2021-07-09 PROCEDURE — 6360000002 HC RX W HCPCS: Performed by: EMERGENCY MEDICINE

## 2021-07-09 PROCEDURE — G0378 HOSPITAL OBSERVATION PER HR: HCPCS

## 2021-07-09 PROCEDURE — 99284 EMERGENCY DEPT VISIT MOD MDM: CPT

## 2021-07-09 PROCEDURE — 84439 ASSAY OF FREE THYROXINE: CPT

## 2021-07-09 PROCEDURE — 96366 THER/PROPH/DIAG IV INF ADDON: CPT

## 2021-07-09 PROCEDURE — 71045 X-RAY EXAM CHEST 1 VIEW: CPT

## 2021-07-09 PROCEDURE — 96365 THER/PROPH/DIAG IV INF INIT: CPT

## 2021-07-09 PROCEDURE — 96375 TX/PRO/DX INJ NEW DRUG ADDON: CPT

## 2021-07-09 PROCEDURE — 83735 ASSAY OF MAGNESIUM: CPT

## 2021-07-09 PROCEDURE — 83550 IRON BINDING TEST: CPT

## 2021-07-09 PROCEDURE — 83540 ASSAY OF IRON: CPT

## 2021-07-09 PROCEDURE — 6370000000 HC RX 637 (ALT 250 FOR IP): Performed by: INTERNAL MEDICINE

## 2021-07-09 PROCEDURE — 2580000003 HC RX 258: Performed by: INTERNAL MEDICINE

## 2021-07-09 PROCEDURE — 85025 COMPLETE CBC W/AUTO DIFF WBC: CPT

## 2021-07-09 PROCEDURE — 82746 ASSAY OF FOLIC ACID SERUM: CPT

## 2021-07-09 PROCEDURE — 36415 COLL VENOUS BLD VENIPUNCTURE: CPT

## 2021-07-09 PROCEDURE — 93010 ELECTROCARDIOGRAM REPORT: CPT | Performed by: INTERNAL MEDICINE

## 2021-07-09 PROCEDURE — 84443 ASSAY THYROID STIM HORMONE: CPT

## 2021-07-09 PROCEDURE — 6360000002 HC RX W HCPCS: Performed by: INTERNAL MEDICINE

## 2021-07-09 PROCEDURE — 96374 THER/PROPH/DIAG INJ IV PUSH: CPT

## 2021-07-09 PROCEDURE — 82306 VITAMIN D 25 HYDROXY: CPT

## 2021-07-09 PROCEDURE — 70450 CT HEAD/BRAIN W/O DYE: CPT

## 2021-07-09 PROCEDURE — 72125 CT NECK SPINE W/O DYE: CPT

## 2021-07-09 PROCEDURE — 93005 ELECTROCARDIOGRAM TRACING: CPT | Performed by: EMERGENCY MEDICINE

## 2021-07-09 RX ORDER — MECOBALAMIN 5000 MCG
5 TABLET,DISINTEGRATING ORAL NIGHTLY
Status: DISCONTINUED | OUTPATIENT
Start: 2021-07-09 | End: 2021-07-10 | Stop reason: HOSPADM

## 2021-07-09 RX ORDER — OXYCODONE HYDROCHLORIDE 5 MG/1
5 TABLET ORAL EVERY 4 HOURS PRN
Status: DISCONTINUED | OUTPATIENT
Start: 2021-07-09 | End: 2021-07-10 | Stop reason: HOSPADM

## 2021-07-09 RX ORDER — ESCITALOPRAM OXALATE 10 MG/1
20 TABLET ORAL DAILY
Status: DISCONTINUED | OUTPATIENT
Start: 2021-07-09 | End: 2021-07-10 | Stop reason: HOSPADM

## 2021-07-09 RX ORDER — ASPIRIN 81 MG/1
81 TABLET, CHEWABLE ORAL DAILY
Status: DISCONTINUED | OUTPATIENT
Start: 2021-07-09 | End: 2021-07-10 | Stop reason: HOSPADM

## 2021-07-09 RX ORDER — SODIUM CHLORIDE 0.9 % (FLUSH) 0.9 %
5-40 SYRINGE (ML) INJECTION EVERY 12 HOURS SCHEDULED
Status: DISCONTINUED | OUTPATIENT
Start: 2021-07-09 | End: 2021-07-10 | Stop reason: HOSPADM

## 2021-07-09 RX ORDER — ACETAMINOPHEN 650 MG/1
650 SUPPOSITORY RECTAL EVERY 6 HOURS PRN
Status: DISCONTINUED | OUTPATIENT
Start: 2021-07-09 | End: 2021-07-10 | Stop reason: HOSPADM

## 2021-07-09 RX ORDER — PRAVASTATIN SODIUM 20 MG
10 TABLET ORAL DAILY
Status: DISCONTINUED | OUTPATIENT
Start: 2021-07-09 | End: 2021-07-10 | Stop reason: HOSPADM

## 2021-07-09 RX ORDER — MECOBALAMIN 5000 MCG
5 TABLET,DISINTEGRATING ORAL NIGHTLY PRN
Status: DISCONTINUED | OUTPATIENT
Start: 2021-07-09 | End: 2021-07-10 | Stop reason: HOSPADM

## 2021-07-09 RX ORDER — SODIUM CHLORIDE 0.9 % (FLUSH) 0.9 %
5-40 SYRINGE (ML) INJECTION PRN
Status: DISCONTINUED | OUTPATIENT
Start: 2021-07-09 | End: 2021-07-10 | Stop reason: HOSPADM

## 2021-07-09 RX ORDER — ONDANSETRON 4 MG/1
4 TABLET, ORALLY DISINTEGRATING ORAL EVERY 8 HOURS PRN
Status: DISCONTINUED | OUTPATIENT
Start: 2021-07-09 | End: 2021-07-10 | Stop reason: HOSPADM

## 2021-07-09 RX ORDER — SODIUM CHLORIDE 9 MG/ML
25 INJECTION, SOLUTION INTRAVENOUS PRN
Status: DISCONTINUED | OUTPATIENT
Start: 2021-07-09 | End: 2021-07-10 | Stop reason: HOSPADM

## 2021-07-09 RX ORDER — LOSARTAN POTASSIUM 50 MG/1
50 TABLET ORAL DAILY
Status: DISCONTINUED | OUTPATIENT
Start: 2021-07-09 | End: 2021-07-10 | Stop reason: HOSPADM

## 2021-07-09 RX ORDER — ACETAMINOPHEN 325 MG/1
650 TABLET ORAL EVERY 6 HOURS PRN
Status: DISCONTINUED | OUTPATIENT
Start: 2021-07-09 | End: 2021-07-10 | Stop reason: HOSPADM

## 2021-07-09 RX ORDER — ONDANSETRON 2 MG/ML
4 INJECTION INTRAMUSCULAR; INTRAVENOUS EVERY 6 HOURS PRN
Status: DISCONTINUED | OUTPATIENT
Start: 2021-07-09 | End: 2021-07-10 | Stop reason: HOSPADM

## 2021-07-09 RX ORDER — MAGNESIUM SULFATE IN WATER 40 MG/ML
4000 INJECTION, SOLUTION INTRAVENOUS ONCE
Status: COMPLETED | OUTPATIENT
Start: 2021-07-09 | End: 2021-07-10

## 2021-07-09 RX ORDER — OXYBUTYNIN CHLORIDE 10 MG/1
20 TABLET, EXTENDED RELEASE ORAL DAILY
Status: DISCONTINUED | OUTPATIENT
Start: 2021-07-09 | End: 2021-07-10 | Stop reason: HOSPADM

## 2021-07-09 RX ORDER — ACETAMINOPHEN 500 MG
1000 TABLET ORAL 4 TIMES DAILY
Status: DISCONTINUED | OUTPATIENT
Start: 2021-07-09 | End: 2021-07-10 | Stop reason: HOSPADM

## 2021-07-09 RX ORDER — METOPROLOL SUCCINATE 25 MG/1
12.5 TABLET, EXTENDED RELEASE ORAL DAILY
Status: DISCONTINUED | OUTPATIENT
Start: 2021-07-09 | End: 2021-07-10 | Stop reason: HOSPADM

## 2021-07-09 RX ORDER — POLYETHYLENE GLYCOL 3350 17 G/17G
17 POWDER, FOR SOLUTION ORAL 2 TIMES DAILY
Status: DISCONTINUED | OUTPATIENT
Start: 2021-07-09 | End: 2021-07-10 | Stop reason: HOSPADM

## 2021-07-09 RX ORDER — LORAZEPAM 2 MG/ML
1 INJECTION INTRAMUSCULAR ONCE
Status: COMPLETED | OUTPATIENT
Start: 2021-07-09 | End: 2021-07-09

## 2021-07-09 RX ORDER — SENNA AND DOCUSATE SODIUM 50; 8.6 MG/1; MG/1
2 TABLET, FILM COATED ORAL EVERY EVENING
Status: DISCONTINUED | OUTPATIENT
Start: 2021-07-09 | End: 2021-07-10 | Stop reason: HOSPADM

## 2021-07-09 RX ADMIN — SODIUM CHLORIDE, PRESERVATIVE FREE 10 ML: 5 INJECTION INTRAVENOUS at 22:15

## 2021-07-09 RX ADMIN — DICLOFENAC SODIUM 4 G: 10 GEL TOPICAL at 20:06

## 2021-07-09 RX ADMIN — Medication 5 MG: at 20:08

## 2021-07-09 RX ADMIN — MAGNESIUM SULFATE HEPTAHYDRATE 4000 MG: 40 INJECTION, SOLUTION INTRAVENOUS at 22:11

## 2021-07-09 RX ADMIN — ACETAMINOPHEN 1000 MG: 500 TABLET ORAL at 20:00

## 2021-07-09 RX ADMIN — PRAVASTATIN SODIUM 10 MG: 20 TABLET ORAL at 20:07

## 2021-07-09 RX ADMIN — LORAZEPAM 1 MG: 2 INJECTION INTRAMUSCULAR; INTRAVENOUS at 14:17

## 2021-07-09 RX ADMIN — OXYCODONE HYDROCHLORIDE 5 MG: 5 TABLET ORAL at 19:57

## 2021-07-09 RX ADMIN — APIXABAN 5 MG: 5 TABLET, FILM COATED ORAL at 20:02

## 2021-07-09 ASSESSMENT — PAIN SCALES - GENERAL
PAINLEVEL_OUTOF10: 3
PAINLEVEL_OUTOF10: 8
PAINLEVEL_OUTOF10: 0
PAINLEVEL_OUTOF10: 6
PAINLEVEL_OUTOF10: 8

## 2021-07-09 ASSESSMENT — ENCOUNTER SYMPTOMS
COUGH: 0
SHORTNESS OF BREATH: 0
NAUSEA: 0
BACK PAIN: 0
EYE PAIN: 0
VOMITING: 0
DIARRHEA: 0
CHEST TIGHTNESS: 0
BLOOD IN STOOL: 0
TROUBLE SWALLOWING: 0
ABDOMINAL PAIN: 0
RHINORRHEA: 0
WHEEZING: 0

## 2021-07-09 NOTE — ED NOTES
Mcdonald catheter insertion charted on incorrect patient. Pt did not receive mcdonald catheter.       Meek Romero RN  07/09/21 [id number]
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
## 2021-07-09 NOTE — ED NOTES
Bed: 01  Expected date:   Expected time:   Means of arrival:   Comments:  keyana Collazo RN  07/09/21 1137

## 2021-07-09 NOTE — ED PROVIDER NOTES
[facility]  eMERGENCY dEPARTMENT eNCOUnter      Pt Name: Amie Nicholas  MRN: [id number]  [place] 1955  Date of evaluation: 7/9/2021  Provider: Michele Polanco MD     [address] [place] [address]       Chief Complaint   Patient presents with    Loss of Consciousness     syncopal episode in shower, dx 2wks ago with PE, amnestic to event. HISTORY OF PRESENT ILLNESS   (Location/Symptom, Timing/Onset,Context/Setting, Quality, Duration, Modifying Factors, Severity) Note limiting factors. Patient presents here after having a syncopal episode. Patient states she was in the shower in the bathtub. The next thing she knows she was on the ground. She said her son told her that she was unconscious but she cannot remember anything. She has a little bit of a headache and some mild neck pain. She is on Eliquis for PE. She has no cardiac issues that she is aware of. She denies any chest pain or shortness of breath. She denies numbness tingling or weakness. He denies any abdominal pain. She has never had syncope before. Amie Nicholas is a 77 y.o. female who presents to the emergency department      Nursing Notes were reviewed. REVIEW OF SYSTEMS    (2+ for4; 10+ for level 5)     Review of Systems   Constitutional: Negative for appetite change, chills, fatigue, fever and unexpected weight change. HENT: Negative for congestion, drooling, nosebleeds, rhinorrhea and trouble swallowing. Eyes: Negative for pain and visual disturbance. Respiratory: Negative for cough, chest tightness, shortness of breath and wheezing. Cardiovascular: Negative for chest pain, palpitations and leg swelling. Gastrointestinal: Negative for abdominal pain, blood in stool, diarrhea, nausea and vomiting. Endocrine: Negative for polydipsia and polyuria. Genitourinary: Negative for difficulty urinating, dysuria, flank pain, frequency, hematuria and urgency. (DITROPAN-XL) 10 MG EXTENDED RELEASE TABLET    Take 2 tablets by mouth daily    PRAVASTATIN (PRAVACHOL) 10 MG TABLET    Take 10 mg by mouth daily            Penicillins and Tape [place] tape]    FAMILY HISTORY       Family History   Problem Relation Age of Onset   Tigre العراقي Pacemaker Mother     COPD Mother     Heart Failure Mother     Cancer Father         prostate    Hypertension Father     Coronary Art Dis Father         63's    Hypertension Sister     Heart Disease Brother         age 54          SOCIAL HISTORY       Social History     Socioeconomic History    Marital status:      Spouse name: None    Number of children: None    Years of education: None    Highest education level: None   Occupational History    Occupation: maintenance- housekeeping     Comment: [employer]   Tobacco Use    Smoking status: Never Smoker    Smokeless tobacco: Never Used   Vaping Use    Vaping Use: Never used   Substance and Sexual Activity    Alcohol use: No    Drug use: No    Sexual activity: Not Currently     Partners: Female   Other Topics Concern    None   Social History Narrative    None     Social Determinants of Health     Financial Resource Strain:     Difficulty of Paying Living Expenses:    Food Insecurity:     Worried About Running Out of Food in the Last Year:     Ran Out of Food in the Last Year:    Transportation Needs:     Lack of Transportation (Medical):      Lack of Transportation (Non-Medical):    Physical Activity:     Days of Exercise per Week:     Minutes of Exercise per Session:    Stress:     Feeling of Stress :    Social Connections:     Frequency of Communication with Friends and Family:     Frequency of Social Gatherings with Friends and Family:     Attends Jewish Services:     Active Member of Clubs or Organizations:     Attends Club or Organization Meetings:     Marital Status:    Intimate Partner Violence:     Fear of Current or Ex-Partner:     Emotionally Abused:  Physically Abused:     Sexually Abused:        SCREENINGS      @FLOW([id number])@    PHYSICAL EXAM    (5+ for level 4, 8+ for level 5)     ED Triage Vitals [07/09/21 1137]   BP Temp Temp src Pulse Resp SpO2 Height Weight   138/84 97.5 °F (36.4 °C) -- 66 16 95 % 5' 5\" (1.651 m) 278 lb (126.1 kg)       Physical Exam  Vitals and nursing note reviewed. Constitutional:       General: She is not in acute distress. Appearance: She is well-developed. She is not diaphoretic. HENT:      Head: Normocephalic and atraumatic. Nose: Nose normal.      Mouth/Throat:      Pharynx: No oropharyngeal exudate. Eyes:      General: No scleral icterus. Right eye: No discharge. Left eye: No discharge. Conjunctiva/sclera: Conjunctivae normal.      Pupils: Pupils are equal, round, and reactive to light. Neck:      Thyroid: No thyromegaly. Vascular: No JVD. Trachea: No tracheal deviation. Cardiovascular:      Rate and Rhythm: Normal rate and regular rhythm. Heart sounds: Normal heart sounds. No murmur heard. No gallop. Pulmonary:      Effort: Pulmonary effort is normal. No respiratory distress. Breath sounds: Normal breath sounds. No stridor. No wheezing or rales. Chest:      Chest wall: No tenderness. Abdominal:      General: There is no distension. Palpations: Abdomen is soft. There is no mass. Tenderness: There is no abdominal tenderness. There is no guarding or rebound. Musculoskeletal:         General: No tenderness or deformity. Normal range of motion. Cervical back: Normal range of motion and neck supple. Skin:     General: Skin is warm and dry. Coloration: Skin is not pale. Findings: No erythema or rash. Neurological:      General: No focal deficit present. Mental Status: She is alert and oriented to person, place, and time. Cranial Nerves: No cranial nerve deficit. Motor: No abnormal muscle tone.       Coordination: Coordination normal.   Psychiatric:         Mood and Affect: Mood normal.         Behavior: Behavior normal.         Thought Content: Thought content normal.         Judgment: Judgment normal.         DIAGNOSTIC RESULTS     EKG (Per Emergency Physician):   Poor quality. Sinus rhythm rate of sixty-six. Nonspecific ST segment changes    RADIOLOGY (Per Amarjit Rodrigues): Interpretation per the Radiologist below, if available at the time of this note:  CT Head WO Contrast    Result Date: 7/9/2021  EXAMINATION: CT OF THE HEAD WITHOUT CONTRAST; CT OF THE CERVICAL SPINE WITHOUT CONTRAST 7/9/2021 1:14 pm TECHNIQUE: CT of the head was performed without the administration of intravenous contrast. Dose modulation, iterative reconstruction, and/or weight based adjustment of the mA/kV was utilized to reduce the radiation dose to as low as reasonably achievable.; CT of the cervical spine was performed without the administration of intravenous contrast. Multiplanar reformatted images are provided for review. Dose modulation, iterative reconstruction, and/or weight based adjustment of the mA/kV was utilized to reduce the radiation dose to as low as reasonably achievable. COMPARISON: None. HISTORY: ORDERING SYSTEM PROVIDED HISTORY: syncope on elequis TECHNOLOGIST PROVIDED HISTORY: Reason for exam:->syncope on elequis Has a \"code stroke\" or \"stroke alert\" been called? ->No Decision Support Exception - unselect if not a suspected or confirmed emergency medical condition->Emergency Medical Condition (MA) What reading provider will be dictating this exam?->CRC FINDINGS: CT OF THE BRAIN: BRAIN/VENTRICLES: No mass effect, edema or hemorrhage is seen. Mild age-appropriate volume loss is seen in the brain. No significant chronic microvascular ischemic changes are evident by CT. No hydrocephalus or extra-axial fluid is seen. ORBITS: The visualized portion of the orbits demonstrate no acute abnormality.  SINUSES: The visualized paranasal sinuses and mastoid air cells demonstrate no acute abnormality. SOFT TISSUES/SKULL: No acute abnormality of the visualized skull or soft tissues. CT CERVICAL SPINE: BONES/ALIGNMENT: There is no acute fracture or traumatic malalignment. The central canal is developmentally narrow due to short pedicles. DEGENERATIVE CHANGES: Moderate loss of disc heights at multiple levels. The central canal is suboptimally visualized due to beam hardening artifact. Variable degrees of central canal stenoses are noted, with at least moderate grade stenosis at C4-5. The lower cervical central canal is not well visualized due to beam hardening artifact. Multilevel neural foraminal stenoses, worst (severe) at the left C4-5 level. SOFT TISSUES: There is no prevertebral soft tissue swelling. CT head without contrast: 1. No skull fracture or acute intracranial abnormality. CT cervical spine without contrast: 1. No fracture or joint dislocation is seen. 2. Degenerative changes, as described. CT Cervical Spine WO Contrast    Result Date: 7/9/2021  EXAMINATION: CT OF THE HEAD WITHOUT CONTRAST; CT OF THE CERVICAL SPINE WITHOUT CONTRAST 7/9/2021 1:14 pm TECHNIQUE: CT of the head was performed without the administration of intravenous contrast. Dose modulation, iterative reconstruction, and/or weight based adjustment of the mA/kV was utilized to reduce the radiation dose to as low as reasonably achievable.; CT of the cervical spine was performed without the administration of intravenous contrast. Multiplanar reformatted images are provided for review. Dose modulation, iterative reconstruction, and/or weight based adjustment of the mA/kV was utilized to reduce the radiation dose to as low as reasonably achievable. COMPARISON: None. HISTORY: ORDERING SYSTEM PROVIDED HISTORY: syncope on elequis TECHNOLOGIST PROVIDED HISTORY: Reason for exam:->syncope on elequis Has a \"code stroke\" or \"stroke alert\" been called? ->No Decision negative. I did scan her head because of the syncope on anticoagulation. At this time the patient will be admitted. There may be a low possibility of PE but at this point I will not rescan her in the emergency department. She will be admitted to telemetry. Family are agreeable. .      CONSULTS:  IP CONSULT TO INTERNAL MEDICINE  IP CONSULT TO CASE MANAGEMENT    PROCEDURES:  Unless otherwise noted below, none     Procedures    FINAL IMPRESSION      1. Syncope and collapse    2. Contusion of head, unspecified part of head, initial encounter        DISPOSITION/PLAN   DISPOSITION Admitted 07/09/2021 04:31:32 PM      PATIENT REFERRED TO:  No follow-up provider specified. DISCHARGE MEDICATIONS:  New Prescriptions    No medications on file          (Please note:  Portions of this note were completed with a voice recognition program.  Efforts were made to edit thedictations but occasionally words and phrases are mis-transcribed.)    Form v2016. J.[id number]    Nguyen Laurent MD (electronically signed)  Emergency Medicine Provider          Nguyen Laurent MD  07/09/21 392 Cheryl Egan MD  07/09/21 7938

## 2021-07-10 VITALS
HEART RATE: 79 BPM | DIASTOLIC BLOOD PRESSURE: 73 MMHG | BODY MASS INDEX: 46.32 KG/M2 | SYSTOLIC BLOOD PRESSURE: 126 MMHG | TEMPERATURE: 96.9 F | WEIGHT: 278 LBS | RESPIRATION RATE: 16 BRPM | OXYGEN SATURATION: 93 % | HEIGHT: 65 IN

## 2021-07-10 PROBLEM — E55.9 VITAMIN D INSUFFICIENCY: Status: ACTIVE | Noted: 2021-07-10

## 2021-07-10 PROCEDURE — 2580000003 HC RX 258: Performed by: INTERNAL MEDICINE

## 2021-07-10 PROCEDURE — 6370000000 HC RX 637 (ALT 250 FOR IP): Performed by: INTERNAL MEDICINE

## 2021-07-10 PROCEDURE — G0378 HOSPITAL OBSERVATION PER HR: HCPCS

## 2021-07-10 PROCEDURE — 96366 THER/PROPH/DIAG IV INF ADDON: CPT

## 2021-07-10 RX ORDER — CHOLECALCIFEROL (VITAMIN D3) 50 MCG
2000 TABLET ORAL DAILY
Status: DISCONTINUED | OUTPATIENT
Start: 2021-07-10 | End: 2021-07-10 | Stop reason: HOSPADM

## 2021-07-10 RX ORDER — CHOLECALCIFEROL (VITAMIN D3) 50 MCG
2000 TABLET ORAL DAILY
Qty: 30 TABLET | Refills: 0 | Status: SHIPPED
Start: 2021-07-10 | Stop reason: SDUPTHER

## 2021-07-10 RX ADMIN — LOSARTAN POTASSIUM 50 MG: 50 TABLET, FILM COATED ORAL at 10:17

## 2021-07-10 RX ADMIN — APIXABAN 5 MG: 5 TABLET, FILM COATED ORAL at 10:17

## 2021-07-10 RX ADMIN — ACETAMINOPHEN 1000 MG: 500 TABLET ORAL at 10:16

## 2021-07-10 RX ADMIN — SODIUM CHLORIDE, PRESERVATIVE FREE 10 ML: 5 INJECTION INTRAVENOUS at 10:17

## 2021-07-10 RX ADMIN — ESCITALOPRAM 20 MG: 10 TABLET, FILM COATED ORAL at 10:17

## 2021-07-10 RX ADMIN — Medication 2000 UNITS: at 10:31

## 2021-07-10 RX ADMIN — OXYBUTYNIN CHLORIDE 20 MG: 10 TABLET, EXTENDED RELEASE ORAL at 10:17

## 2021-07-10 RX ADMIN — OXYCODONE HYDROCHLORIDE 5 MG: 5 TABLET ORAL at 08:16

## 2021-07-10 RX ADMIN — ASPIRIN 81 MG: 81 TABLET, CHEWABLE ORAL at 10:16

## 2021-07-10 RX ADMIN — METOPROLOL SUCCINATE 12.5 MG: 25 TABLET, EXTENDED RELEASE ORAL at 10:17

## 2021-07-10 RX ADMIN — DICLOFENAC SODIUM 4 G: 10 GEL TOPICAL at 10:18

## 2021-07-10 ASSESSMENT — PAIN SCALES - GENERAL
PAINLEVEL_OUTOF10: 7
PAINLEVEL_OUTOF10: 9
PAINLEVEL_OUTOF10: 6
PAINLEVEL_OUTOF10: 5
PAINLEVEL_OUTOF10: 4

## 2021-07-10 ASSESSMENT — PAIN DESCRIPTION - DESCRIPTORS
DESCRIPTORS: ACHING;CONSTANT;DISCOMFORT

## 2021-07-10 ASSESSMENT — PAIN DESCRIPTION - LOCATION
LOCATION: HEAD;LEG
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
## 2021-07-10 NOTE — PLAN OF CARE
Will remain free from   falls  Add  Today at 2227 - Met This Shift by Ollie Rader RN  Add  Absence of physical   injury  Add  Today at 2227 - Met This Shift by Ollie Rader RN  Add  Pain:  Pain level will   decrease  Add  Today at 2227 - Met This Shift by Ollie Rader RN  Add  Control of acute   pain  Add  Today at 2227 - Met This Shift by Ollie Rader RN  Add  Control of chronic   pain  Add  Today at 2227 - Met This Shift by Ollie Rader RN

## 2021-07-10 NOTE — PLAN OF CARE
Problem: Falls - Risk of:  Goal: Will remain free from falls  Description: Will remain free from falls  7/10/2021 1056 by Jostin Caldera RN  Outcome: Completed     Problem: Falls - Risk of:  Goal: Absence of physical injury  Description: Absence of physical injury  7/10/2021 1056 by Jostin Caldera RN  Outcome: Completed     Problem: Pain:  Goal: Pain level will decrease  Description: Pain level will decrease  7/10/2021 1056 by Jostin Caldera RN  Outcome: Completed     Problem: Pain:  Goal: Control of acute pain  Description: Control of acute pain  7/10/2021 1056 by Jostin Caldera RN  Outcome: Completed     Problem: Pain:  Goal: Control of chronic pain  Description: Control of chronic pain  7/10/2021 1056 by Jostin Caldera RN  Outcome: Completed

## 2021-07-12 ENCOUNTER — CARE COORDINATION (OUTPATIENT)
Dept: CASE MANAGEMENT | Age: 66
End: 2021-07-12

## 2021-07-12 DIAGNOSIS — R55 SYNCOPE, UNSPECIFIED SYNCOPE TYPE: Primary | ICD-10-CM

## 2021-07-12 NOTE — CARE COORDINATION
Rad[facility] Transitions Initial Follow Up Call    Call within 2 business days of discharge: Yes    Patient: Phill Medina Patient : 1955   MRN: [id number]  Reason for Admission: Syncope  Discharge Date: 7/10/21 RARS: Readmission Risk Score: 12      Last Discharge [facility]       Complaint Diagnosis Description Type Department Provider    21 Loss of Consciousness Syncope and collapse . .. ED to Hosp-Admission (Discharged) (ADMITTED) Lisseth Ness MD; Mohsen Nguyen. .. Spoke with: Phill Medina, patient    Facility: [facility]    Transitions of Care Initial Call      Challenges to be reviewed by the provider   Additional needs identified to be addressed with provider: No  none             Method of communication with provider : none    Was this a readmission? Yes  Patient stated reason for admission: syncope  Patients top risk factors for readmission: medical condition-hld, htn, pe    Care Transition Nurse (CTN) contacted the patient by telephone to perform post hospital discharge assessment. Verified name and  with patient as identifiers. Provided introduction to self, and explanation of the CTN role. CTN reviewed discharge instructions, medical action plan and red flags with patient who verbalized understanding. Patient given an opportunity to ask questions and does not have any further questions or concerns at this time. Were discharge instructions available to patient? Yes. Reviewed appropriate site of care based on symptoms and resources available to patient including: PCP, Specialist and When to call 911. The patient agrees to contact the PCP office for questions related to their healthcare. Medication reconciliation was performed with patient, who verbalizes understanding of administration of home medications. CTN provided contact information. Plan for follow-up call in 5-7 days based on severity of symptoms and risk factors.   Plan for next call: symptom management-syncope, sob, follow up appointment-Scheduled with Dr. Alessio Curiel on 7/20/21 and medication management-Received meds from mail order?  med changes or questions    Non-face-to-face services provided:  Scheduled appointment with PCP-Verified with patient appt with Dr. Alessio Curiel on 7/20/21. Scheduled appointment with Specialist-Patient has appt with Dr. Merrill Ortega on 8/9/21 per AVS  Obtained and reviewed discharge summary and/or continuity of care documents    Care Transitions 24 Hour Call    Do you have any ongoing symptoms?: Yes  Patient-reported symptoms: Shortness of Breath  Do you have a copy of your discharge instructions?: Yes  Do you have all of your prescriptions and are they filled?: Yes  Have you been contacted by a [id number] Pharmacist?: No  Have you scheduled your follow up appointment?: Yes  How are you going to get to your appointment?: Car - drive self  Were you discharged with any Home Care or Post Acute Services: No  Post Acute Services: Outpatient/Community Services (Comment: 6/28/21-outpatient PT in [facility])  Do you feel like you have everything you need to keep you well at home?: No  Care Transitions Interventions  No Identified Needs       Spoke with patient for initial care transition call post hospital discharge. Med review completed; 1111F entered. Due to Eliquis and Aspirin, bleeding precautions reviewed with patient. Patient denies any abnormal or uncontrolled bleeding. Patient instructed to report any abnormal bleeding and to call 911 for any uncontrolled bleeding or injury to head. Patient verbalized understanding. Patient awaiting delivery of Voltaren gel and Vitamin D from mail order pharmacy. Patient denies syncopal episodes since d/c. 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 Patient encouraged to take deep breaths and too slow down.   Patient reports she follows a low sodium diet; instructed to read labels and maintain less than 2000 mg of sodium daily. Patient instructed on foods high in salt including but not limited to frozen meals, restaurant foods, fast foods, lunch meat, smoked meat, cheeses, canned foods. Instructed to rinse canned vegetables before eating. Phone number provided for [facility] for walk in shower    Patient denies any further needs, questions, or concerns at this time. Patient is agreeable to future follow up calls.     Follow Up  Future Appointments   Date Time Provider Luci Lee   7/20/2021  8:30 AM Yevgeniy Trejo MD [facility]   8/9/2021 10:00 AM Frantz Smiley MD [facility]   9/2/2021 10:00 AM Adriano Chamberlain MD [facility]   9/24/2021  8:50 AM Soraya Moran MD [facility]       Sandra Arredondo RN

## 2021-07-20 ENCOUNTER — CARE COORDINATION (OUTPATIENT)
Dept: CASE MANAGEMENT | Age: 66
End: 2021-07-20

## 2021-07-20 NOTE — CARE COORDINATION
[facility] Transitions Follow Up Call    2021    Patient: Jerson Layton  Patient : 1955   MRN: [id number]  Reason for Admission: Syncope  Discharge Date: 7/10/21 RARS: Readmission Risk Score: 12         Spoke with: No one    First attempt to reach the patient for sub Care Transition call post hospital discharge. Message left with CTN's contact information requesting return phone call. Per EMR, appt with Dr. Kaye Ojeda cancelled today.       Follow Up  Future Appointments   Date Time Provider Luci Lee   2021 10:30 AM [facility] US RM 1 SEYZ [facility]   2021 10:00 AM MD EDIE Garcia [facility]   2021 10:00 AM [facility]   2021  8:50 AM Azul Junior MD [facility]       Ladona Galeazzi, RN

## 2021-07-27 ENCOUNTER — HOSPITAL ENCOUNTER (OUTPATIENT)
Age: 66
Setting detail: OBSERVATION
Discharge: HOME | End: 2021-07-29
Attending: STUDENT IN AN ORGANIZED HEALTH CARE EDUCATION/TRAINING PROGRAM | Admitting: INTERNAL MEDICINE
Payer: COMMERCIAL

## 2021-07-27 ENCOUNTER — APPOINTMENT (OUTPATIENT)
Dept: CT IMAGING | Age: 66
End: 2021-07-27
Payer: COMMERCIAL

## 2021-07-27 DIAGNOSIS — R07.9 CHEST PAIN, UNSPECIFIED TYPE: Primary | ICD-10-CM

## 2021-07-27 LAB
ALBUMIN SERPL-MCNC: 3.7 G/DL (ref 3.5–5.2)
ALP BLD-CCNC: 89 U/L (ref 35–104)
ALT SERPL-CCNC: 14 U/L (ref 0–32)
ANION GAP SERPL CALCULATED.3IONS-SCNC: 10 MMOL/L (ref 7–16)
AST SERPL-CCNC: 16 U/L (ref 0–31)
BASOPHILS ABSOLUTE: 0.06 E9/L (ref 0–0.2)
BASOPHILS RELATIVE PERCENT: 0.5 % (ref 0–2)
BILIRUB SERPL-MCNC: 0.3 MG/DL (ref 0–1.2)
BUN BLDV-MCNC: 33 MG/DL (ref 6–23)
CALCIUM SERPL-MCNC: 9.2 MG/DL (ref 8.6–10.2)
CHLORIDE BLD-SCNC: 101 MMOL/L (ref 98–107)
CO2: 27 MMOL/L (ref 22–29)
CREAT SERPL-MCNC: 1.2 MG/DL (ref 0.5–1)
EOSINOPHILS ABSOLUTE: 0.13 E9/L (ref 0.05–0.5)
EOSINOPHILS RELATIVE PERCENT: 1.1 % (ref 0–6)
GFR AFRICAN AMERICAN: 54
GFR NON-AFRICAN AMERICAN: 45 ML/MIN/1.73
GLUCOSE BLD-MCNC: 101 MG/DL (ref 74–99)
HCT VFR BLD CALC: 39.1 % (ref 34–48)
HEMOGLOBIN: 12.4 G/DL (ref 11.5–15.5)
IMMATURE GRANULOCYTES #: 0.07 E9/L
IMMATURE GRANULOCYTES %: 0.6 % (ref 0–5)
LACTIC ACID: 1.5 MMOL/L (ref 0.5–2.2)
LYMPHOCYTES ABSOLUTE: 2.71 E9/L (ref 1.5–4)
LYMPHOCYTES RELATIVE PERCENT: 22.6 % (ref 20–42)
MCH RBC QN AUTO: 29.2 PG (ref 26–35)
MCHC RBC AUTO-ENTMCNC: 31.7 % (ref 32–34.5)
MCV RBC AUTO: 92 FL (ref 80–99.9)
MONOCYTES ABSOLUTE: 1.07 E9/L (ref 0.1–0.95)
MONOCYTES RELATIVE PERCENT: 8.9 % (ref 2–12)
NEUTROPHILS ABSOLUTE: 7.93 E9/L (ref 1.8–7.3)
NEUTROPHILS RELATIVE PERCENT: 66.3 % (ref 43–80)
PDW BLD-RTO: 14.4 FL (ref 11.5–15)
PLATELET # BLD: 345 E9/L (ref 130–450)
PMV BLD AUTO: 10.2 FL (ref 7–12)
POTASSIUM REFLEX MAGNESIUM: 4 MMOL/L (ref 3.5–5)
PRO-BNP: 489 PG/ML (ref 0–125)
RBC # BLD: 4.25 E12/L (ref 3.5–5.5)
SODIUM BLD-SCNC: 138 MMOL/L (ref 132–146)
TOTAL PROTEIN: 6.6 G/DL (ref 6.4–8.3)
TROPONIN, HIGH SENSITIVITY: 13 NG/L (ref 0–9)
TROPONIN, HIGH SENSITIVITY: 15 NG/L (ref 0–9)
TROPONIN, HIGH SENSITIVITY: 16 NG/L (ref 0–9)
WBC # BLD: 12 E9/L (ref 4.5–11.5)

## 2021-07-27 PROCEDURE — 84484 ASSAY OF TROPONIN QUANT: CPT

## 2021-07-27 PROCEDURE — 83880 ASSAY OF NATRIURETIC PEPTIDE: CPT

## 2021-07-27 PROCEDURE — 2580000003 HC RX 258: Performed by: RADIOLOGY

## 2021-07-27 PROCEDURE — 80053 COMPREHEN METABOLIC PANEL: CPT

## 2021-07-27 PROCEDURE — 93005 ELECTROCARDIOGRAM TRACING: CPT | Performed by: EMERGENCY MEDICINE

## 2021-07-27 PROCEDURE — 36415 COLL VENOUS BLD VENIPUNCTURE: CPT

## 2021-07-27 PROCEDURE — 71275 CT ANGIOGRAPHY CHEST: CPT

## 2021-07-27 PROCEDURE — 96374 THER/PROPH/DIAG INJ IV PUSH: CPT

## 2021-07-27 PROCEDURE — 99285 EMERGENCY DEPT VISIT HI MDM: CPT

## 2021-07-27 PROCEDURE — 96375 TX/PRO/DX INJ NEW DRUG ADDON: CPT

## 2021-07-27 PROCEDURE — 6370000000 HC RX 637 (ALT 250 FOR IP): Performed by: STUDENT IN AN ORGANIZED HEALTH CARE EDUCATION/TRAINING PROGRAM

## 2021-07-27 PROCEDURE — 6360000004 HC RX CONTRAST MEDICATION: Performed by: RADIOLOGY

## 2021-07-27 PROCEDURE — G0378 HOSPITAL OBSERVATION PER HR: HCPCS

## 2021-07-27 PROCEDURE — 2580000003 HC RX 258: Performed by: EMERGENCY MEDICINE

## 2021-07-27 PROCEDURE — 83605 ASSAY OF LACTIC ACID: CPT

## 2021-07-27 PROCEDURE — 6360000002 HC RX W HCPCS: Performed by: EMERGENCY MEDICINE

## 2021-07-27 PROCEDURE — 85025 COMPLETE CBC W/AUTO DIFF WBC: CPT

## 2021-07-27 RX ORDER — M-VIT,TX,IRON,MINS/CALC/FOLIC 27MG-0.4MG
1 TABLET ORAL DAILY
COMMUNITY

## 2021-07-27 RX ORDER — LORAZEPAM 1 MG/1
0.5 TABLET ORAL ONCE
Status: COMPLETED | OUTPATIENT
Start: 2021-07-27 | End: 2021-07-27

## 2021-07-27 RX ORDER — FENTANYL CITRATE 50 UG/ML
25 INJECTION, SOLUTION INTRAMUSCULAR; INTRAVENOUS ONCE
Status: COMPLETED | OUTPATIENT
Start: 2021-07-27 | End: 2021-07-27

## 2021-07-27 RX ORDER — SODIUM CHLORIDE 0.9 % (FLUSH) 0.9 %
10 SYRINGE (ML) INJECTION ONCE
Status: COMPLETED | OUTPATIENT
Start: 2021-07-27 | End: 2021-07-27

## 2021-07-27 RX ORDER — 0.9 % SODIUM CHLORIDE 0.9 %
1000 INTRAVENOUS SOLUTION INTRAVENOUS ONCE
Status: COMPLETED | OUTPATIENT
Start: 2021-07-27 | End: 2021-07-27

## 2021-07-27 RX ADMIN — LORAZEPAM 0.5 MG: 1 TABLET ORAL at 18:25

## 2021-07-27 RX ADMIN — SODIUM CHLORIDE 1000 ML: 0.9 INJECTION, SOLUTION INTRAVENOUS at 15:47

## 2021-07-27 RX ADMIN — Medication 10 ML: at 19:14

## 2021-07-27 RX ADMIN — IOPAMIDOL 60 ML: 755 INJECTION, SOLUTION INTRAVENOUS at 19:14

## 2021-07-27 RX ADMIN — FENTANYL CITRATE 25 MCG: 0.05 INJECTION, SOLUTION INTRAMUSCULAR; INTRAVENOUS at 15:44

## 2021-07-27 ASSESSMENT — PAIN SCALES - GENERAL
PAINLEVEL_OUTOF10: 8
PAINLEVEL_OUTOF10: 9

## 2021-07-27 ASSESSMENT — PAIN DESCRIPTION - LOCATION: LOCATION: BREAST

## 2021-07-27 ASSESSMENT — PAIN DESCRIPTION - ORIENTATION: ORIENTATION: LEFT

## 2021-07-27 ASSESSMENT — PAIN DESCRIPTION - PAIN TYPE: TYPE: ACUTE PAIN

## 2021-07-27 NOTE — ED NOTES
Bed: 17B-17  Expected date:   Expected time:   Means of arrival:   Comments:  HERMILO Tran RN  07/27/21 [id number]

## 2021-07-28 ENCOUNTER — APPOINTMENT (OUTPATIENT)
Dept: NON INVASIVE DIAGNOSTICS | Age: 66
End: 2021-07-28
Payer: COMMERCIAL

## 2021-07-28 ENCOUNTER — APPOINTMENT (OUTPATIENT)
Dept: NUCLEAR MEDICINE | Age: 66
End: 2021-07-28
Payer: COMMERCIAL

## 2021-07-28 PROBLEM — I82.90: Status: ACTIVE | Noted: 2021-07-28

## 2021-07-28 LAB
ANION GAP SERPL CALCULATED.3IONS-SCNC: 10 MMOL/L (ref 7–16)
BUN BLDV-MCNC: 27 MG/DL (ref 6–23)
CALCIUM SERPL-MCNC: 9.3 MG/DL (ref 8.6–10.2)
CHLORIDE BLD-SCNC: 102 MMOL/L (ref 98–107)
CHOLESTEROL, TOTAL: 157 MG/DL (ref 0–199)
CO2: 25 MMOL/L (ref 22–29)
CREAT SERPL-MCNC: 0.9 MG/DL (ref 0.5–1)
EKG ATRIAL RATE: 58 BPM
EKG ATRIAL RATE: 76 BPM
EKG P AXIS: 52 DEGREES
EKG P AXIS: 63 DEGREES
EKG P-R INTERVAL: 154 MS
EKG P-R INTERVAL: 158 MS
EKG Q-T INTERVAL: 396 MS
EKG Q-T INTERVAL: 452 MS
EKG QRS DURATION: 100 MS
EKG QRS DURATION: 92 MS
EKG QTC CALCULATION (BAZETT): 443 MS
EKG QTC CALCULATION (BAZETT): 445 MS
EKG R AXIS: 6 DEGREES
EKG R AXIS: 7 DEGREES
EKG T AXIS: 14 DEGREES
EKG T AXIS: 17 DEGREES
EKG VENTRICULAR RATE: 58 BPM
EKG VENTRICULAR RATE: 76 BPM
GFR AFRICAN AMERICAN: >60
GFR NON-AFRICAN AMERICAN: >60 ML/MIN/1.73
GLUCOSE BLD-MCNC: 85 MG/DL (ref 74–99)
HBA1C MFR BLD: 5.6 % (ref 4–5.6)
HCT VFR BLD CALC: 36.4 % (ref 34–48)
HDLC SERPL-MCNC: 54 MG/DL
HEMOGLOBIN: 11.7 G/DL (ref 11.5–15.5)
LDL CHOLESTEROL CALCULATED: 87 MG/DL (ref 0–99)
LV EF: 72 %
LVEF MODALITY: NORMAL
MAGNESIUM: 1.4 MG/DL (ref 1.6–2.6)
MAGNESIUM: 2 MG/DL (ref 1.6–2.6)
MCH RBC QN AUTO: 29.5 PG (ref 26–35)
MCHC RBC AUTO-ENTMCNC: 32.1 % (ref 32–34.5)
MCV RBC AUTO: 91.7 FL (ref 80–99.9)
PDW BLD-RTO: 14.5 FL (ref 11.5–15)
PLATELET # BLD: 288 E9/L (ref 130–450)
PMV BLD AUTO: 10.6 FL (ref 7–12)
POTASSIUM REFLEX MAGNESIUM: 3.4 MMOL/L (ref 3.5–5)
RBC # BLD: 3.97 E12/L (ref 3.5–5.5)
SODIUM BLD-SCNC: 137 MMOL/L (ref 132–146)
TRIGL SERPL-MCNC: 79 MG/DL (ref 0–149)
TROPONIN, HIGH SENSITIVITY: 13 NG/L (ref 0–9)
TROPONIN, HIGH SENSITIVITY: 16 NG/L (ref 0–9)
VLDLC SERPL CALC-MCNC: 16 MG/DL
WBC # BLD: 10.5 E9/L (ref 4.5–11.5)

## 2021-07-28 PROCEDURE — 83036 HEMOGLOBIN GLYCOSYLATED A1C: CPT

## 2021-07-28 PROCEDURE — 78452 HT MUSCLE IMAGE SPECT MULT: CPT

## 2021-07-28 PROCEDURE — 78452 HT MUSCLE IMAGE SPECT MULT: CPT | Performed by: INTERNAL MEDICINE

## 2021-07-28 PROCEDURE — 93017 CV STRESS TEST TRACING ONLY: CPT

## 2021-07-28 PROCEDURE — A9500 TC99M SESTAMIBI: HCPCS | Performed by: RADIOLOGY

## 2021-07-28 PROCEDURE — 84484 ASSAY OF TROPONIN QUANT: CPT

## 2021-07-28 PROCEDURE — 6370000000 HC RX 637 (ALT 250 FOR IP): Performed by: INTERNAL MEDICINE

## 2021-07-28 PROCEDURE — G0378 HOSPITAL OBSERVATION PER HR: HCPCS

## 2021-07-28 PROCEDURE — 96366 THER/PROPH/DIAG IV INF ADDON: CPT

## 2021-07-28 PROCEDURE — 80048 BASIC METABOLIC PNL TOTAL CA: CPT

## 2021-07-28 PROCEDURE — 93005 ELECTROCARDIOGRAM TRACING: CPT | Performed by: INTERNAL MEDICINE

## 2021-07-28 PROCEDURE — 85027 COMPLETE CBC AUTOMATED: CPT

## 2021-07-28 PROCEDURE — 3430000000 HC RX DIAGNOSTIC RADIOPHARMACEUTICAL: Performed by: RADIOLOGY

## 2021-07-28 PROCEDURE — 80061 LIPID PANEL: CPT

## 2021-07-28 PROCEDURE — 6360000002 HC RX W HCPCS: Performed by: INTERNAL MEDICINE

## 2021-07-28 PROCEDURE — 96365 THER/PROPH/DIAG IV INF INIT: CPT

## 2021-07-28 PROCEDURE — 93016 CV STRESS TEST SUPVJ ONLY: CPT | Performed by: INTERNAL MEDICINE

## 2021-07-28 PROCEDURE — 36415 COLL VENOUS BLD VENIPUNCTURE: CPT

## 2021-07-28 PROCEDURE — 83735 ASSAY OF MAGNESIUM: CPT

## 2021-07-28 PROCEDURE — 93010 ELECTROCARDIOGRAM REPORT: CPT | Performed by: INTERNAL MEDICINE

## 2021-07-28 PROCEDURE — 93018 CV STRESS TEST I&R ONLY: CPT | Performed by: INTERNAL MEDICINE

## 2021-07-28 PROCEDURE — 6370000000 HC RX 637 (ALT 250 FOR IP): Performed by: NURSE PRACTITIONER

## 2021-07-28 PROCEDURE — 2580000003 HC RX 258: Performed by: INTERNAL MEDICINE

## 2021-07-28 RX ORDER — ACETAMINOPHEN 650 MG/1
650 SUPPOSITORY RECTAL EVERY 6 HOURS PRN
Status: DISCONTINUED | OUTPATIENT
Start: 2021-07-28 | End: 2021-07-29 | Stop reason: HOSPADM

## 2021-07-28 RX ORDER — POTASSIUM CHLORIDE 7.45 MG/ML
10 INJECTION INTRAVENOUS PRN
Status: DISCONTINUED | OUTPATIENT
Start: 2021-07-28 | End: 2021-07-29 | Stop reason: HOSPADM

## 2021-07-28 RX ORDER — ATORVASTATIN CALCIUM 40 MG/1
40 TABLET, FILM COATED ORAL NIGHTLY
Status: DISCONTINUED | OUTPATIENT
Start: 2021-07-28 | End: 2021-07-29 | Stop reason: HOSPADM

## 2021-07-28 RX ORDER — SODIUM CHLORIDE 0.9 % (FLUSH) 0.9 %
5-40 SYRINGE (ML) INJECTION EVERY 12 HOURS SCHEDULED
Status: DISCONTINUED | OUTPATIENT
Start: 2021-07-28 | End: 2021-07-29 | Stop reason: HOSPADM

## 2021-07-28 RX ORDER — HYDROCHLOROTHIAZIDE 25 MG/1
25 TABLET ORAL DAILY
Status: DISCONTINUED | OUTPATIENT
Start: 2021-07-28 | End: 2021-07-29 | Stop reason: HOSPADM

## 2021-07-28 RX ORDER — OXYBUTYNIN CHLORIDE 10 MG/1
20 TABLET, EXTENDED RELEASE ORAL DAILY
Status: DISCONTINUED | OUTPATIENT
Start: 2021-07-28 | End: 2021-07-29 | Stop reason: HOSPADM

## 2021-07-28 RX ORDER — ONDANSETRON 2 MG/ML
4 INJECTION INTRAMUSCULAR; INTRAVENOUS EVERY 6 HOURS PRN
Status: DISCONTINUED | OUTPATIENT
Start: 2021-07-28 | End: 2021-07-29 | Stop reason: HOSPADM

## 2021-07-28 RX ORDER — POTASSIUM CHLORIDE 20 MEQ/1
40 TABLET, EXTENDED RELEASE ORAL PRN
Status: DISCONTINUED | OUTPATIENT
Start: 2021-07-28 | End: 2021-07-29 | Stop reason: HOSPADM

## 2021-07-28 RX ORDER — M-VIT,TX,IRON,MINS/CALC/FOLIC 27MG-0.4MG
1 TABLET ORAL DAILY
Status: DISCONTINUED | OUTPATIENT
Start: 2021-07-28 | End: 2021-07-29 | Stop reason: HOSPADM

## 2021-07-28 RX ORDER — ACETAMINOPHEN 325 MG/1
650 TABLET ORAL EVERY 6 HOURS PRN
Status: DISCONTINUED | OUTPATIENT
Start: 2021-07-28 | End: 2021-07-29 | Stop reason: HOSPADM

## 2021-07-28 RX ORDER — SODIUM CHLORIDE 9 MG/ML
25 INJECTION, SOLUTION INTRAVENOUS PRN
Status: DISCONTINUED | OUTPATIENT
Start: 2021-07-28 | End: 2021-07-29 | Stop reason: HOSPADM

## 2021-07-28 RX ORDER — PANTOPRAZOLE SODIUM 40 MG/1
40 TABLET, DELAYED RELEASE ORAL
Status: DISCONTINUED | OUTPATIENT
Start: 2021-07-28 | End: 2021-07-29 | Stop reason: HOSPADM

## 2021-07-28 RX ORDER — CHOLECALCIFEROL (VITAMIN D3) 50 MCG
2000 TABLET ORAL DAILY
Status: DISCONTINUED | OUTPATIENT
Start: 2021-07-28 | End: 2021-07-29 | Stop reason: HOSPADM

## 2021-07-28 RX ORDER — MAGNESIUM SULFATE IN WATER 40 MG/ML
4000 INJECTION, SOLUTION INTRAVENOUS ONCE
Status: COMPLETED | OUTPATIENT
Start: 2021-07-28 | End: 2021-07-28

## 2021-07-28 RX ORDER — ESCITALOPRAM OXALATE 10 MG/1
20 TABLET ORAL DAILY
Status: DISCONTINUED | OUTPATIENT
Start: 2021-07-28 | End: 2021-07-29 | Stop reason: HOSPADM

## 2021-07-28 RX ORDER — METOPROLOL SUCCINATE 25 MG/1
12.5 TABLET, EXTENDED RELEASE ORAL DAILY
Status: DISCONTINUED | OUTPATIENT
Start: 2021-07-28 | End: 2021-07-29 | Stop reason: HOSPADM

## 2021-07-28 RX ORDER — OMEPRAZOLE 40 MG/1
40 CAPSULE, DELAYED RELEASE ORAL
Qty: 14 CAPSULE | Refills: 0 | Status: SHIPPED | OUTPATIENT
Start: 2021-07-28 | End: 2021-10-12 | Stop reason: ALTCHOICE

## 2021-07-28 RX ORDER — ASPIRIN 81 MG/1
81 TABLET, CHEWABLE ORAL DAILY
Status: DISCONTINUED | OUTPATIENT
Start: 2021-07-28 | End: 2021-07-29 | Stop reason: HOSPADM

## 2021-07-28 RX ORDER — SODIUM CHLORIDE 0.9 % (FLUSH) 0.9 %
10 SYRINGE (ML) INJECTION PRN
Status: DISCONTINUED | OUTPATIENT
Start: 2021-07-28 | End: 2021-07-29 | Stop reason: HOSPADM

## 2021-07-28 RX ORDER — ONDANSETRON 4 MG/1
4 TABLET, ORALLY DISINTEGRATING ORAL EVERY 8 HOURS PRN
Status: DISCONTINUED | OUTPATIENT
Start: 2021-07-28 | End: 2021-07-29 | Stop reason: HOSPADM

## 2021-07-28 RX ORDER — LOSARTAN POTASSIUM 50 MG/1
50 TABLET ORAL DAILY
Status: DISCONTINUED | OUTPATIENT
Start: 2021-07-28 | End: 2021-07-29 | Stop reason: HOSPADM

## 2021-07-28 RX ADMIN — Medication 12 MILLICURIE: at 13:39

## 2021-07-28 RX ADMIN — OXYBUTYNIN CHLORIDE 20 MG: 10 TABLET, EXTENDED RELEASE ORAL at 09:50

## 2021-07-28 RX ADMIN — METOPROLOL SUCCINATE 12.5 MG: 25 TABLET, EXTENDED RELEASE ORAL at 16:54

## 2021-07-28 RX ADMIN — REGADENOSON 0.4 MG: 0.08 INJECTION, SOLUTION INTRAVENOUS at 14:17

## 2021-07-28 RX ADMIN — PANTOPRAZOLE SODIUM 40 MG: 40 TABLET, DELAYED RELEASE ORAL at 06:54

## 2021-07-28 RX ADMIN — ESCITALOPRAM 20 MG: 10 TABLET, FILM COATED ORAL at 09:45

## 2021-07-28 RX ADMIN — Medication 1 TABLET: at 09:45

## 2021-07-28 RX ADMIN — ASPIRIN 81 MG: 81 TABLET, CHEWABLE ORAL at 09:45

## 2021-07-28 RX ADMIN — APIXABAN 5 MG: 5 TABLET, FILM COATED ORAL at 05:50

## 2021-07-28 RX ADMIN — Medication 2000 UNITS: at 09:45

## 2021-07-28 RX ADMIN — HYDROCHLOROTHIAZIDE 25 MG: 25 TABLET ORAL at 09:44

## 2021-07-28 RX ADMIN — MAGNESIUM SULFATE HEPTAHYDRATE 4000 MG: 40 INJECTION, SOLUTION INTRAVENOUS at 08:32

## 2021-07-28 RX ADMIN — SODIUM CHLORIDE, PRESERVATIVE FREE 10 ML: 5 INJECTION INTRAVENOUS at 21:46

## 2021-07-28 RX ADMIN — ATORVASTATIN CALCIUM 40 MG: 40 TABLET, FILM COATED ORAL at 05:50

## 2021-07-28 RX ADMIN — ATORVASTATIN CALCIUM 40 MG: 40 TABLET, FILM COATED ORAL at 21:46

## 2021-07-28 RX ADMIN — SODIUM CHLORIDE, PRESERVATIVE FREE 10 ML: 5 INJECTION INTRAVENOUS at 09:45

## 2021-07-28 RX ADMIN — APIXABAN 5 MG: 5 TABLET, FILM COATED ORAL at 21:46

## 2021-07-28 RX ADMIN — LOSARTAN POTASSIUM 50 MG: 50 TABLET, FILM COATED ORAL at 09:44

## 2021-07-28 RX ADMIN — Medication 35 MILLICURIE: at 14:15

## 2021-07-28 RX ADMIN — POTASSIUM CHLORIDE 40 MEQ: 20 TABLET, EXTENDED RELEASE ORAL at 09:50

## 2021-07-28 ASSESSMENT — ENCOUNTER SYMPTOMS
SHORTNESS OF BREATH: 0
SORE THROAT: 0
ABDOMINAL DISTENTION: 0
VOMITING: 0
RHINORRHEA: 0
COUGH: 0
NAUSEA: 0
CONSTIPATION: 0
BACK PAIN: 0
EYE REDNESS: 0
DIARRHEA: 0
WHEEZING: 0
EYE PAIN: 0
BLOOD IN STOOL: 0
ABDOMINAL PAIN: 0

## 2021-07-28 ASSESSMENT — PAIN DESCRIPTION - LOCATION: LOCATION: CHEST

## 2021-07-28 ASSESSMENT — PAIN SCALES - GENERAL
PAINLEVEL_OUTOF10: 0
PAINLEVEL_OUTOF10: 0
PAINLEVEL_OUTOF10: 7
PAINLEVEL_OUTOF10: 0
PAINLEVEL_OUTOF10: 0

## 2021-07-28 ASSESSMENT — PAIN DESCRIPTION - PAIN TYPE: TYPE: ACUTE PAIN

## 2021-07-28 ASSESSMENT — PAIN DESCRIPTION - ORIENTATION: ORIENTATION: MID

## 2021-07-28 NOTE — CARE COORDINATION
7/28/2021 - Care Coordination - admitted for chest pain. Troponin 15, 16, 13. For stress test today. Spoke with pt in room to discuss discharge planning. PCP is Dr Savi Yadav. [facility] [address] and she uses [facility] for mail order meds. Her family lives with her in her 1 floor home with 2 steps to enter home. Independent in ADLs. Denies hx HHC and EDMOND/ARU. DME - has hand rails in shower. Does not anticipate any needs. Plan is to discharge home when medically stable. Family can provide transportation home. SW/CM will follow.

## 2021-07-28 NOTE — DISCHARGE SUMMARY
Hospitalist Discharge Summary    Patient ID: Dary Scanlon   Patient : 1955  Patient's PCP: Josette Everett MD    Admit Date: 2021   Admitting Physician: Donna Moran MD    Discharge Date:  2021   Discharge Physician: GENTRY Boyer NP   Discharge Condition: Stable  Discharge Disposition: [facility] course in brief:  (Please refer to daily progress notes for a comprehensive review of the hospitalization by requesting medical records)    Patient admitted on 2021 for chest pain.       She states she developed sharp substernal chest pain while she was at work after an episode of coughing. The CP lasted a little over an hour.  She also endorses some shortness of breath and diaphoresis.  Patient does note that she gets frequent heartburn and is not on medication for this. She also notes she has  intermittent leg swelling and intermittent dyspnea with exertion. She is on Eliquis for history of PE. She underwent NM stress test which revealed no evidence of ischemia. She is now stable for discharge home with OP follow up. Consults:   IP CONSULT TO INTERNAL MEDICINE    Discharge Diagnoses:  Chest pain --ACS r/o  Hyperlipidemia  Hypertension  Hypokalemia  JES  Obesity  Anxiety  History of PE/DVT anticoagulated on Eliquis  Goiter    Discharge Instructions / Follow up: Follow-up with PCP within 1 week of discharge. Future Appointments   Date Time Provider Luci Lee   2021 10:30 AM [facility] US RM 1 SEYZ [facility]   2021 10[facility]00 [facility]   [facility] 10:00 [facility]   2021  8:50 AM [facility]       The patient's condition is stable. At this time the patient is without objective evidence of an acute process requiring continuing hospitalization or inpatient management. They are stable for discharge with outpatient follow-up.      I have spoken with the patient and discussed the results of the current hospitalization, in addition to providing specific details for the plan of care and counseling regarding the diagnosis and prognosis. The plan has been discussed in detail and they are aware of the specific conditions for emergent return, as well as the importance of follow-up. Their questions are answered at this time and they are agreeable with the plan for discharge home.     Continued appropriate risk factor modification of blood pressure, diabetes and serum lipids will remain essential to reducing risk of future atherosclerotic development    Activity: activity as tolerated    Significant labs:  CBC:   Recent Labs     07/27/21  1555 07/28/21  0454   WBC 12.0* 10.5   RBC 4.25 3.97   HGB 12.4 11.7   HCT 39.1 36.4   MCV 92.0 91.7   RDW 14.4 14.5    288     BMP:   Recent Labs     07/27/21  1555 07/28/21  0127 07/28/21  0455     --  137   K 4.0  --  3.4*     --  102   CO2 27  --  25   BUN 33*  --  27*   CREATININE 1.2*  --  0.9   MG  --  1.4* 2.0     LFT:  Recent Labs     07/27/21  1555   PROT 6.6   ALKPHOS 89   ALT 14   AST 16   BILITOT 0.3     Hgb A1C:   Lab Results   Component Value Date    LABA1C 5.6 07/28/2021     Folate and B12:   Lab Results   Component Value Date    MACACALW62 512 07/09/2021   ,   Lab Results   Component Value Date    FOLATE 16.1 07/09/2021     Thyroid Studies:   Lab Results   Component Value Date    TSH 0.678 07/09/2021       Urinalysis:    Lab Results   Component Value Date    NITRU Negative 06/22/2021    WBCUA 0-1 06/22/2021    BACTERIA RARE 06/22/2021    RBCUA 0-1 06/22/2021    BLOODU Negative 06/22/2021    SPECGRAV 1.020 06/22/2021    GLUCOSEU Negative 06/22/2021       Imaging:  CT Head WO Contrast    Result Date: 7/9/2021  EXAMINATION: CT OF THE HEAD WITHOUT CONTRAST; CT OF THE CERVICAL SPINE WITHOUT CONTRAST 7/9/2021 1:14 pm TECHNIQUE: CT of the head was performed without the administration of intravenous contrast. Dose modulation, iterative reconstruction, and/or weight based adjustment of the mA/kV was utilized to reduce the radiation dose to as low as reasonably achievable.; CT of the cervical spine was performed without the administration of intravenous contrast. Multiplanar reformatted images are provided for review. Dose modulation, iterative reconstruction, and/or weight based adjustment of the mA/kV was utilized to reduce the radiation dose to as low as reasonably achievable. COMPARISON: None. HISTORY: ORDERING SYSTEM PROVIDED HISTORY: syncope on elequis TECHNOLOGIST PROVIDED HISTORY: Reason for exam:->syncope on elequis Has a \"code stroke\" or \"stroke alert\" been called? ->No Decision Support Exception - unselect if not a suspected or confirmed emergency medical condition->Emergency Medical Condition (MA) What reading provider will be dictating this exam?->CRC FINDINGS: CT OF THE BRAIN: BRAIN/VENTRICLES: No mass effect, edema or hemorrhage is seen. Mild age-appropriate volume loss is seen in the brain. No significant chronic microvascular ischemic changes are evident by CT. No hydrocephalus or extra-axial fluid is seen. ORBITS: The visualized portion of the orbits demonstrate no acute abnormality. SINUSES: The visualized paranasal sinuses and mastoid air cells demonstrate no acute abnormality. SOFT TISSUES/SKULL: No acute abnormality of the visualized skull or soft tissues. CT CERVICAL SPINE: BONES/ALIGNMENT: There is no acute fracture or traumatic malalignment. The central canal is developmentally narrow due to short pedicles. DEGENERATIVE CHANGES: Moderate loss of disc heights at multiple levels. The central canal is suboptimally visualized due to beam hardening artifact. Variable degrees of central canal stenoses are noted, with at least moderate grade stenosis at C4-5. The lower cervical central canal is not well visualized due to beam hardening artifact.   Multilevel neural foraminal stenoses, worst (severe) at the left C4-5 level. SOFT TISSUES: There is no prevertebral soft tissue swelling. CT head without contrast: 1. No skull fracture or acute intracranial abnormality. CT cervical spine without contrast: 1. No fracture or joint dislocation is seen. 2. Degenerative changes, as described. CT Cervical Spine WO Contrast    Result Date: 7/9/2021  EXAMINATION: CT OF THE HEAD WITHOUT CONTRAST; CT OF THE CERVICAL SPINE WITHOUT CONTRAST 7/9/2021 1:14 pm TECHNIQUE: CT of the head was performed without the administration of intravenous contrast. Dose modulation, iterative reconstruction, and/or weight based adjustment of the mA/kV was utilized to reduce the radiation dose to as low as reasonably achievable.; CT of the cervical spine was performed without the administration of intravenous contrast. Multiplanar reformatted images are provided for review. Dose modulation, iterative reconstruction, and/or weight based adjustment of the mA/kV was utilized to reduce the radiation dose to as low as reasonably achievable. COMPARISON: None. HISTORY: ORDERING SYSTEM PROVIDED HISTORY: syncope on elequis TECHNOLOGIST PROVIDED HISTORY: Reason for exam:->syncope on elequis Has a \"code stroke\" or \"stroke alert\" been called? ->No Decision Support Exception - unselect if not a suspected or confirmed emergency medical condition->Emergency Medical Condition (MA) What reading provider will be dictating this exam?->CRC FINDINGS: CT OF THE BRAIN: BRAIN/VENTRICLES: No mass effect, edema or hemorrhage is seen. Mild age-appropriate volume loss is seen in the brain. No significant chronic microvascular ischemic changes are evident by CT. No hydrocephalus or extra-axial fluid is seen. ORBITS: The visualized portion of the orbits demonstrate no acute abnormality. SINUSES: The visualized paranasal sinuses and mastoid air cells demonstrate no acute abnormality. SOFT TISSUES/SKULL: No acute abnormality of the visualized skull or soft tissues.  CT CERVICAL SPINE: BONES/ALIGNMENT: There is no acute fracture or traumatic malalignment. The central canal is developmentally narrow due to short pedicles. DEGENERATIVE CHANGES: Moderate loss of disc heights at multiple levels. The central canal is suboptimally visualized due to beam hardening artifact. Variable degrees of central canal stenoses are noted, with at least moderate grade stenosis at C4-5. The lower cervical central canal is not well visualized due to beam hardening artifact. Multilevel neural foraminal stenoses, worst (severe) at the left C4-5 level. SOFT TISSUES: There is no prevertebral soft tissue swelling. CT head without contrast: 1. No skull fracture or acute intracranial abnormality. CT cervical spine without contrast: 1. No fracture or joint dislocation is seen. 2. Degenerative changes, as described. XR CHEST PORTABLE    Result Date: 7/9/2021  EXAMINATION: ONE XRAY VIEW OF THE CHEST 7/9/2021 12:39 pm COMPARISON: 06/22/2021. HISTORY: ORDERING SYSTEM PROVIDED HISTORY: syncope TECHNOLOGIST PROVIDED HISTORY: Reason for exam:->syncope What reading provider will be dictating this exam?->CRC FINDINGS: Heart and the mediastinal structures are normal.  There is minimal atelectasis in the lung bases. The remainder of the lungs are clear. There is no focal consolidation or pleural effusion. Nonacute chest with minimal atelectasis in the lung bases. CTA PULMONARY W CONTRAST    Result Date: 7/27/2021  EXAMINATION: CTA OF THE CHEST 7/27/2021 7:01 pm TECHNIQUE: CTA of the chest was performed after the administration of intravenous contrast.  Multiplanar reformatted images are provided for review. MIP images are provided for review. Dose modulation, iterative reconstruction, and/or weight based adjustment of the mA/kV was utilized to reduce the radiation dose to as low as reasonably achievable. COMPARISON: None.  HISTORY: ORDERING SYSTEM PROVIDED HISTORY: Recent PE, comes in with worsening chest pain and shortness of breath TECHNOLOGIST PROVIDED HISTORY: Reason for exam:->Recent PE, comes in with worsening chest pain and shortness of breath Decision Support Exception - unselect if not a suspected or confirmed emergency medical condition->Emergency Medical Condition (MA) What reading provider will be dictating this exam?->CRC FINDINGS: Pulmonary Arteries: Pulmonary arteries are adequately opacified for evaluation. No evidence of intraluminal filling defect to suggest pulmonary embolism. Main pulmonary artery is normal in caliber. Mediastinum: No evidence of mediastinal lymphadenopathy. Enlarged left lobe of the thyroid gland. A the heart and pericardium demonstrate no acute abnormality. There is no acute abnormality of the thoracic aorta. Lungs/pleura: The lungs are without acute process. No focal consolidation or pulmonary edema. No evidence of pleural effusion or pneumothorax. Upper Abdomen: Limited images of the upper abdomen are unremarkable. Soft Tissues/Bones: No acute bone or soft tissue abnormality. Degenerative changes thoracic spine. No evidence of pulmonary embolism or acute pulmonary abnormality.        Discharge Medications:      Medication List      START taking these medications    omeprazole 40 MG delayed release capsule  Commonly known as: PRILOSEC  Take 1 capsule by mouth every morning (before breakfast) for 14 days        CONTINUE taking these medications    apixaban 5 MG Tabs tablet  Commonly known as: Eliquis  Take 1 tablet by mouth 2 times daily     aspirin 81 MG chewable tablet     CALCIUM CITRATE PO     escitalopram 20 MG tablet  Commonly known as: LEXAPRO  TAKE 1 TABLET BY MOUTH ONCE DAILY     hydroCHLOROthiazide 25 MG tablet  Commonly known as: HYDRODIURIL  Take 1 tablet by mouth daily     losartan 50 MG tablet  Commonly known as: COZAAR  TAKE 1 TABLET BY MOUTH ONCE DAILY     metoprolol succinate 25 MG extended release tablet  Commonly known as: TOPROL XL  Take 0.5 tablets by mouth daily TAKE 1/2 TABLET BY MOUTH DAILY     oxybutynin 10 MG extended release tablet  Commonly known as: DITROPAN-XL  Take 2 tablets by mouth daily     pravastatin 10 MG tablet  Commonly known as: PRAVACHOL     therapeutic multivitamin-minerals tablet     vitamin D 50 MCG (2000 UT) Tabs tablet  Commonly known as: CHOLECALCIFEROL  Take 1 tablet by mouth daily           Where to Get Your Medications      These medications were sent to [facility], [address] [address], [facility] [address] [address], [facility] [address] [address]    Phone: [phone]   · omeprazole 40 MG delayed release capsule         Time Spent on discharge is more than 45 minutes in the examination, evaluation, counseling and review of medications and discharge plan.    +++++++++++++++++++++++++++++++++++++++++++++++++  Dom Ritter [address] [place] [address]  +++++++++++++++++++++++++++++++++++++++++++++++++  NOTE: This report was transcribed using voice recognition software. Every effort was made to ensure accuracy; however, inadvertent computerized transcription errors may be present.

## 2021-07-28 NOTE — H&P
Hospital Medicine History & Physical      PCP: Jeremi Lazo MD    Date of Admission: 7/27/2021    Date of Service: Placed in Observation. Chief Complaint:  CP      History Of Present Illness:    77 y.o. female who presented to [facility] with CP. Patient notes she developed substernal chest pain that was sharp in nature today when she was at work chest pain lasted a little over an hour. No exacerbation relief. At started after a coughing paroxysms. Is associated with some shortness of breath and diaphoresis. No nausea vomiting or palpitations. Nonradiating. Denies similar previous chest pain. Patient does note that she gets frequent heartburn and is not on medication for this. She states she will not be able to walk on a treadmill for a stress test.  She notes she has had intermittent leg swelling. Also reports intermittent dyspnea with exertion. Patient reports history of PE. She is on Eliquis and reports compliance with all of her medications. Past Medical History:          Diagnosis Date    Anxiety     Deep vein blood clot of left lower extremity ([address] 05/27/2019    Family history of early CAD     Hyperlipemia     Hypertension     Lightheadedness     Multinodular goiter     Sleep apnea     SOBOE (shortness of breath on exertion)        Past Surgical History:          Procedure Laterality Date    BREAST SURGERY  2014    biopsy    CARDIOVASCULAR STRESS TEST      COLONOSCOPY      CYST REMOVAL  05/2017    mouth/under tongue    HYSTERECTOMY  1990    KNEE ARTHROSCOPY  2005    LEG SURGERY  1963    ligament repair    SALIVARY GLAND SURGERY Left 05/24/2017    TONSILLECTOMY      childhood       Medications Prior to Admission:      Prior to Admission medications    Medication Sig Start Date End Date Taking?  Authorizing Provider   Multiple Vitamins-Minerals (THERAPEUTIC MULTIVITAMIN-MINERALS) tablet Take 1 tablet by mouth daily   Yes Historical Provider, MD   vitamin D (CHOLECALCIFEROL) 50 MCG (2000 UT) TABS tablet Take 1 tablet by mouth daily 7/10/21  Yes Shy Hannon MD   apixaban (ELIQUIS) 5 MG TABS tablet Take 1 tablet by mouth 2 times daily 6/25/21  Yes Karyle Minder, MD   oxybutynin (DITROPAN-XL) 10 MG extended release tablet Take 2 tablets by mouth daily 6/1/21  Yes Abelardo Snowball, DO   pravastatin (PRAVACHOL) 10 MG tablet Take 10 mg by mouth daily   Yes Historical Provider, MD   hydroCHLOROthiazide (HYDRODIURIL) 25 MG tablet Take 1 tablet by mouth daily 3/19/21  Yes Karyle Minder, MD   losartan (COZAAR) 50 MG tablet TAKE 1 TABLET BY MOUTH ONCE DAILY 1/4/21  Yes Nessa Razo MD   escitalopram (LEXAPRO) 20 MG tablet TAKE 1 TABLET BY MOUTH ONCE DAILY 1/4/21  Yes Nessa Razo MD   aspirin 81 MG chewable tablet Take 81 mg by mouth daily   Yes Historical Provider, MD   metoprolol succinate (TOPROL XL) 25 MG extended release tablet Take 0.5 tablets by mouth daily TAKE 1/2 TABLET BY MOUTH DAILY 3/19/21 7/12/21  Karyle Minder, MD   CALCIUM CITRATE PO Take 1,000 mg by mouth daily OTC    Historical Provider, MD       Allergies:  Penicillins and Tape [adhesive tape]    Social History:      The patient currently lives home    TOBACCO:   reports that she has never smoked. She has never used smokeless tobacco.  ETOH:   reports no history of alcohol use. Family History:            Problem Relation Age of Onset   [facility]ep [facility] Pacemaker Mother     COPD Mother     Heart Failure Mother     Cancer Father         prostate    Hypertension Father     Coronary Art Dis Father         63's    Hypertension Sister     Heart Disease Brother         age 54       REVIEW OF SYSTEMS:   Pertinent positives as noted in the HPI. All other systems reviewed and negative.     PHYSICAL EXAM:    /84   Pulse 68   Temp 97 °F (36.1 °C) (Infrared)   Resp 22   Ht 5' 5\" (1.651 m)   Wt 278 lb (126.1 kg)   SpO2 95%   BMI 46.26 kg/m²     General appearance:  No apparent distress, appears stated age and cooperative. HEENT:  Normal cephalic, atraumatic without obvious deformity. Pupils equal, round, and reactive to light. Extra ocular muscles intact. Conjunctivae/corneas clear. Neck: Supple, with full range of motion. No jugular venous distention. Trachea midline. Respiratory:  Normal respiratory effort. Clear to auscultation, bilaterally without Rales/Wheezes/Rhonchi. Cardiovascular:  Regular rate and rhythm with normal S1/S2 without murmurs, rubs or gallops. Abdomen: Soft, non-tender, non-distended with normal bowel sounds. Musculoskeletal:  No clubbing, cyanosis or edema bilaterally. Full range of motion without deformity. Skin: Skin color, texture, turgor normal.  No rashes or lesions. Neurologic:  Neurovascularly intact without any focal sensory/motor deficits. Cranial nerves: II-XII intact, grossly non-focal.  Psychiatric:  Alert and oriented, thought content appropriate, normal insight    CTA negative acute PE  EKG:  I have reviewed the EKG with the following interpretation: NSR    Labs:     Recent Labs     07/27/21  1555   WBC 12.0*   HGB 12.4   HCT 39.1        Recent Labs     07/27/21  1555      K 4.0      CO2 27   BUN 33*   CREATININE 1.2*   CALCIUM 9.2     Recent Labs     07/27/21  1555   AST 16   ALT 14   BILITOT 0.3   ALKPHOS 89     No results for input(s): INR in the last 72 hours. No results for input(s): Jose Martin Clap in the last 72 hours.     Urinalysis:      Lab Results   Component Value Date    NITRU Negative 06/22/2021    WBCUA 0-1 06/22/2021    BACTERIA RARE 06/22/2021    RBCUA 0-1 06/22/2021    BLOODU Negative 06/22/2021    SPECGRAV 1.020 06/22/2021    GLUCOSEU Negative 06/22/2021         ASSESSMENT:    Active Hospital Problems    Diagnosis Date Noted    On medication for venous thromboembolism [I82.90] 07/28/2021    Chest pain [R07.9] 07/27/2021    Morbidly obese ([facility] [address] [E66.01] 09/03/2020    JES (obstructive sleep apnea) [G47.33] 10/25/2016    Essential hypertension [I10] 10/25/2016    Hyperlipemia [E78.5]        PLAN:    Observation monitored bed  assess for ACS  ASA  statin  cycle troponin  Fasting lipid panel  serial EKG  nuclear stress -- unable to do treadmill    VTE  Apixaban  CTA negative acute PE    JES  hs NIPPV    M Obesity  Obesity  weight loss through healthy diet and increased activity   DVT Prophylaxis: apixaban  Diet: Diet NPO  Code Status: Full Code    PT/OT Eval Status: ordered    Juana Black MD    Thank you Flor Asencio MD for the opportunity to be involved in this patient's care. If you have any questions or concerns please feel free to contact me through [facility].

## 2021-07-28 NOTE — PROGRESS NOTES
Hospitalist Progress Note      SYNOPSIS: Patient admitted on 2021 for chest pain. She states she developed sharp substernal chest pain while she was at work after an episode of coughing. The CP lasted a little over an hour. She also endorses some shortness of breath and diaphoresis. Patient does note that she gets frequent heartburn and is not on medication for this. She also notes she has  intermittent leg swelling and intermittent dyspnea with exertion. She is on Eliquis for history of PE. Hospital day 0     SUBJECTIVE:  Stable overnight. No issues reported. Patient seen and examined  Records reviewed. Temp (24hrs), Av °F (36.1 °C), Min:96.3 °F (35.7 °C), Max:97.3 °F (36.3 °C)    DIET: Diet NPO  CODE: Full Code    Intake/Output Summary (Last 24 hours) at 2021 0826  Last data filed at 2021 0600  Gross per 24 hour   Intake 0 ml   Output --   Net 0 ml       Review of Systems: All bolded are positive; please see HPI  General:  Fever, chills, diaphoresis, fatigue, malaise, night sweats, weight loss  Psychological:  Anxiety, disorientation, hallucinations. ENT:  Epistaxis, headaches, vertigo, visual changes. Cardiovascular:  Chest pain, irregular heartbeats, palpitations, paroxysmal nocturnal dyspnea. Respiratory:  Shortness of breath, coughing, sputum production, hemoptysis, wheezing, orthopnea.   Gastrointestinal:  Nausea, vomiting, diarrhea, heartburn, constipation, abdominal pain, hematemesis, hematochezia, melena, acholic stools  Genito-Urinary:  Dysuria, urgency, frequency, hematuria  Musculoskeletal:  Joint pain, joint stiffness, joint swelling, muscle pain  Neurology:  Headache, focal neurological deficits, weakness, numbness, paresthesia  Derm:  Rashes, ulcers, excoriations, bruising  Extremities:  Decreased ROM, peripheral edema, mottling    OBJECTIVE:    /66   Pulse 64   Temp 96.3 °F (35.7 °C) (Temporal)   Resp 22   Ht 5' 5\" (1.651 m)   Wt 277 lb 9.6 oz (125.9 kg)   SpO2 92%   BMI 46.20 kg/m²     General appearance:  Obese, elderly female in no apparent distress, appears stated age and cooperative. HEENT:  Normal cephalic, atraumatic without obvious deformity. Pupils equal, round, and reactive to light. Extra ocular muscles intact. Conjunctivae/corneas clear. Neck: Supple, with full range of motion. No jugular venous distention. Trachea midline. Respiratory:  Normal respiratory effort. Clear to auscultation, bilaterally without Rales/Wheezes/Rhonchi. Cardiovascular:  Regular rate and rhythm with normal S1/S2 without murmurs, rubs or gallops. Abdomen: Soft, non-tender, obese with normal bowel sounds. Musculoskeletal:  No clubbing, cyanosis or edema bilaterally. Full range of motion without deformity. Skin: Skin color, texture, turgor normal.  No rashes or lesions. Neurologic:  Neurovascularly intact without any focal sensory/motor deficits.  Cranial nerves: II-XII intact, grossly non-focal.  Psychiatric:  Alert and oriented, thought content appropriate, normal insight    Medications:  REVIEWED DAILY    Infusion Medications    sodium chloride       Scheduled Medications    apixaban  5 mg Oral BID    escitalopram  20 mg Oral Daily    hydroCHLOROthiazide  25 mg Oral Daily    losartan  50 mg Oral Daily    metoprolol succinate  12.5 mg Oral Daily    therapeutic multivitamin-minerals  1 tablet Oral Daily    oxybutynin  20 mg Oral Daily    vitamin D  2,000 Units Oral Daily    sodium chloride flush  5-40 mL Intravenous 2 times per day    aspirin  81 mg Oral Daily    atorvastatin  40 mg Oral Nightly    pantoprazole  40 mg Oral QAM AC    magnesium sulfate  4,000 mg Intravenous Once     PRN Meds: sodium chloride flush, sodium chloride, ondansetron **OR** ondansetron, acetaminophen **OR** acetaminophen, magnesium hydroxide, regadenoson    Labs:     Recent Labs     07/27/21  1555 07/28/21  0454   WBC 12.0* 10.5   HGB 12.4 11.7   HCT 39.1 36.4   PLT 345 288       Recent Labs     07/27/21  1555 07/28/21  0455    137   K 4.0 3.4*    102   CO2 27 25   BUN 33* 27*   CREATININE 1.2* 0.9   CALCIUM 9.2 9.3       Recent Labs     07/27/21  1555   PROT 6.6   ALKPHOS 89   ALT 14   AST 16   BILITOT 0.3     Chronic labs:    Lab Results   Component Value Date    CHOL 157 07/28/2021    TRIG 79 07/28/2021    HDL 54 07/28/2021    LDLCALC 87 07/28/2021    TSH 0.678 07/09/2021    INR 1.0 04/15/2021    LABA1C 5.6 07/28/2021       Radiology: REVIEWED DAILY    ASSESSMENT:  Chest pain  Hyperlipidemia  Hypertension  Hypokalemia  JES  Obesity  Anxiety  History of PE/DVT anticoagulated on Eliquis  Goiter    PLAN:  For stress this morning  Trend trops  ASA, statin  PPI  PRN potassium replacement    DVT prophylaxis: Eliquis  Diet: NPO  Disposition: Intermediate    DISCHARGE PLAN: Home when stable, possibly later s/p stress    +++++++++++++++++++++++++++++++++++++++++++++++++  Conrad Cheek, [address] [place] [address],[address] [place] [address]  +++++++++++++++++++++++++++++++++++++++++++++++++  NOTE: This report was transcribed using voice recognition software. Every effort was made to ensure accuracy; however, inadvertent computerized transcription errors may be present.

## 2021-07-28 NOTE — PROCEDURES
[facility] Nuclear Stress Test:    Cardiologist: Dr. Jeremiah Farfan EKG: Normal sinus rhythm, normal EKG. Indications for study: Chest pain    1. No chest pain  2. No new arrhythmias  3. No EKG changes suggestive of stress induced ischemia  4. Nuclear images pending    Paresh Munoz MD., [facility].    [facility]) Cardiology

## 2021-07-28 NOTE — ED PROVIDER NOTES
REGINE Nicholas is a 77 y.o. female with a PMHx significant for HTN, HLD, blood clots and JES who presents with new onset chest pain and diaphoresis that began while she was cleaning her house approximately one hour PTA. The patient's complaint is persistent, moderate in severity and worsened by nothing. She states she was diagnosed with a PE recently and has been taking eliquis for it. She denies missing any doses. She describes the chest pain as a sharp stabbing sensation that localizes to the right mid-chest and does not radiate anywhere. She says the pain is reminiscent of the pain she experienced in her chest when she was initially diagnosed with a left sided PE last month. She rates the pain as a 6/10. She did not take anything for the pain prior to arrival and says she cannot think of anything that makes it better or worse. The patient denies recent trauma, fever, chills, fatigue, HA, dizziness, vision changes, congestion, rhinorrhea, neck pain, palpitations, hx of MI, LE edema, SOB, cough, wheezing, abdominal pain, N/V/D/C, hematochezia, melena, dysuria, hematuria, generalized weakness and paresthesias. The patient is currently taking Eliquis. Tobacco Hx:   reports that she has never smoked. She has never used smokeless tobacco.    Alcohol Hx:   reports no history of alcohol use. Illicit Drug Hx:  Reports no hx of illicit drug use. The history is provided by the patient. Last Tetanus (if applicable): n/a    Review of Systems   Constitutional: Positive for diaphoresis. Negative for chills, fatigue and fever. HENT: Negative for congestion, rhinorrhea and sore throat. Eyes: Negative for pain and redness. Respiratory: Negative for cough, shortness of breath and wheezing. Cardiovascular: Positive for chest pain. Negative for palpitations and leg swelling.    Gastrointestinal: Negative for abdominal distention, abdominal pain, blood in stool, constipation, diarrhea, nausea and vomiting. Genitourinary: Negative for difficulty urinating, dysuria, flank pain, frequency and hematuria. Musculoskeletal: Negative for arthralgias, back pain, myalgias, neck pain and neck stiffness. Skin: Negative for rash and wound. Neurological: Negative for dizziness, syncope, weakness, light-headedness, numbness and headaches. All other systems reviewed and are negative. Physical Exam  Vitals and nursing note reviewed. Constitutional:       General: She is awake. She is not in acute distress. Appearance: She is not diaphoretic. HENT:      Head: Normocephalic and atraumatic. Right Ear: External ear normal.      Left Ear: External ear normal.      Nose: Nose normal.      Mouth/Throat:      Mouth: Mucous membranes are moist.      Pharynx: Oropharynx is clear. Eyes:      General: No scleral icterus. Right eye: No discharge. Left eye: No discharge. Extraocular Movements: Extraocular movements intact. Conjunctiva/sclera: Conjunctivae normal.   Cardiovascular:      Rate and Rhythm: Normal rate and regular rhythm. Heart sounds: Normal heart sounds. No murmur heard. No friction rub. No gallop. Comments: Upper extremity and lower extremity distal pulses intact bilaterally +2/4  Pulmonary:      Effort: Pulmonary effort is normal. No respiratory distress. Breath sounds: Normal breath sounds. No wheezing, rhonchi or rales. Comments: Good air movement noted throughout. Chest:      Chest wall: No tenderness. Abdominal:      General: There is no distension. Palpations: Abdomen is soft. Tenderness: There is no abdominal tenderness. There is no guarding or rebound. Musculoskeletal:         General: No tenderness or deformity. Normal range of motion. Cervical back: Normal range of motion and neck supple. No rigidity. No muscular tenderness. Right lower leg: No edema. Left lower leg: No edema.    Lymphadenopathy: Cervical: No cervical adenopathy. Skin:     General: Skin is warm and dry. Capillary Refill: Capillary refill takes less than 2 seconds. Findings: No erythema or rash. Neurological:      General: No focal deficit present. Mental Status: She is alert and oriented to person, place, and time. Sensory: No sensory deficit. Motor: No weakness. Coordination: Coordination normal.        --------------------------------------------- PAST HISTORY ---------------------------------------------  Past Medical History:  has a past medical history of Anxiety, Deep vein blood clot of left lower extremity ([facility] [address] Family history of early CAD, Hyperlipemia, Hypertension, Lightheadedness, Multinodular goiter, Sleep apnea, and SOBOE (shortness of breath on exertion). Past Surgical History:  has a past surgical history that includes Tonsillectomy; Leg Surgery (7798); Colonoscopy; cardiovascular stress test; Hysterectomy (1990); Knee arthroscopy (2005); Breast surgery (2014); Salivary gland surgery (Left, 05/24/2017); and cyst removal (05/2017). Social History:  reports that she has never smoked. She has never used smokeless tobacco. She reports that she does not drink alcohol and does not use drugs. Family History: family history includes COPD in her mother; Cancer in her father; Coronary Art Dis in her father; Heart Disease in her brother; Heart Failure in her mother; Hypertension in her father and sister; Pacemaker in her mother.      Home Meds: Medications Prior to Admission: Multiple Vitamins-Minerals (THERAPEUTIC MULTIVITAMIN-MINERALS) tablet, Take 1 tablet by mouth daily  vitamin D (CHOLECALCIFEROL) 50 MCG (2000 UT) TABS tablet, Take 1 tablet by mouth daily  apixaban (ELIQUIS) 5 MG TABS tablet, Take 1 tablet by mouth 2 times daily  oxybutynin (DITROPAN-XL) 10 MG extended release tablet, Take 2 tablets by mouth daily  pravastatin (PRAVACHOL) 10 MG tablet, Take 10 mg by mouth E9/L    Monocytes Absolute 1.07 (H) 0.10 - 0.95 E9/L    Eosinophils Absolute 0.13 0.05 - 0.50 E9/L    Basophils Absolute 0.06 0.00 - 0.20 E9/L   Comprehensive Metabolic Panel w/ Reflex to MG   Result Value Ref Range    Sodium 138 132 - 146 mmol/L    Potassium reflex Magnesium 4.0 3.5 - 5.0 mmol/L    Chloride 101 98 - 107 mmol/L    CO2 27 22 - 29 mmol/L    Anion Gap 10 7 - 16 mmol/L    Glucose 101 (H) 74 - 99 mg/dL    BUN 33 (H) 6 - 23 mg/dL    CREATININE 1.2 (H) 0.5 - 1.0 mg/dL    GFR Non-African American 45 >=60 mL/min/1.73    GFR African American 54     Calcium 9.2 8.6 - 10.2 mg/dL    Total Protein 6.6 6.4 - 8.3 g/dL    Albumin 3.7 3.5 - 5.2 g/dL    Total Bilirubin 0.3 0.0 - 1.2 mg/dL    Alkaline Phosphatase 89 35 - 104 U/L    ALT 14 0 - 32 U/L    AST 16 0 - 31 U/L   Troponin   Result Value Ref Range    Troponin, High Sensitivity 16 (H) 0 - 9 ng/L   Brain Natriuretic Peptide   Result Value Ref Range    Pro- (H) 0 - 125 pg/mL   Lactic Acid, Plasma   Result Value Ref Range    Lactic Acid 1.5 0.5 - 2.2 mmol/L   Troponin   Result Value Ref Range    Troponin, High Sensitivity 13 (H) 0 - 9 ng/L   Troponin   Result Value Ref Range    Troponin, High Sensitivity 15 (H) 0 - 9 ng/L   Magnesium   Result Value Ref Range    Magnesium 1.4 (L) 1.6 - 2.6 mg/dL   Troponin   Result Value Ref Range    Troponin, High Sensitivity 16 (H) 0 - 9 ng/L   Troponin   Result Value Ref Range    Troponin, High Sensitivity 13 (H) 0 - 9 ng/L   Basic Metabolic Panel w/ Reflex to MG   Result Value Ref Range    Sodium 137 132 - 146 mmol/L    Potassium reflex Magnesium 3.4 (L) 3.5 - 5.0 mmol/L    Chloride 102 98 - 107 mmol/L    CO2 25 22 - 29 mmol/L    Anion Gap 10 7 - 16 mmol/L    Glucose 85 74 - 99 mg/dL    BUN 27 (H) 6 - 23 mg/dL    CREATININE 0.9 0.5 - 1.0 mg/dL    GFR Non-African American >60 >=60 mL/min/1.73    GFR African American >60     Calcium 9.3 8.6 - 10.2 mg/dL   Hemoglobin A1c   Result Value Ref Range    Hemoglobin A1C 5.6 4.0 - 5.6 %   CBC   Result Value Ref Range    WBC 10.5 4.5 - 11.5 E9/L    RBC 3.97 3.50 - 5.50 E12/L    Hemoglobin 11.7 11.5 - 15.5 g/dL    Hematocrit 36.4 34.0 - 48.0 %    MCV 91.7 80.0 - 99.9 fL    MCH 29.5 26.0 - 35.0 pg    MCHC 32.1 32.0 - 34.5 %    RDW 14.5 11.5 - 15.0 fL    Platelets 091 166 - 643 E9/L    MPV 10.6 7.0 - 12.0 fL   Lipid panel - fasting   Result Value Ref Range    Cholesterol, Total 157 0 - 199 mg/dL    Triglycerides 79 0 - 149 mg/dL    HDL 54 >40 mg/dL    LDL Calculated 87 0 - 99 mg/dL    VLDL Cholesterol Calculated 16 mg/dL   Magnesium   Result Value Ref Range    Magnesium 2.0 1.6 - 2.6 mg/dL   EKG 12 Lead   Result Value Ref Range    Ventricular Rate 76 BPM    Atrial Rate 76 BPM    P-R Interval 154 ms    QRS Duration 92 ms    Q-T Interval 396 ms    QTc Calculation (Bazett) 445 ms    P Axis 52 degrees    R Axis 6 degrees    T Axis 17 degrees   EKG 12 lead   Result Value Ref Range    Ventricular Rate 58 BPM    Atrial Rate 58 BPM    P-R Interval 158 ms    QRS Duration 100 ms    Q-T Interval 452 ms    QTc Calculation (Bazett) 443 ms    P Axis 63 degrees    R Axis 7 degrees    T Axis 14 degrees       RADIOLOGY: Interpreted by Radiologist unless otherwise noted. CTA PULMONARY W CONTRAST   Final Result   No evidence of pulmonary embolism or acute pulmonary abnormality.          NM Cardiac Stress Test Nuclear Imaging    (Results Pending)       Oxygen Saturation Interpretation: Normal    Meds Given:  Medications   apixaban (ELIQUIS) tablet 5 mg (5 mg Oral Not Given 7/28/21 0942)   escitalopram (LEXAPRO) tablet 20 mg (20 mg Oral Given 7/28/21 0945)   hydroCHLOROthiazide (HYDRODIURIL) tablet 25 mg (25 mg Oral Given 7/28/21 0944)   losartan (COZAAR) tablet 50 mg (50 mg Oral Given 7/28/21 0944)   metoprolol succinate (TOPROL XL) extended release tablet 12.5 mg (0 mg Oral Held 7/28/21 0946)   therapeutic multivitamin-minerals 1 tablet (1 tablet Oral Given 7/28/21 0945)   oxybutynin (DITROPAN-XL) extended release 100 mL IVPB premix (0 mg Intravenous Stopped 7/28/21 1232)   technetium sestamibi (CARDIOLITE) injection 12 millicurie (12 millicuries Intravenous Given 7/28/21 1339)       Procedures:  No procedures performed. --------------------------------- PROGRESS NOTES / ADDITIONAL PROVIDER NOTES ---------------------------------  Consultations:  As outlined below. ED Course:    ED Course as of Jul 28 1351   Tue Jul 27, 2021   1551 EKG: This EKG is signed and interpreted by me. Rate: 76  Rhythm: Sinus  Interpretation: non-specific EKG, no ST elevations or depressions, normal axis, normal intervals,   Comparison: stable as compared to patient's most recent EKG      [SS]   2001 Around 230pm patient had episode of chest pain patient was walking and cleaning when she had sudden on set of chest pain with diaphoresis. [SS]   8044 Case was discussed with internal medicine who agrees to admit for further evaluation management. [ML]      ED Course User Index  [ML] Remington Gibbs DO  [SS] Ame Song MD       9773: All results were discussed with the patient and I have provided specific details regarding the plan to admit the patient. The patient was stable at the time of admission and was without objective evidence of hemodynamic instability. The patient was seen in the emergency department by myself and the assigned attending physician, Dr. Agus Tubbs, who agreed with the assessment, plan and decision to admit as laid out herein. The patient verbalized an understanding and agreement with the plan for admission. All questions were answered and the patient was deemed to be in stable condition at the time of transport. MDM:  Patient presented from home complaining of new onset chest pain and diaphoresis. On arrival, the patient was hypotensive with remaining VS wnl. EKG and physical exam were as documented above. A work up was initiated to further evaluate her presenting complaints.  Labs were remarkable for slightly elevated troponin, proBNP and Cr. CTA of the chest was negative for PE and other acute cardiopulmonary pathology. However, given her history, presentation and HEART Score of 5, the decision was made to admit her to the hospital for further evaluation and management, likely to include evaluation by cardiology and a stress test. The case was discussed with IM who agreed to admit. The patient was stable at the time of her dispsition. This patient's ED course included: a personal history and physicial examination, re-evaluation prior to disposition, multiple bedside re-evaluations, IV medications, cardiac monitoring and continuous pulse oximetry    Diagnosis:  1. Chest pain, unspecified type        Disposition:  Patient's disposition: Admit to telemetry  Patient's condition is stable. This patient was seen, examined and treated with Dr. Marquis Monk. All pertinent aspects of the patient's care were discussed with the attending physician.        Faustino Renteria DO  Resident  07/28/21 4617

## 2021-07-29 VITALS
DIASTOLIC BLOOD PRESSURE: 53 MMHG | TEMPERATURE: 98.3 F | HEIGHT: 65 IN | HEART RATE: 56 BPM | WEIGHT: 274.2 LBS | BODY MASS INDEX: 45.68 KG/M2 | RESPIRATION RATE: 18 BRPM | SYSTOLIC BLOOD PRESSURE: 115 MMHG | OXYGEN SATURATION: 95 %

## 2021-07-29 LAB
ANION GAP SERPL CALCULATED.3IONS-SCNC: 5 MMOL/L (ref 7–16)
BUN BLDV-MCNC: 21 MG/DL (ref 6–23)
CALCIUM SERPL-MCNC: 8.9 MG/DL (ref 8.6–10.2)
CHLORIDE BLD-SCNC: 104 MMOL/L (ref 98–107)
CO2: 30 MMOL/L (ref 22–29)
CREAT SERPL-MCNC: 0.9 MG/DL (ref 0.5–1)
GFR AFRICAN AMERICAN: >60
GFR NON-AFRICAN AMERICAN: >60 ML/MIN/1.73
GLUCOSE BLD-MCNC: 92 MG/DL (ref 74–99)
POTASSIUM SERPL-SCNC: 4 MMOL/L (ref 3.5–5)
SODIUM BLD-SCNC: 139 MMOL/L (ref 132–146)

## 2021-07-29 PROCEDURE — APPSS60 APP SPLIT SHARED TIME 46-60 MINUTES: Performed by: PHYSICIAN ASSISTANT

## 2021-07-29 PROCEDURE — G0378 HOSPITAL OBSERVATION PER HR: HCPCS

## 2021-07-29 PROCEDURE — 99245 OFF/OP CONSLTJ NEW/EST HI 55: CPT | Performed by: INTERNAL MEDICINE

## 2021-07-29 PROCEDURE — 6370000000 HC RX 637 (ALT 250 FOR IP): Performed by: INTERNAL MEDICINE

## 2021-07-29 PROCEDURE — 36415 COLL VENOUS BLD VENIPUNCTURE: CPT

## 2021-07-29 PROCEDURE — 2580000003 HC RX 258: Performed by: INTERNAL MEDICINE

## 2021-07-29 PROCEDURE — 80048 BASIC METABOLIC PNL TOTAL CA: CPT

## 2021-07-29 RX ADMIN — Medication 2000 UNITS: at 10:39

## 2021-07-29 RX ADMIN — Medication 1 TABLET: at 10:38

## 2021-07-29 RX ADMIN — METOPROLOL SUCCINATE 12.5 MG: 25 TABLET, EXTENDED RELEASE ORAL at 10:47

## 2021-07-29 RX ADMIN — HYDROCHLOROTHIAZIDE 25 MG: 25 TABLET ORAL at 10:39

## 2021-07-29 RX ADMIN — PANTOPRAZOLE SODIUM 40 MG: 40 TABLET, DELAYED RELEASE ORAL at 06:47

## 2021-07-29 RX ADMIN — SODIUM CHLORIDE, PRESERVATIVE FREE 10 ML: 5 INJECTION INTRAVENOUS at 10:40

## 2021-07-29 RX ADMIN — LOSARTAN POTASSIUM 50 MG: 50 TABLET, FILM COATED ORAL at 10:39

## 2021-07-29 RX ADMIN — ASPIRIN 81 MG: 81 TABLET, CHEWABLE ORAL at 10:38

## 2021-07-29 RX ADMIN — OXYBUTYNIN CHLORIDE 20 MG: 10 TABLET, EXTENDED RELEASE ORAL at 10:39

## 2021-07-29 RX ADMIN — ESCITALOPRAM 20 MG: 10 TABLET, FILM COATED ORAL at 10:38

## 2021-07-29 RX ADMIN — APIXABAN 5 MG: 5 TABLET, FILM COATED ORAL at 10:39

## 2021-07-29 ASSESSMENT — PAIN SCALES - GENERAL
PAINLEVEL_OUTOF10: 0

## 2021-07-29 NOTE — CARE COORDINATION
Normal stress yesterday. Pt remains for cardiology eval re: frequent admissions for chest pain. Discharge plan remains home with no needs.

## 2021-07-29 NOTE — PROGRESS NOTES
Discharged to home. Goals met. Discharge  Instructions given and verbalized an understanding. Monitor removed and placed in nurses station.

## 2021-07-29 NOTE — CONSULTS
Inpatient Cardiology Consultation      Reason for Consult: Chest pain    Consulting Physician: Dr. Renetta Ibanez    Requesting Physician: Dr. Osmar Torres    Date of Consultation: 7/29/2021    HISTORY OF PRESENT ILLNESS:   Patient is a 77year old WF who is known to Dr. Elyssa Felix. Patient is being seen in consultation this hospital admission by Dr. Renetta Ibanez for evaluation and recommendations regarding chest discomfort. Patient has a known past medical history of morbid obesity, gastroesophageal reflux disease, vitamin D deficiency, hypertension, hyperlipidemia, obstructive sleep apnea on home CPAP, history of goiter and thyroid nodules, depression, mild valvular heart disease, history of left lower extremity DVT in 2019, and history of pulmonary embolism June 2021 currently on Eliquis therapy. She is also noted to have an admission earlier this month at [facility] for syncopal episode noted while in the shower. It was thought to be vasovagal in nature by primary service (no cardiology consult noted during that admission). She was discharged home in stable condition. She now presents to [facility] on July 27, 2021 due to complaints of chest discomfort, shortness of breath and cough. 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 On day of hospital presentation, she was at work and began to notice chest discomfort. The chest discomfort was located midsternally with no radiation. She described it as a sharp sensation. It was severe in intensity, 9/10. She states that she initially noticed this after a coughing spell, but the chest pain continued despite resolution of her cough. She also noticed the chest discomfort more when she was exerting herself during this period of time. She states the chest pain lasted a total duration of approximately one to two hours before spontaneous resolution. She noted of associated diaphoresis, shortness of breath and nausea with the episode.   She states she has had two more episodes of this chest discomfort since hospital admission, with the most recent being last night. She states however that these episodes only lasted approximately ten minutes in total duration with resolution. She states he is episodes occurred during exertion and were relieved with rest.  She is chest pain-free during my interview/exam today. She states the chest discomfort is not related to palpation, deep inspiration, or meals. There are no other specific alleviating factors regarding the chest discomfort. Patient additionally admits of random episodes of dizziness/palpitations over the past several months -- these are not related to exertion or changes in position, and are brief in duration with spontaneous resolution. She denies near-syncope or recurrent syncope since her last ED visit. She additionally mitts to lower extremity edema bilaterally, which has been an issue for several months now. She denies any complaints to me of emesis, paroxysmal nocturnal dyspnea, orthopnea, subjective fever, chills or any known recent sick contacts. Family and social history as noted below. Labs and diagnostic testing as noted below. Please note: past medical records were reviewed per electronic medical record (EMR) - see detailed reports under Past Medical/ Surgical History. PAST MEDICAL HISTORY:    1. Morbid obesity. 2. GERD. 3. Vitamin D deficiency. 4. HTN. 5. HLD, on statin therapy. 6. JES, on home CPAP. 7. History of goiter and thyroid nodules. 8. Depression. 9. Mild valvular heart disease. 10. History of DVT of the left common femoral and superficial femoral veins in May 2019. Placed on [address] [place] [address] at that time, which was eventually discontinued. 11. Admission [facility] June 22, 2021 with shortness of breath and peripheral edema.  Chest CTA revealed \"Partially occlusive filling defects in the distal subsegmental pulmonary artery of the left lower lobe concerning for distal subsegmental pulmonary Oksana Mendez MD   losartan (COZAAR) 50 MG tablet TAKE 1 TABLET BY MOUTH ONCE DAILY 1/4/21  Yes Sally Maldonado MD   escitalopram (LEXAPRO) 20 MG tablet TAKE 1 TABLET BY MOUTH ONCE DAILY 1/4/21  Yes Sally Maldonado MD   aspirin 81 MG chewable tablet Take 81 mg by mouth daily   Yes Historical Provider, MD   metoprolol succinate (TOPROL XL) 25 MG extended release tablet Take 0.5 tablets by mouth daily TAKE 1/2 TABLET BY MOUTH DAILY 3/19/21 7/12/21  Oksana Mendez MD   CALCIUM CITRATE PO Take 1,000 mg by mouth daily OTC    Historical Provider, MD       CURRENT MEDICATIONS:      Current Facility-Administered Medications:     apixaban (ELIQUIS) tablet 5 mg, 5 mg, Oral, BID, Tresa Chavarria MD, 5 mg at 07/28/21 2146    escitalopram (LEXAPRO) tablet 20 mg, 20 mg, Oral, Daily, Tresa Chavarria MD, 20 mg at 07/28/21 0945    hydroCHLOROthiazide (HYDRODIURIL) tablet 25 mg, 25 mg, Oral, Daily, Tresa Chavarria MD, 25 mg at 07/28/21 0944    losartan (COZAAR) tablet 50 mg, 50 mg, Oral, Daily, Tresa Chavarria MD, 50 mg at 07/28/21 0944    metoprolol succinate (TOPROL XL) extended release tablet 12.5 mg, 12.5 mg, Oral, Daily, Tresa Chavarria MD, 12.5 mg at 07/28/21 1654    therapeutic multivitamin-minerals 1 tablet, 1 tablet, Oral, Daily, Tresa Chavarria MD, 1 tablet at 07/28/21 0945    oxybutynin (DITROPAN-XL) extended release tablet 20 mg, 20 mg, Oral, Daily, Tresa Chavarria MD, 20 mg at 07/28/21 0950    vitamin D (CHOLECALCIFEROL) tablet 2,000 Units, 2,000 Units, Oral, Daily, Tresa Chavarria MD, 2,000 Units at 07/28/21 0945    sodium chloride flush 0.9 % injection 5-40 mL, 5-40 mL, Intravenous, 2 times per day, Tresa Chavarria MD, 10 mL at 07/28/21 2146    sodium chloride flush 0.9 % injection 10 mL, 10 mL, Intravenous, PRN, Tresa Chavarria MD    0.9 % sodium chloride infusion, 25 mL, Intravenous, PRN, Tresa Chavarria MD    ondansetron (ZOFRAN-ODT) disintegrating tablet 4 mg, 4 mg, Oral, Q8H PRN **OR** ondansetron (ZOFRAN) injection 4 mg, 4 mg, Intravenous, Q6H PRN, Darius Segura MD    acetaminophen (TYLENOL) tablet 650 mg, 650 mg, Oral, Q6H PRN **OR** acetaminophen (TYLENOL) suppository 650 mg, 650 mg, Rectal, Q6H PRN, Darius Segura MD    magnesium hydroxide (MILK OF MAGNESIA) 400 MG/5ML suspension 30 mL, 30 mL, Oral, Daily PRN, Darius Segura MD    aspirin chewable tablet 81 mg, 81 mg, Oral, Daily, Darius Segura MD, 81 mg at 07/28/21 0945    atorvastatin (LIPITOR) tablet 40 mg, 40 mg, Oral, Nightly, Darius Segura MD, 40 mg at 07/28/21 2146    pantoprazole (PROTONIX) tablet 40 mg, 40 mg, Oral, QAM AC, Darius Segura MD, 40 mg at 07/29/21 8304    potassium chloride (KLOR-CON M) extended release tablet 40 mEq, 40 mEq, Oral, PRN, 40 mEq at 07/28/21 0950 **OR** potassium bicarb-citric acid (EFFER-K) effervescent tablet 40 mEq, 40 mEq, Oral, PRN **OR** potassium chloride 10 mEq/100 mL IVPB (Peripheral Line), 10 mEq, Intravenous, PRN, Vu Ro, APRN - NP      ALLERGIES:  Penicillins and Tape Nia Metz tape]    SOCIAL HISTORY:    Denies former or current alcohol, tobacco or illicit drug use. FAMILY HISTORY:   Brother with history of passing away from myocardial infarction at age 48. Mother with history of CHF, prior MI and permanent pacemaker placement. Strong family history of hypertension. Denies any other pertinent cardiac family medical history me at this time. REVIEW OF SYSTEMS:     · Constitutional: Denies night sweats, and generalized fatigue. Denies significant weight loss or weight gain. · HEENT: Denies headaches, nose bleeds, rhinorrhea, sore throat. Denies blurred vision. Denies dysphagia, odynophagia. · Musculoskeletal: Denies falls, pain to BLE with ambulation. Denies muscle weakness. · Neurological: +dizziness. Denies numbness and tingling. Denies focal neurological deficits.   · Cardiovascular: +chest pain, +palpitations, +diaphoresis, +peripheral edema. Denies current near syncope, syncope. Denies PND, orthopnea. · Respiratory: +cough, +dyspsnea on exertion. Denies hemoptysis. · Gastrointestinal: +nausea. Denies abdominal pain, vomiting, diarrhea and constipation, black/bloody, and tarry stools. · Genitourinary: Denies dysuria and hematuria. · Hematologic: Denies excessive bruising or bleeding. · Endocrine: Denies excessive thirst. Denies intolerance to hot and cold. PHYSICAL EXAM:   /79   Pulse 66   Temp 97.6 °F (36.4 °C) (Temporal)   Resp 17   Ht 5' 5\" (1.651 m)   Wt 274 lb 3.2 oz (124.4 kg)   SpO2 94%   BMI 45.63 kg/m²   CONST:  Well developed, morbidly obese WF who appears stated age. Awake, alert, cooperative, no apparent distress. HEENT:   Head- Normocephalic, atraumatic. Eyes- Conjunctivae pink, anicteric. Neck-  No stridor, trachea midline, no apparent jugular venous distention. CHEST: Chest symmetrical and non-tender to palpation. No accessory muscle use or intercostal retractions. RESPIRATORY: Lung sounds - clear throughout fields. No wheezing, rales or rhonchi. CARDIOVASCULAR:     No noted carotid bruit. Heart Ausculation- Regular rate and rhythm, no apparent murmur. PV: Trace-1+ bilateral lower extremity edema. Pedal pulses palpable, no clubbing or cyanosis. ABDOMEN: Soft, non-tender to light palpation. Bowel sounds present. MS: Good muscle strength and tone. No atrophy or abnormal movements. SKIN: Warm and dry. NEURO / PSYCH: Oriented to person, place and time. Speech clear and appropriate. Follows all commands. Pleasant affect. DATA:    Telemetry: NSR with HR in the 70s    Diagnostic:  All diagnostic testing and lab work thus far this admission reviewed in detail. Chest CTA 7/27/2021:  Impression:  No evidence of pulmonary embolism or acute pulmonary abnormality. Lexiscan Stress Test (7/28/2021):   FINDINGS: The overall quality of the study was fair  Left ventricular cavity size was noted to be normal.  Rotational analog analysis demonstrated no patient motion or  extracardiac activity. This soft tissue breast attenuation artifact. The gated SPECT stress imaging in the short, vertical long, and  horizontal long axis demonstrated abnormal delayed tracer distribution  in the myocardium. A mild defect was present in the mid to distal anterior and apical  wall(s) that was medium sized by quantification with normal wall  motion suggestive attenuation artifact. The resting images show no change. Gated SPECT left ventricular ejection fraction was calculated to be  72% with normal wall motion and thickening. TID ratio 0.93. Impression  1.  ECG during the infusion did not change. 2.  The myocardial perfusion imaging was normal with attenuation  artifact. 3.  Overall left ventricular systolic function was normal without  regional wall motion abnormalities. 4.  No transient ischemic dilatation.   5.  Low risk general pharmacologic stress test.        Intake/Output Summary (Last 24 hours) at 7/29/2021 0806  Last data filed at 7/29/2021 0600  Gross per 24 hour   Intake 650 ml   Output --   Net 650 ml       Labs:   CBC:   Recent Labs     07/27/21  1555 07/28/21  0454   WBC 12.0* 10.5   HGB 12.4 11.7   HCT 39.1 36.4    288     BMP:   Recent Labs     07/28/21  0455 07/29/21  0517    139   K 3.4* 4.0   CO2 25 30*   BUN 27* 21   CREATININE 0.9 0.9   LABGLOM >60 >60   CALCIUM 9.3 8.9     Mag:   Recent Labs     07/28/21  0127 07/28/21  0455   MG 1.4* 2.0     HgA1c:   Lab Results   Component Value Date    LABA1C 5.6 07/28/2021     FASTING LIPID PANEL:  Lab Results   Component Value Date    CHOL 157 07/28/2021    HDL 54 07/28/2021    LDLCALC 87 07/28/2021    TRIG 79 07/28/2021     LIVER PROFILE:  Recent Labs     07/27/21  1555   AST 16   ALT 14   LABALBU 3.7      Ref Range & Units 07/27/21 2011 07/27/21 1840 07/27/21 1555   Troponin, High Sensitivity 0 - 9 ng/L 15High   13High  CM  16High  CM      Ref Range & Units 07/27/21 1555    Pro-BNP 0 - 125 pg/mL 489High        Ref Range & Units 07/27/21 1555   Lactic Acid 0.5 - 2.2 mmol/L 1.5                   Assessment and plan to follow as per Dr. Alison Michelle. Ghazal Sanders, [address] [place] [address], [place] [address] Cardiology    Electronically signed by Demi Can PA-C on 7/29/2021 at 8:06 AM         I personally and independently saw and examined patient and reviewed all done pertinent laboratory data, imaging studies, ECGs and rhythm strips. I have reviewed and agree with the APN history and physical exam as documented in the above note. Electronically signed by Deanna Shi MD on 7/29/2021 at 6:00 PM     We were asked to see the patient for chest pain.      IMPRESSION:  1. Chest pain. No ischemic EKG changes. Negative cardiac enzymes. Negative stress test.  2. History of pulmonary embolism diagnosed by Chest CTA 6/2021. History of LLE DVT in 2019. On chronic Eliquis therapy. 3. Mild MR, mild TR, with normal LV function and EF of 60% on TTE 4/2021.  4. Hypertension, controlled. 5. Hyperlipidemia, on statin therapy. 6. Morbid obesity. 7. Obstructive sleep apnea, compliant with home CPAP. 8. History of vasovagal syncope 7/2021.  9. History of goiter and thyroid nodules. 10. History of Vitamin D deficiency. 11. GERD. 12. Depression.        PLAN:   1. May be discharged from cardiology standpoint. 2. Follow up with her primary cardiologist, Dr. Jai Mejia, upon discharge.    3. Consider CTA of the coronaries (or coronary angiography) if recurrent chest pain.         Electronically signed by Deanna Shi MD on 7/29/2021 at 1:31 PM

## 2021-07-29 NOTE — PROGRESS NOTES
Hospitalist Progress Note      SYNOPSIS: Patient admitted on 2021 for chest pain. She states she developed sharp substernal chest pain while she was at work after an episode of coughing. The CP lasted a little over an hour. She also endorses some shortness of breath and diaphoresis. Patient does note that she gets frequent heartburn and is not on medication for this. She also notes she has  intermittent leg swelling and intermittent dyspnea with exertion. She is on Eliquis for history of PE. Underwent nuc med stress test which revealed no evidence of ischemia. She requested cardiology consult given recurrent chest pain. Hospital day 0     SUBJECTIVE:  Stable overnight. No issues reported. Patient seen and examined. No further episodes of CP. Records reviewed. Temp (24hrs), Av.6 °F (36.4 °C), Min:97.1 °F (36.2 °C), Max:98.5 °F (36.9 °C)    DIET: ADULT DIET; Regular; Low Fat/Low Chol/High Fiber/2 gm Na  CODE: Full Code    Intake/Output Summary (Last 24 hours) at 2021 0903  Last data filed at 2021 0836  Gross per 24 hour   Intake 770 ml   Output --   Net 770 ml       Review of Systems: All bolded are positive; please see HPI  General:  Fever, chills, diaphoresis, fatigue, malaise, night sweats, weight loss  Psychological:  Anxiety, disorientation, hallucinations. ENT:  Epistaxis, headaches, vertigo, visual changes. Cardiovascular:  Chest pain, irregular heartbeats, palpitations, paroxysmal nocturnal dyspnea. Respiratory:  Shortness of breath, coughing, sputum production, hemoptysis, wheezing, orthopnea.   Gastrointestinal:  Nausea, vomiting, diarrhea, heartburn, constipation, abdominal pain, hematemesis, hematochezia, melena, acholic stools  Genito-Urinary:  Dysuria, urgency, frequency, hematuria  Musculoskeletal:  Joint pain, joint stiffness, joint swelling, muscle pain  Neurology:  Headache, focal neurological deficits, weakness, numbness, paresthesia  Derm:  Rashes, ulcers, excoriations, bruising  Extremities:  Decreased ROM, peripheral edema, mottling    OBJECTIVE:    /79   Pulse 66   Temp 97.6 °F (36.4 °C) (Temporal)   Resp 17   Ht 5' 5\" (1.651 m)   Wt 274 lb 3.2 oz (124.4 kg)   SpO2 94%   BMI 45.63 kg/m²     General appearance:  Obese, elderly female in no apparent distress, appears stated age and cooperative. HEENT:  Normal cephalic, atraumatic without obvious deformity. Pupils equal, round, and reactive to light. Extra ocular muscles intact. Conjunctivae/corneas clear. Neck: Supple, with full range of motion. No jugular venous distention. Trachea midline. Respiratory:  Normal respiratory effort. Clear to auscultation, bilaterally without Rales/Wheezes/Rhonchi. Cardiovascular:  Regular rate and rhythm with normal S1/S2 without murmurs, rubs or gallops. Abdomen: Soft, non-tender, obese with normal bowel sounds. Musculoskeletal:  No clubbing, cyanosis or edema bilaterally. Full range of motion without deformity. Skin: Skin color, texture, turgor normal.  No rashes or lesions. Neurologic:  Neurovascularly intact without any focal sensory/motor deficits.  Cranial nerves: II-XII intact, grossly non-focal.  Psychiatric:  Alert and oriented, thought content appropriate, normal insight    Medications:  REVIEWED DAILY    Infusion Medications    sodium chloride       Scheduled Medications    apixaban  5 mg Oral BID    escitalopram  20 mg Oral Daily    hydroCHLOROthiazide  25 mg Oral Daily    losartan  50 mg Oral Daily    metoprolol succinate  12.5 mg Oral Daily    therapeutic multivitamin-minerals  1 tablet Oral Daily    oxybutynin  20 mg Oral Daily    vitamin D  2,000 Units Oral Daily    sodium chloride flush  5-40 mL Intravenous 2 times per day    aspirin  81 mg Oral Daily    atorvastatin  40 mg Oral Nightly    pantoprazole  40 mg Oral QAM AC     PRN Meds: sodium chloride flush, sodium chloride, ondansetron **OR** ondansetron, acetaminophen **OR** acetaminophen, magnesium hydroxide, potassium chloride **OR** potassium alternative oral replacement **OR** potassium chloride    Labs:     Recent Labs     07/27/21  1555 07/28/21  0454   WBC 12.0* 10.5   HGB 12.4 11.7   HCT 39.1 36.4    288       Recent Labs     07/27/21  1555 07/28/21  0455 07/29/21  0517    137 139   K 4.0 3.4* 4.0    102 104   CO2 27 25 30*   BUN 33* 27* 21   CREATININE 1.2* 0.9 0.9   CALCIUM 9.2 9.3 8.9       Recent Labs     07/27/21  1555   PROT 6.6   ALKPHOS 89   ALT 14   AST 16   BILITOT 0.3     Chronic labs:    Lab Results   Component Value Date    CHOL 157 07/28/2021    TRIG 79 07/28/2021    HDL 54 07/28/2021    LDLCALC 87 07/28/2021    TSH 0.678 07/09/2021    INR 1.0 04/15/2021    LABA1C 5.6 07/28/2021       Radiology: REVIEWED DAILY    ASSESSMENT:  Chest pain- NM stress negative. Hyperlipidemia  Hypertension  JES  Obesity  Anxiety  History of PE/DVT anticoagulated on Eliquis  Goiter    PLAN:  Awaiting cardiology input per patient request  Continue current care    DVT prophylaxis: Eliquis  Diet: NPO  Disposition: Intermediate    DISCHARGE PLAN: Home pending cards input    +++++++++++++++++++++++++++++++++++++++++++++++++  Sarah Hernandez[address]  +++++++++++++++++++++++++++++++++++++++++++++++++  NOTE: This report was transcribed using voice recognition software. Every effort was made to ensure accuracy; however, inadvertent computerized transcription errors may be present.

## 2021-07-29 NOTE — DISCHARGE SUMMARY
Hospitalist Discharge Summary    Patient ID: Xin Missael   Patient : 1955  Patient's PCP: Yevgeniy Grajeda MD    Admit Date: 2021   Admitting Physician: Ana Licea MD    Discharge Date:  2021   Discharge Physician: GENTRY Godwin NP   Discharge Condition: Stable  Discharge Disposition: [facility] course in brief:  (Please refer to daily progress notes for a comprehensive review of the hospitalization by requesting medical records)    Patient admitted on 2021 for chest pain.       She states she developed sharp substernal chest pain while she was at work after an episode of coughing. The CP lasted a little over an hour.  She also endorses some shortness of breath and diaphoresis.  Patient does note that she gets frequent heartburn and is not on medication for this. She also notes she has  intermittent leg swelling and intermittent dyspnea with exertion. She is on Eliquis for history of PE. She underwent NM stress test which revealed no evidence of ischemia. She is now stable for discharge home with OP follow up. Consults:   IP CONSULT TO INTERNAL MEDICINE  IP CONSULT TO CARDIOLOGY    Discharge Diagnoses:  Chest pain --ACS r/o  Hyperlipidemia  Hypertension  Hypokalemia  JES  Obesity  Anxiety  History of PE/DVT anticoagulated on Eliquis  Goiter    Discharge Instructions / Follow up: Follow-up with PCP within 1 week of discharge. Future Appointments   Date Time Provider Luci Lee   2021 10:30 AM [place] RM 1 SEYZ [facility]   2021 10:00 AM MD EDIE Florentino ENDO [facility]   8/10/2021  2:30 PM DO Anahy Saldivar [facility]   2021 10:00 [facility]   2021  8:50 AM MD EDIE Thomas ORTHO [facility]       The patient's condition is stable. At this time the patient is without objective evidence of an acute process requiring continuing hospitalization or inpatient management.   They are stable for discharge with outpatient follow-up. I have spoken with the patient and discussed the results of the current hospitalization, in addition to providing specific details for the plan of care and counseling regarding the diagnosis and prognosis. The plan has been discussed in detail and they are aware of the specific conditions for emergent return, as well as the importance of follow-up. Their questions are answered at this time and they are agreeable with the plan for discharge home.     Continued appropriate risk factor modification of blood pressure, diabetes and serum lipids will remain essential to reducing risk of future atherosclerotic development    Activity: activity as tolerated    Significant labs:  CBC:   Recent Labs     07/27/21  1555 07/28/21  0454   WBC 12.0* 10.5   RBC 4.25 3.97   HGB 12.4 11.7   HCT 39.1 36.4   MCV 92.0 91.7   RDW 14.4 14.5    288     BMP:   Recent Labs     07/27/21  1555 07/28/21  0127 07/28/21  0455 07/29/21  0517     --  137 139   K 4.0  --  3.4* 4.0     --  102 104   CO2 27  --  25 30*   BUN 33*  --  27* 21   CREATININE 1.2*  --  0.9 0.9   MG  --  1.4* 2.0  --      LFT:  Recent Labs     07/27/21  1555   PROT 6.6   ALKPHOS 89   ALT 14   AST 16   BILITOT 0.3     Hgb A1C:   Lab Results   Component Value Date    LABA1C 5.6 07/28/2021     Folate and B12:   Lab Results   Component Value Date    YJHUYNEE75 512 07/09/2021   ,   Lab Results   Component Value Date    FOLATE 16.1 07/09/2021     Thyroid Studies:   Lab Results   Component Value Date    TSH 0.678 07/09/2021       Urinalysis:    Lab Results   Component Value Date    NITRU Negative 06/22/2021    WBCUA 0-1 06/22/2021    BACTERIA RARE 06/22/2021    RBCUA 0-1 06/22/2021    BLOODU Negative 06/22/2021    SPECGRAV 1.020 06/22/2021    GLUCOSEU Negative 06/22/2021       Imaging:  CT Head WO Contrast    Result Date: 7/9/2021  EXAMINATION: CT OF THE HEAD WITHOUT CONTRAST; CT OF THE CERVICAL SPINE WITHOUT CONTRAST 7/9/2021 1:14 pm TECHNIQUE: CT of the head was performed without the administration of intravenous contrast. Dose modulation, iterative reconstruction, and/or weight based adjustment of the mA/kV was utilized to reduce the radiation dose to as low as reasonably achievable.; CT of the cervical spine was performed without the administration of intravenous contrast. Multiplanar reformatted images are provided for review. Dose modulation, iterative reconstruction, and/or weight based adjustment of the mA/kV was utilized to reduce the radiation dose to as low as reasonably achievable. COMPARISON: None. HISTORY: ORDERING SYSTEM PROVIDED HISTORY: syncope on elequis TECHNOLOGIST PROVIDED HISTORY: Reason for exam:->syncope on elequis Has a \"code stroke\" or \"stroke alert\" been called? ->No Decision Support Exception - unselect if not a suspected or confirmed emergency medical condition->Emergency Medical Condition (MA) What reading provider will be dictating this exam?->CRC FINDINGS: CT OF THE BRAIN: BRAIN/VENTRICLES: No mass effect, edema or hemorrhage is seen. Mild age-appropriate volume loss is seen in the brain. No significant chronic microvascular ischemic changes are evident by CT. No hydrocephalus or extra-axial fluid is seen. ORBITS: The visualized portion of the orbits demonstrate no acute abnormality. SINUSES: The visualized paranasal sinuses and mastoid air cells demonstrate no acute abnormality. SOFT TISSUES/SKULL: No acute abnormality of the visualized skull or soft tissues. CT CERVICAL SPINE: BONES/ALIGNMENT: There is no acute fracture or traumatic malalignment. The central canal is developmentally narrow due to short pedicles. DEGENERATIVE CHANGES: Moderate loss of disc heights at multiple levels. The central canal is suboptimally visualized due to beam hardening artifact. Variable degrees of central canal stenoses are noted, with at least moderate grade stenosis at C4-5.   The lower cervical central canal is not well visualized due to beam hardening artifact. Multilevel neural foraminal stenoses, worst (severe) at the left C4-5 level. SOFT TISSUES: There is no prevertebral soft tissue swelling. CT head without contrast: 1. No skull fracture or acute intracranial abnormality. CT cervical spine without contrast: 1. No fracture or joint dislocation is seen. 2. Degenerative changes, as described. CT Cervical Spine WO Contrast    Result Date: 7/9/2021  EXAMINATION: CT OF THE HEAD WITHOUT CONTRAST; CT OF THE CERVICAL SPINE WITHOUT CONTRAST 7/9/2021 1:14 pm TECHNIQUE: CT of the head was performed without the administration of intravenous contrast. Dose modulation, iterative reconstruction, and/or weight based adjustment of the mA/kV was utilized to reduce the radiation dose to as low as reasonably achievable.; CT of the cervical spine was performed without the administration of intravenous contrast. Multiplanar reformatted images are provided for review. Dose modulation, iterative reconstruction, and/or weight based adjustment of the mA/kV was utilized to reduce the radiation dose to as low as reasonably achievable. COMPARISON: None. HISTORY: ORDERING SYSTEM PROVIDED HISTORY: syncope on elequis TECHNOLOGIST PROVIDED HISTORY: Reason for exam:->syncope on elequis Has a \"code stroke\" or \"stroke alert\" been called? ->No Decision Support Exception - unselect if not a suspected or confirmed emergency medical condition->Emergency Medical Condition (MA) What reading provider will be dictating this exam?->CRC FINDINGS: CT OF THE BRAIN: BRAIN/VENTRICLES: No mass effect, edema or hemorrhage is seen. Mild age-appropriate volume loss is seen in the brain. No significant chronic microvascular ischemic changes are evident by CT. No hydrocephalus or extra-axial fluid is seen. ORBITS: The visualized portion of the orbits demonstrate no acute abnormality.  SINUSES: The visualized paranasal sinuses and mastoid air cells demonstrate no acute abnormality. SOFT TISSUES/SKULL: No acute abnormality of the visualized skull or soft tissues. CT CERVICAL SPINE: BONES/ALIGNMENT: There is no acute fracture or traumatic malalignment. The central canal is developmentally narrow due to short pedicles. DEGENERATIVE CHANGES: Moderate loss of disc heights at multiple levels. The central canal is suboptimally visualized due to beam hardening artifact. Variable degrees of central canal stenoses are noted, with at least moderate grade stenosis at C4-5. The lower cervical central canal is not well visualized due to beam hardening artifact. Multilevel neural foraminal stenoses, worst (severe) at the left C4-5 level. SOFT TISSUES: There is no prevertebral soft tissue swelling. CT head without contrast: 1. No skull fracture or acute intracranial abnormality. CT cervical spine without contrast: 1. No fracture or joint dislocation is seen. 2. Degenerative changes, as described. XR CHEST PORTABLE    Result Date: 7/9/2021  EXAMINATION: ONE XRAY VIEW OF THE CHEST 7/9/2021 12:39 pm COMPARISON: 06/22/2021. HISTORY: ORDERING SYSTEM PROVIDED HISTORY: syncope TECHNOLOGIST PROVIDED HISTORY: Reason for exam:->syncope What reading provider will be dictating this exam?->CRC FINDINGS: Heart and the mediastinal structures are normal.  There is minimal atelectasis in the lung bases. The remainder of the lungs are clear. There is no focal consolidation or pleural effusion. Nonacute chest with minimal atelectasis in the lung bases. CTA PULMONARY W CONTRAST    Result Date: 7/27/2021  EXAMINATION: CTA OF THE CHEST 7/27/2021 7:01 pm TECHNIQUE: CTA of the chest was performed after the administration of intravenous contrast.  Multiplanar reformatted images are provided for review. MIP images are provided for review.  Dose modulation, iterative reconstruction, and/or weight based adjustment of the mA/kV was utilized to reduce the radiation dose to as COZAAR  TAKE 1 TABLET BY MOUTH ONCE DAILY     metoprolol succinate 25 MG extended release tablet  Commonly known as: TOPROL XL  Take 0.5 tablets by mouth daily TAKE 1/2 TABLET BY MOUTH DAILY     oxybutynin 10 MG extended release tablet  Commonly known as: DITROPAN-XL  Take 2 tablets by mouth daily     pravastatin 10 MG tablet  Commonly known as: PRAVACHOL     therapeutic multivitamin-minerals tablet     vitamin D 50 MCG (2000 UT) Tabs tablet  Commonly known as: CHOLECALCIFEROL  Take 1 tablet by mouth daily           Where to Get Your Medications      These medications were sent to [address] [facility] [address], [address] [place] [address], [address], [place] [id number]    Phone: [phone]   · omeprazole 40 MG delayed release capsule         Time Spent on discharge is more than 45 minutes in the examination, evaluation, counseling and review of medications and discharge plan.    +++++++++++++++++++++++++++++++++++++++++++++++++  Natacha Landis, [address] [place] [address]  +++++++++++++++++++++++++++++++++++++++++++++++++  NOTE: This report was transcribed using voice recognition software. Every effort was made to ensure accuracy; however, inadvertent computerized transcription errors may be present.

## 2021-07-29 NOTE — CONSULTS
Patient seen and examined. Chart, labs, imaging studies, EKG and rhythm strips reviewed. Full consult to follow. We were asked to see the patient for chest pain. IMPRESSION:  1. Chest pain. No ischemic EKG changes. Negative cardiac enzymes. Negative stress test.  2. History of pulmonary embolism diagnosed by Chest CTA 6/2021. History of LLE DVT in 2019. On chronic Eliquis therapy. 3. Mild MR, mild TR, with normal LV function and EF of 60% on TTE 4/2021.  4. Hypertension, controlled. 5. Hyperlipidemia, on statin therapy. 6. Morbid obesity. 7. Obstructive sleep apnea, compliant with home CPAP. 8. History of vasovagal syncope 7/2021.  9. History of goiter and thyroid nodules. 10. History of Vitamin D deficiency. 11. GERD. 12. Depression. PLAN:   1. May be discharged from cardiology standpoint. 2. Follow up with her primary cardiologist, Dr. Madelin Lindsay, upon discharge. 3. Consider CTA of the coronaries (or coronary angiography) if recurrent chest pain.        Electronically signed by Eliseo Yost MD on 7/29/2021 at 1:31 PM

## 2021-07-30 ENCOUNTER — TELEPHONE (OUTPATIENT)
Dept: ADMINISTRATIVE | Age: 66
End: 2021-07-30

## 2021-07-30 ENCOUNTER — CARE COORDINATION (OUTPATIENT)
Dept: CASE MANAGEMENT | Age: 66
End: 2021-07-30

## 2021-07-30 DIAGNOSIS — R07.9 CHEST PAIN, UNSPECIFIED TYPE: Primary | ICD-10-CM

## 2021-07-30 NOTE — CARE COORDINATION
Radha 45 Transitions Initial Follow Up Call    Call within 2 business days of discharge: Yes    Patient: Be Hernandez Patient : 1955   MRN: <[id number]>  Reason for Admission: Chest pain  Discharge Date: 21 RARS: Readmission Risk Score: 12      Last Discharge [facility]       Complaint Diagnosis Description Type Department Provider    21 Chest Pain Chest pain, unspecified type ED to Hosp-Admission (Discharged) (ADMITTED) [id number] Lety Rodriguez DO; Yessi Land. .. Spoke with: Aj Briceño who states she is doing well she really enjoyed the care she received while in the hospital. She denies chest pain, SOB, sweating, cough fever. She is eating and drinking well. Normal bowel and bladder elimination. Medications reviewed and taking all as directed waiting on Prilosec as was sent to her mail away pharmacy. 1111 f complete and sent. All appointments scheduled and patient has no concerns today is agreeable to calls for updates with care. Facility: [facility]    Non-face-to-face services provided:  Obtained and reviewed discharge summary and/or continuity of care documents  Transitions of Care Initial Call    Was this an external facility discharge? No     Challenges to be reviewed by the provider   Additional needs identified to be addressed with provider: No  none             Method of communication with provider : none      Advance Care Planning:   Does patient have an Advance Directive: reviewed and needs to be updated, not on file; education provided, not on file, patient declined education, decision maker updated and referral to internal ACP facilitator. Was this a readmission? No  Patient stated reason for admission: chest pain  Patients top risk factors for readmission: lack of knowledge about disease and medication management    Care Transition Nurse (CTN) contacted the patient by telephone to perform post hospital discharge assessment.  Verified name and  with patient as identifiers. Provided introduction to self, and explanation of the CTN role. CTN reviewed discharge instructions, medical action plan and red flags with patient who verbalized understanding. Patient given an opportunity to ask questions and does not have any further questions or concerns at this time. Were discharge instructions available to patient? Yes. Reviewed appropriate site of care based on symptoms and resources available to patient including: PCP, Specialist and When to call 911. The patient agrees to contact the PCP office for questions related to their healthcare. Medication reconciliation was performed with patient, who verbalizes understanding of administration of home medications. Advised obtaining a 90-day supply of all daily and as-needed medications. Covid Risk Education     Educated patient about risk for severe COVID-19 due to risk factors according to CDC guidelines. LPN CC reviewed discharge instructions, medical action plan and red flag symptoms with the patient who verbalized understanding. Discussed COVID vaccination status: Yes. Education provided on COVID-19 vaccination as appropriate. Discussed exposure protocols and quarantine with CDC Guidelines. Patient was given an opportunity to verbalize any questions and concerns and agrees to contact LPN CC or health care provider for questions related to their healthcare. Reviewed and educated patient on any new and changed medications related to discharge diagnosis. Was patient discharged with a pulse oximeter? No Discussed and confirmed pulse oximeter discharge instructions and when to notify provider or seek emergency care. LPN CC provided contact information. Plan for follow-up call in 5-7 days based on severity of symptoms and risk factors.   Plan for next call: symptom management-chest pain or SOB present      Care Transitions 24 Hour Call    Do you have any ongoing symptoms?: No  Do you have a copy of your discharge instructions?: Yes  Do you have all of your prescriptions and are they filled?: Yes  Have you been contacted by a [address]?: No  Have you scheduled your follow up appointment?: Yes  How are you going to get to your appointment?: Car - family or friend to transport  Were you discharged with any Home Care or Post Acute Services: No  Post Acute Services:  Outpatient/Community Services (Comment: 6/28/21-outpatient PT in [facility])  Do you feel like you have everything you need to keep you well at home?: Yes  Care Transitions Interventions         Follow Up  Future Appointments   Date Time Provider Luci Lee   8/7/2021 10:30 AM [facility] [address] [place]   8/9/2021 10:00 AM MD EDIE Tate [facility]   8/10/2021  2:30 PM [facility]   8/20/2021 10:15 AM DO Chrissy Llanes [facility]   9/2/2021 10:00 AM Gerry Jenkins MD [facility]   9/24/2021  8:50 AM MD GUS Nick ORTHO [facility]       Lilibeth Burton LPN

## 2021-08-03 ENCOUNTER — TELEPHONE (OUTPATIENT)
Dept: FAMILY MEDICINE CLINIC | Age: 66
End: 2021-08-03

## 2021-08-03 NOTE — PROGRESS NOTES
Let's get patient in for a follow up Ventricular Rate : 71   Atrial Rate : 71   P-R Interval : 126   QRS Duration : 70   Q-T Interval : 396   QTC Calculation(Bezet) : 430   P Axis : 41   R Axis : 35   T Axis : 15   Diagnosis : Normal sinus rhythm~Normal ECG~No previous ECGs available~Confirmed by case PALOMINO M.D.([facility])NAVDEEP ([id number]) on 1/27/2017 6:16:17 PM~Confirmed by NATHALY LYNNE  ([facility])SHARMILA ([id number]) on 2/1/2017 3:56:17 PM

## 2021-08-06 ENCOUNTER — CARE COORDINATION (OUTPATIENT)
Dept: CASE MANAGEMENT | Age: 66
End: 2021-08-06

## 2021-08-06 NOTE — CARE COORDINATION
[facility] 45 Transitions Follow Up Call    2021    Patient: Rupa Oliveira  Patient : 1955   MRN: <[id number]>  Reason for Admission: Chest pain  Discharge Date: 21 RARS: Readmission Risk Score: 12         Spoke with: Eddie Trujillo who states she is doing good, she has returned to work denies chest pain, dizziness, has fatigue and SOB with exertion. Patient is eating and drinking well. And normal bowel and bladder elimination patterns. Denies concerns at this time. Has F/U scheduled next week and US tomorrow  Care Transitions Follow Up Call    Needs to be reviewed by the provider   Additional needs identified to be addressed with provider: No  none             Method of communication with provider : none      Care Transition Nurse (CTN) contacted the patient by telephone to follow up after admission on 21. Verified name and  with patient as identifiers. Addressed changes since last contact: patient has returned to work has fatigue and SOB with exertion  Discussed follow-up appointments. If no appointment was previously scheduled, appointment scheduling offered: Yes. Is follow up appointment scheduled within 7 days of discharge? No.    Advance Care Planning:   Does patient have an Advance Directive: reviewed and needs to be updated, not on file; education provided, not on file, patient declined education, decision maker updated and referral to internal ACP facilitator. CTN reviewed discharge instructions, medical action plan and red flags with patient and discussed any barriers to care and/or understanding of plan of care after discharge. Discussed appropriate site of care based on symptoms and resources available to patient including: PCP, Specialist and When to call 911. The patient agrees to contact the PCP office for questions related to their healthcare.      Patients top risk factors for readmission: lack of knowledge about disease  Interventions to address risk factors: Obtained and reviewed discharge summary and/or continuity of care documents          CTN provided contact information for future needs. Plan for follow-up call in 7-10 days based on severity of symptoms and risk factors. Plan for next call: symptom management-SOB, fatigue        Care Transitions Subsequent and Final Call    Subsequent and Final Calls  Do you have any ongoing symptoms?: Yes  Onset of Patient-reported symptoms: In the past 7 days  Patient-reported symptoms: Fatigue, Shortness of Breath  Interventions for patient-reported symptoms: Other  Have your medications changed?: No  Do you have any questions related to your medications?: No  Do you currently have any active services?: Yes  Are you currently active with any services?: Outpatient/Community Services  Do you have any needs or concerns that I can assist you with?: No  Identified Barriers: Lack of Education, Other  Care Transitions Interventions  Other Interventions:            Follow Up  Future Appointments   Date Time Provider Luci Lee   8/7/2021 10:30 AM [facility] US RM 1 SEYZ [facility]   8/10/2021  2:30 PM [facility]   8/20/2021 10:15 AM DO Franklyn Sandy [facility]   8/25/2021  8:00 AM MD EDIE Vogt [facility]   9/24/2021  8:50 AM MD EDIE Dorantes ORTHO [facility]       Annika Romero LPN

## 2021-08-07 ENCOUNTER — HOSPITAL ENCOUNTER (OUTPATIENT)
Dept: ULTRASOUND IMAGING | Age: 66
Discharge: HOME | End: 2021-08-09
Payer: COMMERCIAL

## 2021-08-07 DIAGNOSIS — E04.2 MULTINODULAR GOITER: ICD-10-CM

## 2021-08-07 PROCEDURE — 76536 US EXAM OF HEAD AND NECK: CPT

## 2021-08-10 ENCOUNTER — OFFICE VISIT (OUTPATIENT)
Dept: FAMILY MEDICINE CLINIC | Age: 66
End: 2021-08-10
Payer: COMMERCIAL

## 2021-08-10 VITALS
OXYGEN SATURATION: 98 % | WEIGHT: 277.3 LBS | HEART RATE: 76 BPM | BODY MASS INDEX: 46.2 KG/M2 | HEIGHT: 65 IN | SYSTOLIC BLOOD PRESSURE: 118 MMHG | RESPIRATION RATE: 20 BRPM | DIASTOLIC BLOOD PRESSURE: 60 MMHG | TEMPERATURE: 96.6 F

## 2021-08-10 DIAGNOSIS — M79.89 LEG SWELLING: ICD-10-CM

## 2021-08-10 DIAGNOSIS — I26.99 PULMONARY EMBOLISM ON LEFT (HCC): ICD-10-CM

## 2021-08-10 DIAGNOSIS — R06.09 DOE (DYSPNEA ON EXERTION): Primary | ICD-10-CM

## 2021-08-10 PROCEDURE — 1123F ACP DISCUSS/DSCN MKR DOCD: CPT | Performed by: FAMILY MEDICINE

## 2021-08-10 PROCEDURE — 1090F PRES/ABSN URINE INCON ASSESS: CPT | Performed by: FAMILY MEDICINE

## 2021-08-10 PROCEDURE — 4040F PNEUMOC VAC/ADMIN/RCVD: CPT | Performed by: FAMILY MEDICINE

## 2021-08-10 PROCEDURE — G8427 DOCREV CUR MEDS BY ELIG CLIN: HCPCS | Performed by: FAMILY MEDICINE

## 2021-08-10 PROCEDURE — 99214 OFFICE O/P EST MOD 30 MIN: CPT | Performed by: FAMILY MEDICINE

## 2021-08-10 PROCEDURE — 1036F TOBACCO NON-USER: CPT | Performed by: FAMILY MEDICINE

## 2021-08-10 PROCEDURE — 3017F COLORECTAL CA SCREEN DOC REV: CPT | Performed by: FAMILY MEDICINE

## 2021-08-10 PROCEDURE — G8399 PT W/DXA RESULTS DOCUMENT: HCPCS | Performed by: FAMILY MEDICINE

## 2021-08-10 PROCEDURE — G8417 CALC BMI ABV UP PARAM F/U: HCPCS | Performed by: FAMILY MEDICINE

## 2021-08-10 RX ORDER — POTASSIUM CHLORIDE 750 MG/1
10 TABLET, EXTENDED RELEASE ORAL DAILY
Qty: 30 TABLET | Refills: 5 | Status: SHIPPED
Start: 2021-08-10 | End: 2022-02-22 | Stop reason: ALTCHOICE

## 2021-08-10 RX ORDER — FUROSEMIDE 20 MG/1
20 TABLET ORAL DAILY
Qty: 30 TABLET | Refills: 1 | Status: SHIPPED
Start: 2021-08-10 | End: 2021-10-26 | Stop reason: SDUPTHER

## 2021-08-10 SDOH — ECONOMIC STABILITY: FOOD INSECURITY: WITHIN THE PAST 12 MONTHS, THE FOOD YOU BOUGHT JUST DIDN'T LAST AND YOU DIDN'T HAVE MONEY TO GET MORE.: NEVER TRUE

## 2021-08-10 SDOH — ECONOMIC STABILITY: FOOD INSECURITY: WITHIN THE PAST 12 MONTHS, YOU WORRIED THAT YOUR FOOD WOULD RUN OUT BEFORE YOU GOT MONEY TO BUY MORE.: NEVER TRUE

## 2021-08-10 ASSESSMENT — SOCIAL DETERMINANTS OF HEALTH (SDOH): HOW HARD IS IT FOR YOU TO PAY FOR THE VERY BASICS LIKE FOOD, HOUSING, MEDICAL CARE, AND HEATING?: NOT HARD AT ALL

## 2021-08-10 NOTE — PATIENT INSTRUCTIONS
Patient Education        furosemide (oral/injection)  Pronunciation:  haja shipman  Brand:  Lasix  What is the most important information I should know about furosemide? You should not use this medicine if you are unable to urinate. Do not take more than your recommended dose. High doses of furosemide may cause irreversible hearing loss. What is furosemide? Furosemide is a loop diuretic (water pill) that prevents your body from absorbing too much salt. This allows the salt to instead be passed in your urine. Furosemide is used to treat fluid retention (edema) in people with congestive heart failure, liver disease, or a kidney disorder such as nephrotic syndrome. Furosemide is also used to treat high blood pressure (hypertension). Furosemide may also be used for purposes not listed in this medication guide. What should I discuss with my healthcare provider before taking furosemide? You should not use furosemide if you are allergic to it, or if you are unable to urinate. Tell your doctor if you have ever had:  · kidney disease;  · enlarged prostate, bladder obstruction, urination problems;  · cirrhosis or other liver disease;  · an electrolyte imbalance (such as low levels of potassium or magnesium in your blood);  · gout;  · lupus;  · diabetes; or  · a sulfa drug allergy. Tell your doctor if you have an MRI (magnetic resonance imaging) or any type of scan using a radioactive dye that is injected into your veins. Both contrast dyes and furosemide can harm your kidneys. It is not known whether this medicine will harm an unborn baby. Tell your doctor if you are pregnant or plan to become pregnant. It may not be safe to breastfeed while using this medicine. Ask your doctor about any risk. Furosemide may slow breast milk production. How should I take furosemide? Follow all directions on your prescription label and read all medication guides or instruction sheets.  Your doctor may occasionally change your dose. Use the medicine exactly as directed. Furosemide oral is taken by mouth. Furosemide injection is injected into a muscle or given as an infusion into a vein. A healthcare provider will give you this injection if you are unable to take the medicine by mouth. You may receive your first dose in a hospital or clinic setting if you have severe liver disease. Do not take more than your recommended dose. High doses of furosemide may cause irreversible hearing loss. Measure liquid medicine carefully. Use the dosing syringe provided, or use a medicine dose-measuring device (not a kitchen spoon). Furosemide doses are based on weight in children. Your child's dose needs may change if the child gains or loses weight. Furosemide will make you urinate more often and you may get dehydrated easily. Follow your doctor's instructions about using potassium supplements or getting enough salt and potassium in your diet. Your blood pressure will need to be checked often and you may need other medical tests. If you have high blood pressure, keep using this medicine even if you feel well. High blood pressure often has no symptoms. You may need to use blood pressure medicine for the rest of your life. If you need surgery, tell the surgeon ahead of time that you are using furosemide. Store at room temperature away from moisture, heat, and light. Throw away any unused oral liquid  after 90 days. What happens if I miss a dose? Furosemide is sometimes used only once, so you may not be on a dosing schedule. If you are using the medication regularly, take the medicine as soon as you can, but skip the missed dose if it is almost time for your next dose. Do not take two doses at one time. What happens if I overdose? Seek emergency medical attention or call the Poison Help line at 1-201.512.9713. Overdose symptoms may include feeling very thirsty or hot, heavy sweating, hot and dry skin, extreme weakness, or fainting.   What should I avoid while taking furosemide? Avoid getting up too fast from a sitting or lying position, or you may feel dizzy. Avoid becoming dehydrated. Follow your doctor's instructions about the type and amount of liquids you should drink while you are taking furosemide. Drinking alcohol with this medicine can cause side effects. If you have high blood pressure, ask a doctor or pharmacist before taking any medicines that can raise your blood pressure, such as diet pills or cough-and-cold medicine. What are the possible side effects of furosemide? Get emergency medical help if you have signs of an allergic reaction (hives, difficult breathing, swelling in your face or throat) or a severe skin reaction (fever, sore throat, burning in your eyes, skin pain, red or purple skin rash that spreads and causes blistering and peeling). Call your doctor at once if you have:  · a light-headed feeling, like you might pass out;  · ringing in your ears, hearing loss;  · muscle spasms or contractions;  · pale skin, easy bruising, unusual bleeding;  · high blood sugar --increased thirst, increased urination, dry mouth, fruity breath odor;  · kidney problems --little or no urination, swelling in your feet or ankles, feeling tired or short of breath;  · signs of liver or pancreas problems --loss of appetite, upper stomach pain (that may spread to your back), nausea or vomiting, dark urine, jaundice (yellowing of the skin or eyes); or  · signs of an electrolyte imbalance --dry mouth, thirst, weakness, drowsiness, feeling jittery or unsteady, vomiting, irregular heartbeats, fluttering in your chest, numbness or tingling, muscle cramps, muscle weakness or limp feeling. Common side effects may include:  · diarrhea, constipation, loss of appetite;  · numbness or tingling;  · headache, dizziness; or  · blurred vision. This is not a complete list of side effects and others may occur.  Call your doctor for medical advice about side assist licensed healthcare practitioners in caring for their patients and/or to serve consumers viewing this service as a supplement to, and not a substitute for, the expertise, skill, knowledge and judgment of healthcare practitioners. The absence of a warning for a given drug or drug combination in no way should be construed to indicate that the drug or drug combination is safe, effective or appropriate for any given patient. [facility] does not assume any responsibility for any aspect of healthcare administered with the aid of information [facility] provides. The information contained herein is not intended to cover all possible uses, directions, precautions, warnings, drug interactions, allergic reactions, or adverse effects. If you have questions about the drugs you are taking, check with your doctor, nurse or pharmacist.  Copyright 1929-[address] [address]. Version: 16.01. Revision date: 5/22/2019. Care instructions adapted under license by [facility] ([facility]). If you have questions about a medical condition or this instruction, always ask your healthcare professional. [place] [address] any warranty or liability for your use of this information.

## 2021-08-10 NOTE — PROGRESS NOTES
HPI:  The patient is a 77 y.o. female who presents today to establish care. Previous  PCP was Dr. Adria Manning. She is switching because she felt like it was hard to get appts. The patient is complaining of shortness of breath and swelling of her legs. She has been in and out of the hospital due to shortness of breath and issues with her blood pressure being low. Her son is with her today and states that she can barely walk. She had a stress test on 7/27/2021 which was normal. She is following with Dr. Gabriella Tan. Echo done April 2021 which showed EF of 60%. Shortness of breath is associated with a cough that is productive of white sputum.       Past Medical History:   Diagnosis Date    Anxiety     Deep vein blood clot of left lower extremity ([address] 05/27/2019    Family history of early CAD     Hyperlipemia     Hypertension     Lightheadedness     Multinodular goiter     Sleep apnea     SOBOE (shortness of breath on exertion)       Past Surgical History:   Procedure Laterality Date    BREAST SURGERY Left 2014    biopsy    CARDIOVASCULAR STRESS TEST      COLONOSCOPY      CYST REMOVAL  05/2017    mouth/under tongue    HYSTERECTOMY, TOTAL ABDOMINAL  1990    KNEE ARTHROSCOPY Left 2005    LEG SURGERY Left 1963    ligament repair    SALIVARY GLAND SURGERY Left 05/24/2017    TONSILLECTOMY      childhood      Family History   Problem Relation Age of Onset    Heart Disease Mother     Pacemaker Mother     COPD Mother     Heart Failure Mother     Heart Disease Father     Cancer Father         prostate    Hypertension Father     Coronary Art Dis Father         63's    Prostate Cancer Father     Hypertension Sister     Diabetes Sister     Heart Disease Sister     Diabetes Sister     COPD Sister     Heart Disease Brother         age 54    Heart Attack Brother     Hypertension Son     Clotting Disorder Son      Social History     Socioeconomic History    Marital status:      Spouse name: Not on file    Number of children: Not on file    Years of education: Not on file    Highest education level: Not on file   Occupational History    Occupation: maintenance- housekeeping     Comment: [employer]   Tobacco Use    Smoking status: Never Smoker    Smokeless tobacco: Never Used   Vaping Use    Vaping Use: Never used   Substance and Sexual Activity    Alcohol use: No    Drug use: No    Sexual activity: Not Currently     Partners: Female   Other Topics Concern    Not on file   Social History Narrative    Not on file     Social Determinants of Health     Financial Resource Strain: Low Risk     Difficulty of Paying Living Expenses: Not hard at all   Food Insecurity: No Food Insecurity    Worried About [address] in the Last Year: Never true    [address] [employer] [employer] in the Last Year: Never true   Transportation Needs:     Lack of Transportation (Medical):  Lack of Transportation (Non-Medical):    Physical Activity:     Days of Exercise per Week:     Minutes of Exercise per Session:    Stress:     Feeling of Stress :    Social Connections:     Frequency of Communication with Friends and Family:     Frequency of Social Gatherings with Friends and Family:     Attends Zoroastrian Services:     Active Member of Clubs or Organizations:     Attends Club or Organization Meetings:     Marital Status:    Intimate Partner Violence:     Fear of Current or Ex-Partner:     Emotionally Abused:     Physically Abused:     Sexually Abused:        Allergies   Allergen Reactions    Penicillins Hives and Rash    Tape Evern Sarah Tape] Hives        Review of Systems:  Constitutional:  No fever, no fatigue, no chills, no headaches, no weight change  Dermatology:  No rash, no mole, no dry or sensitive skin  ENT:  No cough, no sore throat, no sinus pain, no runny nose, no ear pain  Cardiology:  No chest pain, no palpitations, + leg edema, + shortness of breath, no PND  Endocrinology:  No polydipsia, no polyuria, no cold intolerance, no heat intolerance, no polyphagia, no hair changes  Gastroenterology:  No dysphagia, no abdominal pain, no nausea, no vomiting, no constipation, no diarrhea, no heartburn  Female Reproductive:  No hot flashes, no abnormal vaginal discharge, no pain with menstruation, no pelvic pain  Musculoskeletal:  No joint pain, no leg cramps, no back pain, no muscle aches  Respiratory:  + shortness of breath, no orthopnea, no wheezing, no SOTO, no hemoptysis  Urology:  No blood in the urine, no urinary frequency, no urinary incontinence, no urinary urgency, no nocturia, no dysuria  Neurology:  No numbness/tingling, no dizziness, no weakness  Psychology:  No depression, no sleep disturbances, no suicidal ideation, no anxiety      Vitals:    08/10/21 1422   BP: 118/60   Pulse: 76   Resp: 20   Temp: 96.6 °F (35.9 °C)   TempSrc: Temporal   SpO2: 98%   Weight: 277 lb 4.8 oz (125.8 kg)   Height: 5' 5\" (1.651 m)       Physical Exam  Vitals and nursing note reviewed. Constitutional:       Appearance: She is well-developed. HENT:      Head: Normocephalic and atraumatic. Right Ear: External ear normal.      Left Ear: External ear normal.      Nose: Nose normal.   Eyes:      Conjunctiva/sclera: Conjunctivae normal.      Pupils: Pupils are equal, round, and reactive to light. Neck:      Thyroid: No thyromegaly. Cardiovascular:      Rate and Rhythm: Normal rate and regular rhythm. Heart sounds: Normal heart sounds. Pulmonary:      Effort: Pulmonary effort is normal.      Breath sounds: Normal breath sounds. No wheezing. Abdominal:      General: Bowel sounds are normal.      Palpations: Abdomen is soft. Tenderness: There is no abdominal tenderness. Musculoskeletal:         General: Normal range of motion. Cervical back: Normal range of motion and neck supple. Right lower leg: Edema present. Left lower leg: Edema present.    Skin:     General: Skin is warm and dry. Findings: No rash. Neurological:      Mental Status: She is alert and oriented to person, place, and time. Deep Tendon Reflexes: Reflexes are normal and symmetric. Psychiatric:         Behavior: Behavior normal.         Assessment/Plan:      Kylah Correa was seen today for established new doctor. Diagnoses and all orders for this visit:    SOTO (dyspnea on exertion)  -     Full PFT Study With Bronchodilator; Future  -     COVID-19 Ambulatory; Future  PFT ordered  Reviewed stress test and echocardiogram.  To consider referral to pulmonology. Leg swelling  -     furosemide (LASIX) 20 MG tablet; Take 1 tablet by mouth daily  -     potassium chloride (KLOR-CON M) 10 MEQ extended release tablet; Take 1 tablet by mouth daily  Will start lasix 20 mg daily  Side effects reviewed    Pulmonary embolism on left ([address]  -     Full PFT Study With Bronchodilator; Future      As above. Call or go to ED immediately if symptoms worsen or persist.  Return in about 2 weeks (around 8/24/2021) for lower extremity edema. , or sooner if necessary. Educational materials and/or home exercises printed for patient's review and were included in patient instructions on his/her After Visit Summary and given to patient at the end of visit. Counseled regarding above diagnosis, including possible risks and complications,  especially if left uncontrolled. Counseled regarding the possible side effects, risks, benefits and alternatives to treatment; patient and/or guardian verbalizes understanding, agrees, feels comfortable with and wishes to proceed with above treatment plan. Advised patient to call with any new medication issues, and read all Rx info from pharmacy to assure aware of all possible risks and side effects of medication before taking. Reviewed age and gender appropriate health screening exams and vaccinations.   Advised patient regarding importance of keeping up with recommended health maintenance

## 2021-08-13 ENCOUNTER — CARE COORDINATION (OUTPATIENT)
Dept: CASE MANAGEMENT | Age: 66
End: 2021-08-13

## 2021-08-13 NOTE — CARE COORDINATION
Care Transitions Outreach Attempt    Attempted to reach patient for transitions of care follow up. Unable to reach patient. Left HIPAA compliant message and provided call back number  Patient: Nikolay Cazares Patient : 1955 MRN: [id number]    Last Discharge [address] [address]       Complaint Diagnosis Description Type Department Provider    21 Chest Pain Chest pain, unspecified type ED to Hosp-Admission (Discharged) (ADMITTED) [id number] Mercedes John DO; [place] ..           Noted following upcoming appointments from discharge chart review:   [facility] follow up appointment(s):   Future Appointments   Date Time Provider Luci Lee   2021  9:00 AM LAURO PFT STRESS LAB ROOM [facility]   2021 10:15 AM Deven Cuevas DO [facility]   2021  9:00 AM Jimbo Shahid DO [facility]   2021  8:00 AM MD EDIE Corea [facility]   2021  8:50 AM Luisito Baeza MD [facility]

## 2021-08-17 ENCOUNTER — TELEPHONE (OUTPATIENT)
Dept: FAMILY MEDICINE CLINIC | Age: 66
End: 2021-08-17

## 2021-08-17 NOTE — TELEPHONE ENCOUNTER
Pt's son called and is requesting a letter for his mom stating that she does not have to wear a mask at work when there are no children in the building. I explained that we are probably not going to be able to provide that for her.

## 2021-08-19 ENCOUNTER — HOSPITAL ENCOUNTER (OUTPATIENT)
Dept: PULMONOLOGY | Age: 66
Discharge: HOME | End: 2021-08-19
Payer: COMMERCIAL

## 2021-08-19 DIAGNOSIS — I26.99 PULMONARY EMBOLISM ON LEFT (HCC): ICD-10-CM

## 2021-08-19 DIAGNOSIS — R06.09 DOE (DYSPNEA ON EXERTION): ICD-10-CM

## 2021-08-19 PROCEDURE — 94729 DIFFUSING CAPACITY: CPT

## 2021-08-19 PROCEDURE — 94726 PLETHYSMOGRAPHY LUNG VOLUMES: CPT

## 2021-08-19 PROCEDURE — 94060 EVALUATION OF WHEEZING: CPT

## 2021-08-20 ENCOUNTER — OFFICE VISIT (OUTPATIENT)
Dept: CARDIOLOGY CLINIC | Age: 66
End: 2021-08-20
Payer: COMMERCIAL

## 2021-08-20 VITALS
WEIGHT: 287.5 LBS | RESPIRATION RATE: 18 BRPM | HEART RATE: 69 BPM | BODY MASS INDEX: 47.9 KG/M2 | DIASTOLIC BLOOD PRESSURE: 72 MMHG | SYSTOLIC BLOOD PRESSURE: 106 MMHG | HEIGHT: 65 IN

## 2021-08-20 DIAGNOSIS — R00.2 PALPITATIONS: ICD-10-CM

## 2021-08-20 DIAGNOSIS — I10 ESSENTIAL HYPERTENSION: Primary | ICD-10-CM

## 2021-08-20 PROCEDURE — 3017F COLORECTAL CA SCREEN DOC REV: CPT | Performed by: INTERNAL MEDICINE

## 2021-08-20 PROCEDURE — 4040F PNEUMOC VAC/ADMIN/RCVD: CPT | Performed by: INTERNAL MEDICINE

## 2021-08-20 PROCEDURE — G8427 DOCREV CUR MEDS BY ELIG CLIN: HCPCS | Performed by: INTERNAL MEDICINE

## 2021-08-20 PROCEDURE — 99214 OFFICE O/P EST MOD 30 MIN: CPT | Performed by: INTERNAL MEDICINE

## 2021-08-20 PROCEDURE — 93000 ELECTROCARDIOGRAM COMPLETE: CPT | Performed by: INTERNAL MEDICINE

## 2021-08-20 PROCEDURE — 1036F TOBACCO NON-USER: CPT | Performed by: INTERNAL MEDICINE

## 2021-08-20 PROCEDURE — 1123F ACP DISCUSS/DSCN MKR DOCD: CPT | Performed by: INTERNAL MEDICINE

## 2021-08-20 PROCEDURE — G8399 PT W/DXA RESULTS DOCUMENT: HCPCS | Performed by: INTERNAL MEDICINE

## 2021-08-20 PROCEDURE — 1090F PRES/ABSN URINE INCON ASSESS: CPT | Performed by: INTERNAL MEDICINE

## 2021-08-20 PROCEDURE — G8417 CALC BMI ABV UP PARAM F/U: HCPCS | Performed by: INTERNAL MEDICINE
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
## 2021-08-24 ENCOUNTER — OFFICE VISIT (OUTPATIENT)
Dept: FAMILY MEDICINE CLINIC | Age: 66
End: 2021-08-24
Payer: COMMERCIAL

## 2021-08-24 VITALS
SYSTOLIC BLOOD PRESSURE: 114 MMHG | OXYGEN SATURATION: 98 % | HEIGHT: 65 IN | WEIGHT: 276.8 LBS | RESPIRATION RATE: 18 BRPM | BODY MASS INDEX: 46.12 KG/M2 | DIASTOLIC BLOOD PRESSURE: 64 MMHG | HEART RATE: 62 BPM | TEMPERATURE: 97.4 F

## 2021-08-24 DIAGNOSIS — R06.09 DOE (DYSPNEA ON EXERTION): ICD-10-CM

## 2021-08-24 DIAGNOSIS — M79.89 LEG SWELLING: Primary | ICD-10-CM
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
## 2021-08-24 DIAGNOSIS — S00.262A INSECT BITE OF LEFT PERIOCULAR AREA, INITIAL ENCOUNTER: ICD-10-CM

## 2021-08-24 PROCEDURE — 99213 OFFICE O/P EST LOW 20 MIN: CPT | Performed by: FAMILY MEDICINE

## 2021-08-24 PROCEDURE — 4040F PNEUMOC VAC/ADMIN/RCVD: CPT | Performed by: FAMILY MEDICINE

## 2021-08-24 PROCEDURE — 1123F ACP DISCUSS/DSCN MKR DOCD: CPT | Performed by: FAMILY MEDICINE

## 2021-08-24 PROCEDURE — 1090F PRES/ABSN URINE INCON ASSESS: CPT | Performed by: FAMILY MEDICINE

## 2021-08-24 PROCEDURE — 3017F COLORECTAL CA SCREEN DOC REV: CPT | Performed by: FAMILY MEDICINE

## 2021-08-24 PROCEDURE — G8427 DOCREV CUR MEDS BY ELIG CLIN: HCPCS | Performed by: FAMILY MEDICINE

## 2021-08-24 PROCEDURE — 1036F TOBACCO NON-USER: CPT | Performed by: FAMILY MEDICINE

## 2021-08-24 PROCEDURE — G8399 PT W/DXA RESULTS DOCUMENT: HCPCS | Performed by: FAMILY MEDICINE

## 2021-08-24 PROCEDURE — G8417 CALC BMI ABV UP PARAM F/U: HCPCS | Performed by: FAMILY MEDICINE

## 2021-08-24 NOTE — PATIENT INSTRUCTIONS
Patient Education        mupirocin topical  Pronunciation:  katelino PEER oh sin  Brand:  [employer] AT Kit  What is the most important information I should know about mupirocin topical?  Follow all directions on your medicine label and package. Tell each of your healthcare providers about all your medical conditions, allergies, and all medicines you use. What is mupirocin topical?  Mupirocin is an antibiotic that prevents bacteria from growing on your skin. Mupirocin topical (for use on the skin) is used to treat skin infections such as impetigo ([id number]) or a \"Staph\" infection of the skin. Mupirocin topical may also be used for purposes not listed in this medication guide. What should I discuss with my healthcare provider before using mupirocin topical?  You should not use this medicine if you are allergic to mupirocin. To make sure mupirocin topical is safe for you, tell your doctor if you have ever had:  · kidney disease. Do not use mupirocin topical on a child without medical advice. The cream should not be used on a child younger than 1 months old. The ointment may be used on a child as young as 3 months old. It is not known whether this medicine will harm an unborn baby. Tell your doctor if you are pregnant. It is not known whether mupirocin topical passes into breast milk or if it could harm a nursing baby. Tell your doctor if you are breast-feeding a baby. If you apply this medicine to your breast or nipple, wash the areas thoroughly before nursing your baby. How should I use mupirocin topical?  Follow all directions on your prescription label. Do not use this medicine in larger or smaller amounts or for longer than recommended. Do not take by mouth. Topical medicine is for use only on the skin. If this medicine gets in your eyes, nose, or mouth, rinse with water. Wash your hands before and after applying mupirocin topical.  Clean and dry the affected skin area.  Use a cotton signs of an allergic reaction: hives; dizziness, fast or pounding heartbeats; wheezing, difficult breathing; swelling of your face, lips, tongue, or throat. Stop using this medicine and call your doctor at once if you have:  · severe stomach pain, diarrhea that is watery or bloody;  · severe itching, rash, or other irritation of treated skin;  · unusual skin blistering or peeling; or  · any signs of a new skin infection. Common side effects may include:  · burning, stinging;  · itching; or  · pain. This is not a complete list of side effects and others may occur. Call your doctor for medical advice about side effects. You may report side effects to FDA at 4-808-URT-1945. What other drugs will affect mupirocin topical?  It is not likely that other drugs you take orally or inject will have an effect on topically applied mupirocin. But many drugs can interact with each other. Tell each of your health care providers about all medicines you use, including prescription and over-the-counter medicines, vitamins, and herbal products. Where can I get more information? Your pharmacist can provide more information about mupirocin topical.  Remember, keep this and all other medicines out of the reach of children, never share your medicines with others, and use this medication only for the indication prescribed. Every effort has been made to ensure that the information provided by [address] is accurate, up-to-date, and complete, but no guarantee is made to that effect. Drug information contained herein may be time sensitive. [facility] information has been compiled for use by healthcare practitioners and consumers in the [place] and therefore [facility] does not warrant that uses outside of the [place] are appropriate, unless specifically indicated otherwise. [facility] drug information does not endorse drugs, diagnose patients or recommend therapy.  [facility] drug information is an informational resource designed to assist licensed healthcare practitioners in caring for their patients and/or to serve consumers viewing this service as a supplement to, and not a substitute for, the expertise, skill, knowledge and judgment of healthcare practitioners. The absence of a warning for a given drug or drug combination in no way should be construed to indicate that the drug or drug combination is safe, effective or appropriate for any given patient. [facility] does not assume any responsibility for any aspect of healthcare administered with the aid of information [facility] provides. The information contained herein is not intended to cover all possible uses, directions, precautions, warnings, drug interactions, allergic reactions, or adverse effects. If you have questions about the drugs you are taking, check with your doctor, nurse or pharmacist.  Copyright 5407-6462 [address]: 4.01. Revision date: 5/22/2017. Care instructions adapted under license by [facility] [place] [facility] [facility] If you have questions about a medical condition or this instruction, always ask your healthcare professional. [place] [address] any warranty or liability for your use of this information.

## 2021-08-24 NOTE — PROGRESS NOTES
Chief Complaint   Patient presents with    Leg Swelling       HPI:  Patient is here for follow-up of lower extremity edema. She has been taking lasix 20 mg every other day. She states that her swelling has been improved. She states that she does have shortness of breath with exertion. She has discussed this with her cardiologist and he wants her walking 30 minutes 3 times a week. She does not feel stable enough to do so and is requesting a cane. Patient complains of loulou on the left side of her face that has been there for 1-2 weeks. She states that it will start to heal and then it will start bleeding again. She has been putting olay cream on it with no improvement. She has been using dove soap for sensitive skin to wash the area. Patient's past medical, surgical, social and/or family history reviewed, updated in chart, and are non-contributory (unless otherwise stated). Medications and allergies also reviewed and updated in chart.     Review of Systems:  Constitutional:  No fever, no fatigue, no chills, no headaches, no weight change  Dermatology:  No rash, no mole, no dry or sensitive skin  ENT:  No cough, no sore throat, no sinus pain, no runny nose, no ear pain  Cardiology:  No chest pain, no palpitations, + leg edema, + shortness of breath, no PND  Gastroenterology:  No dysphagia, no abdominal pain, no nausea, no vomiting, no constipation, no diarrhea, no heartburn  Musculoskeletal:  No joint pain, no leg cramps, no back pain, no muscle aches  Respiratory:  No shortness of breath, no orthopnea, no wheezing, no SOTO, no hemoptysis  Urology:  No blood in the urine, no urinary frequency, no urinary incontinence, no urinary urgency, no nocturia, no dysuria      Vitals:    08/24/21 0843   BP: 114/64   Pulse: 62   Resp: 18   Temp: 97.4 °F (36.3 °C)   TempSrc: Temporal   SpO2: 98%   Weight: 276 lb 12.8 oz (125.6 kg)   Height: 5' 5\" (1.651 m)       Physical Exam  Vitals and nursing note reviewed. Constitutional:       Appearance: She is well-developed. HENT:      Head: Normocephalic and atraumatic. Right Ear: External ear normal.      Left Ear: External ear normal.      Nose: Nose normal.   Eyes:      Conjunctiva/sclera: Conjunctivae normal.      Pupils: Pupils are equal, round, and reactive to light. Neck:      Thyroid: No thyromegaly. Cardiovascular:      Rate and Rhythm: Normal rate and regular rhythm. Heart sounds: Normal heart sounds. Pulmonary:      Effort: Pulmonary effort is normal.      Breath sounds: Normal breath sounds. No wheezing. Abdominal:      General: Bowel sounds are normal.      Palpations: Abdomen is soft. Tenderness: There is no abdominal tenderness. Musculoskeletal:         General: Normal range of motion. Cervical back: Normal range of motion and neck supple. Skin:     General: Skin is warm and dry. Findings: Wound (left cheek) present. No rash. Neurological:      Mental Status: She is alert and oriented to person, place, and time. Deep Tendon Reflexes: Reflexes are normal and symmetric. Psychiatric:         Behavior: Behavior normal.         Assessment/Plan:      Alex Bishop was seen today for leg swelling. Diagnoses and all orders for this visit:    Leg swelling  -     DME Order for [place] as OP  Swelling improved  Continue lasix as prescribed    SOTO (dyspnea on exertion)  -     DME Order for [place] as OP    Insect bite of left periocular area, initial encounter  -     mupirocin (BACTROBAN) 2 % ointment; Apply 3 times daily. As above. Call or go to ED immediately if symptoms worsen or persist.  Return in about 3 months (around 11/24/2021). , or sooner if necessary. Educational materials and/or home exercises printed for patient's review and were included in patient instructions on his/her After Visit Summary and given to patient at the end of visit.       Counseled regarding above diagnosis, including possible risks and complications,  especially if left uncontrolled. Counseled regarding the possible side effects, risks, benefits and alternatives to treatment; patient and/or guardian verbalizes understanding, agrees, feels comfortable with and wishes to proceed with above treatment plan. Advised patient to call with any new medication issues, and read all Rx info from pharmacy to assure aware of all possible risks and side effects of medication before taking. Reviewed age and gender appropriate health screening exams and vaccinations. Advised patient regarding importance of keeping up with recommended health maintenance and to schedule as soon as possible if overdue, as this is important in assessing for undiagnosed pathology, especially cancer, as well as protecting against potentially harmful/life threatening disease. Patient and/or guardian verbalizes understanding and agrees with above counseling, assessment and plan. All questions answered. Julio C Wiley DO  8/24/2021    I have personally reviewed and updated the chief complaint, HPI, Past Medical, Family and Social History, as well as the above Review of Systems.

## 2021-08-25 ENCOUNTER — OFFICE VISIT (OUTPATIENT)
Dept: ENDOCRINOLOGY | Age: 66
End: 2021-08-25
Payer: COMMERCIAL

## 2021-08-25 ENCOUNTER — TELEPHONE (OUTPATIENT)
Dept: CARDIOLOGY CLINIC | Age: 66
End: 2021-08-25

## 2021-08-25 VITALS
DIASTOLIC BLOOD PRESSURE: 61 MMHG | OXYGEN SATURATION: 98 % | BODY MASS INDEX: 46.65 KG/M2 | HEART RATE: 70 BPM | HEIGHT: 65 IN | SYSTOLIC BLOOD PRESSURE: 123 MMHG | RESPIRATION RATE: 18 BRPM | WEIGHT: 280 LBS

## 2021-08-25 DIAGNOSIS — E55.9 VITAMIN D DEFICIENCY: ICD-10-CM

## 2021-08-25 DIAGNOSIS — E04.2 MULTINODULAR GOITER: Primary | ICD-10-CM

## 2021-08-25 PROCEDURE — G8417 CALC BMI ABV UP PARAM F/U: HCPCS | Performed by: INTERNAL MEDICINE

## 2021-08-25 PROCEDURE — G8427 DOCREV CUR MEDS BY ELIG CLIN: HCPCS | Performed by: INTERNAL MEDICINE

## 2021-08-25 PROCEDURE — 1090F PRES/ABSN URINE INCON ASSESS: CPT | Performed by: INTERNAL MEDICINE

## 2021-08-25 PROCEDURE — 99214 OFFICE O/P EST MOD 30 MIN: CPT | Performed by: INTERNAL MEDICINE

## 2021-08-25 PROCEDURE — 1036F TOBACCO NON-USER: CPT | Performed by: INTERNAL MEDICINE

## 2021-08-25 PROCEDURE — 1123F ACP DISCUSS/DSCN MKR DOCD: CPT | Performed by: INTERNAL MEDICINE

## 2021-08-25 PROCEDURE — 4040F PNEUMOC VAC/ADMIN/RCVD: CPT | Performed by: INTERNAL MEDICINE

## 2021-08-25 PROCEDURE — G8399 PT W/DXA RESULTS DOCUMENT: HCPCS | Performed by: INTERNAL MEDICINE

## 2021-08-25 PROCEDURE — 3017F COLORECTAL CA SCREEN DOC REV: CPT | Performed by: INTERNAL MEDICINE

## 2021-08-25 NOTE — TELEPHONE ENCOUNTER
----- Message from Joan Nails sent at 8/25/2021  9:28 AM EDT -----  Subject: Medication Problem    QUESTIONS  Name of Medication? apixaban (ELIQUIS) 5 MG TABS tablet  Patient-reported dosage and instructions? 5 MG twice a day  What question or problem do you have with the medication? Patient is   concerned that she is taking Eliquis 5 MG x2 with baby aspirin 81 MG and   is concerned about the side effects(SOB, heart pumps too fast) - she read   in the paper about a woman who quit taking the aspirin and felt better. Please return her call. Preferred Pharmacy? [facility], [address] [facility]  Pharmacy phone number (if available)? [phone]  Additional Information for Provider? She wants to know if this would be   the right step for her to stop taking the baby aspirin  ---------------------------------------------------------------------------  --------------  CALL BACK INFO  What is the best way for the office to contact you? OK to leave message on   voicemail  Preferred Call Back Phone Number? [phone]  ---------------------------------------------------------------------------  --------------  SCRIPT ANSWERS  Relationship to Patient?  Self

## 2021-08-25 NOTE — PROGRESS NOTES
[address] [place] [address] Department of Endocrinology Diabetes and Metabolism   [address] [facility] [address]   Phone: [phone]  Fax: [phone]    Date of Service: 8/25/2021    Primary Care Physician: Shahla Junior DO  Provider: Carri Akbar MD            Reason for the visit:  Multinodular goiter     History of Present Illness: The history is provided by the patient. No  was used. Accuracy of the patient data is excellent. Fabian Hercules is a very pleasant 77 y.o. female seen today for evaluation and management of multinodular goiter     The patient was found to have thyroid nodule on CT 8/6/19 performed for eval of possible dissection of aortic aneurysm    Dedicated 11/27/19 US thyroid: 1.0 cm solid nodule inf R lobe, 2.0 cm complex predominantly solid nodule with punctate calcifications in mid L lobe and 2.4 cm solid isoechoic nodule inf L lobe     12/19/19 FNA of two left nodules: both non-malignant    Recent thyroid US 7/13/2020, I have reviewed images myself   Rt lobe measures is 4.8 x 1.9 x 1.8 cm --> 1.1 cm hypoechoic nodule in lower pole, stable   Lt lobe measures is 5.2 x 2.4 x 2.8 cm --> two dominant nodules, 2.1  cm nodule in the upper pole, 2.4 cm nodule in lower pole       Recent US 8/7/2021 --> stable MNG     Fabian Hercules denies any new lumps, bumps in her neck, voice change, or shortness of breath. No family history of thyroid cancer. No prior history of radiation to head or neck region. Lab Results   Component Value Date/Time    TSH 0.678 07/09/2021 06:15 PM    T4FREE 1.55 07/09/2021 06:15 PM     Patient denied any history of  swelling in the area of the thyroid gland, weight loss, change in appetite, nervousness, anxiety, irritability, tremor, sweating, heat intolerance, changes in bowel habits, muscle weakness or difficulty sleeping.   Patient also denied any h/o unexplained weight gain, new fatigue, increased sensitivity to cold, dry skin, brittle fingernails, brittle hair, facial swelling/puffiness, or depressed mood. PAST MEDICAL HISTORY   Past Medical History:   Diagnosis Date    Anxiety     Deep vein blood clot of left lower extremity ([address] 05/27/2019    Family history of early CAD     Hyperlipemia     Hypertension     Lightheadedness     Multinodular goiter     Sleep apnea     SOBOE (shortness of breath on exertion)        PAST SURGICAL HISTORY   Past Surgical History:   Procedure Laterality Date    BREAST SURGERY Left 2014    biopsy    CARDIOVASCULAR STRESS TEST      COLONOSCOPY      CYST REMOVAL  05/2017    mouth/under tongue    HYSTERECTOMY, TOTAL ABDOMINAL  1990    KNEE ARTHROSCOPY Left 2005    LEG SURGERY Left 1963    ligament repair    SALIVARY GLAND SURGERY Left 05/24/2017    TONSILLECTOMY      childhood       SOCIAL HISTORY   Tobacco:   reports that she has never smoked. She has never used smokeless tobacco.  Alcohol:   reports no history of alcohol use. Drugs:   reports no history of drug use. FAMILY HISTORY   Family History   Problem Relation Age of Onset    Heart Disease Mother     Pacemaker Mother     COPD Mother     Heart Failure Mother     Heart Disease Father     Cancer Father         prostate    Hypertension Father     Coronary Art Dis Father         63's    Prostate Cancer Father     Hypertension Sister     Diabetes Sister     Heart Disease Sister     Diabetes Sister    Dossie Sharron COPD Sister     Heart Disease Brother         age 54    Heart Attack Brother     Hypertension Son     Clotting Disorder Son        ALLERGIES AND DRUG REACTIONS   Allergies   Allergen Reactions    Penicillins Hives and Rash    Tape [Adhesive Tape] Hives       CURRENT MEDICATIONS   Current Outpatient Medications   Medication Sig Dispense Refill    mupirocin (BACTROBAN) 2 % ointment Apply 3 times daily.  1 Tube 0    furosemide (LASIX) 20 MG tablet Take 1 tablet by mouth daily 30 tablet 1    potassium chloride (KLOR-CON M) 10 MEQ extended release tablet Take 1 tablet by mouth daily 30 tablet 5    Multiple Vitamins-Minerals (THERAPEUTIC MULTIVITAMIN-MINERALS) tablet Take 1 tablet by mouth daily      vitamin D (CHOLECALCIFEROL) 50 MCG (2000 UT) TABS tablet Take 1 tablet by mouth daily 30 tablet 0    apixaban (ELIQUIS) 5 MG TABS tablet Take 1 tablet by mouth 2 times daily 180 tablet 1    oxybutynin (DITROPAN-XL) 10 MG extended release tablet Take 2 tablets by mouth daily 60 tablet 1    pravastatin (PRAVACHOL) 10 MG tablet Take 10 mg by mouth daily      hydroCHLOROthiazide (HYDRODIURIL) 25 MG tablet Take 1 tablet by mouth daily 30 tablet 5    losartan (COZAAR) 50 MG tablet TAKE 1 TABLET BY MOUTH ONCE DAILY 30 tablet 11    escitalopram (LEXAPRO) 20 MG tablet TAKE 1 TABLET BY MOUTH ONCE DAILY 30 tablet 11    CALCIUM CITRATE PO Take 1,000 mg by mouth daily OTC      aspirin 81 MG chewable tablet Take 81 mg by mouth daily      omeprazole (PRILOSEC) 40 MG delayed release capsule Take 1 capsule by mouth every morning (before breakfast) for 14 days 14 capsule 0    metoprolol succinate (TOPROL XL) 25 MG extended release tablet Take 0.5 tablets by mouth daily TAKE 1/2 TABLET BY MOUTH DAILY 15 tablet 5     No current facility-administered medications for this visit. Review of Systems  Constitutional: No fever, no chills, no diaphoresis, no generalized weakness. HEENT: No blurred vision, No sore throat, no ear pain, no hair loss  Neck: denied any neck swelling, difficulty swallowing,   Cadrdiopulomary: No CP, SOB or palpitation, No orthopnea or PND. No cough or wheezing. GI: No N/V/D, no constipation, No abdominal pain, no melena or hematochezia   : Denied any dysuria, hematuria, flank pain, discharge, or incontinence. Skin: denied any rash, ulcer, Hirsute, or hyperpigmentation. MSK: denied any joint deformity, joint pain/swelling, muscle pain, or back pain.   Neuro: no numbess, no tingling, no weakness, OBJECTIVE    /61   Pulse 70   Resp 18   Ht 5' 5\" (1.651 m)   Wt 280 lb (127 kg)   SpO2 98%   BMI 46.59 kg/m²   BP Readings from Last 4 Encounters:   08/25/21 123/61   08/24/21 114/64   08/20/21 106/72   08/10/21 118/60     Wt Readings from Last 6 Encounters:   08/25/21 280 lb (127 kg)   08/24/21 276 lb 12.8 oz (125.6 kg)   08/20/21 287 lb 8 oz (130.4 kg)   08/10/21 277 lb 4.8 oz (125.8 kg)   07/29/21 274 lb 3.2 oz (124.4 kg)   07/09/21 278 lb (126.1 kg)       Physical examination:  General: awake alert, oriented x3, no abnormal position or movements. HEENT: normocephalic non traumatic  Neck: supple, no LN enlargement, Lt side thyroid nodules palpable , no thyroid tenderness, no JVD. Pulm: Clear equal air entry no added sounds, no wheezing or rhonchi    CVS: S1 + S2, no murmur, no heave. Dorsalis pedis pulse palpable   Abd: soft lax, no tenderness, no organomegaly, audible bowel sounds. Skin: warm, no lesions, no rash.  No Palmar erythema  Musculoskeletal: No back tenderness, no kyphosis/scoliosis     Neuro: CN intact, sensation normal , muscle power normal. No tremors   Psych: normal mood, and affect    Review of Laboratory Data:  I personally reviewed the following labs:   Lab Results   Component Value Date/Time    WBC 10.5 07/28/2021 04:54 AM    RBC 3.97 07/28/2021 04:54 AM    HGB 11.7 07/28/2021 04:54 AM    HCT 36.4 07/28/2021 04:54 AM    MCV 91.7 07/28/2021 04:54 AM    MCH 29.5 07/28/2021 04:54 AM    MCHC 32.1 07/28/2021 04:54 AM    RDW 14.5 07/28/2021 04:54 AM     07/28/2021 04:54 AM    MPV 10.6 07/28/2021 04:54 AM      Lab Results   Component Value Date/Time     07/29/2021 05:17 AM    K 4.0 07/29/2021 05:17 AM    K 3.4 (L) 07/28/2021 04:55 AM    CO2 30 (H) 07/29/2021 05:17 AM    BUN 21 07/29/2021 05:17 AM    CREATININE 0.9 07/29/2021 05:17 AM    CALCIUM 8.9 07/29/2021 05:17 AM    LABGLOM >60 07/29/2021 05:17 AM    GFRAA >60 07/29/2021 05:17 AM      Lab Results   Component Value Date/Time    TSH 0.678 07/09/2021 06:15 PM    T4FREE 1.55 07/09/2021 06:15 PM     Lab Results   Component Value Date    LABA1C 5.6 07/28/2021    GLUCOSE 92 07/29/2021    GLUCOSE 130 02/10/2012     Lab Results   Component Value Date    TRIG 79 07/28/2021    HDL 54 07/28/2021    LDLCALC 87 07/28/2021    CHOL 157 07/28/2021     Lab Results   Component Value Date    VITD25 27 07/09/2021     ASSESSMENT & RECOMMENDATIONS   Dary Scanlon, a 77 y.o.-old female seen in for the following issues     Multinodular goiter   · Prevalence of thyroid nodule on thyroid ultrasound is 50% and 95 % of these nodules are benign. · 12/2019 FNA to dominant Lt side 2.1 and 2.4 cm nodule was benign   · US 7/2021 --> stable MNG   · Will continue following with US. Next US in a year   · Thyroid hormones normal     VitD deficiency   · Encourage taking vitD 2000 U/day   · Check level with next thyroid labs     I personally reviewed external notes from PCP and other patient's care team providers, and personally interpreted labs associated with the above diagnosis. I also ordered labs to further assess and manage the above addressed medical conditions    Return for VitD deficiency, Multinodular goiter. The above issues were reviewed with the patient who understood and agreed with the plan. Thank you for allowing us to participate in the care of this patient. Please do not hesitate to contact us with any additional questions. Diagnosis Orders   1. Multinodular goiter  US THYROID    TSH without Reflex    T4, Free   2. Vitamin D deficiency  Vitamin D 25 Hydroxy    Basic Metabolic Panel       Judy Acuña MD  Endocrinologist, [facility])   [address] [place] [address], [address] [place] [address],[address] [id number]   Phone: [phone]  Fax: [phone]  -------------------------------------------------------------  An electronic signature was used to authenticate this note.  Zion Faustin MD on 8/25/2021 at 11:44 AM

## 2021-08-25 NOTE — TELEPHONE ENCOUNTER
Patient is asking if she can stop taking Aspirin, she read an article about patients with CP and difficulty breathing feeling better after stopping aspirin, please advise

## 2021-08-27 ENCOUNTER — TELEPHONE (OUTPATIENT)
Dept: FAMILY MEDICINE CLINIC | Age: 66
End: 2021-08-27

## 2021-08-27 NOTE — TELEPHONE ENCOUNTER
I contacted home delivery regarding medical necessity and let her know that 2 forms were mailed on 08/20/21 due to fax being down.

## 2021-08-27 NOTE — TELEPHONE ENCOUNTER
----- Message from Christiano Trujillo sent at 8/26/2021  5:18 PM EDT -----  Subject: Message to Provider    QUESTIONS  Information for Provider? Dondra Schirmer from [facility] is calling about   Certificate of Medical Necessity for supplies, they faxed it over on   August 12th just wondering if you received it yet. If you have any   questions please call [phone].  ---------------------------------------------------------------------------  --------------  Leodan Muse INFO  What is the best way for the office to contact you? OK to leave message on   voicemail  Preferred Call Back Phone Number? [phone]  ---------------------------------------------------------------------------  --------------  SCRIPT ANSWERS  Relationship to Patient? Third Party  Representative Name?  Dondra Schirmer

## 2021-09-14 DIAGNOSIS — I10 ESSENTIAL HYPERTENSION: ICD-10-CM

## 2021-09-14 RX ORDER — HYDROCHLOROTHIAZIDE 25 MG/1
25 TABLET ORAL DAILY
Qty: 30 TABLET | Refills: 5 | Status: SHIPPED
Start: 2021-09-14 | End: 2022-02-21

## 2021-09-15 ENCOUNTER — APPOINTMENT (OUTPATIENT)
Dept: GENERAL RADIOLOGY | Age: 66
End: 2021-09-15
Payer: COMMERCIAL

## 2021-09-15 ENCOUNTER — APPOINTMENT (OUTPATIENT)
Dept: CT IMAGING | Age: 66
End: 2021-09-15
Payer: COMMERCIAL

## 2021-09-15 ENCOUNTER — TELEPHONE (OUTPATIENT)
Dept: CARDIOLOGY CLINIC | Age: 66
End: 2021-09-15

## 2021-09-15 ENCOUNTER — HOSPITAL ENCOUNTER (EMERGENCY)
Age: 66
Discharge: HOME | End: 2021-09-15
Attending: EMERGENCY MEDICINE
Payer: COMMERCIAL

## 2021-09-15 ENCOUNTER — TELEPHONE (OUTPATIENT)
Dept: FAMILY MEDICINE CLINIC | Age: 66
End: 2021-09-15

## 2021-09-15 VITALS
HEIGHT: 65 IN | OXYGEN SATURATION: 99 % | HEART RATE: 59 BPM | DIASTOLIC BLOOD PRESSURE: 69 MMHG | WEIGHT: 265 LBS | TEMPERATURE: 97.9 F | RESPIRATION RATE: 16 BRPM | BODY MASS INDEX: 44.15 KG/M2 | SYSTOLIC BLOOD PRESSURE: 122 MMHG

## 2021-09-15 DIAGNOSIS — R07.9 CHEST PAIN, UNSPECIFIED TYPE: Primary | ICD-10-CM

## 2021-09-15 LAB
ALBUMIN SERPL-MCNC: 3.7 G/DL (ref 3.5–5.2)
ALP BLD-CCNC: 96 U/L (ref 35–104)
ALT SERPL-CCNC: 14 U/L (ref 0–32)
ANION GAP SERPL CALCULATED.3IONS-SCNC: 5 MMOL/L (ref 7–16)
AST SERPL-CCNC: 19 U/L (ref 0–31)
BASOPHILS ABSOLUTE: 0.04 E9/L (ref 0–0.2)
BASOPHILS RELATIVE PERCENT: 0.5 % (ref 0–2)
BILIRUB SERPL-MCNC: 0.5 MG/DL (ref 0–1.2)
BUN BLDV-MCNC: 25 MG/DL (ref 6–23)
CALCIUM SERPL-MCNC: 9.5 MG/DL (ref 8.6–10.2)
CHLORIDE BLD-SCNC: 102 MMOL/L (ref 98–107)
CO2: 32 MMOL/L (ref 22–29)
CREAT SERPL-MCNC: 1.1 MG/DL (ref 0.5–1)
EKG ATRIAL RATE: 61 BPM
EKG P AXIS: 54 DEGREES
EKG P-R INTERVAL: 176 MS
EKG Q-T INTERVAL: 430 MS
EKG QRS DURATION: 94 MS
EKG QTC CALCULATION (BAZETT): 432 MS
EKG R AXIS: -2 DEGREES
EKG T AXIS: 13 DEGREES
EKG VENTRICULAR RATE: 61 BPM
EOSINOPHILS ABSOLUTE: 0.13 E9/L (ref 0.05–0.5)
EOSINOPHILS RELATIVE PERCENT: 1.6 % (ref 0–6)
GFR AFRICAN AMERICAN: 60
GFR NON-AFRICAN AMERICAN: 49 ML/MIN/1.73
GLUCOSE BLD-MCNC: 105 MG/DL (ref 74–99)
HCT VFR BLD CALC: 38.9 % (ref 34–48)
HEMOGLOBIN: 12.8 G/DL (ref 11.5–15.5)
IMMATURE GRANULOCYTES #: 0.04 E9/L
IMMATURE GRANULOCYTES %: 0.5 % (ref 0–5)
LYMPHOCYTES ABSOLUTE: 1.95 E9/L (ref 1.5–4)
LYMPHOCYTES RELATIVE PERCENT: 24.3 % (ref 20–42)
MCH RBC QN AUTO: 30.2 PG (ref 26–35)
MCHC RBC AUTO-ENTMCNC: 32.9 % (ref 32–34.5)
MCV RBC AUTO: 91.7 FL (ref 80–99.9)
MONOCYTES ABSOLUTE: 0.7 E9/L (ref 0.1–0.95)
MONOCYTES RELATIVE PERCENT: 8.7 % (ref 2–12)
NEUTROPHILS ABSOLUTE: 5.18 E9/L (ref 1.8–7.3)
NEUTROPHILS RELATIVE PERCENT: 64.4 % (ref 43–80)
PDW BLD-RTO: 14.4 FL (ref 11.5–15)
PLATELET # BLD: 297 E9/L (ref 130–450)
PMV BLD AUTO: 10 FL (ref 7–12)
POTASSIUM REFLEX MAGNESIUM: 3.8 MMOL/L (ref 3.5–5)
PRO-BNP: 461 PG/ML (ref 0–125)
RBC # BLD: 4.24 E12/L (ref 3.5–5.5)
SODIUM BLD-SCNC: 139 MMOL/L (ref 132–146)
TOTAL PROTEIN: 6.7 G/DL (ref 6.4–8.3)
TROPONIN, HIGH SENSITIVITY: 13 NG/L (ref 0–9)
TROPONIN, HIGH SENSITIVITY: 14 NG/L (ref 0–9)
WBC # BLD: 8 E9/L (ref 4.5–11.5)

## 2021-09-15 PROCEDURE — 93010 ELECTROCARDIOGRAM REPORT: CPT | Performed by: INTERNAL MEDICINE

## 2021-09-15 PROCEDURE — 80053 COMPREHEN METABOLIC PANEL: CPT

## 2021-09-15 PROCEDURE — 93005 ELECTROCARDIOGRAM TRACING: CPT | Performed by: STUDENT IN AN ORGANIZED HEALTH CARE EDUCATION/TRAINING PROGRAM

## 2021-09-15 PROCEDURE — 71045 X-RAY EXAM CHEST 1 VIEW: CPT

## 2021-09-15 PROCEDURE — 99285 EMERGENCY DEPT VISIT HI MDM: CPT

## 2021-09-15 PROCEDURE — 6370000000 HC RX 637 (ALT 250 FOR IP): Performed by: STUDENT IN AN ORGANIZED HEALTH CARE EDUCATION/TRAINING PROGRAM

## 2021-09-15 PROCEDURE — 84484 ASSAY OF TROPONIN QUANT: CPT

## 2021-09-15 PROCEDURE — 71275 CT ANGIOGRAPHY CHEST: CPT

## 2021-09-15 PROCEDURE — 83880 ASSAY OF NATRIURETIC PEPTIDE: CPT

## 2021-09-15 PROCEDURE — 85025 COMPLETE CBC W/AUTO DIFF WBC: CPT

## 2021-09-15 PROCEDURE — 2580000003 HC RX 258: Performed by: STUDENT IN AN ORGANIZED HEALTH CARE EDUCATION/TRAINING PROGRAM

## 2021-09-15 PROCEDURE — 6360000004 HC RX CONTRAST MEDICATION: Performed by: RADIOLOGY

## 2021-09-15 PROCEDURE — 2580000003 HC RX 258: Performed by: RADIOLOGY

## 2021-09-15 RX ORDER — SODIUM CHLORIDE 0.9 % (FLUSH) 0.9 %
10 SYRINGE (ML) INJECTION PRN
Status: COMPLETED | OUTPATIENT
Start: 2021-09-15 | End: 2021-09-15

## 2021-09-15 RX ORDER — 0.9 % SODIUM CHLORIDE 0.9 %
1000 INTRAVENOUS SOLUTION INTRAVENOUS ONCE
Status: COMPLETED | OUTPATIENT
Start: 2021-09-15 | End: 2021-09-15

## 2021-09-15 RX ORDER — ASPIRIN 81 MG/1
324 TABLET, CHEWABLE ORAL ONCE
Status: COMPLETED | OUTPATIENT
Start: 2021-09-15 | End: 2021-09-15

## 2021-09-15 RX ADMIN — IOPAMIDOL 75 ML: 755 INJECTION, SOLUTION INTRAVENOUS at 15:23

## 2021-09-15 RX ADMIN — Medication 10 ML: at 15:23

## 2021-09-15 RX ADMIN — ASPIRIN 81 MG CHEWABLE TABLET 324 MG: 81 TABLET CHEWABLE at 12:06

## 2021-09-15 RX ADMIN — SODIUM CHLORIDE 1000 ML: 9 INJECTION, SOLUTION INTRAVENOUS at 12:06

## 2021-09-15 ASSESSMENT — ENCOUNTER SYMPTOMS
EYE PAIN: 0
SINUS PRESSURE: 0
EYE REDNESS: 0
SHORTNESS OF BREATH: 1
COUGH: 1
VOMITING: 0
NAUSEA: 0
BACK PAIN: 0
ABDOMINAL DISTENTION: 0
WHEEZING: 0
EYE DISCHARGE: 0
SORE THROAT: 0
DIARRHEA: 0

## 2021-09-15 ASSESSMENT — PAIN DESCRIPTION - ORIENTATION: ORIENTATION: MID

## 2021-09-15 ASSESSMENT — PAIN DESCRIPTION - LOCATION: LOCATION: CHEST

## 2021-09-15 ASSESSMENT — PAIN SCALES - GENERAL: PAINLEVEL_OUTOF10: 10

## 2021-09-15 ASSESSMENT — PAIN DESCRIPTION - PAIN TYPE: TYPE: ACUTE PAIN

## 2021-09-15 NOTE — ED NOTES
Bed: 08  Expected date:   Expected time:   Means of arrival:   Comments:  [facility], RN  09/15/21 [id number]

## 2021-09-15 NOTE — ED PROVIDER NOTES
Chief Complaint   Patient presents with    Chest Pain     started this morning w/SOB and light headed. Hx of PE per pt. Patient is a 53-year-old female presents today for chest pain shortness of breath. Symptom started earlier today. Symptoms are aggravated alleviated by specific factors. Is not tried medication for the symptoms. Patient does have a history of prior PE, however states she has been compliant with her Eliquis and takes it twice a day and has not missed any doses. Denies previous MI or stent. Denies cough, fevers or chills. Denies nausea, vomiting or diarrhea. Has had her Covid vaccine. She has not tried any medication for the symptoms. The history is provided by the patient. No  was used. Review of Systems   Constitutional: Negative for chills and fever. HENT: Negative for ear pain, sinus pressure and sore throat. Eyes: Negative for pain, discharge and redness. Respiratory: Positive for cough and shortness of breath. Negative for wheezing. Cardiovascular: Positive for chest pain. Negative for palpitations and leg swelling. Gastrointestinal: Negative for abdominal distention, diarrhea, nausea and vomiting. Genitourinary: Negative for dysuria and frequency. Musculoskeletal: Negative for arthralgias and back pain. Skin: Negative for rash and wound. Neurological: Negative for weakness and headaches. Hematological: Negative for adenopathy. All other systems reviewed and are negative. Physical Exam  Vitals and nursing note reviewed. Constitutional:       General: She is not in acute distress. Appearance: She is well-developed. She is obese. She is not ill-appearing, toxic-appearing or diaphoretic. HENT:      Head: Normocephalic and atraumatic. Eyes:      Pupils: Pupils are equal, round, and reactive to light. Cardiovascular:      Rate and Rhythm: Normal rate and regular rhythm. Pulses: Normal pulses.       Heart sounds: Normal heart sounds. No murmur heard. Pulmonary:      Effort: Pulmonary effort is normal. No respiratory distress. Breath sounds: Normal breath sounds. No wheezing or rales. Abdominal:      General: Bowel sounds are normal.      Palpations: Abdomen is soft. Tenderness: There is no abdominal tenderness. There is no guarding or rebound. Musculoskeletal:      Cervical back: Normal range of motion and neck supple. Right lower leg: No edema. Left lower leg: No edema. Skin:     General: Skin is warm and dry. Capillary Refill: Capillary refill takes less than 2 seconds. Neurological:      General: No focal deficit present. Mental Status: She is alert and oriented to person, place, and time. Cranial Nerves: No cranial nerve deficit. Coordination: Coordination normal.   Psychiatric:         Mood and Affect: Mood normal.         Behavior: Behavior normal.          Procedures     Labs Reviewed   COMPREHENSIVE METABOLIC PANEL W/ REFLEX TO MG FOR LOW K - Abnormal; Notable for the following components:       Result Value    CO2 32 (*)     Anion Gap 5 (*)     Glucose 105 (*)     BUN 25 (*)     CREATININE 1.1 (*)     All other components within normal limits   TROPONIN - Abnormal; Notable for the following components:    Troponin, High Sensitivity 14 (*)     All other components within normal limits   BRAIN NATRIURETIC PEPTIDE - Abnormal; Notable for the following components:    Pro- (*)     All other components within normal limits   TROPONIN - Abnormal; Notable for the following components:    Troponin, High Sensitivity 13 (*)     All other components within normal limits   CBC WITH AUTO DIFFERENTIAL     CTA PULMONARY W CONTRAST   Final Result   No CT evidence for pulmonary embolus. Mild subpleural atelectasis lung bases.       Mild thickening left adrenal gland likely hyperplasia similar to prior exam.         XR CHEST PORTABLE   Final Result   No acute cardiopulmonary process is identified. EKG #1:  I personally interpreted this EKG  Time:  1133    Rate: 61  Rhythm: Sinus. Interpretation: Normal sinus rhythm, normal axis, no ST elevation or T wave inversion. MDM  Number of Diagnoses or Management Options  Chest pain, unspecified type  Diagnosis management comments: Patient is a 68-year-old female presents today for chest pain. Heart lungs clear to auscultation. Vitals are stable on arrival.  Labs and imaging were obtained. Lab work shows has a severe troponin XIV, delta of 13. EKG shows no acute ischemic changes. Remainder of lab works within normal limits. CTA was obtained as she does have a history of previous PEs and is endorsing shortness of breath or chest pain. CTA shows no evidence of pulmonary embolism. Vitals are stable. Per chart review patient did have stress test earlier this summer which was unremarkable. With benign work-up in the department, this is unlikely ACS in etiology. Patient will be discharged home instructed follow-up PCP. Strict return precautions were given. Amount and/or Complexity of Data Reviewed  Clinical lab tests: reviewed  Tests in the radiology section of CPT®: reviewed  Tests in the medicine section of CPT®: reviewed           ED Course as of Sep 15 1615   Wed Sep 15, 2021   1600 Per chart review patient did have stress test on July 27 of this year which showed no abnormalities. [JH]      ED Course User Index  [] Nevaeh Quevedo, DO       --------------------------------------------- PAST HISTORY ---------------------------------------------  Past Medical History:  has a past medical history of Anxiety, Deep vein blood clot of left lower extremity ([address] Family history of early CAD, Hyperlipemia, Hypertension, Lightheadedness, Multinodular goiter, Sleep apnea, and SOBOE (shortness of breath on exertion). Past Surgical History:  has a past surgical history that includes Tonsillectomy;  Leg Metabolic Panel w/ Reflex to MG   Result Value Ref Range    Sodium 139 132 - 146 mmol/L    Potassium reflex Magnesium 3.8 3.5 - 5.0 mmol/L    Chloride 102 98 - 107 mmol/L    CO2 32 (H) 22 - 29 mmol/L    Anion Gap 5 (L) 7 - 16 mmol/L    Glucose 105 (H) 74 - 99 mg/dL    BUN 25 (H) 6 - 23 mg/dL    CREATININE 1.1 (H) 0.5 - 1.0 mg/dL    GFR Non-African American 49 >=60 mL/min/1.73    GFR African American 60     Calcium 9.5 8.6 - 10.2 mg/dL    Total Protein 6.7 6.4 - 8.3 g/dL    Albumin 3.7 3.5 - 5.2 g/dL    Total Bilirubin 0.5 0.0 - 1.2 mg/dL    Alkaline Phosphatase 96 35 - 104 U/L    ALT 14 0 - 32 U/L    AST 19 0 - 31 U/L   Troponin   Result Value Ref Range    Troponin, High Sensitivity 14 (H) 0 - 9 ng/L   Brain Natriuretic Peptide   Result Value Ref Range    Pro- (H) 0 - 125 pg/mL   Troponin   Result Value Ref Range    Troponin, High Sensitivity 13 (H) 0 - 9 ng/L   EKG 12 Lead   Result Value Ref Range    Ventricular Rate 61 BPM    Atrial Rate 61 BPM    P-R Interval 176 ms    QRS Duration 94 ms    Q-T Interval 430 ms    QTc Calculation (Bazett) 432 ms    P Axis 54 degrees    R Axis -2 degrees    T Axis 13 degrees       Radiology:  CTA PULMONARY W CONTRAST   Final Result   No CT evidence for pulmonary embolus. Mild subpleural atelectasis lung bases. Mild thickening left adrenal gland likely hyperplasia similar to prior exam.         XR CHEST PORTABLE   Final Result   No acute cardiopulmonary process is identified.             ------------------------- NURSING NOTES AND VITALS REVIEWED ---------------------------  Date / Time Roomed:  9/15/2021 11:21 AM  ED Bed Assignment:  08/08    The nursing notes within the ED encounter and vital signs as below have been reviewed.    /60   Pulse 66   Temp 97.9 °F (36.6 °C)   Resp 16   Ht 5' 5\" (1.651 m)   Wt 265 lb (120.2 kg)   SpO2 99%   BMI 44.10 kg/m²   Oxygen Saturation Interpretation: Normal      ------------------------------------------ PROGRESS NOTES ------------------------------------------  I have spoken with the patient and discussed todays results, in addition to providing specific details for the plan of care and counseling regarding the diagnosis and prognosis. Their questions are answered at this time and they are agreeable with the plan. I discussed at length with them reasons for immediate return here for re evaluation. They will followup with primary care by calling their office tomorrow. --------------------------------- ADDITIONAL PROVIDER NOTES ---------------------------------  At this time the patient is without objective evidence of an acute process requiring hospitalization or inpatient management. They have remained hemodynamically stable throughout their entire ED visit and are stable for discharge with outpatient follow-up. The plan has been discussed in detail and they are aware of the specific conditions for emergent return, as well as the importance of follow-up. New Prescriptions    No medications on file       Diagnosis:  1. Chest pain, unspecified type        Disposition:  Patient's disposition: Discharge to home  Patient's condition is stable.               Yana Pink DO  Resident  09/15/21 [id number]

## 2021-09-15 NOTE — TELEPHONE ENCOUNTER
Patient's son Sharyn Espinosa called to inform us that patient was having chest pain,left arm pain quentin dizziness this morning. He  contacted pcp and was instructed to go to the ER, I also advised him to take patient to the ER and he said he is on his way now.

## 2021-09-15 NOTE — TELEPHONE ENCOUNTER
Pt's son called in stating that mom was making breakfast this morning and got dizzy, complaining of chest pain, left arm pain and her BP was 170/90. Paloma Pen that she needs to go to the ED now.

## 2021-09-17 DIAGNOSIS — N39.41 URGE INCONTINENCE: ICD-10-CM

## 2021-09-17 DIAGNOSIS — I10 ESSENTIAL HYPERTENSION: ICD-10-CM

## 2021-09-20 RX ORDER — OXYBUTYNIN CHLORIDE 10 MG/1
20 TABLET, EXTENDED RELEASE ORAL DAILY
Qty: 60 TABLET | Refills: 1 | Status: SHIPPED
Start: 2021-09-20 | End: 2021-10-08

## 2021-09-20 RX ORDER — METOPROLOL SUCCINATE 25 MG/1
12.5 TABLET, EXTENDED RELEASE ORAL DAILY
Qty: 15 TABLET | Refills: 5 | Status: SHIPPED
Start: 2021-09-20 | End: 2022-01-21 | Stop reason: SDUPTHER

## 2021-09-20 RX ORDER — CHOLECALCIFEROL (VITAMIN D3) 50 MCG
2000 TABLET ORAL DAILY
Qty: 30 TABLET | Refills: 0 | Status: SHIPPED
Start: 2021-09-20 | End: 2022-10-12 | Stop reason: SDUPTHER

## 2021-09-21 ENCOUNTER — OFFICE VISIT (OUTPATIENT)
Dept: FAMILY MEDICINE CLINIC | Age: 66
End: 2021-09-21
Payer: COMMERCIAL

## 2021-09-21 VITALS
RESPIRATION RATE: 18 BRPM | BODY MASS INDEX: 45.82 KG/M2 | HEIGHT: 65 IN | TEMPERATURE: 96.9 F | DIASTOLIC BLOOD PRESSURE: 62 MMHG | HEART RATE: 77 BPM | OXYGEN SATURATION: 95 % | SYSTOLIC BLOOD PRESSURE: 106 MMHG | WEIGHT: 275 LBS

## 2021-09-21 DIAGNOSIS — R07.82 INTERCOSTAL PAIN: Primary | ICD-10-CM

## 2021-09-21 DIAGNOSIS — R42 DIZZINESS: ICD-10-CM

## 2021-09-21 PROCEDURE — 3017F COLORECTAL CA SCREEN DOC REV: CPT | Performed by: FAMILY MEDICINE

## 2021-09-21 PROCEDURE — G8399 PT W/DXA RESULTS DOCUMENT: HCPCS | Performed by: FAMILY MEDICINE

## 2021-09-21 PROCEDURE — 1036F TOBACCO NON-USER: CPT | Performed by: FAMILY MEDICINE

## 2021-09-21 PROCEDURE — G8417 CALC BMI ABV UP PARAM F/U: HCPCS | Performed by: FAMILY MEDICINE

## 2021-09-21 PROCEDURE — 99213 OFFICE O/P EST LOW 20 MIN: CPT | Performed by: FAMILY MEDICINE

## 2021-09-21 PROCEDURE — 1123F ACP DISCUSS/DSCN MKR DOCD: CPT | Performed by: FAMILY MEDICINE

## 2021-09-21 PROCEDURE — 4040F PNEUMOC VAC/ADMIN/RCVD: CPT | Performed by: FAMILY MEDICINE

## 2021-09-21 PROCEDURE — 1090F PRES/ABSN URINE INCON ASSESS: CPT | Performed by: FAMILY MEDICINE

## 2021-09-21 PROCEDURE — G8427 DOCREV CUR MEDS BY ELIG CLIN: HCPCS | Performed by: FAMILY MEDICINE

## 2021-09-21 NOTE — PATIENT INSTRUCTIONS
Patient Education        DASH Diet: Care Instructions  Your Care Instructions     The DASH diet is an eating plan that can help lower your blood pressure. DASH stands for Dietary Approaches to Stop Hypertension. Hypertension is high blood pressure. The DASH diet focuses on eating foods that are high in calcium, potassium, and magnesium. These nutrients can lower blood pressure. The foods that are highest in these nutrients are fruits, vegetables, low-fat dairy products, nuts, seeds, and legumes. But taking calcium, potassium, and magnesium supplements instead of eating foods that are high in those nutrients does not have the same effect. The DASH diet also includes whole grains, fish, and poultry. The DASH diet is one of several lifestyle changes your doctor may recommend to lower your high blood pressure. Your doctor may also want you to decrease the amount of sodium in your diet. Lowering sodium while following the DASH diet can lower blood pressure even further than just the DASH diet alone. Follow-up care is a key part of your treatment and safety. Be sure to make and go to all appointments, and call your doctor if you are having problems. It's also a good idea to know your test results and keep a list of the medicines you take. How can you care for yourself at home? Following the DASH diet  · Eat 4 to 5 servings of fruit each day. A serving is 1 medium-sized piece of fruit, ½ cup chopped or canned fruit, 1/4 cup dried fruit, or 4 ounces (½ cup) of fruit juice. Choose fruit more often than fruit juice. · Eat 4 to 5 servings of vegetables each day. A serving is 1 cup of lettuce or raw leafy vegetables, ½ cup of chopped or cooked vegetables, or 4 ounces (½ cup) of vegetable juice. Choose vegetables more often than vegetable juice. · Get 2 to 3 servings of low-fat and fat-free dairy each day. A serving is 8 ounces of milk, 1 cup of yogurt, or 1 ½ ounces of cheese. · Eat 6 to 8 servings of grains each day.  A low-fat dip as an appetizer instead of chips and dip. ? Sprinkle sunflower seeds or chopped almonds over salads. Or try adding chopped walnuts or almonds to cooked vegetables. ? Try some vegetarian meals using beans and peas. Add garbanzo or kidney beans to salads. Make burritos and tacos with mashed morton beans or black beans. Where can you learn more? Go to https://KILTR.[employer]. org and sign in to your [employer] account. Enter Y238 in the [employer] box to learn more about \"DASH Diet: Care Instructions. \"     If you do not have an account, please click on the \"Sign Up Now\" link. Current as of: August 31, 2020               Content Version: 12.9  © 6542-3062 [employer], [employer]. Care instructions adapted under license by [facility] ([facility]). If you have questions about a medical condition or this instruction, always ask your healthcare professional. [address]  any warranty or liability for your use of this information.

## 2021-09-21 NOTE — PROGRESS NOTES
Chief Complaint   Patient presents with    Follow-Up from Hospital     chest pain    Shortness of Breath     left side chest pain        HPI:  Santo Alicia presents to the office for ER follow up. Patient was seen in the ER for chest pain and dizziness. Since being discharged from the ER Yajaira's  symptoms have been ongoing . She continues to have left sided chest pain. She states pain is occurring daily. She states it feels like a thump. She denies nausea, vomiting. She does have diaphoresis and shortness of breath. Any prescribed medications have been finished. Discharge instructions and any medication changes were again reviewed. She had blood work in the ED which showed mildly elevated troponins. She had a CTA which was normal.  EKG done showed sinus rhythm. She had a stress test done in July 2021 which was normal.  She has been taking all medications as prescribed. Patient's past medical, surgical, social and/or family history reviewed, updated in chart, and are non-contributory (unless otherwise stated). Medications and allergies also reviewed and updated in chart.       Review of Systems:  Constitutional:  No fever, no fatigue, no chills, no headaches, no weight change  Dermatology:  No rash, no mole, no dry or sensitive skin  ENT:  No cough, no sore throat, no sinus pain, no runny nose, no ear pain  Cardiology:  + chest pain, no palpitations, no leg edema, + shortness of breath, no PND  Gastroenterology:  No dysphagia, no abdominal pain, no nausea, no vomiting, no constipation, no diarrhea, no heartburn  Musculoskeletal:  No joint pain, no leg cramps, no back pain, no muscle aches  Respiratory:  + shortness of breath, no orthopnea, no wheezing, no SOTO, no hemoptysis  Urology:  No blood in the urine, no urinary frequency, no urinary incontinence, no urinary urgency, no nocturia, no dysuria      Vitals:    09/21/21 1000 09/21/21 1026 09/21/21 1032 09/21/21 1036   BP: 118/74 108/62 106/60 106/62   Position:  Supine     Pulse: 77      Resp: 18      Temp: 96.9 °F (36.1 °C)      SpO2: 95%      Weight: 275 lb (124.7 kg)      Height: 5' 5\" (1.651 m)          Physical Exam  Vitals and nursing note reviewed. Constitutional:       Appearance: She is well-developed. HENT:      Head: Normocephalic and atraumatic. Right Ear: External ear normal.      Left Ear: External ear normal.      Nose: Nose normal.   Eyes:      Conjunctiva/sclera: Conjunctivae normal.      Pupils: Pupils are equal, round, and reactive to light. Neck:      Thyroid: No thyromegaly. Cardiovascular:      Rate and Rhythm: Normal rate and regular rhythm. Heart sounds: Normal heart sounds. Pulmonary:      Effort: Pulmonary effort is normal.      Breath sounds: Normal breath sounds. No wheezing. Chest:      Chest wall: Tenderness present. Abdominal:      General: Bowel sounds are normal.      Palpations: Abdomen is soft. Tenderness: There is no abdominal tenderness. Musculoskeletal:         General: Normal range of motion. Cervical back: Normal range of motion and neck supple. Skin:     General: Skin is warm and dry. Findings: No rash. Neurological:      Mental Status: She is alert and oriented to person, place, and time. Deep Tendon Reflexes: Reflexes are normal and symmetric. Psychiatric:         Behavior: Behavior normal.         Assessment/Plan:      Daniel Gale was seen today for follow-up from hospital and shortness of breath. Diagnoses and all orders for this visit:    Intercostal pain  Likely muscular  RICE therapy discussed in detail  Will wait 2 weeks and if no improvement or symptoms worsen, will see if we can schedule cardiology appt sooner    Dizziness  Ongoing. Orthostatics negative in office  Likely secondary to multiple medications and possible dehydration  Encouraged fluids  Follow up in 2 weeks    As above.   Call or go to ED immediately if symptoms worsen or persist.  Return in about 2 weeks (around 10/5/2021) for costochondritis. , or sooner if necessary. Educational materials and/or home exercises printed for patient's review and were included in patient instructions on his/her After Visit Summary and given to patient at the end of visit. Counseled regarding above diagnosis, including possible risks and complications,  especially if left uncontrolled. Counseled regarding the possible side effects, risks, benefits and alternatives to treatment; patient and/or guardian verbalizes understanding, agrees, feels comfortable with and wishes to proceed with above treatment plan. Advised patient to call with any new medication issues, and read all Rx info from pharmacy to assure aware of all possible risks and side effects of medication before taking. Reviewed age and gender appropriate health screening exams and vaccinations. Advised patient regarding importance of keeping up with recommended health maintenance and to schedule as soon as possible if overdue, as this is important in assessing for undiagnosed pathology, especially cancer, as well as protecting against potentially harmful/life threatening disease. Patient and/or guardian verbalizes understanding and agrees with above counseling, assessment and plan. All questions answered. Jimbo Shahid DO  9/21/2021    I have personally reviewed and updated the chief complaint, HPI, Past Medical, Family and Social History, as well as the above Review of Systems.

## 2021-09-24 ENCOUNTER — OFFICE VISIT (OUTPATIENT)
Dept: ORTHOPEDIC SURGERY | Age: 66
End: 2021-09-24
Payer: COMMERCIAL

## 2021-09-24 VITALS — BODY MASS INDEX: 44.15 KG/M2 | WEIGHT: 265 LBS | HEIGHT: 65 IN

## 2021-09-24 DIAGNOSIS — M17.0 PRIMARY OSTEOARTHRITIS OF BOTH KNEES: Primary | ICD-10-CM

## 2021-09-24 PROCEDURE — 20610 DRAIN/INJ JOINT/BURSA W/O US: CPT | Performed by: NURSE PRACTITIONER

## 2021-09-24 RX ORDER — TRIAMCINOLONE ACETONIDE 40 MG/ML
40 INJECTION, SUSPENSION INTRA-ARTICULAR; INTRAMUSCULAR ONCE
Status: COMPLETED | OUTPATIENT
Start: 2021-09-24 | End: 2021-09-24

## 2021-09-24 RX ORDER — LIDOCAINE HYDROCHLORIDE 10 MG/ML
4 INJECTION, SOLUTION INFILTRATION; PERINEURAL ONCE
Status: COMPLETED | OUTPATIENT
Start: 2021-09-24 | End: 2021-09-24

## 2021-09-24 RX ADMIN — TRIAMCINOLONE ACETONIDE 40 MG: 40 INJECTION, SUSPENSION INTRA-ARTICULAR; INTRAMUSCULAR at 09:24

## 2021-09-24 RX ADMIN — TRIAMCINOLONE ACETONIDE 40 MG: 40 INJECTION, SUSPENSION INTRA-ARTICULAR; INTRAMUSCULAR at 09:23

## 2021-09-24 RX ADMIN — LIDOCAINE HYDROCHLORIDE 4 ML: 10 INJECTION, SOLUTION INFILTRATION; PERINEURAL at 09:23

## 2021-09-24 RX ADMIN — LIDOCAINE HYDROCHLORIDE 4 ML: 10 INJECTION, SOLUTION INFILTRATION; PERINEURAL at 09:22

## 2021-09-24 NOTE — PROGRESS NOTES
Follow Up Visit     Yves Tavarez returns today for follow up visit regarding bilateral knee osteoarthritis. Treatment thus far has included steroid injections with good relief, last cortisone injection on 3/10/2021, continue to work on strengthening and take anti-inflammatories as needed, as well weight loss. She reports symptoms are improved but knee pain has returned. She would like to proceed with bilateral knee cortisone injections today. Physical Exam:     Height: 5' 5\" (1.651 m), Weight: 265 lb (120.2 kg) (per pt)    General: Alert and oriented x3, no acute distress  Cardiovascular/pulmonary: No labored breathing, peripheral perfusion intact  Musculoskeletal:    Bilateral knee exam range of motion 0-120, tenderness present on palpation no swelling deformity visualized on inspection. Valgus varus exams are intact. Stable patella tracking midline. Stable knees on exam.    Controlled Substances Monitoring:      Imaging:  New imaging obtained today. Previous x-rays reviewed shows degenerative changes consistent with osteoarthritis    Procedure Note: Knee Cortisone injection     The bilateral knees were identified as the injection site. The risk and benefits of a cortisone injection were explained and the patient consented to the injection. Under sterile conditions, each knee was injected with a mixture of 40 mg of Kenalog and 4 mL of 1% Lidocaine without complication. A sterile bandage was applied.     Administrations This Visit     lidocaine 1 % injection 4 mL     Admin Date  09/24/2021 Action  Given Dose  4 mL Route  Intra-articular Administered By  Stanislaus Iveth, [facility]           Admin Date  09/24/2021 Action  Given Dose  4 mL Route  Intra-articular Administered By  [facility], [facility]          triamcinolone acetonide (KENALOG-40) injection 40 mg     Admin Date  09/24/2021 Action  Given Dose  40 mg Route  Intra-articular Administered By  Stanislaus Iveth, [facility]           Admin Date  09/24/2021 Action  Given Dose  40 mg Route  Intra-articular Administered By  Nicole Ward ATC                Assessment: Bilateral knee osteoarthritis      Plan: Today we discussed her left knee. Patient reports that the cortisone injection she received in March of this year provided relief up until 3 weeks ago. She reports symptoms have returned to baseline and is having difficulty ambulating due to pain. She requested bilateral knee injections today which were provided today. She will follow-up in 3 months. Patient verbalized understanding.       Elias Meza CNP  Orthopedic Surgery   09/24/21  10:45 AM

## 2021-10-08 DIAGNOSIS — N39.41 URGE INCONTINENCE: ICD-10-CM

## 2021-10-08 RX ORDER — OXYBUTYNIN CHLORIDE 10 MG/1
TABLET, EXTENDED RELEASE ORAL
Qty: 60 TABLET | Refills: 11 | Status: SHIPPED
Start: 2021-10-08 | End: 2022-08-16

## 2021-10-12 ENCOUNTER — OFFICE VISIT (OUTPATIENT)
Dept: FAMILY MEDICINE CLINIC | Age: 66
End: 2021-10-12
Payer: COMMERCIAL

## 2021-10-12 VITALS
WEIGHT: 268 LBS | OXYGEN SATURATION: 94 % | HEIGHT: 65 IN | BODY MASS INDEX: 44.65 KG/M2 | RESPIRATION RATE: 18 BRPM | HEART RATE: 57 BPM | DIASTOLIC BLOOD PRESSURE: 70 MMHG | TEMPERATURE: 97.8 F | SYSTOLIC BLOOD PRESSURE: 102 MMHG

## 2021-10-12 DIAGNOSIS — Z23 FLU VACCINE NEED: ICD-10-CM

## 2021-10-12 DIAGNOSIS — R07.82 INTERCOSTAL PAIN: Primary | ICD-10-CM

## 2021-10-12 DIAGNOSIS — F32.1 CURRENT MODERATE EPISODE OF MAJOR DEPRESSIVE DISORDER WITHOUT PRIOR EPISODE (HCC): ICD-10-CM

## 2021-10-12 DIAGNOSIS — Z91.81 AT HIGH RISK FOR FALLS: ICD-10-CM

## 2021-10-12 PROCEDURE — G8399 PT W/DXA RESULTS DOCUMENT: HCPCS | Performed by: FAMILY MEDICINE

## 2021-10-12 PROCEDURE — 4040F PNEUMOC VAC/ADMIN/RCVD: CPT | Performed by: FAMILY MEDICINE

## 2021-10-12 PROCEDURE — 99214 OFFICE O/P EST MOD 30 MIN: CPT | Performed by: FAMILY MEDICINE

## 2021-10-12 PROCEDURE — 90694 VACC AIIV4 NO PRSRV 0.5ML IM: CPT | Performed by: FAMILY MEDICINE

## 2021-10-12 PROCEDURE — 1090F PRES/ABSN URINE INCON ASSESS: CPT | Performed by: FAMILY MEDICINE

## 2021-10-12 PROCEDURE — 1036F TOBACCO NON-USER: CPT | Performed by: FAMILY MEDICINE

## 2021-10-12 PROCEDURE — 90471 IMMUNIZATION ADMIN: CPT | Performed by: FAMILY MEDICINE

## 2021-10-12 PROCEDURE — G8484 FLU IMMUNIZE NO ADMIN: HCPCS | Performed by: FAMILY MEDICINE

## 2021-10-12 PROCEDURE — 3017F COLORECTAL CA SCREEN DOC REV: CPT | Performed by: FAMILY MEDICINE

## 2021-10-12 PROCEDURE — 1123F ACP DISCUSS/DSCN MKR DOCD: CPT | Performed by: FAMILY MEDICINE

## 2021-10-12 PROCEDURE — G8417 CALC BMI ABV UP PARAM F/U: HCPCS | Performed by: FAMILY MEDICINE

## 2021-10-12 PROCEDURE — G8427 DOCREV CUR MEDS BY ELIG CLIN: HCPCS | Performed by: FAMILY MEDICINE

## 2021-10-12 RX ORDER — BUPROPION HYDROCHLORIDE 150 MG/1
150 TABLET ORAL EVERY MORNING
Qty: 30 TABLET | Refills: 3 | Status: SHIPPED
Start: 2021-10-12 | End: 2021-11-22

## 2021-10-12 RX ORDER — POTASSIUM CHLORIDE 750 MG/1
TABLET, FILM COATED, EXTENDED RELEASE ORAL
COMMUNITY
Start: 2021-09-24 | End: 2021-10-12 | Stop reason: ALTCHOICE

## 2021-10-12 NOTE — PROGRESS NOTES
Chief Complaint   Patient presents with    Follow-up     costochronditis some improvement    Flu Vaccine    Anxiety     discuss increase with Lexapro       HPI:  Patient is here for follow-up of costochondritis. She continues to have sharp pains on the left side of her chest. She states pain comes and goes. Pain is 7 out of 10. Alleviating factors: nothing. Exacerbating factors: nothing. She states that other day at work, she got light headed and real sharp pain and had to sit down. Anxiety and/or depression:  Patient is here with complaints of anxiety and/or depression. This is a/an chronic problem. This has been going on for many years. Treatment in the past includes lexapro. Anxiety symptoms include panic attacks. Depressive symptoms include decreased appetite. Patient does not have suicidal or homicidal ideation. Patient is  having issues falling or staying asleep. She wakes up a lot throughout the night Patient is  having associated panic attacks. Last panic attack was this morning. The above is  interfering with quality of life. Patient has seen a Counselor before. Patient does not use alcohol and/or drugs. She states everyone at her house is always screaming and yelling and it is causing her worsening anxiety and depression. She would like a flu vaccine today. Patient's past medical, surgical, social and/or family history reviewed, updated in chart, and are non-contributory (unless otherwise stated). Medications and allergies also reviewed and updated in chart.     Review of Systems:  Constitutional:  No fever, no fatigue, no chills, no headaches, no weight change + lightheaded  Dermatology:  No rash, no mole, no dry or sensitive skin  ENT:  No cough, no sore throat, no sinus pain, no runny nose, no ear pain  Cardiology:  + chest pain, no palpitations, no leg edema, no shortness of breath, no PND  Gastroenterology:  No dysphagia, no abdominal pain, no nausea, no vomiting, no constipation, no diarrhea, no heartburn  Musculoskeletal:  No joint pain, no leg cramps, no back pain, no muscle aches  Respiratory:  No shortness of breath, no orthopnea, no wheezing, no SOTO, no hemoptysis  Urology:  No blood in the urine, no urinary frequency, no urinary incontinence, no urinary urgency, no nocturia, no dysuria      Vitals:    10/12/21 1148 10/12/21 1213 10/12/21 1224 10/12/21 1229   BP: (!) 108/58 124/68 116/74 102/70   Position:  Supine Sitting Standing   Pulse: 57      Resp: 18      Temp: 97.8 °F (36.6 °C)      TempSrc: Temporal      SpO2: 94%      Weight: 268 lb (121.6 kg)      Height: 5' 5\" (1.651 m)          Physical Exam  Vitals and nursing note reviewed. Constitutional:       Appearance: She is well-developed. HENT:      Head: Normocephalic and atraumatic. Right Ear: External ear normal.      Left Ear: External ear normal.      Nose: Nose normal.   Eyes:      Conjunctiva/sclera: Conjunctivae normal.      Pupils: Pupils are equal, round, and reactive to light. Neck:      Thyroid: No thyromegaly. Cardiovascular:      Rate and Rhythm: Normal rate and regular rhythm. Heart sounds: Normal heart sounds. Pulmonary:      Effort: Pulmonary effort is normal.      Breath sounds: Normal breath sounds. No wheezing. Abdominal:      General: Bowel sounds are normal.      Palpations: Abdomen is soft. Tenderness: There is no abdominal tenderness. Musculoskeletal:         General: Normal range of motion. Cervical back: Normal range of motion and neck supple. Skin:     General: Skin is warm and dry. Findings: No rash. Neurological:      Mental Status: She is alert and oriented to person, place, and time. Deep Tendon Reflexes: Reflexes are normal and symmetric. Psychiatric:         Behavior: Behavior normal.         Assessment/Plan:      Olimpia Alcala was seen today for follow-up, flu vaccine and anxiety.     Diagnoses and all orders for this visit:    Intercostal pain  Improving  Continue conservative management. Current moderate episode of major depressive disorder without prior episode (HCC)  -     buPROPion (WELLBUTRIN XL) 150 MG extended release tablet; Take 1 tablet by mouth every morning  Will start wellbutrin  Side effects reviewed. Flu vaccine need  -     INFLUENZA, QUADV, ADJUVANTED, 65 YRS =, IM, PF, PREFILL SYR, 0.5ML (FLUAD)    At high risk for falls  Home safety tips provided    As above. Call or go to ED immediately if symptoms worsen or persist.  No follow-ups on file. , or sooner if necessary. Educational materials and/or home exercises printed for patient's review and were included in patient instructions on his/her After Visit Summary and given to patient at the end of visit. Counseled regarding above diagnosis, including possible risks and complications,  especially if left uncontrolled. Counseled regarding the possible side effects, risks, benefits and alternatives to treatment; patient and/or guardian verbalizes understanding, agrees, feels comfortable with and wishes to proceed with above treatment plan. Advised patient to call with any new medication issues, and read all Rx info from pharmacy to assure aware of all possible risks and side effects of medication before taking. Reviewed age and gender appropriate health screening exams and vaccinations. Advised patient regarding importance of keeping up with recommended health maintenance and to schedule as soon as possible if overdue, as this is important in assessing for undiagnosed pathology, especially cancer, as well as protecting against potentially harmful/life threatening disease. Patient and/or guardian verbalizes understanding and agrees with above counseling, assessment and plan. All questions answered.     Mart Hudson DO  10/12/2021    I have personally reviewed and updated the chief complaint, HPI, Past Medical, Family and Social History, as well as the above Review of Systems. On the basis of positive falls risk screening, assessment and plan is as follows: home safety tips provided.

## 2021-10-13 ENCOUNTER — TELEPHONE (OUTPATIENT)
Dept: FAMILY MEDICINE CLINIC | Age: 66
End: 2021-10-13

## 2021-10-13 NOTE — TELEPHONE ENCOUNTER
Patient called stating that her son tested positive for covid today. Her and her son live together. She wants to know if she is okay to go to work? She spoke with her employer who told her she is fine to come to work but she is worried because of her condition and she has a slight cough.  Please advise

## 2021-10-26 DIAGNOSIS — M79.89 LEG SWELLING: ICD-10-CM

## 2021-10-26 RX ORDER — FUROSEMIDE 20 MG/1
20 TABLET ORAL DAILY
Qty: 30 TABLET | Refills: 1 | Status: SHIPPED
Start: 2021-10-26 | End: 2021-11-22

## 2021-10-26 NOTE — TELEPHONE ENCOUNTER
----- Message from Mckenna Flores sent at 10/25/2021  5:28 PM EDT -----  Subject: Refill Request    QUESTIONS  Name of Medication? apixaban (ELIQUIS) 5 MG TABS tablet  Patient-reported dosage and instructions? 5mg  How many days do you have left? 2  Preferred Pharmacy? [facility]  Pharmacy phone number (if available)? [phone]  ---------------------------------------------------------------------------  --------------,  Name of Medication? Other - furosemice  Patient-reported dosage and instructions? 20mg tablets  How many days do you have left? 0  Preferred Pharmacy? [facility]  Pharmacy phone number (if available)? [phone]  ---------------------------------------------------------------------------  --------------,  Name of Medication? Other - diclofenac  Patient-reported dosage and instructions? 1% gel  How many days do you have left? 0  Preferred Pharmacy? [facility]  Pharmacy phone number (if available)? [phone]  Additional Information for Provider? Patient need refills on all meds. No   refill on none of them as stated with the pharmacy  ---------------------------------------------------------------------------  --------------  [address]  What is the best way for the office to contact you? OK to leave message on   voicemail  Preferred Call Back Phone Number?  [phone]

## 2021-10-28 ENCOUNTER — OFFICE VISIT (OUTPATIENT)
Dept: FAMILY MEDICINE CLINIC | Age: 66
End: 2021-10-28
Payer: COMMERCIAL

## 2021-10-28 ENCOUNTER — NURSE TRIAGE (OUTPATIENT)
Dept: OTHER | Facility: CLINIC | Age: 66
End: 2021-10-28

## 2021-10-28 VITALS
TEMPERATURE: 97.8 F | SYSTOLIC BLOOD PRESSURE: 108 MMHG | OXYGEN SATURATION: 99 % | WEIGHT: 267.4 LBS | DIASTOLIC BLOOD PRESSURE: 68 MMHG | BODY MASS INDEX: 44.55 KG/M2 | HEIGHT: 65 IN | RESPIRATION RATE: 18 BRPM | HEART RATE: 81 BPM

## 2021-10-28 DIAGNOSIS — I95.9 HYPOTENSION, UNSPECIFIED HYPOTENSION TYPE: Primary | ICD-10-CM

## 2021-10-28 DIAGNOSIS — I95.9 HYPOTENSION, UNSPECIFIED HYPOTENSION TYPE: ICD-10-CM

## 2021-10-28 DIAGNOSIS — R42 DIZZINESS: ICD-10-CM

## 2021-10-28 LAB
ALBUMIN SERPL-MCNC: 4.1 G/DL (ref 3.5–5.2)
ALP BLD-CCNC: 103 U/L (ref 35–104)
ALT SERPL-CCNC: 12 U/L (ref 0–32)
ANION GAP SERPL CALCULATED.3IONS-SCNC: 16 MMOL/L (ref 7–16)
AST SERPL-CCNC: 16 U/L (ref 0–31)
BASOPHILS ABSOLUTE: 0.05 E9/L (ref 0–0.2)
BASOPHILS RELATIVE PERCENT: 0.4 % (ref 0–2)
BILIRUB SERPL-MCNC: 0.4 MG/DL (ref 0–1.2)
BUN BLDV-MCNC: 27 MG/DL (ref 6–23)
CALCIUM SERPL-MCNC: 9.9 MG/DL (ref 8.6–10.2)
CHLORIDE BLD-SCNC: 95 MMOL/L (ref 98–107)
CO2: 26 MMOL/L (ref 22–29)
CREAT SERPL-MCNC: 1.3 MG/DL (ref 0.5–1)
EOSINOPHILS ABSOLUTE: 0.08 E9/L (ref 0.05–0.5)
EOSINOPHILS RELATIVE PERCENT: 0.7 % (ref 0–6)
GFR AFRICAN AMERICAN: 49
GFR NON-AFRICAN AMERICAN: 41 ML/MIN/1.73
GLUCOSE BLD-MCNC: 109 MG/DL (ref 74–99)
HCT VFR BLD CALC: 41.1 % (ref 34–48)
HEMOGLOBIN: 13.2 G/DL (ref 11.5–15.5)
IMMATURE GRANULOCYTES #: 0.08 E9/L
IMMATURE GRANULOCYTES %: 0.7 % (ref 0–5)
LYMPHOCYTES ABSOLUTE: 1.87 E9/L (ref 1.5–4)
LYMPHOCYTES RELATIVE PERCENT: 16.8 % (ref 20–42)
MCH RBC QN AUTO: 29.8 PG (ref 26–35)
MCHC RBC AUTO-ENTMCNC: 32.1 % (ref 32–34.5)
MCV RBC AUTO: 92.8 FL (ref 80–99.9)
MONOCYTES ABSOLUTE: 1.06 E9/L (ref 0.1–0.95)
MONOCYTES RELATIVE PERCENT: 9.5 % (ref 2–12)
NEUTROPHILS ABSOLUTE: 8.01 E9/L (ref 1.8–7.3)
NEUTROPHILS RELATIVE PERCENT: 71.9 % (ref 43–80)
PDW BLD-RTO: 13.7 FL (ref 11.5–15)
PLATELET # BLD: 373 E9/L (ref 130–450)
PMV BLD AUTO: 10.7 FL (ref 7–12)
POTASSIUM SERPL-SCNC: 4.1 MMOL/L (ref 3.5–5)
RBC # BLD: 4.43 E12/L (ref 3.5–5.5)
SODIUM BLD-SCNC: 137 MMOL/L (ref 132–146)
TOTAL PROTEIN: 6.9 G/DL (ref 6.4–8.3)
TSH SERPL DL<=0.05 MIU/L-ACNC: 0.34 UIU/ML (ref 0.27–4.2)
WBC # BLD: 11.2 E9/L (ref 4.5–11.5)

## 2021-10-28 PROCEDURE — 3017F COLORECTAL CA SCREEN DOC REV: CPT | Performed by: FAMILY MEDICINE

## 2021-10-28 PROCEDURE — 93000 ELECTROCARDIOGRAM COMPLETE: CPT | Performed by: FAMILY MEDICINE

## 2021-10-28 PROCEDURE — 4040F PNEUMOC VAC/ADMIN/RCVD: CPT | Performed by: FAMILY MEDICINE

## 2021-10-28 PROCEDURE — G8417 CALC BMI ABV UP PARAM F/U: HCPCS | Performed by: FAMILY MEDICINE

## 2021-10-28 PROCEDURE — G8427 DOCREV CUR MEDS BY ELIG CLIN: HCPCS | Performed by: FAMILY MEDICINE

## 2021-10-28 PROCEDURE — 1036F TOBACCO NON-USER: CPT | Performed by: FAMILY MEDICINE

## 2021-10-28 PROCEDURE — G8484 FLU IMMUNIZE NO ADMIN: HCPCS | Performed by: FAMILY MEDICINE

## 2021-10-28 PROCEDURE — 99214 OFFICE O/P EST MOD 30 MIN: CPT | Performed by: FAMILY MEDICINE

## 2021-10-28 PROCEDURE — 1090F PRES/ABSN URINE INCON ASSESS: CPT | Performed by: FAMILY MEDICINE

## 2021-10-28 PROCEDURE — G8399 PT W/DXA RESULTS DOCUMENT: HCPCS | Performed by: FAMILY MEDICINE

## 2021-10-28 PROCEDURE — 1123F ACP DISCUSS/DSCN MKR DOCD: CPT | Performed by: FAMILY MEDICINE

## 2021-10-28 NOTE — PATIENT INSTRUCTIONS
Patient Education        Dizziness: Care Instructions  Your Care Instructions  Dizziness is the feeling of unsteadiness or fuzziness in your head. It is different than having vertigo, which is a feeling that the room is spinning or that you are moving or falling. It is also different from lightheadedness, which is the feeling that you are about to faint. It can be hard to know what causes dizziness. Some people feel dizzy when they have migraine headaches. Sometimes bouts of flu can make you feel dizzy. Some medical conditions, such as heart problems or high blood pressure, can make you feel dizzy. Many medicines can cause dizziness, including medicines for high blood pressure, pain, or anxiety. If a medicine causes your symptoms, your doctor may recommend that you stop or change the medicine. If it is a problem with your heart, you may need medicine to help your heart work better. If there is no clear reason for your symptoms, your doctor may suggest watching and waiting for a while to see if the dizziness goes away on its own. Follow-up care is a key part of your treatment and safety. Be sure to make and go to all appointments, and call your doctor if you are having problems. It's also a good idea to know your test results and keep a list of the medicines you take. How can you care for yourself at home? · If your doctor recommends or prescribes medicine, take it exactly as directed. Call your doctor if you think you are having a problem with your medicine. · Do not drive while you feel dizzy. · Try to prevent falls. Steps you can take include:  ? Using nonskid mats, adding grab bars near the tub, and using night-lights. ? Clearing your home so that walkways are free of anything you might trip on.  ? Letting family and friends know that you have been feeling dizzy. This will help them know how to help you. When should you call for help? Call 911 anytime you think you may need emergency care.  For example, call if:    · You passed out (lost consciousness).     · You have dizziness along with symptoms of a heart attack. These may include:  ? Chest pain or pressure, or a strange feeling in the chest.  ? Sweating. ? Shortness of breath. ? Nausea or vomiting. ? Pain, pressure, or a strange feeling in the back, neck, jaw, or upper belly or in one or both shoulders or arms. ? Lightheadedness or sudden weakness. ? A fast or irregular heartbeat.     · You have symptoms of a stroke. These may include:  ? Sudden numbness, tingling, weakness, or loss of movement in your face, arm, or leg, especially on only one side of your body. ? Sudden vision changes. ? Sudden trouble speaking. ? Sudden confusion or trouble understanding simple statements. ? Sudden problems with walking or balance. ? A sudden, severe headache that is different from past headaches. Call your doctor now or seek immediate medical care if:    · You feel dizzy and have a fever, headache, or ringing in your ears.     · You have new or increased nausea and vomiting.     · Your dizziness does not go away or comes back. Watch closely for changes in your health, and be sure to contact your doctor if:    · You do not get better as expected. Where can you learn more? Go to [email or web address] and sign in to your [employer] account. Enter U957 in the [facility] box to learn more about \"Dizziness: Care Instructions. \"     If you do not have an account, please click on the \"Sign Up Now\" link. Current as of: July 1, 2021               Content Version: 13.0  © 7155-5291 [employer]. Care instructions adapted under license by [facility] ([facility]). If you have questions about a medical condition or this instruction, always ask your healthcare professional. [address] any warranty or liability for your use of this information.

## 2021-10-28 NOTE — TELEPHONE ENCOUNTER
Reason for Disposition   Message left on identified voicemail   Second attempt to contact family and no contact made. Phone number verified. Protocols used: NO CONTACT OR DUPLICATE CONTACT CALL-ADULT-[place]    Received call from [address] [place] [address] at [facility]  with Red Flag Complaint. No triage    Care advice provided, patient verbalizes understanding; denies any other questions or concerns; instructed to call back for any new or worsening symptoms. Attention Provider: Thank you for allowing me to participate in the care of your patient. The patient was connected to triage in response to information provided to the ECC/PSC. Please do not respond through this encounter as the response is not directed to a shared pool.

## 2021-10-28 NOTE — PROGRESS NOTES
Hypertension:  Patient is here for follow up chronic hypertension. This is  generally controlled on current medication regimen. Takes meds as directed and tolerates them well. Most recent labs reviewed with patient and are not remarkable. No symptoms from htn standpoint per ROS. Patient is  compliant with lifestyle modifications. Patient does not smoke. Comorbid conditions include HTN. She states that she has had dizziness and has been falling at home. She states that she was bending over to tie her shoes and almost went head first.  She feels like she and the room both spin. She has had chest pain, shortness of breath, palpitations. She also has a headache. She states pain is between achy and sharp. Pain is 9 out of 10. Alleviating factors: nothing. Exacerbating factors: walking. Patient's past medical, surgical, social and/or family history reviewed, updated in chart, and are non-contributory (unless otherwise stated). Medications and allergies also reviewed and updated in chart.         Review of Systems:  Constitutional:  No fever, no fatigue, no chills, + headaches, no weight change  Dermatology:  No rash, no mole, no dry or sensitive skin  ENT:  No cough, no sore throat, no sinus pain, no runny nose, no ear pain  Cardiology:  + chest pain, + palpitations, no leg edema, + shortness of breath, no PND  Gastroenterology:  No dysphagia, no abdominal pain, no nausea, no vomiting, no constipation, no diarrhea, no heartburn  Musculoskeletal:  No joint pain, no leg cramps, no back pain, no muscle aches  Respiratory: + shortness of breath, no orthopnea, no wheezing, no SOTO, no hemoptysis  Urology:  No blood in the urine, no urinary frequency, no urinary incontinence, no urinary urgency, no nocturia, no dysuria      Vitals:    10/28/21 1456 10/28/21 1536 10/28/21 1542   BP: (!) 102/58 108/72 108/68   Position:  Supine Sitting   Pulse: 81     Resp: 18     Temp: 97.8 °F (36.6 °C)     TempSrc: Temporal     SpO2: 99%     Weight: 267 lb 6.4 oz (121.3 kg)     Height: 5' 5\" (1.651 m)         Physical Exam  Vitals and nursing note reviewed. Constitutional:       Appearance: She is well-developed. HENT:      Head: Normocephalic and atraumatic. Right Ear: External ear normal.      Left Ear: External ear normal.      Nose: Nose normal.   Eyes:      Conjunctiva/sclera: Conjunctivae normal.      Pupils: Pupils are equal, round, and reactive to light. Neck:      Thyroid: No thyromegaly. Cardiovascular:      Rate and Rhythm: Normal rate and regular rhythm. Heart sounds: Normal heart sounds. Pulmonary:      Effort: Pulmonary effort is normal.      Breath sounds: Normal breath sounds. No wheezing. Abdominal:      General: Bowel sounds are normal.      Palpations: Abdomen is soft. Tenderness: There is no abdominal tenderness. Musculoskeletal:         General: Normal range of motion. Cervical back: Normal range of motion and neck supple. Skin:     General: Skin is warm and dry. Findings: No rash. Neurological:      Mental Status: She is alert and oriented to person, place, and time. Deep Tendon Reflexes: Reflexes are normal and symmetric. Psychiatric:         Behavior: Behavior normal.         Assessment/Plan:      Piyush Ruiz was seen today for dizziness. Diagnoses and all orders for this visit:    Hypotension, unspecified hypotension type  -     EKG 12 Lead  -     Comprehensive Metabolic Panel; Future  -     CBC Auto Differential; Future  -     TSH without Reflex; Future  EKG showed sinus rhythm  Labs ordered  Blood pressure stable in office. Dizziness  -     EKG 12 Lead  -     Comprehensive Metabolic Panel; Future  -     CBC Auto Differential; Future  -     TSH without Reflex; Future  -     CT HEAD WO CONTRAST; Future  EKG showed sinus rhythm  CT head ordered  Labs ordered. Orthostatic negative. As above.   Call or go to ED immediately if symptoms worsen or persist.  No follow-ups on file. , or sooner if necessary. Educational materials and/or home exercises printed for patient's review and were included in patient instructions on his/her After Visit Summary and given to patient at the end of visit. Counseled regarding above diagnosis, including possible risks and complications,  especially if left uncontrolled. Counseled regarding the possible side effects, risks, benefits and alternatives to treatment; patient and/or guardian verbalizes understanding, agrees, feels comfortable with and wishes to proceed with above treatment plan. Advised patient to call with any new medication issues, and read all Rx info from pharmacy to assure aware of all possible risks and side effects of medication before taking. Reviewed age and gender appropriate health screening exams and vaccinations. Advised patient regarding importance of keeping up with recommended health maintenance and to schedule as soon as possible if overdue, as this is important in assessing for undiagnosed pathology, especially cancer, as well as protecting against potentially harmful/life threatening disease. Patient and/or guardian verbalizes understanding and agrees with above counseling, assessment and plan. All questions answered. Silvestre Estrada DO  10/28/2021    I have personally reviewed and updated the chief complaint, HPI, Past Medical, Family and Social History, as well as the above Review of Systems.

## 2021-11-08 ENCOUNTER — OFFICE VISIT (OUTPATIENT)
Dept: FAMILY MEDICINE CLINIC | Age: 66
End: 2021-11-08
Payer: COMMERCIAL

## 2021-11-08 VITALS
BODY MASS INDEX: 43.32 KG/M2 | WEIGHT: 260 LBS | SYSTOLIC BLOOD PRESSURE: 100 MMHG | OXYGEN SATURATION: 97 % | DIASTOLIC BLOOD PRESSURE: 60 MMHG | HEART RATE: 65 BPM | RESPIRATION RATE: 16 BRPM | HEIGHT: 65 IN | TEMPERATURE: 96.1 F

## 2021-11-08 DIAGNOSIS — R05.8 DRY COUGH: ICD-10-CM

## 2021-11-08 DIAGNOSIS — I10 ESSENTIAL HYPERTENSION: Chronic | ICD-10-CM

## 2021-11-08 DIAGNOSIS — Z20.822 SUSPECTED COVID-19 VIRUS INFECTION: Primary | ICD-10-CM

## 2021-11-08 PROCEDURE — G8399 PT W/DXA RESULTS DOCUMENT: HCPCS | Performed by: PHYSICIAN ASSISTANT

## 2021-11-08 PROCEDURE — 99214 OFFICE O/P EST MOD 30 MIN: CPT | Performed by: PHYSICIAN ASSISTANT

## 2021-11-08 PROCEDURE — G8417 CALC BMI ABV UP PARAM F/U: HCPCS | Performed by: PHYSICIAN ASSISTANT

## 2021-11-08 PROCEDURE — G8484 FLU IMMUNIZE NO ADMIN: HCPCS | Performed by: PHYSICIAN ASSISTANT

## 2021-11-08 PROCEDURE — G8427 DOCREV CUR MEDS BY ELIG CLIN: HCPCS | Performed by: PHYSICIAN ASSISTANT

## 2021-11-08 PROCEDURE — 3017F COLORECTAL CA SCREEN DOC REV: CPT | Performed by: PHYSICIAN ASSISTANT

## 2021-11-08 PROCEDURE — 1036F TOBACCO NON-USER: CPT | Performed by: PHYSICIAN ASSISTANT

## 2021-11-08 PROCEDURE — 1123F ACP DISCUSS/DSCN MKR DOCD: CPT | Performed by: PHYSICIAN ASSISTANT

## 2021-11-08 PROCEDURE — 1090F PRES/ABSN URINE INCON ASSESS: CPT | Performed by: PHYSICIAN ASSISTANT

## 2021-11-08 PROCEDURE — 4040F PNEUMOC VAC/ADMIN/RCVD: CPT | Performed by: PHYSICIAN ASSISTANT

## 2021-11-08 RX ORDER — ZINC SULFATE 50(220)MG
50 CAPSULE ORAL DAILY
Qty: 30 CAPSULE | Refills: 0 | Status: SHIPPED
Start: 2021-11-08 | End: 2021-11-09 | Stop reason: SDUPTHER

## 2021-11-08 RX ORDER — ALBUTEROL SULFATE 90 UG/1
2 AEROSOL, METERED RESPIRATORY (INHALATION) 4 TIMES DAILY PRN
Qty: 54 G | Refills: 1 | Status: SHIPPED
Start: 2021-11-08 | End: 2021-11-09 | Stop reason: SDUPTHER

## 2021-11-08 RX ORDER — ASCORBIC ACID 250 MG
500 TABLET ORAL DAILY
Qty: 60 TABLET | Refills: 0 | Status: SHIPPED
Start: 2021-11-08 | End: 2021-11-09 | Stop reason: SDUPTHER

## 2021-11-08 RX ORDER — DEXTROMETHORPHAN HYDROBROMIDE AND PROMETHAZINE HYDROCHLORIDE 15; 6.25 MG/5ML; MG/5ML
5 SYRUP ORAL 4 TIMES DAILY PRN
Qty: 180 ML | Refills: 0 | Status: SHIPPED
Start: 2021-11-08 | End: 2021-11-09 | Stop reason: SDUPTHER

## 2021-11-08 NOTE — PROGRESS NOTES
normal.      Pupils: Pupils are equal, round, and reactive to light. Neck:      Thyroid: No thyromegaly. Cardiovascular:      Rate and Rhythm: Normal rate and regular rhythm. Heart sounds: Normal heart sounds. No murmur heard. Pulmonary:      Effort: Pulmonary effort is normal. No accessory muscle usage or respiratory distress. Breath sounds: Normal breath sounds. No wheezing. Musculoskeletal:         General: Normal range of motion. Cervical back: Normal range of motion and neck supple. Skin:     General: Skin is warm and dry. Findings: No rash. Neurological:      Mental Status: She is alert and oriented to person, place, and time. Deep Tendon Reflexes: Reflexes are normal and symmetric. Psychiatric:         Speech: Speech normal.         Behavior: Behavior normal.         Assessment/Plan:      Paresh Berry was seen today for cough, pharyngitis and other. Diagnoses and all orders for this visit:    Suspected COVID-19 virus infection  -     COVID-19 Ambulatory; Future  -     zinc sulfate (ORAZINC) 220 (50 Zn) MG capsule; Take 1 capsule by mouth daily  -     ascorbic acid (V-R VITAMIN C) 250 MG tablet; Take 2 tablets by mouth daily  -     promethazine-dextromethorphan (PROMETHAZINE-DM) 6.25-15 MG/5ML syrup; Take 5 mLs by mouth 4 times daily as needed for Cough    Dry cough  -     albuterol sulfate HFA (VENTOLIN HFA) 108 (90 Base) MCG/ACT inhaler; Inhale 2 puffs into the lungs 4 times daily as needed for Wheezing  -     dexamethasone (DECADRON) 0.75 MG tablet; Take 1 tablet by mouth 2 times daily (with meals) for 5 days    Essential hypertension  -stable  -pt is on eliquis  Letter written to remain off work until covid 19 test is resulted and further recommendations can be made. She will quarantine. As above. Call or go to ED immediately if symptoms worsen or persist.  No follow-ups on file. , or sooner if necessary.       Educational materials and/or home exercises printed for patient's review and were included in patient instructions on his/her After Visit Summary and given to patient at the end of visit. Counseled regarding above diagnosis, including possible risks and complications,  especially if left uncontrolled. Counseled regarding the possible side effects, risks, benefits and alternatives to treatment; patient and/or guardian verbalizes understanding, agrees, feels comfortable with and wishes to proceed with above treatment plan. Advised patient to call with any new medication issues, and read all Rx info from pharmacy to assure aware of all possible risks and side effects of medication before taking. Reviewed age and gender appropriate health screening exams and vaccinations. Advised patient regarding importance of keeping up with recommended health maintenance and to schedule as soon as possible if overdue, as this is important in assessing for undiagnosed pathology, especially cancer, as well as protecting against potentially harmful/life threatening disease. Patient and/or guardian verbalizes understanding and agrees with above counseling, assessment and plan. All questions answered. Jennifer Hussein PA-C  11/8/2021    I have personally reviewed and updated the chief complaint, HPI, Past Medical, Family and Social History, as well as the above Review of Systems.

## 2021-11-09 DIAGNOSIS — Z20.822 SUSPECTED COVID-19 VIRUS INFECTION: ICD-10-CM

## 2021-11-09 DIAGNOSIS — R05.8 DRY COUGH: ICD-10-CM

## 2021-11-09 RX ORDER — ZINC SULFATE 50(220)MG
50 CAPSULE ORAL DAILY
Qty: 30 CAPSULE | Refills: 0 | COMMUNITY
Start: 2021-11-09 | End: 2021-11-22

## 2021-11-09 RX ORDER — ASCORBIC ACID 250 MG
500 TABLET ORAL DAILY
Qty: 60 TABLET | Refills: 0 | Status: SHIPPED
Start: 2021-11-09 | End: 2022-10-03 | Stop reason: SDUPTHER

## 2021-11-09 RX ORDER — ALBUTEROL SULFATE 90 UG/1
2 AEROSOL, METERED RESPIRATORY (INHALATION) 4 TIMES DAILY PRN
Qty: 54 G | Refills: 1 | Status: SHIPPED
Start: 2021-11-09 | End: 2022-08-10

## 2021-11-09 RX ORDER — DEXTROMETHORPHAN HYDROBROMIDE AND PROMETHAZINE HYDROCHLORIDE 15; 6.25 MG/5ML; MG/5ML
5 SYRUP ORAL 4 TIMES DAILY PRN
Qty: 180 ML | Refills: 0 | Status: SHIPPED | OUTPATIENT
Start: 2021-11-09 | End: 2021-11-16

## 2021-11-10 LAB
SARS-COV-2: NOT DETECTED
SOURCE: NORMAL

## 2021-11-22 ENCOUNTER — OFFICE VISIT (OUTPATIENT)
Dept: CARDIOLOGY CLINIC | Age: 66
End: 2021-11-22
Payer: COMMERCIAL

## 2021-11-22 ENCOUNTER — TELEPHONE (OUTPATIENT)
Dept: FAMILY MEDICINE CLINIC | Age: 66
End: 2021-11-22

## 2021-11-22 VITALS
DIASTOLIC BLOOD PRESSURE: 70 MMHG | WEIGHT: 264.4 LBS | HEART RATE: 63 BPM | OXYGEN SATURATION: 96 % | HEIGHT: 65 IN | RESPIRATION RATE: 18 BRPM | BODY MASS INDEX: 44.05 KG/M2 | SYSTOLIC BLOOD PRESSURE: 110 MMHG

## 2021-11-22 DIAGNOSIS — F40.240 CLAUSTROPHOBIA: Primary | ICD-10-CM

## 2021-11-22 DIAGNOSIS — I10 ESSENTIAL HYPERTENSION: Primary | ICD-10-CM

## 2021-11-22 DIAGNOSIS — Z20.822 SUSPECTED COVID-19 VIRUS INFECTION: ICD-10-CM

## 2021-11-22 DIAGNOSIS — R00.2 PALPITATIONS: ICD-10-CM

## 2021-11-22 PROCEDURE — 4040F PNEUMOC VAC/ADMIN/RCVD: CPT | Performed by: INTERNAL MEDICINE

## 2021-11-22 PROCEDURE — 99214 OFFICE O/P EST MOD 30 MIN: CPT | Performed by: INTERNAL MEDICINE

## 2021-11-22 PROCEDURE — 3017F COLORECTAL CA SCREEN DOC REV: CPT | Performed by: INTERNAL MEDICINE

## 2021-11-22 PROCEDURE — G8417 CALC BMI ABV UP PARAM F/U: HCPCS | Performed by: INTERNAL MEDICINE

## 2021-11-22 PROCEDURE — 1090F PRES/ABSN URINE INCON ASSESS: CPT | Performed by: INTERNAL MEDICINE

## 2021-11-22 PROCEDURE — 1036F TOBACCO NON-USER: CPT | Performed by: INTERNAL MEDICINE

## 2021-11-22 PROCEDURE — G8399 PT W/DXA RESULTS DOCUMENT: HCPCS | Performed by: INTERNAL MEDICINE

## 2021-11-22 PROCEDURE — 93000 ELECTROCARDIOGRAM COMPLETE: CPT | Performed by: INTERNAL MEDICINE

## 2021-11-22 PROCEDURE — 1123F ACP DISCUSS/DSCN MKR DOCD: CPT | Performed by: INTERNAL MEDICINE

## 2021-11-22 PROCEDURE — G8427 DOCREV CUR MEDS BY ELIG CLIN: HCPCS | Performed by: INTERNAL MEDICINE

## 2021-11-22 PROCEDURE — G8484 FLU IMMUNIZE NO ADMIN: HCPCS | Performed by: INTERNAL MEDICINE

## 2021-11-22 RX ORDER — ZINC SULFATE 50(220)MG
CAPSULE ORAL
Qty: 30 CAPSULE | Refills: 10 | Status: SHIPPED
Start: 2021-11-22 | End: 2022-01-04

## 2021-11-22 RX ORDER — LOSARTAN POTASSIUM 25 MG/1
25 TABLET ORAL DAILY
Qty: 90 TABLET | Refills: 3 | Status: ON HOLD
Start: 2021-11-22 | End: 2022-06-01 | Stop reason: HOSPADM

## 2021-11-22 NOTE — TELEPHONE ENCOUNTER
----- Message from Eleni Sacks sent at 11/22/2021  9:31 AM EST -----  Subject: Message to Provider    QUESTIONS  Information for Provider? Medication requested to calm nerves before   testing  ---------------------------------------------------------------------------  --------------  CALL BACK INFO  What is the best way for the office to contact you? OK to leave message on   voicemail  Preferred Call Back Phone Number? [phone]  ---------------------------------------------------------------------------  --------------  SCRIPT ANSWERS  Relationship to Patient?  Self
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
## 2021-11-24 RX ORDER — PRAVASTATIN SODIUM 10 MG
10 TABLET ORAL DAILY
Qty: 30 TABLET | Refills: 5 | Status: SHIPPED
Start: 2021-11-24 | End: 2022-05-24

## 2021-11-24 RX ORDER — LORAZEPAM 0.5 MG/1
0.5 TABLET ORAL ONCE
Qty: 1 TABLET | Refills: 0 | Status: SHIPPED | OUTPATIENT
Start: 2021-11-24 | End: 2021-11-24

## 2021-11-24 NOTE — TELEPHONE ENCOUNTER
Ativan sent to pharmacy to take 30 minutes prior to test. She cannot drive while taking this medication.

## 2021-12-10 ENCOUNTER — HOSPITAL ENCOUNTER (OUTPATIENT)
Dept: CT IMAGING | Age: 66
Discharge: HOME | End: 2021-12-12
Payer: COMMERCIAL

## 2021-12-10 DIAGNOSIS — R42 DIZZINESS: ICD-10-CM

## 2021-12-10 PROCEDURE — 70450 CT HEAD/BRAIN W/O DYE: CPT

## 2021-12-14 ENCOUNTER — TELEPHONE (OUTPATIENT)
Dept: FAMILY MEDICINE CLINIC | Age: 66
End: 2021-12-14

## 2021-12-14 NOTE — TELEPHONE ENCOUNTER
----- Message from [facility] sent at 12/14/2021  9:07 AM EST -----  Subject: Message to Provider    QUESTIONS  Information for Provider? Pt was returning call from office. please call   her back  ---------------------------------------------------------------------------  --------------  CALL BACK INFO  What is the best way for the office to contact you? OK to leave message on   voicemail  Preferred Call Back Phone Number? [phone]  ---------------------------------------------------------------------------  --------------  SCRIPT ANSWERS  Relationship to Patient?  Self

## 2021-12-22 DIAGNOSIS — F32.1 CURRENT MODERATE EPISODE OF MAJOR DEPRESSIVE DISORDER WITHOUT PRIOR EPISODE (HCC): ICD-10-CM

## 2021-12-22 RX ORDER — ESCITALOPRAM OXALATE 20 MG/1
20 TABLET ORAL DAILY
Qty: 90 TABLET | Refills: 1 | Status: SHIPPED
Start: 2021-12-22 | End: 2022-05-24

## 2022-01-04 ENCOUNTER — OFFICE VISIT (OUTPATIENT)
Dept: ORTHOPEDIC SURGERY | Age: 67
End: 2022-01-04
Payer: COMMERCIAL

## 2022-01-04 VITALS — BODY MASS INDEX: 43.99 KG/M2 | WEIGHT: 264 LBS | HEIGHT: 65 IN

## 2022-01-04 DIAGNOSIS — M17.0 PRIMARY OSTEOARTHRITIS OF BOTH KNEES: Primary | ICD-10-CM

## 2022-01-04 PROCEDURE — 20610 DRAIN/INJ JOINT/BURSA W/O US: CPT | Performed by: NURSE PRACTITIONER

## 2022-01-04 PROCEDURE — 3017F COLORECTAL CA SCREEN DOC REV: CPT | Performed by: NURSE PRACTITIONER

## 2022-01-04 PROCEDURE — 1090F PRES/ABSN URINE INCON ASSESS: CPT | Performed by: NURSE PRACTITIONER

## 2022-01-04 PROCEDURE — 1036F TOBACCO NON-USER: CPT | Performed by: NURSE PRACTITIONER

## 2022-01-04 PROCEDURE — G8484 FLU IMMUNIZE NO ADMIN: HCPCS | Performed by: NURSE PRACTITIONER

## 2022-01-04 PROCEDURE — 4040F PNEUMOC VAC/ADMIN/RCVD: CPT | Performed by: NURSE PRACTITIONER

## 2022-01-04 PROCEDURE — G8399 PT W/DXA RESULTS DOCUMENT: HCPCS | Performed by: NURSE PRACTITIONER

## 2022-01-04 PROCEDURE — G8427 DOCREV CUR MEDS BY ELIG CLIN: HCPCS | Performed by: NURSE PRACTITIONER

## 2022-01-04 PROCEDURE — G8417 CALC BMI ABV UP PARAM F/U: HCPCS | Performed by: NURSE PRACTITIONER

## 2022-01-04 PROCEDURE — 1123F ACP DISCUSS/DSCN MKR DOCD: CPT | Performed by: NURSE PRACTITIONER

## 2022-01-04 RX ORDER — TRIAMCINOLONE ACETONIDE 40 MG/ML
40 INJECTION, SUSPENSION INTRA-ARTICULAR; INTRAMUSCULAR ONCE
Status: COMPLETED | OUTPATIENT
Start: 2022-01-04 | End: 2022-01-04

## 2022-01-04 RX ORDER — LIDOCAINE HYDROCHLORIDE 10 MG/ML
4 INJECTION, SOLUTION INFILTRATION; PERINEURAL ONCE
Status: COMPLETED | OUTPATIENT
Start: 2022-01-04 | End: 2022-01-04

## 2022-01-04 RX ADMIN — TRIAMCINOLONE ACETONIDE 40 MG: 40 INJECTION, SUSPENSION INTRA-ARTICULAR; INTRAMUSCULAR at 13:37

## 2022-01-04 RX ADMIN — TRIAMCINOLONE ACETONIDE 40 MG: 40 INJECTION, SUSPENSION INTRA-ARTICULAR; INTRAMUSCULAR at 13:38

## 2022-01-04 RX ADMIN — LIDOCAINE HYDROCHLORIDE 4 ML: 10 INJECTION, SOLUTION INFILTRATION; PERINEURAL at 13:37

## 2022-01-04 RX ADMIN — LIDOCAINE HYDROCHLORIDE 4 ML: 10 INJECTION, SOLUTION INFILTRATION; PERINEURAL at 13:36

## 2022-01-04 NOTE — PROGRESS NOTES
Follow Up Visit     Kait Campbell returns today for follow up visit regarding bilateral knee osteoarthritis. Treatment thus far has included steroid injections with good relief, last cortisone injection on 9/24/2021, continue to work on strengthening and take anti-inflammatories as needed, as well weight loss. She reports symptoms are improved but knee pain has returned. Physical Exam:     Height: 5' 5\" (1.651 m), Weight: 264 lb (119.7 kg) (per pt)    General: Alert and oriented x3, no acute distress  Cardiovascular/pulmonary: No labored breathing, peripheral perfusion intact  Musculoskeletal:    Bilateral knee exam range of motion 0-110, there is medial joint line tenderness present to both knees on palpation. There is no swelling deformity or palpable effusion present. Stable valgus varus exams. Stable patella tracked midline with crepitus right greater than left. Controlled Substances Monitoring:      Imaging:  Bilateral knee x-rays show bone-on-bone degenerative changes with right greater than left    Procedure Note: Knee Cortisone injection     The bilateral knees were identified as the injection site. The risk and benefits of a cortisone injection were explained and the patient consented to the injection. Under sterile conditions, each knee was injected with a mixture of 40 mg of Kenalog and 4 mL of 1% Lidocaine without complication. A sterile bandage was applied.     Administrations This Visit     lidocaine 1 % injection 4 mL     Admin Date  01/04/2022 Action  Given Dose  4 mL Route  Intra-artICUlar Administered By  Micaela Alert, ATC           Admin Date  01/04/2022 Action  Given Dose  4 mL Route  Intra-artICUlar Administered By  Micaela Alert, ATC          triamcinolone acetonide (KENALOG-40) injection 40 mg     Admin Date  01/04/2022 Action  Given Dose  40 mg Route  Intra-artICUlar Administered By  Micaela Alert, ATC           Admin Date  01/04/2022 Action  Given Dose  40 mg Route  Intra-artICUlar Administered By  Emmy Cheatham ATC                Assessment: Bone-on-bone bilateral knee osteoarthritis      Plan: Today we discussed both her knees. Patient reports return of symptoms about 2 weeks ago. She had very good relief of symptoms with last injections from September. She would like to continue with conservative treatment at this time. This was agreeable and cortisone injections were provided today. She will call to schedule follow-up appointment when symptoms return.     GENTRY Orbien-CNP  Orthopedic Surgery   01/04/22  1:40 PM

## 2022-01-21 DIAGNOSIS — I10 ESSENTIAL HYPERTENSION: ICD-10-CM

## 2022-01-21 RX ORDER — METOPROLOL SUCCINATE 25 MG/1
TABLET, EXTENDED RELEASE ORAL
Qty: 15 TABLET | Refills: 10 | Status: SHIPPED
Start: 2022-01-21 | End: 2022-09-19 | Stop reason: SDUPTHER

## 2022-01-24 RX ORDER — POTASSIUM CHLORIDE 750 MG/1
TABLET, FILM COATED, EXTENDED RELEASE ORAL
Qty: 30 TABLET | Refills: 10 | Status: ON HOLD
Start: 2022-01-24 | End: 2022-06-01 | Stop reason: HOSPADM

## 2022-02-07 ENCOUNTER — TELEPHONE (OUTPATIENT)
Dept: FAMILY MEDICINE CLINIC | Age: 67
End: 2022-02-07

## 2022-02-07 NOTE — TELEPHONE ENCOUNTER
Called and spoke to patient and she is to get medication today. Advised if she did not receive to call us and we will send a temporary supply to local pharmacy.

## 2022-02-07 NOTE — TELEPHONE ENCOUNTER
----- Message from Ilan Garcia sent at 2/4/2022  2:58 PM EST -----  Subject: Message to Provider    QUESTIONS  Information for Provider? Patient medication will not make shipment due to   weather, Needs to know what to do.  ---------------------------------------------------------------------------  --------------  CALL BACK INFO  What is the best way for the office to contact you? OK to leave message on   voicemail  Preferred Call Back Phone Number? [phone]  ---------------------------------------------------------------------------  --------------  SCRIPT ANSWERS  Relationship to Patient?  Self

## 2022-02-09 ENCOUNTER — OFFICE VISIT (OUTPATIENT)
Dept: FAMILY MEDICINE CLINIC | Age: 67
End: 2022-02-09
Payer: COMMERCIAL

## 2022-02-09 VITALS
RESPIRATION RATE: 16 BRPM | OXYGEN SATURATION: 95 % | BODY MASS INDEX: 42.05 KG/M2 | HEIGHT: 65 IN | WEIGHT: 252.4 LBS | SYSTOLIC BLOOD PRESSURE: 114 MMHG | DIASTOLIC BLOOD PRESSURE: 58 MMHG | HEART RATE: 79 BPM | TEMPERATURE: 97.7 F

## 2022-02-09 DIAGNOSIS — I26.99 PULMONARY EMBOLISM ON LEFT (HCC): ICD-10-CM

## 2022-02-09 DIAGNOSIS — Z20.822 SUSPECTED COVID-19 VIRUS INFECTION: Primary | ICD-10-CM

## 2022-02-09 DIAGNOSIS — F32.1 CURRENT MODERATE EPISODE OF MAJOR DEPRESSIVE DISORDER WITHOUT PRIOR EPISODE (HCC): ICD-10-CM

## 2022-02-09 DIAGNOSIS — Z20.822 SUSPECTED COVID-19 VIRUS INFECTION: ICD-10-CM

## 2022-02-09 DIAGNOSIS — E66.01 OBESITY, CLASS III, BMI 40-49.9 (MORBID OBESITY) (HCC): ICD-10-CM

## 2022-02-09 LAB
INFLUENZA A ANTIBODY: NEGATIVE
INFLUENZA B ANTIBODY: NEGATIVE
Lab: NORMAL
PERFORMING INSTRUMENT: NORMAL
QC PASS/FAIL: NORMAL
SARS-COV-2, POC: NORMAL

## 2022-02-09 PROCEDURE — G8399 PT W/DXA RESULTS DOCUMENT: HCPCS | Performed by: FAMILY MEDICINE

## 2022-02-09 PROCEDURE — G8427 DOCREV CUR MEDS BY ELIG CLIN: HCPCS | Performed by: FAMILY MEDICINE

## 2022-02-09 PROCEDURE — 87426 SARSCOV CORONAVIRUS AG IA: CPT | Performed by: FAMILY MEDICINE

## 2022-02-09 PROCEDURE — 99213 OFFICE O/P EST LOW 20 MIN: CPT | Performed by: FAMILY MEDICINE

## 2022-02-09 PROCEDURE — 3017F COLORECTAL CA SCREEN DOC REV: CPT | Performed by: FAMILY MEDICINE

## 2022-02-09 PROCEDURE — 1036F TOBACCO NON-USER: CPT | Performed by: FAMILY MEDICINE

## 2022-02-09 PROCEDURE — 87804 INFLUENZA ASSAY W/OPTIC: CPT | Performed by: FAMILY MEDICINE

## 2022-02-09 PROCEDURE — G8417 CALC BMI ABV UP PARAM F/U: HCPCS | Performed by: FAMILY MEDICINE

## 2022-02-09 PROCEDURE — 4040F PNEUMOC VAC/ADMIN/RCVD: CPT | Performed by: FAMILY MEDICINE

## 2022-02-09 PROCEDURE — 1123F ACP DISCUSS/DSCN MKR DOCD: CPT | Performed by: FAMILY MEDICINE

## 2022-02-09 PROCEDURE — G8484 FLU IMMUNIZE NO ADMIN: HCPCS | Performed by: FAMILY MEDICINE

## 2022-02-09 PROCEDURE — 1090F PRES/ABSN URINE INCON ASSESS: CPT | Performed by: FAMILY MEDICINE

## 2022-02-09 NOTE — PROGRESS NOTES
Congestion:  Patient is here with complaints of congestion, sinus pressure, drainage and cough for 1 week(s). Cough is  Productive dark yellow mucus. Over the counter medications include none. Patient does have a change in appetite. Patient is  drinking well. Patient does not smoke. Sick contacts include no sick contacts. She has loss of taste, loss of smell, diarrhea. Patient's past medical, surgical, social and/or family history reviewed, updated in chart, and are non-contributory (unless otherwise stated). Medications and allergies also reviewed and updated in chart. Review of Systems:  Constitutional:  - fever, + fatigue, + chills, + headaches, no weight change, +reduced appetite  Dermatology:  No rash, no mole, no dry or sensitive skin  ENT:  + cough, + sore throat, + sinus pain, + runny nose, no ear pain  Cardiology:  No chest pain, no palpitations, no leg edema, no shortness of breath, no PND  Gastroenterology:  No dysphagia, no abdominal pain, no nausea, no vomiting, no constipation, + diarrhea, no heartburn  Musculoskeletal:  No joint pain, no leg cramps, no back pain, no muscle aches  Respiratory:  No shortness of breath, no orthopnea, no wheezing, no SOTO, no hemoptysis  Urology:  No blood in the urine, no urinary frequency, no urinary incontinence, no urinary urgency, no nocturia, no dysuria      Vitals:    02/09/22 1507   BP: (!) 114/58   Pulse: 79   Resp: 16   Temp: 97.7 °F (36.5 °C)   SpO2: 95%   Weight: 252 lb 6.4 oz (114.5 kg)   Height: 5' 5\" (1.651 m)       Physical Exam  Vitals and nursing note reviewed. Constitutional:       Appearance: She is well-developed. HENT:      Head: Normocephalic and atraumatic. Right Ear: External ear normal.      Left Ear: External ear normal.      Nose: Rhinorrhea present. Mouth/Throat:      Pharynx: Posterior oropharyngeal erythema present.    Eyes:      Conjunctiva/sclera: Conjunctivae normal.      Pupils: Pupils are equal, round, and reactive to light. Neck:      Thyroid: No thyromegaly. Cardiovascular:      Rate and Rhythm: Normal rate and regular rhythm. Heart sounds: Normal heart sounds. Pulmonary:      Effort: Pulmonary effort is normal.      Breath sounds: Normal breath sounds. No wheezing. Abdominal:      General: Bowel sounds are normal.      Palpations: Abdomen is soft. Tenderness: There is no abdominal tenderness. Musculoskeletal:         General: Normal range of motion. Cervical back: Normal range of motion and neck supple. Skin:     General: Skin is warm and dry. Findings: No rash. Neurological:      Mental Status: She is alert and oriented to person, place, and time. Deep Tendon Reflexes: Reflexes are normal and symmetric. Psychiatric:         Behavior: Behavior normal.         Assessment/Plan:      Srini Goldstein was seen today for diarrhea. Diagnoses and all orders for this visit:    Suspected COVID-19 virus infection  -     POCT COVID-19, Antigen  -     POCT Influenza A/B  -     COVID-19 Ambulatory; Future  COVID negative. Likely viral illness  Lack of antibiotic effectiveness discussed   Symptomatic relief reviewed  Patient to return to clinic if symptoms worsen or do not improve. Pt understands and is in agreement with the plan. Current moderate episode of major depressive disorder without prior episode ([address]  Stable     Pulmonary embolism on left ([address]  Stable  Taking eliquis as prescribed. Obesity, Class III, BMI 40-49.9 (morbid obesity) (HCC)  Diet and exercise were discussed and recommended to the patient. As above. Call or go to ED immediately if symptoms worsen or persist.  Return if symptoms worsen or fail to improve. , or sooner if necessary. Educational materials and/or home exercises printed for patient's review and were included in patient instructions on his/her After Visit Summary and given to patient at the end of visit.       Counseled regarding above diagnosis, including possible risks and complications,  especially if left uncontrolled. Counseled regarding the possible side effects, risks, benefits and alternatives to treatment; patient and/or guardian verbalizes understanding, agrees, feels comfortable with and wishes to proceed with above treatment plan. Advised patient to call with any new medication issues, and read all Rx info from pharmacy to assure aware of all possible risks and side effects of medication before taking. Reviewed age and gender appropriate health screening exams and vaccinations. Advised patient regarding importance of keeping up with recommended health maintenance and to schedule as soon as possible if overdue, as this is important in assessing for undiagnosed pathology, especially cancer, as well as protecting against potentially harmful/life threatening disease. Patient and/or guardian verbalizes understanding and agrees with above counseling, assessment and plan. All questions answered. Isa Cooks, DO  2/9/2022    I have personally reviewed and updated the chief complaint, HPI, Past Medical, Family and Social History, as well as the above Review of Systems.

## 2022-02-09 NOTE — PATIENT INSTRUCTIONS

## 2022-02-11 LAB — SARS-COV-2, PCR: NOT DETECTED

## 2022-02-21 ENCOUNTER — TELEPHONE (OUTPATIENT)
Dept: FAMILY MEDICINE CLINIC | Age: 67
End: 2022-02-21

## 2022-02-21 DIAGNOSIS — I10 ESSENTIAL HYPERTENSION: ICD-10-CM

## 2022-02-21 RX ORDER — HYDROCHLOROTHIAZIDE 25 MG/1
TABLET ORAL
Qty: 30 TABLET | Refills: 5 | Status: ON HOLD
Start: 2022-02-21 | End: 2022-06-01 | Stop reason: HOSPADM

## 2022-02-21 NOTE — TELEPHONE ENCOUNTER
PT WANTS a med refill:  Did not know name of medication took it a year ago but it was for vertigo; dizziness  I told pt you would probably need to call her and she might need an appt  ty

## 2022-02-21 NOTE — TELEPHONE ENCOUNTER
She hasn't been our patient for a year. Without knowing the name of the medication, we are unable to refill. If she is having issues, would recommend scheduling an appt to discuss.

## 2022-02-22 ENCOUNTER — OFFICE VISIT (OUTPATIENT)
Dept: FAMILY MEDICINE CLINIC | Age: 67
End: 2022-02-22
Payer: COMMERCIAL

## 2022-02-22 VITALS
SYSTOLIC BLOOD PRESSURE: 116 MMHG | BODY MASS INDEX: 41.09 KG/M2 | RESPIRATION RATE: 16 BRPM | OXYGEN SATURATION: 95 % | TEMPERATURE: 97.2 F | HEIGHT: 65 IN | WEIGHT: 246.6 LBS | DIASTOLIC BLOOD PRESSURE: 74 MMHG | HEART RATE: 82 BPM

## 2022-02-22 DIAGNOSIS — R42 VERTIGO: ICD-10-CM

## 2022-02-22 DIAGNOSIS — R42 DIZZINESS: ICD-10-CM

## 2022-02-22 DIAGNOSIS — R63.4 WEIGHT LOSS: ICD-10-CM

## 2022-02-22 DIAGNOSIS — R42 DIZZINESS: Primary | ICD-10-CM

## 2022-02-22 DIAGNOSIS — Z12.11 COLON CANCER SCREENING: ICD-10-CM

## 2022-02-22 DIAGNOSIS — Z12.31 ENCOUNTER FOR SCREENING MAMMOGRAM FOR MALIGNANT NEOPLASM OF BREAST: ICD-10-CM

## 2022-02-22 LAB
ALBUMIN SERPL-MCNC: 3.7 G/DL (ref 3.5–5.2)
ALP BLD-CCNC: 95 U/L (ref 35–104)
ALT SERPL-CCNC: <5 U/L (ref 0–32)
ANION GAP SERPL CALCULATED.3IONS-SCNC: 13 MMOL/L (ref 7–16)
AST SERPL-CCNC: 11 U/L (ref 0–31)
BASOPHILS ABSOLUTE: 0.06 E9/L (ref 0–0.2)
BASOPHILS RELATIVE PERCENT: 0.6 % (ref 0–2)
BILIRUB SERPL-MCNC: 0.4 MG/DL (ref 0–1.2)
BUN BLDV-MCNC: 24 MG/DL (ref 6–23)
CALCIUM SERPL-MCNC: 9.4 MG/DL (ref 8.6–10.2)
CHLORIDE BLD-SCNC: 100 MMOL/L (ref 98–107)
CO2: 29 MMOL/L (ref 22–29)
CREAT SERPL-MCNC: 1.3 MG/DL (ref 0.5–1)
EOSINOPHILS ABSOLUTE: 0.06 E9/L (ref 0.05–0.5)
EOSINOPHILS RELATIVE PERCENT: 0.6 % (ref 0–6)
GFR AFRICAN AMERICAN: 49
GFR NON-AFRICAN AMERICAN: 41 ML/MIN/1.73
GLUCOSE BLD-MCNC: 94 MG/DL (ref 74–99)
HCT VFR BLD CALC: 40.6 % (ref 34–48)
HEMOGLOBIN: 12.7 G/DL (ref 11.5–15.5)
IMMATURE GRANULOCYTES #: 0.05 E9/L
IMMATURE GRANULOCYTES %: 0.5 % (ref 0–5)
LYMPHOCYTES ABSOLUTE: 2.13 E9/L (ref 1.5–4)
LYMPHOCYTES RELATIVE PERCENT: 19.9 % (ref 20–42)
MCH RBC QN AUTO: 30.2 PG (ref 26–35)
MCHC RBC AUTO-ENTMCNC: 31.3 % (ref 32–34.5)
MCV RBC AUTO: 96.4 FL (ref 80–99.9)
MONOCYTES ABSOLUTE: 0.93 E9/L (ref 0.1–0.95)
MONOCYTES RELATIVE PERCENT: 8.7 % (ref 2–12)
NEUTROPHILS ABSOLUTE: 7.48 E9/L (ref 1.8–7.3)
NEUTROPHILS RELATIVE PERCENT: 69.7 % (ref 43–80)
PDW BLD-RTO: 14.4 FL (ref 11.5–15)
PLATELET # BLD: 348 E9/L (ref 130–450)
PMV BLD AUTO: 11.3 FL (ref 7–12)
POTASSIUM SERPL-SCNC: 3.8 MMOL/L (ref 3.5–5)
RBC # BLD: 4.21 E12/L (ref 3.5–5.5)
SODIUM BLD-SCNC: 142 MMOL/L (ref 132–146)
TOTAL PROTEIN: 6.7 G/DL (ref 6.4–8.3)
TSH SERPL DL<=0.05 MIU/L-ACNC: 1.16 UIU/ML (ref 0.27–4.2)
WBC # BLD: 10.7 E9/L (ref 4.5–11.5)

## 2022-02-22 PROCEDURE — 99214 OFFICE O/P EST MOD 30 MIN: CPT | Performed by: FAMILY MEDICINE

## 2022-02-22 RX ORDER — MECLIZINE HCL 12.5 MG/1
12.5 TABLET ORAL 3 TIMES DAILY PRN
Qty: 60 TABLET | Refills: 1 | Status: SHIPPED
Start: 2022-02-22 | End: 2022-08-10

## 2022-02-22 NOTE — PROGRESS NOTES
heartburn  Musculoskeletal:  No joint pain, no leg cramps, no back pain, no muscle aches  Respiratory:  No shortness of breath, no orthopnea, no wheezing, no SOTO, no hemoptysis  Urology:  No blood in the urine, no urinary frequency, no urinary incontinence, no urinary urgency, no nocturia, no dysuria      Vitals:    02/22/22 0931   BP: 116/74   Pulse: 82   Resp: 16   Temp: 97.2 °F (36.2 °C)   TempSrc: Temporal   SpO2: 95%   Weight: 246 lb 9.6 oz (111.9 kg)   Height: 5' 5\" (1.651 m)       Physical Exam  Vitals and nursing note reviewed. Constitutional:       Appearance: She is well-developed. HENT:      Head: Normocephalic and atraumatic. Right Ear: External ear normal.      Left Ear: External ear normal.      Nose: Nose normal.   Eyes:      Conjunctiva/sclera: Conjunctivae normal.      Pupils: Pupils are equal, round, and reactive to light. Neck:      Thyroid: No thyromegaly. Cardiovascular:      Rate and Rhythm: Normal rate and regular rhythm. Heart sounds: Normal heart sounds. Pulmonary:      Effort: Pulmonary effort is normal.      Breath sounds: Normal breath sounds. No wheezing. Abdominal:      General: Bowel sounds are normal.      Palpations: Abdomen is soft. Tenderness: There is no abdominal tenderness. Musculoskeletal:         General: Normal range of motion. Cervical back: Normal range of motion and neck supple. Skin:     General: Skin is warm and dry. Findings: No rash. Neurological:      Mental Status: She is alert and oriented to person, place, and time. Deep Tendon Reflexes: Reflexes are normal and symmetric. Psychiatric:         Behavior: Behavior normal.         Assessment/Plan:      Flory Dodd was seen today for dizziness. Diagnoses and all orders for this visit:    Dizziness  -     CBC with Auto Differential; Future  -     Comprehensive Metabolic Panel; Future  -     TSH; Future  -     meclizine (ANTIVERT) 12.5 MG tablet;  Take 1 tablet by mouth 3 times daily as needed for Dizziness  Will start meclizine  Side effects reviewed. Labs ordered. Colon cancer screening  -     Luba Chapman MD, General Surgery, [place]  Referral to Dr. Nikhil Castro. Encounter for screening mammogram for malignant neoplasm of breast  -     ALDO DIGITAL SCREEN BILATERAL PER PROTOCOL; Future    Vertigo  -     meclizine (ANTIVERT) 12.5 MG tablet; Take 1 tablet by mouth 3 times daily as needed for Dizziness  Will start meclizine  Side effects reviewe.d     Weight loss  -     Luba Chapman MD, General Surgery, [place]  -     CBC with Auto Differential; Future  -     Comprehensive Metabolic Panel; Future  -     TSH; Future  Patient has lost 18-20 pounds in the last 3 months. As above. Call or go to ED immediately if symptoms worsen or persist.  Return in about 1 week (around 3/1/2022) for vertigo. , or sooner if necessary. Educational materials and/or home exercises printed for patient's review and were included in patient instructions on his/her After Visit Summary and given to patient at the end of visit. Counseled regarding above diagnosis, including possible risks and complications,  especially if left uncontrolled. Counseled regarding the possible side effects, risks, benefits and alternatives to treatment; patient and/or guardian verbalizes understanding, agrees, feels comfortable with and wishes to proceed with above treatment plan. Advised patient to call with any new medication issues, and read all Rx info from pharmacy to assure aware of all possible risks and side effects of medication before taking. Reviewed age and gender appropriate health screening exams and vaccinations.   Advised patient regarding importance of keeping up with recommended health maintenance and to schedule as soon as possible if overdue, as this is important in assessing for undiagnosed pathology, especially cancer, as well as protecting against potentially harmful/life threatening disease. Patient and/or guardian verbalizes understanding and agrees with above counseling, assessment and plan. All questions answered. Shantell Webb, DO  2/22/2022    I have personally reviewed and updated the chief complaint, HPI, Past Medical, Family and Social History, as well as the above Review of Systems.

## 2022-02-22 NOTE — LETTER
F[facility] Shin [address] [place] [id number]  Phone: [phone]  Fax: [phone] Roxanne Malone DO        February 22, 2022     Patient: Rashaun Shepard   YOB: 1955   Date of Visit: 2/22/2022       To Whom It May Concern: It is my medical opinion that Nasreen Licea should remain out of work until reevaluated in 1 week. .    If you have any questions or concerns, please don't hesitate to call.     Sincerely,        Tobi Hector DO

## 2022-02-22 NOTE — PATIENT INSTRUCTIONS
Patient Education        meclizine  Pronunciation:  SHIRA cam  Brand:  [employer]  What is the most important information I should know about meclizine? Follow all directions on your medicine label and package. Tell each of your healthcare providers about all your medical conditions, allergies, and all medicines you use. What is meclizine? Meclizine is used to treat or prevent nausea, vomiting, and dizziness caused by motion sickness. Meclizine is also used to treat symptoms of vertigo (dizziness or spinning sensation) caused by disease that affects your inner ear. Meclizine may also be used for purposes not listed in this medication guide. What should I discuss with my healthcare provider before taking meclizine? You should not use meclizine if you are allergic to it. Meclizine should not be given to a child younger than 15years old. Do not give this medicine to a child without medical advice. Tell your doctor if you have ever had:  · liver disease;  · kidney disease;  · asthma;  · glaucoma;  · enlarged prostate; or  · urination problems. Tell your doctor if you are pregnant or breast-feeding. How should I take meclizine? Follow all directions on your prescription label and read all medication guides or instruction sheets. Use the medicine exactly as directed. You must chew the chewable tablet before you swallow it. To prevent motion sickness, take meclizine about 1 hour before you travel or anticipate having motion sickness. You may take meclizine once every 24 hours while you are traveling, to further prevent motion sickness. To treat vertigo, you may need to take meclizine several times daily. Follow your doctor's instructions. This medicine can affect the results of allergy skin tests. Tell any doctor who treats you that you are using meclizine. Store at room temperature away from moisture, heat, and light. What happens if I miss a dose?   Since meclizine is sometimes taken only Problem: OCCUPATIONAL THERAPY ADULT  Goal: Performs self-care activities at highest level of function for planned discharge setting  See evaluation for individualized goals  Description: Treatment Interventions: ADL retraining, Functional transfer training, Endurance training, Cognitive reorientation, Patient/family training, Equipment evaluation/education, Compensatory technique education, Energy conservation, Activityengagement          See flowsheet documentation for full assessment, interventions and recommendations  Outcome: Progressing  Note: Limitation: Decreased ADL status, Decreased Safe judgement during ADL, Decreased cognition, Decreased endurance, Decreased high-level ADLs, Decreased self-care trans  Prognosis: Fair  Assessment: Patient participated in Skilled OT session this date with interventions consisting of ADL re training with the use of correct body mechnaics, safety awareness and fall prevention techniques, therapeutic exercise to: increase functional use of BUEs, increase BUE muscle strength ,  therapeutic activities to: increase activity tolerance, increase standing tolerance time with unilateral UE support to complete sink level ADLs and increase OOB/ sitting tolerance  Upon arrival patient was found supine in bed  Pt demonstrated S for SUP <> sit, fnxl ambulation household distance using RW  Pt c/o numbness in weakness in L fingers from knuckles to finger tips  Demonstrates poor sharp/dull awareness and decreased  strength  Pt is able to utilize fork and knife but with increased energy required at this time  Pt completed therex with light resistance theraband in all planes 3x/10 and no complaints  Don/doff socks I in bed  Pt improving activity tolerance, ADL performance, and transfers compared to previous sessions  Pt continues to be limited by endurance, activity tolerance, strength, decreased sensation, and over ADL performance   Pt continues to be functioning below baseline level, occupational performance remains limited secondary to factors listed above and increased risk for falls and injury  From OT standpoint, recommendation at time of d/c would be Short Term Rehab  Patient to benefit from continued Occupational Therapy treatment while in the hospital to address deficits as defined above and maximize level of functional independence with ADLs and functional mobility  Pt left post session supine in room with all current needs met  Recommendation: Psych Consult  OT Discharge Recommendation: Post-Acute Rehabilitation Services  OT - OK to Discharge:  Yes when needed, you may not be on a dosing schedule. Skip any missed dose if it's almost time for your next dose. Do not use two doses at one time. What happens if I overdose? Seek emergency medical attention or call the Poison Help line at 1-303.255.4040. What should I avoid while taking meclizine? Avoid driving or hazardous activity until you know how this medicine will affect you. Your reactions could be impaired. Drinking alcohol can increase certain side effects of meclizine. What are the possible side effects of meclizine? Get emergency medical help if you have signs of an allergic reaction: hives; difficult breathing; swelling of your face, lips, tongue, or throat. Common side effects may include:  · drowsiness;  · dry mouth;  · headache;  · vomiting; or  · feeling tired. This is not a complete list of side effects and others may occur. Call your doctor for medical advice about side effects. You may report side effects to FDA at 5-160-ZZU-2158. What other drugs will affect meclizine? Using meclizine with other drugs that make you drowsy can worsen this effect. Ask your doctor before using opioid medication, a sleeping pill, a muscle relaxer, or medicine for anxiety or seizures. Other drugs may affect meclizine, including prescription and over-the-counter medicines, vitamins, and herbal products. Tell your doctor about all your current medicines and any medicine you start or stop using. Where can I get more information? Your pharmacist can provide more information about meclizine. Remember, keep this and all other medicines out of the reach of children, never share your medicines with others, and use this medication only for the indication prescribed. Every effort has been made to ensure that the information provided by [address] is accurate, up-to-date, and complete, but no guarantee is made to that effect. Drug information contained herein may be time sensitive.  Ed information has been compiled for use by healthcare practitioners and consumers in the [place] and therefore [employer] does not warrant that uses outside of the [place] are appropriate, unless specifically indicated otherwise. [facility]'s drug information does not endorse drugs, diagnose patients or recommend therapy. [facility] drug information is an informational resource designed to assist licensed healthcare practitioners in caring for their patients and/or to serve consumers viewing this service as a supplement to, and not a substitute for, the expertise, skill, knowledge and judgment of healthcare practitioners. The absence of a warning for a given drug or drug combination in no way should be construed to indicate that the drug or drug combination is safe, effective or appropriate for any given patient. [facility] does not assume any responsibility for any aspect of healthcare administered with the aid of information [facility] provides. The information contained herein is not intended to cover all possible uses, directions, precautions, warnings, drug interactions, allergic reactions, or adverse effects. If you have questions about the drugs you are taking, check with your doctor, nurse or pharmacist.  Copyright 2840-2015 [address]: 7.01. Revision date: 2/1/2019. Care instructions adapted under license by [facility] ([facility]). If you have questions about a medical condition or this instruction, always ask your healthcare professional. [place] [address] any warranty or liability for your use of this information.

## 2022-02-23 ENCOUNTER — TELEPHONE (OUTPATIENT)
Dept: FAMILY MEDICINE CLINIC | Age: 67
End: 2022-02-23

## 2022-02-23 NOTE — TELEPHONE ENCOUNTER
----- Message from Vandana Romeo sent at 2/23/2022  9:24 AM EST -----  Subject: Message to Provider    QUESTIONS  Information for Provider? Calling back ORLÉREJI, please give her a call   back when you have a chance, she thinks it might be about her blood work   that was done yesterday  ---------------------------------------------------------------------------  --------------  [address]  What is the best way for the office to contact you? OK to leave message on   voicemail  Preferred Call Back Phone Number?  [phone]  ---------------------------------------------------------------------------  --------------  SCRIPT ANSWERS  undefined

## 2022-03-01 ENCOUNTER — OFFICE VISIT (OUTPATIENT)
Dept: FAMILY MEDICINE CLINIC | Age: 67
End: 2022-03-01
Payer: COMMERCIAL

## 2022-03-01 ENCOUNTER — TELEPHONE (OUTPATIENT)
Dept: SURGERY | Age: 67
End: 2022-03-01

## 2022-03-01 ENCOUNTER — OFFICE VISIT (OUTPATIENT)
Dept: SURGERY | Age: 67
End: 2022-03-01
Payer: COMMERCIAL

## 2022-03-01 VITALS
HEIGHT: 65 IN | BODY MASS INDEX: 40.75 KG/M2 | DIASTOLIC BLOOD PRESSURE: 72 MMHG | WEIGHT: 244.6 LBS | SYSTOLIC BLOOD PRESSURE: 114 MMHG | RESPIRATION RATE: 16 BRPM | TEMPERATURE: 97.2 F

## 2022-03-01 VITALS
HEIGHT: 65 IN | HEART RATE: 67 BPM | WEIGHT: 245.6 LBS | BODY MASS INDEX: 40.92 KG/M2 | TEMPERATURE: 97.1 F | DIASTOLIC BLOOD PRESSURE: 70 MMHG | SYSTOLIC BLOOD PRESSURE: 118 MMHG | RESPIRATION RATE: 16 BRPM | OXYGEN SATURATION: 97 %

## 2022-03-01 DIAGNOSIS — R63.4 WEIGHT LOSS: Primary | ICD-10-CM

## 2022-03-01 DIAGNOSIS — R42 DIZZINESS: Primary | ICD-10-CM

## 2022-03-01 DIAGNOSIS — R63.4 WEIGHT LOSS: ICD-10-CM

## 2022-03-01 PROCEDURE — 99213 OFFICE O/P EST LOW 20 MIN: CPT | Performed by: FAMILY MEDICINE

## 2022-03-01 PROCEDURE — 99203 OFFICE O/P NEW LOW 30 MIN: CPT | Performed by: SURGERY

## 2022-03-01 NOTE — PROGRESS NOTES
[facility]   History and Physical    Patient's Name/Date of Birth: Carl Romeo / 1955 (74 y.o.)      PCP: Ralph Bennett DO      CC:  Wt loss     HPI:  74 y.o. female  Hx of poor appetite and wt loss 30 lb in last 18 months or so. No lower GI sx no blood per rectum. Previous c scope was wnl years ago. Denies any systemic sx. Was dx with dvt in 2019 and placed on [address] [facility]. She has a thoracic aortic filling defect that she is being followed for by vascular surgery.           Past Medical History:   Diagnosis Date    Anxiety     Deep vein blood clot of left lower extremity ([address] 05/27/2019    Family history of early CAD     Hyperlipemia     Hypertension     Lightheadedness     Multinodular goiter     Sleep apnea     SOBOE (shortness of breath on exertion)        Past Surgical History:   Procedure Laterality Date    BREAST SURGERY Left 2014    biopsy    CARDIOVASCULAR STRESS TEST      COLONOSCOPY      CYST REMOVAL  05/2017    mouth/under tongue    HYSTERECTOMY, TOTAL ABDOMINAL  1990    KNEE ARTHROSCOPY Left 2005    LEG SURGERY Left 1963    ligament repair    SALIVARY GLAND SURGERY Left 05/24/2017    TONSILLECTOMY      childhood       Family History   Problem Relation Age of Onset    Heart Disease Mother     Pacemaker Mother     COPD Mother     Heart Failure Mother     Heart Disease Father     Cancer Father         prostate    Hypertension Father     Coronary Art Dis Father         63's    Prostate Cancer Father     Hypertension Sister     Diabetes Sister     Heart Disease Sister     Diabetes Sister     COPD Sister     Heart Disease Brother         age 54    Heart Attack Brother     Hypertension Son     Clotting Disorder Son        Social History     Socioeconomic History    Marital status:      Spouse name: Not on file    Number of children: Not on file    Years of education: Not on file    Highest education level: Not on file   Occupational History    Occupation: maintenance- housekeeping     Comment: [employer]   Tobacco Use    Smoking status: Never Smoker    Smokeless tobacco: Never Used   Vaping Use    Vaping Use: Never used   Substance and Sexual Activity    Alcohol use: No    Drug use: No    Sexual activity: Not Currently     Partners: Female   Other Topics Concern    Not on file   Social History Narrative    Not on file     Social Determinants of Health     Financial Resource Strain: Low Risk     Difficulty of Paying Living Expenses: Not hard at all   Food Insecurity: No Food Insecurity    Worried About Running Out of Food in the Last Year: Never true    [address] in the Last Year: Never true   Transportation Needs:     Lack of Transportation (Medical): Not on file    Lack of Transportation (Non-Medical):  Not on file   Physical Activity:     Days of Exercise per Week: Not on file    Minutes of Exercise per Session: Not on file   Stress:     Feeling of Stress : Not on file   Social Connections:     Frequency of Communication with Friends and Family: Not on file    Frequency of Social Gatherings with Friends and Family: Not on file    Attends [employer] Services: Not on file    Active Member of [address] [place] [address] or Organizations: Not on file    Attends Club or Organization Meetings: Not on file    Marital Status: Not on file   Intimate Partner Violence:     Fear of Current or Ex-Partner: Not on file    Emotionally Abused: Not on file    Physically Abused: Not on file    Sexually Abused: Not on file   Housing Stability:     Unable to Pay for Housing in the Last Year: Not on file    Number of [place] in the Last Year: Not on file    Unstable Housing in the Last Year: Not on file       Current Outpatient Medications   Medication Sig Dispense Refill    meclizine (ANTIVERT) 12.5 MG tablet Take 1 tablet by mouth 3 times daily as needed for Dizziness 60 tablet 1    hydroCHLOROthiazide (HYDRODIURIL) 25 MG tablet TAKE 1 TABLET BY MOUTH ONCE DAILY 30 tablet 5    potassium chloride (KLOR-CON) 10 MEQ extended release tablet TAKE 1 TABLET BY MOUTH ONCE DAILY 30 tablet 10    metoprolol succinate (TOPROL XL) 25 MG extended release tablet TAKE 1/2 TABLET (12.5 MG) BY MOUTH ONCE DAILY. 15 tablet 10    escitalopram (LEXAPRO) 20 MG tablet Take 1 tablet by mouth daily 90 tablet 1    diclofenac sodium (VOLTAREN) 1 % GEL APPLY 4 GRAMS TOPICALLY FOUR TIMES A  g 10    pravastatin (PRAVACHOL) 10 MG tablet Take 1 tablet by mouth daily 30 tablet 5    losartan (COZAAR) 25 MG tablet Take 1 tablet by mouth daily 90 tablet 3    furosemide (LASIX) 20 MG tablet TAKE 1 TABLET BY MOUTH ONCE DAILY 30 tablet 5    buPROPion (WELLBUTRIN XL) 150 MG extended release tablet TAKE 1 TABLET BY MOUTH EVERY MORNING 30 tablet 5    albuterol sulfate HFA (VENTOLIN HFA) 108 (90 Base) MCG/ACT inhaler Inhale 2 puffs into the lungs 4 times daily as needed for Wheezing 54 g 1    ascorbic acid (V-R VITAMIN C) 250 MG tablet Take 2 tablets by mouth daily 60 tablet 0    apixaban (ELIQUIS) 5 MG TABS tablet Take 1 tablet by mouth 2 times daily 180 tablet 1    oxybutynin (DITROPAN-XL) 10 MG extended release tablet TAKE 2 TABLETS (20 MG) BY MOUTH ONCE DAILY 60 tablet 11    vitamin D (CHOLECALCIFEROL) 50 MCG (2000 UT) TABS tablet Take 1 tablet by mouth daily 30 tablet 0    Multiple Vitamins-Minerals (THERAPEUTIC MULTIVITAMIN-MINERALS) tablet Take 1 tablet by mouth daily      CALCIUM CITRATE PO Take 1,000 mg by mouth daily OTC       No current facility-administered medications for this visit.        Allergies   Allergen Reactions    Penicillins Hives and Rash    Tape Garnell  Tape] Hives       REVIEW OF SYSTEMS:    Constitutional: negative   Eyes: negative  Ears, nose, mouth, throat, and face: negative  Respiratory: negative  Cardiovascular: negative, some SOB   Gastrointestinal: negative  Genitourinary:negative  Integument/breast: negative  Hematologic/lymphatic: negative  Musculoskeletal:negative  Neurological: negative  Allergic/Immunologic: negative    Physical Exam:    @/72   Temp 97.2 °F (36.2 °C)   Resp 16   Ht 5' 5\" (1.651 m)   Wt 244 lb 9.6 oz (110.9 kg)   BMI 40.70 kg/m² @    GENERAL EXAM: On exam- pt appears stated age. No acute distress. NEURO:  Alert and oriented x 3. No obvious neuro deficits   HEENT: head- atraumatic-normocephalic. No discharge from ears, nose or throat. NECK: Supple. No jugular venous distention. CVS:RR  LUNGS: Bilateral chest movements without the use of accessory muscles. Respirations easy, nonlabored,  ABDOMEN: Abdomen soft obese, nondistended, nontender, No palpable hernias noted. RECTAL: exam deferred. EXTREMITIES: No edema, NVI and symmetrical no pedal edema        IMPRESSION/PLAN: This is a 74 y.o. female who has weight loss and decreased appetite     Will begin work up with EGD and C scope.       Electronically Signed by Qiana Lott MD FACS   3:10 PM

## 2022-03-01 NOTE — LETTER
[address]  Phone: [phone]  Fax: [phone] Roxanne Malone DO        March 1, 2022     Patient: Alexandria Kennedy   YOB: 1955   Date of Visit: 3/1/2022       To Whom It May Concern: It is my medical opinion that Gabbi Little may return to work on 3/2/2022. If you have any questions or concerns, please don't hesitate to call.     Sincerely,        Rossy Luna, DO

## 2022-03-01 NOTE — PATIENT INSTRUCTIONS
Patient Information and Instructions for Colonoscopy         Definition of Colonoscopy   A colonoscopy is the visual exam of the rectum and colon (large intestine). The exam is done with a tool called a colonoscope. The colonoscope is a flexible tube with a tiny camera on the end. This instrument allows the doctor to view the inside of your rectum and colon. Sigmoidoscopy is a shorter scope that views only the last one third of the colon. Reasons for Colonoscopy   It is used to examine, diagnose, and treat problems in your large intestine. The procedure is most often done for the following reasons: To determine the cause of abdominal pain, rectal bleeding, or a change in bowel habits   To detect and treat colon cancer or colon polyps   To obtain tissue samples for testing   To stop intestinal bleeding   Monitor response to treatment if you have inflammatory bowel disease     Possible Complications   Complications are rare, but no procedure is completely free of risk. If you are planning to have a colonoscopy, your doctor will review a list of possible complications, which may include:   Bleeding   Reaction to the sedation causing drop in your blood pressure or problems breathing  Perforation or puncture of the bowel     Factors that may increase the risk of complications include:   Pre-existing heart or kidney condition   Treatment with certain medicines, including aspirin and other drugs with anticoagulant or blood-thinning properties   Prior abdominal surgery or radiation treatments   Active colitis , diverticulitis , or other acute bowel disease   Previous treatment with radiation therapy     Be sure to discuss these risks with your doctor before the procedure.      What to Expect   Prior to Procedure   Your doctor will likely do the following:   Physical exam   Health history   Review of medicines   Test your stool for hidden blood (called \"occult blood\")     Your colon must be completely clean before the procedure. Any stool left in the intestine will block the view. This preparation may start several days before the procedure. Follow your doctor's instructions. Leading up to your procedure:   Talk to your doctor about your medicines. You may be asked to stop taking some medicines up to one week before the procedure, like:   Anti-inflammatory drugs (e.g., aspirin )   Blood thinners like clopidogrel (Plavix) or warfarin (Coumadin)   Iron supplements or vitamins containing iron   The day or days before your procedure, go on a clear liquid diet (clear broth, clear juice, clear jello) with no red coloring  Do not eat or drink anything after midnight. Wear comfortable clothing. If you have diabetes, ask your doctor if you need to adjust your diabetes medicine on the day prior to your procedure and the day of your procedure. Arrange for a ride home after the procedure. Anesthesia   You will receive intravenous sedation medicine for the procedure so you will not feel anything during the procedure. Description of the Procedure   You will lie on your left side with knees bent and drawn up toward your chest. The colonoscope will be slowly inserted through the rectum and into the bowel. The colonoscope will inject air into the colon. A small attached video camera will allow the doctor to view the colon's lining on a screen. The doctor will continue guiding the tool through the bowel and assess the lining. A tissue sample or polyps may be removed during the procedure. How Long Will It Take? Usually it takes about 30 to 45 minutes     Will It Hurt? Most people do not feel anything during the procedure and will not remember the procedure. After the procedure, gas pains and cramping are common. These pains should go away with the passing of gas. Post-procedure Care   If any tissue was removed: It will be sent to a lab to be examined. It may take 1-2 weeks for results.  The doctor will usually give an initial report after the scope is removed. Other tests may be recommended. A small amount of bleeding may occur during the first few days after the procedure. When you return home after the procedure, be sure to follow your doctor's instructions, which may include:   Resume medicines as instructed by your doctor. Resume normal diet, unless directed otherwise by your doctor. The sedative will make you drowsy. Avoid driving, operating machinery, or making important decisions for the rest of the day. Rest for the remainder of the day. After arriving home, contact your doctor if any of the following occurs:   Bleeding from your rectum, notify your doctor if you pass a teaspoonful of blood or more. Black, tarry stools   Severe abdominal pain   Hard, swollen abdomen   Signs of infection, including fever or chills   Inability to pass gas or stool   Coughing, shortness of breath, chest pain, severe nausea or vomiting     In case of an emergency, CALL 911 . PRISCILLA COLONOSCOPY PREPARATION    Instructions for Clear liquid diet  Definition  A clear liquid diet consists of clear liquids, such as water, broth and plain gelatin, that are easily digested and leave no undigested residue in your intestinal tract. Your doctor may prescribe a clear liquid diet before certain medical procedures or if you have certain digestive problems. Because a clear liquid diet can't provide you with adequate calories and nutrients, it shouldn't be continued for more than a few days. Purpose  A clear liquid diet is often used before tests, procedures or surgeries that require no food in your stomach or intestines, such as before colonoscopy. It may also be recommended as a short-term diet if you have certain digestive problems, such as nausea, vomiting or diarrhea, or after certain types of surgery.      Diet details  A clear liquid diet helps maintain adequate hydration, provides some important electrolytes, such as sodium and potassium, and gives some energy at a time when a full diet isn't possible or recommended. The following foods are allowed in a clear liquid diet:    Plain water    Fruit juices without pulp, such as apple juice, white grape juice.  Strained lemonade    Clear, fat-free broth (bouillon or consomme)    Clear sodas     Plain gelatin (Not RED or PURPLE)   Honey    Ice pops without bits of fruit or fruit pulp    Tea or coffee without milk or cream   ** NOTHING RED OR PURPLE    Any foods not on the above list should be avoided. Also, for certain tests, such as colon exams, your doctor may ask you to avoid liquids or gelatin with red coloring. A typical menu on the clear liquid diet may look like this:   Breakfast:  1 glass fruit juice  1 cup coffee or tea (without dairy products)  1 cup broth  1 bowl gelatin     Snack:  1 glass fruit juice  1 bowl gelatin     Lunch:  1 glass fruit juice  1 glass water  1 cup broth  1 bowl gelatin     Snack:  1 ice pop (without fruit pulp)  1 cup coffee or tea (without dairy products) or a soft drink     Dinner:  1 cup juice or water  1 cup broth  1 bowl gelatin  1 cup coffee or tea     Purchase this over the counter:  1. GATORADE/Clear liquid (64 ounces)   Pick these up at the pharmacy:  2. DULCOLAX 5 mg tablets (four tablets)  3. MIRALAX BOTTLE 238 grams (over the counter only)    The DAY BEFORE your colonoscopy:   Drink only clear liquids. (Absolutely no solid food)    COLONOSCOPY PREP:  3 PM: Take 2 DULCOLAX tablets    5 PM: Mix the entire bottle of MIRALAX into the 64 ounces of GATORADE. (Put half the bottle in each 32 ounce bottle). Shake the solution until fully dissolved. Drink an 8 ounce glass every 30 minutes until the solution is gone. 7 PM: Take the last 2 DULCOLAX tablets. **DO NOT EAT OR DRINK ANYTHING AFTER MIDNIGHT**          The DAY OF your colonoscopy: You may take any necessary medications with a sip of water.   Bring along someone to take you home. REMEMBER: The preparation is very important. An adequate clean out allows for the best evaluation of your entire colon. During the prep, using baby wipes may ease some of your discomfort. You should NOT plan on working or driving the rest of the day due to sedation given at the procedure    Any Questions or concerns contact April at [phone]    Patient Information and Instructions for  Upper GI Endoscopy or Esophagogastroduodenoscopy [EGD])         Definition Upper GI Endoscopy or Esophagogastroduodenoscopy [EGD])  This is a test that uses a fiberoptic scope to examine the esophagus (throat), stomach, and upper part of the small intestines. Upper GI endoscopy may be recommended if you have:   Abdominal pain   Severe heartburn   Persistent nausea and vomiting   Difficulty swallowing   Blood in stool or vomit   Abnormal x-ray or other examinations of the gastrointestinal tract     Conditions that can be diagnosed with upper GI endoscopy include:   Ulcers   Tumors   Polyps   Abnormal narrowing   Inflammation     Possible Complications   Complications are rare, but no procedure is completely free of risk.  If you are planning to have upper GI endoscopy, your doctor will review a list of possible complications, which may include:   Bleeding   Damage to the esophagus, stomach, or intestine   Infection   Respiratory depression (reduced breathing rate and/or depth)   Reaction to sedatives or anesthesia causing your blood pressure to drop    Some factors that may increase the risk of complications include:   Age: 61 or older   Pregnancy   Obesity   Smoking , alcoholism , or drug use   Malnutrition   Recent illness   Diabetes   Heart or lung problems   Bleeding disorders   Use of certain medicines     Be sure to discuss these risks with your doctor before the test.     What to Expect   Prior to test   Leading up to the test:   Arrange for a ride home after the test. Also, arrange for help at home.   The night before, eat a light meal.  Do not eat or drink anything after midnight the night before the test.   Talk to your doctor about your medicines. You may be asked to stop taking some medicines up to one week before the procedure, like:   Anti-inflammatory drugs (e.g., aspirin )   Blood thinners, like clopidogrel (Plavix) or warfarin (Coumadin)     Description of the Test   You will be asked to lie on your left side. You will have monitors tracking your breathing, heart rate, and blood oxygen levels. You will be given supplemental oxygen to breathe through your nose. A mouthpiece will be positioned to help keep your mouth open. Your throat may be sprayed with a numbing medicine. You will be given a sedative through an IV to help you relax during the test.  During the test, a small suction tube will be used to clear saliva and fluids from your mouth. The endoscope will be lubricated and placed in your mouth. You will be asked to try to swallow it. Then, it will be carefully and slowly advanced down your throat. It will be passed through your esophagus and into your stomach and intestine. While the endoscope is being advanced, your doctor will view the images on the screen. Air will be passed through the endoscope into your digestive tract. This will be done to smooth the normal folds in the tissues, allowing your doctor to view the tissue more easily. Tiny tools may be passed through the endoscope in order to take biopsies or do other tests. After Test   After the test, you will be observed for an hour. Then, you will be allowed to go home. When you return home after the test, do the following to help ensure a smooth recovery:   Rest when you get home. Ask your doctor if you can resume your normal diet. In most cases, you will be able to. Sedatives can slow your reaction time. Do not drive or use machinery for the rest of the day. Avoid alcohol for the rest of the day.    Be sure to follow your doctor's instructions . How Long Will It Take? Usually about 10-15 minutes     Will It Hurt? Most people do not feel anything during the test and will not remember the test.  After the test, your throat may be sore and you may feel bloated. Results   This test gives your doctor information about the health of your digestive system. The results can help to explain your symptoms. You and your doctor will talk about the results and your treatment plan. Call Your Doctor   After the test, call your doctor if any of the following occurs:   Signs of infection, including fever and chills   Severe abdominal pain   Hard, swollen abdomen   Difficulty swallowing or breathing   Any change or increase in your original symptoms   Bloody or black tarry colored stools   Nausea and/or vomiting   Cough, shortness of breath, or chest pain   Bleeding     In case of emergency, call 911.

## 2022-03-01 NOTE — PATIENT INSTRUCTIONS
Patient Education        meclizine  Pronunciation:  SHIRA cam  Brand:  [employer]  What is the most important information I should know about meclizine? Follow all directions on your medicine label and package. Tell each of your healthcare providers about all your medical conditions, allergies, and all medicines you use. What is meclizine? Meclizine is used to treat or prevent nausea, vomiting, and dizziness caused by motion sickness. Meclizine is also used to treat symptoms of vertigo (dizziness or spinning sensation) caused by disease that affects your inner ear. Meclizine may also be used for purposes not listed in this medication guide. What should I discuss with my healthcare provider before taking meclizine? You should not use meclizine if you are allergic to it. Meclizine should not be given to a child younger than 15years old. Do not give this medicine to a child without medical advice. Tell your doctor if you have ever had:  · liver disease;  · kidney disease;  · asthma;  · glaucoma;  · enlarged prostate; or  · urination problems. Tell your doctor if you are pregnant or breast-feeding. How should I take meclizine? Follow all directions on your prescription label and read all medication guides or instruction sheets. Use the medicine exactly as directed. You must chew the chewable tablet before you swallow it. To prevent motion sickness, take meclizine about 1 hour before you travel or anticipate having motion sickness. You may take meclizine once every 24 hours while you are traveling, to further prevent motion sickness. To treat vertigo, you may need to take meclizine several times daily. Follow your doctor's instructions. This medicine can affect the results of allergy skin tests. Tell any doctor who treats you that you are using meclizine. Store at room temperature away from moisture, heat, and light. What happens if I miss a dose?   Since meclizine is sometimes taken only information has been compiled for use by healthcare practitioners and consumers in the [place] and therefore [employer] does not warrant that uses outside of the [place] are appropriate, unless specifically indicated otherwise. [facility]'s drug information does not endorse drugs, diagnose patients or recommend therapy. [facility] drug information is an informational resource designed to assist licensed healthcare practitioners in caring for their patients and/or to serve consumers viewing this service as a supplement to, and not a substitute for, the expertise, skill, knowledge and judgment of healthcare practitioners. The absence of a warning for a given drug or drug combination in no way should be construed to indicate that the drug or drug combination is safe, effective or appropriate for any given patient. [facility] does not assume any responsibility for any aspect of healthcare administered with the aid of information [facility] provides. The information contained herein is not intended to cover all possible uses, directions, precautions, warnings, drug interactions, allergic reactions, or adverse effects. If you have questions about the drugs you are taking, check with your doctor, nurse or pharmacist.  Copyright 3934-0830 [address]: 7.01. Revision date: 2/1/2019. Care instructions adapted under license by [facility] If you have questions about a medical condition or this instruction, always ask your healthcare professional. [place] [address] any warranty or liability for your use of this information.

## 2022-03-01 NOTE — PROGRESS NOTES
Chief Complaint   Patient presents with    Dizziness       HPI:  Patient is here for follow-up of 1 week for vertigo. Patient has no complaints. She states that she has been taking meclizine which does help with her symptoms. She has been taking it once a day. She continues to have issues with no appetite. She states that she is not having dizziness every day. She is scheduled this afternoon to see the general surgeon. Due to decreased appetite. She denies nausea, vomiting, diarrhea, constipation. She has lost an additional pound since her last visit. Patient's past medical, surgical, social and/or family history reviewed, updated in chart, and are non-contributory (unless otherwise stated). Medications and allergies also reviewed and updated in chart. Review of Systems:  Constitutional:  No fever, no fatigue, no chills, no headaches, no weight change  Dermatology:  No rash, no mole, no dry or sensitive skin  ENT:  No cough, no sore throat, no sinus pain, no runny nose, no ear pain  Cardiology:  No chest pain, no palpitations, no leg edema, no shortness of breath, no PND  Gastroenterology:  No dysphagia, no abdominal pain, no nausea, no vomiting, no constipation, no diarrhea, no heartburn  Musculoskeletal:  No joint pain, no leg cramps, no back pain, no muscle aches  Respiratory:  No shortness of breath, no orthopnea, no wheezing, no SOTO, no hemoptysis  Urology:  No blood in the urine, no urinary frequency, no urinary incontinence, no urinary urgency, no nocturia, no dysuria      Vitals:    03/01/22 0957   BP: 118/70   Pulse: 67   Resp: 16   Temp: 97.1 °F (36.2 °C)   TempSrc: Temporal   SpO2: 97%   Weight: 245 lb 9.6 oz (111.4 kg)   Height: 5' 5\" (1.651 m)       Physical Exam  Vitals and nursing note reviewed. Constitutional:       Appearance: She is well-developed. HENT:      Head: Normocephalic and atraumatic.       Right Ear: External ear normal.      Left Ear: External ear normal.      Nose: Nose normal.   Eyes:      Conjunctiva/sclera: Conjunctivae normal.      Pupils: Pupils are equal, round, and reactive to light. Neck:      Thyroid: No thyromegaly. Cardiovascular:      Rate and Rhythm: Normal rate and regular rhythm. Heart sounds: Normal heart sounds. Pulmonary:      Effort: Pulmonary effort is normal.      Breath sounds: Normal breath sounds. No wheezing. Abdominal:      General: Bowel sounds are normal.      Palpations: Abdomen is soft. Tenderness: There is no abdominal tenderness. Musculoskeletal:         General: Normal range of motion. Cervical back: Normal range of motion and neck supple. Skin:     General: Skin is warm and dry. Findings: No rash. Neurological:      Mental Status: She is alert and oriented to person, place, and time. Deep Tendon Reflexes: Reflexes are normal and symmetric. Psychiatric:         Behavior: Behavior normal.         Assessment/Plan:      Mariella George was seen today for dizziness. Diagnoses and all orders for this visit:    Dizziness  Dizziness has improved. Continue meclizine as prescribed. Weight loss  Patient scheduled to see Dr. Corie Bradley    As above. Call or go to ED immediately if symptoms worsen or persist.  Return if symptoms worsen or fail to improve. , or sooner if necessary. Educational materials and/or home exercises printed for patient's review and were included in patient instructions on his/her After Visit Summary and given to patient at the end of visit. Counseled regarding above diagnosis, including possible risks and complications,  especially if left uncontrolled. Counseled regarding the possible side effects, risks, benefits and alternatives to treatment; patient and/or guardian verbalizes understanding, agrees, feels comfortable with and wishes to proceed with above treatment plan.     Advised patient to call with any new medication issues, and read all Rx info from pharmacy to assure aware of all possible risks and side effects of medication before taking. Reviewed age and gender appropriate health screening exams and vaccinations. Advised patient regarding importance of keeping up with recommended health maintenance and to schedule as soon as possible if overdue, as this is important in assessing for undiagnosed pathology, especially cancer, as well as protecting against potentially harmful/life threatening disease. Patient and/or guardian verbalizes understanding and agrees with above counseling, assessment and plan. All questions answered. Tobi Hector DO  3/1/2022    I have personally reviewed and updated the chief complaint, HPI, Past Medical, Family and Social History, as well as the above Review of Systems.

## 2022-03-01 NOTE — TELEPHONE ENCOUNTER
Prior Authorization Form:      DEMOGRAPHICS:                     Patient Name:  Rashaun Shepard  Patient :  1955            Insurance:  Payor: Bianca Lugo / Plan: [employer] [place] PPO / Product Type: *No Product type* /   Insurance ID Number:    Payor/Plan Subscr  Sex Relation Sub. Ins. ID Effective Group Num   1. GENERIC AUTO Lamberto Mock* 1955 Female Self  17                                    [address] [place] [address]   [address]* 1955 Female Self [id number]                                    [id number]         DIAGNOSIS & PROCEDURE:                       Procedure/Operation: EGD and colonoscopy            CPT Code: 50055 and 89191    Diagnosis:  Weight loss decrease appetite     ICD10 Code: decrease appetite and weight loss      Location:  R63.4 and R63.0    Surgeon:  Dr. Ivanna Hernandez INFORMATION:                          Date: 3/29/22    Time: 10am              Anesthesia:  MAC/TIVA                                                       Status:  Outpatient        Special Comments:  N/A       Electronically signed by Nadege Steel on 3/1/2022 at 4:08 PM

## 2022-03-03 ENCOUNTER — TELEPHONE (OUTPATIENT)
Dept: SURGERY | Age: 67
End: 2022-03-03

## 2022-03-03 NOTE — TELEPHONE ENCOUNTER
Scheduled patient for EGD and colonoscopy on 3/29/22 at 10am in [facility]. Patient needs to report at the front entrance 1 hour prior to the procedure, no Eliquis 3 days prior to the procedure, remind patient to do the colon prep as well as a clear liquid diet a day before. Patient verbalized understanding. Instruction letter handed. Encouraged patient to call our office if any questions.     Electronically signed by Shraddha Barraza on 3/3/2022 at 9:40 AM

## 2022-03-22 RX ORDER — APIXABAN 5 MG/1
TABLET, FILM COATED ORAL
Qty: 60 TABLET | Refills: 10 | Status: SHIPPED
Start: 2022-03-22 | End: 2022-09-27 | Stop reason: SDUPTHER

## 2022-03-23 NOTE — PROGRESS NOTES
[address] PRE-ADMISSION TESTING   ENDOSCOPY/ COLONSCOPY INSTRUCTIONS  PAT- Phone Number: [phone]    ENDOSCOPY/ COLONSCOPY INSTRUCTIONS:     [x] Bowel Prep instructions reviewed. [x] Colonoscopy- The day prior: No solid foods. Clear liquids only. [x] Nothing by mouth after midnight. Including no gum, candy, mints, or water. [x] You may brush your teeth, gargle, but do NOT swallow water. [x] Do not wear makeup, lotions, powders, deodorant. [x] Arrange transportation with a responsible adult  to and from the hospital. If you do not have a responsible adult  to transport you, you will need to make arrangements with a medical transportation company. Arrange for someone to be with you for the remainder of the day and for 24 hours after your procedure due to having had anesthesia. -Who will be your  for transportation? Sister- Tarsha Rubi  -Who will be staying with you for 24 hrs after your procedure? Sister- Tarsha Rubi    PARKING INSTRUCTIONS:     [x] ARRIVAL TIME: 3/29/22 @ 9:00AM  · [x] Enter into the [employer]. Two people may accompany you. Masks are required. · [x] Parking Lot \"I\" is where you will park. It is located on the corner of [facility] and [facility]. The entrance is on [facility]. · To enter, press the button and the gate will lift. A free token will be provided to exit the lot. EDUCATION INSTRUCTIONS:    [x] Bring a complete list of your medications, please write the last time you took the medicine, give this list to the nurse. [x] Take the following medications the morning of surgery with 1-2 ounces of water: Wellbutrin, Lexapro, and Toprol-XL. [x] Stop herbal supplements and vitamins 5 days before your surgery. LD 3/25 for all vitamins. [] DO NOT take any diabetic medicine the morning of surgery. Follow instructions for insulin the day before surgery.   [] If you are diabetic and your blood sugar is low or you feel symptomatic, you may drink 1-2 ounces of apple juice or take a glucose tablet. The morning of your procedure, you may call the pre-op area if you have concerns about your blood sugar [phone]. [x] Use your inhalers the morning of surgery. Bring your emergency inhaler with you day of surgery. *Albuterol  [x] Follow physician instructions regarding any blood thinners you may be taking. Eliquis LD: 3/25. WHAT TO EXPECT:    [x] The day of your procedure you will be greeted and checked in by the [facility] [employer].  In addition, you will be registered in the [facility] by a Patient Access Representative. Please bring your photo ID and insurance card. A nurse will greet you in accordance to the time you are needed in the pre-op area to prepare you for surgery. Please do not be discouraged if you are not greeted in the order you arrive as there are many variables that are involved in patient preparation. Your patience is greatly appreciated as you wait for your nurse. Please bring in items such as: books, magazines, newspapers, electronics, or any other items  to occupy your time in the waiting area. [x]  Delays may occur. Staff will make a sincere effort to keep you informed of delays. If any delays occur with your procedure, we apologize ahead of time for your inconvenience as we recognize the value of your time.

## 2022-03-26 NOTE — TELEPHONE ENCOUNTER
4 Eyes Skin Assessment Completed by Harini RN and LARISA Lambert.    Head WDL  Ears WDL  Nose WDL  Mouth WDL  Neck WDL  Breast/Chest WDL  Shoulder Blades  Incision on left shoulder  Spine WDL  (R) Arm/Elbow/Hand WDL  (L) Arm/Elbow/Hand WDL  Abdomen WDL  Groin WDL  Scrotum/Coccyx/Buttocks WDL  (R) Leg WDL  (L) Leg WDL  (R) Heel/Foot/Toe WDL  (L) Heel/Foot/Toe WDL          Devices In Places Pulse Ox and SCD's      Interventions In Place N/A    Possible Skin Injury No    Pictures Uploaded Into Epic N/A  Wound Consult Placed N/A  RN Wound Prevention Protocol Ordered No    Patient requested a video visit with dr. Rhea Burris 9/24/20

## 2022-03-28 ENCOUNTER — PREP FOR PROCEDURE (OUTPATIENT)
Dept: SURGERY | Age: 67
End: 2022-03-28

## 2022-03-28 RX ORDER — SODIUM CHLORIDE 9 MG/ML
25 INJECTION, SOLUTION INTRAVENOUS PRN
Status: CANCELLED | OUTPATIENT
Start: 2022-03-28

## 2022-03-28 RX ORDER — SODIUM CHLORIDE 0.9 % (FLUSH) 0.9 %
5-40 SYRINGE (ML) INJECTION PRN
Status: CANCELLED | OUTPATIENT
Start: 2022-03-28

## 2022-03-28 RX ORDER — SODIUM CHLORIDE 0.9 % (FLUSH) 0.9 %
5-40 SYRINGE (ML) INJECTION EVERY 12 HOURS SCHEDULED
Status: CANCELLED | OUTPATIENT
Start: 2022-03-28

## 2022-03-28 NOTE — PROGRESS NOTES
Notified to arrive at 0830 for her procedure that has been changed to 0930. [facility] SYSTEM with patient.

## 2022-03-29 ENCOUNTER — ANESTHESIA (OUTPATIENT)
Dept: ENDOSCOPY | Age: 67
End: 2022-03-29
Payer: COMMERCIAL

## 2022-03-29 ENCOUNTER — ANESTHESIA EVENT (OUTPATIENT)
Dept: ENDOSCOPY | Age: 67
End: 2022-03-29
Payer: COMMERCIAL

## 2022-03-29 ENCOUNTER — HOSPITAL ENCOUNTER (OUTPATIENT)
Age: 67
Setting detail: OUTPATIENT SURGERY
Discharge: HOME | End: 2022-03-29
Attending: SURGERY | Admitting: SURGERY
Payer: COMMERCIAL

## 2022-03-29 VITALS
RESPIRATION RATE: 16 BRPM | TEMPERATURE: 97.7 F | HEART RATE: 74 BPM | SYSTOLIC BLOOD PRESSURE: 152 MMHG | WEIGHT: 244 LBS | OXYGEN SATURATION: 94 % | HEIGHT: 65 IN | DIASTOLIC BLOOD PRESSURE: 73 MMHG | BODY MASS INDEX: 40.65 KG/M2

## 2022-03-29 VITALS
RESPIRATION RATE: 17 BRPM | SYSTOLIC BLOOD PRESSURE: 134 MMHG | OXYGEN SATURATION: 100 % | DIASTOLIC BLOOD PRESSURE: 71 MMHG

## 2022-03-29 PROCEDURE — 7100000010 HC PHASE II RECOVERY - FIRST 15 MIN: Performed by: SURGERY

## 2022-03-29 PROCEDURE — 3700000000 HC ANESTHESIA ATTENDED CARE: Performed by: SURGERY

## 2022-03-29 PROCEDURE — 45385 COLONOSCOPY W/LESION REMOVAL: CPT

## 2022-03-29 PROCEDURE — 88305 TISSUE EXAM BY PATHOLOGIST: CPT | Performed by: ANESTHESIOLOGY

## 2022-03-29 PROCEDURE — 2709999900 HC NON-CHARGEABLE SUPPLY: Performed by: SURGERY

## 2022-03-29 PROCEDURE — 7100000011 HC PHASE II RECOVERY - ADDTL 15 MIN: Performed by: SURGERY

## 2022-03-29 PROCEDURE — 3700000001 HC ADD 15 MINUTES (ANESTHESIA): Performed by: SURGERY

## 2022-03-29 PROCEDURE — 43239 EGD BIOPSY SINGLE/MULTIPLE: CPT

## 2022-03-29 PROCEDURE — 3609012400 HC EGD TRANSORAL BIOPSY SINGLE/MULTIPLE: Performed by: SURGERY

## 2022-03-29 PROCEDURE — 3609009900 HC COLONOSCOPY W/CONTROL BLEEDING ANY METHOD: Performed by: SURGERY

## 2022-03-29 PROCEDURE — 2500000003 HC RX 250 WO HCPCS: Performed by: NURSE ANESTHETIST, CERTIFIED REGISTERED

## 2022-03-29 PROCEDURE — 88305 TISSUE EXAM BY PATHOLOGIST: CPT

## 2022-03-29 PROCEDURE — 2580000003 HC RX 258: Performed by: NURSE ANESTHETIST, CERTIFIED REGISTERED

## 2022-03-29 PROCEDURE — 2500000003 HC RX 250 WO HCPCS: Performed by: SURGERY

## 2022-03-29 PROCEDURE — 6360000002 HC RX W HCPCS: Performed by: NURSE ANESTHETIST, CERTIFIED REGISTERED

## 2022-03-29 PROCEDURE — 3609010600 HC COLONOSCOPY POLYPECTOMY SNARE/COLD BIOPSY: Performed by: SURGERY

## 2022-03-29 RX ORDER — SODIUM CHLORIDE 9 MG/ML
INJECTION, SOLUTION INTRAVENOUS CONTINUOUS PRN
Status: DISCONTINUED | OUTPATIENT
Start: 2022-03-29 | End: 2022-03-29 | Stop reason: SDUPTHER

## 2022-03-29 RX ORDER — FLUMAZENIL 0.1 MG/ML
INJECTION, SOLUTION INTRAVENOUS PRN
Status: DISCONTINUED | OUTPATIENT
Start: 2022-03-29 | End: 2022-03-29 | Stop reason: ALTCHOICE

## 2022-03-29 RX ORDER — FENTANYL CITRATE 50 UG/ML
INJECTION, SOLUTION INTRAMUSCULAR; INTRAVENOUS PRN
Status: DISCONTINUED | OUTPATIENT
Start: 2022-03-29 | End: 2022-03-29 | Stop reason: SDUPTHER

## 2022-03-29 RX ORDER — GLYCOPYRROLATE 1 MG/5 ML
SYRINGE (ML) INTRAVENOUS PRN
Status: DISCONTINUED | OUTPATIENT
Start: 2022-03-29 | End: 2022-03-29 | Stop reason: SDUPTHER

## 2022-03-29 RX ORDER — SODIUM CHLORIDE 0.9 % (FLUSH) 0.9 %
5-40 SYRINGE (ML) INJECTION PRN
Status: DISCONTINUED | OUTPATIENT
Start: 2022-03-29 | End: 2022-03-29 | Stop reason: HOSPADM

## 2022-03-29 RX ORDER — SODIUM CHLORIDE 0.9 % (FLUSH) 0.9 %
5-40 SYRINGE (ML) INJECTION EVERY 12 HOURS SCHEDULED
Status: DISCONTINUED | OUTPATIENT
Start: 2022-03-29 | End: 2022-03-29 | Stop reason: HOSPADM

## 2022-03-29 RX ORDER — SODIUM CHLORIDE 9 MG/ML
25 INJECTION, SOLUTION INTRAVENOUS PRN
Status: DISCONTINUED | OUTPATIENT
Start: 2022-03-29 | End: 2022-03-29 | Stop reason: HOSPADM

## 2022-03-29 RX ORDER — PROPOFOL 10 MG/ML
INJECTION, EMULSION INTRAVENOUS PRN
Status: DISCONTINUED | OUTPATIENT
Start: 2022-03-29 | End: 2022-03-29 | Stop reason: SDUPTHER

## 2022-03-29 RX ORDER — LIDOCAINE HYDROCHLORIDE 20 MG/ML
INJECTION, SOLUTION INTRAVENOUS PRN
Status: DISCONTINUED | OUTPATIENT
Start: 2022-03-29 | End: 2022-03-29 | Stop reason: SDUPTHER

## 2022-03-29 RX ADMIN — FENTANYL CITRATE 25 MCG: 50 INJECTION, SOLUTION INTRAMUSCULAR; INTRAVENOUS at 09:25

## 2022-03-29 RX ADMIN — FENTANYL CITRATE 25 MCG: 50 INJECTION, SOLUTION INTRAMUSCULAR; INTRAVENOUS at 09:23

## 2022-03-29 RX ADMIN — PROPOFOL 600 MG: 10 INJECTION, EMULSION INTRAVENOUS at 09:02

## 2022-03-29 RX ADMIN — LIDOCAINE HYDROCHLORIDE 80 MG: 20 INJECTION, SOLUTION INTRAVENOUS at 09:01

## 2022-03-29 RX ADMIN — SODIUM CHLORIDE: 0.9 INJECTION, SOLUTION INTRAVENOUS at 08:54

## 2022-03-29 RX ADMIN — FENTANYL CITRATE 50 MCG: 50 INJECTION, SOLUTION INTRAMUSCULAR; INTRAVENOUS at 09:02

## 2022-03-29 RX ADMIN — Medication 0.2 MG: at 08:58

## 2022-03-29 ASSESSMENT — PAIN - FUNCTIONAL ASSESSMENT: PAIN_FUNCTIONAL_ASSESSMENT: 0-10

## 2022-03-29 ASSESSMENT — LIFESTYLE VARIABLES: SMOKING_STATUS: 0

## 2022-03-29 ASSESSMENT — PAIN DESCRIPTION - PAIN TYPE: TYPE: SURGICAL PAIN

## 2022-03-29 ASSESSMENT — PAIN SCALES - GENERAL
PAINLEVEL_OUTOF10: 0

## 2022-03-29 ASSESSMENT — PAIN DESCRIPTION - LOCATION: LOCATION: ABDOMEN

## 2022-03-29 ASSESSMENT — ENCOUNTER SYMPTOMS: SHORTNESS OF BREATH: 1

## 2022-03-29 NOTE — PROGRESS NOTES
Patient admitted to [facility] Patient placed on appropriate monitors. Family in with patient. Patient lying on left side and instructed to pass air.

## 2022-03-29 NOTE — OP NOTE
EGD/Colonoscopy Op Note    DATE OF PROCEDURE: 3/29/2022     SURGEON: Vipul Roberts MD    PREOPERATIVE DIAGNOSIS: weight loss     POSTOPERATIVE DIAGNOSIS: Same, colon polyps, distal esophagitis rule out Salinas's     OPERATION: Procedure(s):  EGD BIOPSY  COLONOSCOPY POLYPECTOMY SNARE/COLD BIOPSY  COLONOSCOPY CONTROL HEMORRHAGE    ANESTHESIA: Local monitored anesthesia. ESTIMATED BLOOD LOSS: minimal    COMPLICATIONS: None. SPECIMENS:    ID Type Source Tests Collected by Time Destination   A : biopsy GE junction r/o Barretts Gastric Gastric SURGICAL PATHOLOGY Vipul Roberts MD 3/29/2022 9436    B : right colon polyp Tissue Colon SURGICAL PATHOLOGY Vipul Roberts MD 3/29/2022 2308    C : transverse colon polyp Tissue Colon SURGICAL PATHOLOGY Vipul Roberts MD 3/29/2022 1817    D : sigmoid colon polyp Tissue Colon SURGICAL PATHOLOGY Vipul Roberts MD 3/29/2022 2011        HISTORY: The patient is a 74y.o. year old female with history of above preop diagnosis. I recommended esophagogastroduodenoscopy and colonoscopy with possible biopsy/polypectomy and I explained the risk, benefits, expected outcome, and alternatives to the procedure. Risks included but are not limited to bleeding, infection, respiratory distress, hypotension, and perforation. Patient understands and is in agreement. PROCEDURE: The patient was given IV conscious sedation per anesthesia. The patient was given supplemental oxygen by nasal cannula. The gastroscope was inserted orally and advanced under direct vision through the esophagus, through the stomach, through the pylorus, and into the duodenum. Findings:  Duodenum:     Descending: normal    Bulb: normal    Stomach:    Antrum: normal    Body: normal    Fundus: normal    Esophagus: abnormal: small rest of abnormal mucosa proximal to the GE junction Bx cold forceps to rule out Salinas's     Larynx: not examined    The scope was removed and the patient tolerated the procedure well. The colonoscope was inserted per rectum and advanced under direct vision to the cecum without difficulty, identified by ileocecal valve. The prep was good so exam was adequate. FINDINGS:    LIDNA: abnormal: external hemorrhoids,     Terminal Ileum: normal    Cecum/Ascending colon: abnormal: small polyp removed with cold snare, site injected with epi x 4cc    Transverse colon: abnormal: small polyp removed with cold snare,     Descending/Sigmoid colon: abnormal: small polyp noted removed with cold snare,     Rectum/Anus: examined in normal and retroflexed positions and was normal    The colon was decompressed and the scope was removed. The withdraw time was approximately 10 minutes. The patient tolerated the procedure well. ASSESSMENT/PLAN:     1.  Colorectal Cancer Screening - recommend repeat colonoscopy in 3 years    Josselyn Velazco MD  03/29/22  9:56 AM

## 2022-03-29 NOTE — OP NOTE
Operative Note      Patient: Zach Durand  YOB: 1955  MRN: [id number]    Date of Procedure: 3/29/2022    Pre-Op Diagnosis: WEIGHT LOSS AND LOST OF APPETITE    Post-Op Diagnosis: Same       Procedure(s):  EGD BIOPSY  COLONOSCOPY POLYPECTOMY SNARE/COLD BIOPSY  COLONOSCOPY CONTROL HEMORRHAGE    Surgeon(s):  Danna Hopkins MD    Assistant:   Resident: Kailey Dillon MD    Anesthesia: Monitor Anesthesia Care    Estimated Blood Loss (mL): Minimal    Complications: None    Specimens:   ID Type Source Tests Collected by Time Destination   A : biopsy GE junction r/o Barretts Gastric Gastric SURGICAL PATHOLOGY Danna Hopkins MD 3/29/2022 0709    B : right colon polyp Tissue Colon SURGICAL PATHOLOGY Danna Hopkins MD 3/29/2022 7481    C : transverse colon polyp Tissue Colon SURGICAL PATHOLOGY Danna Hopkins MD 3/29/2022 1256    D : sigmoid colon polyp Tissue Colon SURGICAL PATHOLOGY Danna Hopkins MD 3/29/2022 2974        Implants:  * No implants in log *      Drains: * No LDAs found *    Findings: GE junction erythematous mucosa r/o Salinas's, Transverse colon polyp, sigmoid colon polyp    Detailed Description of Procedure:     OPERATION: (1) Esophagogastroduodenoscopy with GE junction biopsy (2) Total Colonoscopy to the cecum with 2x colonic polyp biopsiess      ANESTHESIA: LMAC    History and consent: This is a 74y.o. year old female who is having weight loss and diminished appetite. I have discussed with the patient the indication, alternatives, and the possible risks and/or complications of endoscopy and the conscious sedation anesthesia. The patient appears to understand and agrees to proceed. Monitoring and Safety:    The patient was placed on a cardiac monitor and vital signs, pulse oximetry and level of consciousness were continuously evaluated throughout the procedure.  The patient was closely monitored until recovery from the medications was complete and the patient had returned to baseline status. Anesthesia was present for the procedure. OPERATIONS: The patient was placed on the table and sedated. Bite block was placed. A lubricated scope was easily passed into the upper esophagus which looked normal. The distal esophagus looked abnormal: there was a small area of erythematous mucosa that was biopsied using biopsy forceps. The scope was passed into the stomach and retroflexed. There was no notable hiatal hernia. The scope was passed down toward the pylorus. The antral mucosa looked normal. The scope was then passed through the pylorus into the duodenal bulb which looked normal, then around to the distal duodenum which looked normal, and the scope was then withdrawn. The patient tolerated the procedure well. Next, digital rectal exam was performed which was normal.  A lubricated scope was passed into the rectum, there were external hemorrhoids noted on insertion, the rectum appeared normal.  The scope was passed all the way around to the cecum which appeared normal.  A transverse colon polyp was noted and biopsied with cold snare, bleeding was then controlled with injection of 5 cc epinephrine. A second polyp was noted in the sigmoid colon which was removed with cold snare, there was no significant bleeding noted. There were no masses, or further lesions, no bleeding was noted. The scope was then slowly withdrawn, each area was examined again on the way out. The scope was retroflexed in the rectum. The scope was withdrawn. The patient tolerated the procedure well. Dr. Luz Calle was present for entirety of the procedure.      Complications: none    Diet: GERD    Physician Signature: Electronically signed by Dr. Anand Del Rio MD M.D.

## 2022-03-29 NOTE — ANESTHESIA POSTPROCEDURE EVALUATION
Department of Anesthesiology  Postprocedure Note    Patient: Fredonia Goltz  MRN: [id number]  YOB: 1955  Date of evaluation: 3/29/2022  Time:  11:08 AM     Procedure Summary     Date: 03/29/22 Room / Location: [address] [place] [address] / [facility]    Anesthesia Start: 7809 Anesthesia Stop: 1594    Procedures:       EGD BIOPSY (N/A )      COLONOSCOPY POLYPECTOMY SNARE/COLD BIOPSY (N/A )      COLONOSCOPY CONTROL HEMORRHAGE Diagnosis: (WEIGHT LOSS AND LOST OF APPETITE)    Surgeons: Jose Bautista MD Responsible Provider: Bird Macedo MD    Anesthesia Type: MAC ASA Status: 3          Anesthesia Type: MAC    Zina Phase I: Zina Score: 10    Zina Phase II: Zina Score: 10    Last vitals: Reviewed and per EMR flowsheets.        Anesthesia Post Evaluation    Patient location during evaluation: PACU  Patient participation: complete - patient participated  Level of consciousness: awake and alert  Airway patency: patent  Nausea & Vomiting: no nausea and no vomiting  Complications: no  Cardiovascular status: hemodynamically stable  Respiratory status: acceptable  Hydration status: euvolemic

## 2022-03-29 NOTE — H&P
[facility] Surgery   History and Physical     Patient's Name/Date of Birth: Dank Hussein / 1955 (15 y.o.)        PCP: Mohan Oro DO        CC: Wt loss      HPI:  74 y.o. female  Hx of poor appetite and wt loss 30 lb in last 18 months or so. No lower GI sx no blood per rectum. Previous c scope was wnl years ago. Denies any systemic sx. Was dx with dvt in 2019 and placed on [address] [place] [address].   She has a thoracic aortic filling defect that she is being followed for by vascular surgery.           Past Medical History        Past Medical History:   Diagnosis Date    Anxiety      Deep vein blood clot of left lower extremity ([address] 05/27/2019    Family history of early CAD      Hyperlipemia      Hypertension      Lightheadedness      Multinodular goiter      Sleep apnea      SOBOE (shortness of breath on exertion)              Past Surgical History         Past Surgical History:   Procedure Laterality Date    BREAST SURGERY Left 2014     biopsy    CARDIOVASCULAR STRESS TEST        COLONOSCOPY        CYST REMOVAL   05/2017     mouth/under tongue    HYSTERECTOMY, TOTAL ABDOMINAL   1990    KNEE ARTHROSCOPY Left 2005    LEG SURGERY Left 1963     ligament repair    SALIVARY GLAND SURGERY Left 05/24/2017    TONSILLECTOMY         childhood            Family History         Family History   Problem Relation Age of Onset    Heart Disease Mother      Pacemaker Mother      COPD Mother      Heart Failure Mother      Heart Disease Father      Cancer Father           prostate    Hypertension Father      Coronary Art Dis Father           63's    Prostate Cancer Father      Hypertension Sister      Diabetes Sister      Heart Disease Sister      Diabetes Sister      COPD Sister      Heart Disease Brother           age 54    Heart Attack Brother      Hypertension Son      Clotting Disorder Son              Social History               Socioeconomic History    Marital status:        Spouse name: Not on file    Number of children: Not on file    Years of education: Not on file    Highest education level: Not on file   Occupational History    Occupation: maintenance- housekeeping       Comment: [employer]   Tobacco Use    Smoking status: Never Smoker    Smokeless tobacco: Never Used   Vaping Use    Vaping Use: Never used   Substance and Sexual Activity    Alcohol use: No    Drug use: No    Sexual activity: Not Currently       Partners: Female   Other Topics Concern    Not on file   Social History Narrative    Not on file      Social Determinants of Health          Financial Resource Strain: Low Risk     Difficulty of Paying Living Expenses: Not hard at all   Food Insecurity: No Food Insecurity    Worried About [address] in the Last Year: Never true    [address] in the Last Year: Never true   Transportation Needs:     Lack of Transportation (Medical): Not on file    Lack of Transportation (Non-Medical):  Not on file   Physical Activity:     Days of Exercise per Week: Not on file    Minutes of Exercise per Session: Not on file   Stress:     Feeling of Stress : Not on file   Social Connections:     Frequency of Communication with Friends and Family: Not on file    Frequency of Social Gatherings with Friends and Family: Not on file    Attends Muslim Services: Not on file    Active Member of [address] [place] [address] [employer] or Organizations: Not on file    Attends Club or Organization Meetings: Not on file    Marital Status: Not on file   Intimate Partner Violence:     Fear of Current or Ex-Partner: Not on file    Emotionally Abused: Not on file    Physically Abused: Not on file    Sexually Abused: Not on file   Housing Stability:     Unable to Pay for Housing in the Last Year: Not on file    Number of [place] in the Last Year: Not on file    Unstable Housing in the Last Year: Not on file            Current Facility-Administered Medications          Current Outpatient Medications   Medication Sig Dispense Refill    meclizine (ANTIVERT) 12.5 MG tablet Take 1 tablet by mouth 3 times daily as needed for Dizziness 60 tablet 1    hydroCHLOROthiazide (HYDRODIURIL) 25 MG tablet TAKE 1 TABLET BY MOUTH ONCE DAILY 30 tablet 5    potassium chloride (KLOR-CON) 10 MEQ extended release tablet TAKE 1 TABLET BY MOUTH ONCE DAILY 30 tablet 10    metoprolol succinate (TOPROL XL) 25 MG extended release tablet TAKE 1/2 TABLET (12.5 MG) BY MOUTH ONCE DAILY.  15 tablet 10    escitalopram (LEXAPRO) 20 MG tablet Take 1 tablet by mouth daily 90 tablet 1    diclofenac sodium (VOLTAREN) 1 % GEL APPLY 4 GRAMS TOPICALLY FOUR TIMES A  g 10    pravastatin (PRAVACHOL) 10 MG tablet Take 1 tablet by mouth daily 30 tablet 5    losartan (COZAAR) 25 MG tablet Take 1 tablet by mouth daily 90 tablet 3    furosemide (LASIX) 20 MG tablet TAKE 1 TABLET BY MOUTH ONCE DAILY 30 tablet 5    buPROPion (WELLBUTRIN XL) 150 MG extended release tablet TAKE 1 TABLET BY MOUTH EVERY MORNING 30 tablet 5    albuterol sulfate HFA (VENTOLIN HFA) 108 (90 Base) MCG/ACT inhaler Inhale 2 puffs into the lungs 4 times daily as needed for Wheezing 54 g 1    ascorbic acid (V-R VITAMIN C) 250 MG tablet Take 2 tablets by mouth daily 60 tablet 0    apixaban (ELIQUIS) 5 MG TABS tablet Take 1 tablet by mouth 2 times daily 180 tablet 1    oxybutynin (DITROPAN-XL) 10 MG extended release tablet TAKE 2 TABLETS (20 MG) BY MOUTH ONCE DAILY 60 tablet 11    vitamin D (CHOLECALCIFEROL) 50 MCG (2000 UT) TABS tablet Take 1 tablet by mouth daily 30 tablet 0    Multiple Vitamins-Minerals (THERAPEUTIC MULTIVITAMIN-MINERALS) tablet Take 1 tablet by mouth daily        CALCIUM CITRATE PO Take 1,000 mg by mouth daily OTC          No current facility-administered medications for this visit.                 Allergies   Allergen Reactions    Penicillins Hives and Rash    Tape Ali Emigdio Tape] Hives         REVIEW OF SYSTEMS:    Constitutional: negative   Eyes: negative  Ears, nose, mouth, throat, and face: negative  Respiratory: negative  Cardiovascular: negative, some SOB   Gastrointestinal: negative  Genitourinary:negative  Integument/breast: negative  Hematologic/lymphatic: negative  Musculoskeletal:negative  Neurological: negative  Allergic/Immunologic: negative     Physical Exam:     @/72   Temp 97.2 °F (36.2 °C)   Resp 16   Ht 5' 5\" (1.651 m)   Wt 244 lb 9.6 oz (110.9 kg)   BMI 40.70 kg/m² @     GENERAL EXAM: On exam- pt appears stated age. No acute distress. NEURO:  Alert and oriented x 3. No obvious neuro deficits   HEENT: head- atraumatic-normocephalic. No discharge from ears, nose or throat. NECK: Supple. No jugular venous distention. CVS:RR  LUNGS: Bilateral chest movements without the use of accessory muscles. Respirations easy, nonlabored,  ABDOMEN: Abdomen soft obese, nondistended, nontender, No palpable hernias noted. RECTAL: exam deferred.    EXTREMITIES: No edema, NVI and symmetrical no pedal edema        IMPRESSION/PLAN: This is a 74 y.o. female who has weight loss and decreased appetite      Will begin work up with EGD and C scope.     Radha Acosta MD
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
## 2022-03-30 ENCOUNTER — TELEPHONE (OUTPATIENT)
Dept: SURGERY | Age: 67
End: 2022-03-30

## 2022-03-30 NOTE — TELEPHONE ENCOUNTER
Received a voice message from the patient who is wanting to schedule the appt with Dr. Margarette Gallegos. Attempted to call back. No answer, left message on the phone.   Electronically signed by Bret Luong on 3/30/22 at 4:38 PM EDT

## 2022-04-01 ENCOUNTER — TELEPHONE (OUTPATIENT)
Dept: FAMILY MEDICINE CLINIC | Age: 67
End: 2022-04-01

## 2022-04-01 NOTE — TELEPHONE ENCOUNTER
Attempted to call patient and a woman answered stated I had wrong number.   I am not sure if patient wants Mammogram done at the [facility] or where because we do not perform Mammograms at the office

## 2022-04-01 NOTE — TELEPHONE ENCOUNTER
----- Message from Bridger Jose Manuel sent at 4/1/2022  9:48 AM EDT -----  Subject: Referral Request    QUESTIONS   Reason for referral request? Pt would like to have a referral for   Mammogram to be seen at [address]   Has the physician seen you for this condition before? No   Preferred Specialist (if applicable)? Do you already have an appointment scheduled? No  Additional Information for Provider?   ---------------------------------------------------------------------------  --------------  CALL BACK INFO  What is the best way for the office to contact you? OK to leave message on   voicemail  Preferred Call Back Phone Number? [phone]  ---------------------------------------------------------------------------  --------------  SCRIPT ANSWERS  Relationship to Patient?  Self

## 2022-04-12 ENCOUNTER — OFFICE VISIT (OUTPATIENT)
Dept: SURGERY | Age: 67
End: 2022-04-12
Payer: MEDICAID

## 2022-04-12 ENCOUNTER — CARE COORDINATION (OUTPATIENT)
Dept: CARE COORDINATION | Age: 67
End: 2022-04-12

## 2022-04-12 VITALS
DIASTOLIC BLOOD PRESSURE: 60 MMHG | WEIGHT: 238 LBS | BODY MASS INDEX: 39.65 KG/M2 | RESPIRATION RATE: 18 BRPM | OXYGEN SATURATION: 93 % | HEIGHT: 65 IN | HEART RATE: 60 BPM | TEMPERATURE: 97.3 F | SYSTOLIC BLOOD PRESSURE: 104 MMHG

## 2022-04-12 DIAGNOSIS — R63.4 WEIGHT LOSS: Primary | ICD-10-CM

## 2022-04-12 PROCEDURE — 99211 OFF/OP EST MAY X REQ PHY/QHP: CPT | Performed by: SURGERY

## 2022-04-12 NOTE — LETTER
4/14/2022     [address]    Dear Codi Foy    I recently attempted to contact you to discuss your healthcare needs. My name is Orlin Baugh and I am a registered nurse who partners with Nicholas Gonzalez DO to improve patients health. As a member of your health care team, I would work with other providers involved in your care, offer education for your specific health conditions, and connect you with additional resources as needed. I will collaborate with Filiberto Hernández DO to support you in following your treatment plan. The additional support I provide is no additional cost to you. My primary focus is to help you achieve specific goals and improve your health. I look forward to working with you. Please contact me at your earliest convenience at [phone].     In good health,    Orlin Baugh RN

## 2022-04-12 NOTE — CARE COORDINATION
ACM received a phone call back and was told that the number listed for this patient is the wrong number. The person calling has been receiving a lot of phone calls from [facility] ([facility]) and would like to have the number removed.

## 2022-04-12 NOTE — CARE COORDINATION
ACM located another phone number for the patient. Left a HIPPA compliant message asking for a return call. PLAN:  -If no return call, ACM will attempt to contact patient again.

## 2022-04-12 NOTE — CARE COORDINATION
Patient returned ACM phone call. ACM introduced self and explained the Care Coordination program.  Patient states that she will consider enrollment and call ACM back tomorrow morning. PLAN:  -If patient does not call ACM back, ACM will attempt to contact patient again.

## 2022-04-12 NOTE — CARE COORDINATION
ACM attempted to contact patient by phone to discuss Care Coordination and offer enrollment. Left a HIPAA compliant message asking for a return call.     PLAN:  -If no return call, ACM will attempt to contact patient again

## 2022-04-12 NOTE — PROGRESS NOTES
General Surgery Progress Note:    Cc: wt loss    S: doing well no abd pain       Objective:  @/60   Pulse 60   Temp 97.3 °F (36.3 °C) (Infrared)   Resp 18   Ht 5' 5\" (1.651 m)   Wt 238 lb (108 kg)   SpO2 93%   BMI 39.61 kg/m² @    Physical -     Gen: NAD  Resp: Breathing Comfortably, no resp distress  CV: Reg Rate  Abd:obese soft non tender    EXT nvi    Assessment/Plan: wt loss colon polyps, GERD,     Colon polyps 5 yrs repeat c scope  PPI   Discussed that if having persistent wt loss over next 6 months or so that we should see her again.   I reviewed her last abd CT from 2019 no GI or soft tissue pathology noted at that time     Electronically Signed by Vipul Roberts MD FACS   2:07 PM

## 2022-04-14 NOTE — CARE COORDINATION
ACM attempted to contact patient again to discuss Care Coordination and offer enrollment. Left a HIPAA compliant message asking for a return call.     PLAN:  -ACM will send \"Introduction to CC\" letter to patient via [employer]  -If no return call, ACM will attempt to contact patient again

## 2022-04-19 ENCOUNTER — OFFICE VISIT (OUTPATIENT)
Dept: ORTHOPEDIC SURGERY | Age: 67
End: 2022-04-19
Payer: COMMERCIAL

## 2022-04-19 VITALS — HEIGHT: 65 IN | WEIGHT: 237 LBS | BODY MASS INDEX: 39.49 KG/M2

## 2022-04-19 DIAGNOSIS — M25.562 PAIN IN BOTH KNEES, UNSPECIFIED CHRONICITY: ICD-10-CM

## 2022-04-19 DIAGNOSIS — M17.0 PRIMARY OSTEOARTHRITIS OF BOTH KNEES: Primary | ICD-10-CM

## 2022-04-19 DIAGNOSIS — M25.561 PAIN IN BOTH KNEES, UNSPECIFIED CHRONICITY: ICD-10-CM

## 2022-04-19 PROCEDURE — 20610 DRAIN/INJ JOINT/BURSA W/O US: CPT | Performed by: NURSE PRACTITIONER

## 2022-04-19 RX ORDER — TRIAMCINOLONE ACETONIDE 40 MG/ML
40 INJECTION, SUSPENSION INTRA-ARTICULAR; INTRAMUSCULAR ONCE
Status: COMPLETED | OUTPATIENT
Start: 2022-04-19 | End: 2022-04-19

## 2022-04-19 RX ORDER — LIDOCAINE HYDROCHLORIDE 10 MG/ML
4 INJECTION, SOLUTION INFILTRATION; PERINEURAL ONCE
Status: COMPLETED | OUTPATIENT
Start: 2022-04-19 | End: 2022-04-19

## 2022-04-19 RX ADMIN — TRIAMCINOLONE ACETONIDE 40 MG: 40 INJECTION, SUSPENSION INTRA-ARTICULAR; INTRAMUSCULAR at 13:52

## 2022-04-19 RX ADMIN — LIDOCAINE HYDROCHLORIDE 4 ML: 10 INJECTION, SOLUTION INFILTRATION; PERINEURAL at 13:53

## 2022-04-19 RX ADMIN — LIDOCAINE HYDROCHLORIDE 4 ML: 10 INJECTION, SOLUTION INFILTRATION; PERINEURAL at 13:52

## 2022-04-19 NOTE — PROGRESS NOTES
Follow Up Visit     Yves Tavarez returns today for follow up visit regarding Bilateral Knee Pain. Treatment thus far has included Cortisone injection 1/4/2022 with good improvement. She reports symptoms have returned. REVIEW OF SYSTEMS:     Constitutional:  Negative for weight loss, fevers, chills, fatigue  Cardiovascular: Negative for chest pain, palpitations  Pulmonary: Negative for shortness of breath, labored breathing, cough  GI: negative for abdominal pain, nausea, vomitting   MSK: per HPI  Skin: negative for rash, open wounds    All other systems reviewed and are negative       Physical Exam:     Height: 5' 5\" (1.651 m), Weight: 237 lb (107.5 kg) (per pt)    General: Alert and oriented x3, no acute distress  Cardiovascular/pulmonary: No labored breathing, peripheral perfusion intact  Musculoskeletal:    Bilateral knee exam range of motion 0-100, joint line tenderness present on both knees. Stable valgus varus exams. Stable patella tracked midline. No swelling deformity or palpable effusion. Controlled Substances Monitoring:      Imaging:  No new imaging obtained today. Previous imaging reviewed showing bone-on-bone degenerative changes right greater than left    Procedure Note: Knee Cortisone injection     The bilateral knees were identified as the injection site. The risk and benefits of a cortisone injection were explained and the patient consented to the injection. Under sterile conditions, each knee was injected with a mixture of 40 mg of Kenalog and 4 mL of 1% Lidocaine without complication. A sterile bandage was applied.     Administrations This Visit     lidocaine 1 % injection 4 mL     Admin Date  04/19/2022 Action  Given Dose  4 mL Route  Intra-artICUlar Administered By  Kristal Samaniego RN           Admin Date  04/19/2022 Action  Given Dose  4 mL Route  Intra-artICUlar Administered By  Kristal Samaniego RN          triamcinolone acetonide (KENALOG-40) injection 40 mg     Admin Date  04/19/2022 Action  Given Dose  40 mg Route  Intra-artICUlar Administered By  Claudette Sinclair, RN    Admin Date  04/19/2022 Action  Given Dose  40 mg Route  Intra-artICUlar Administered By  Claudette Sinclair, RN                  Assessment: Bilateral knee bone-on-bone osteoarthritis      Plan: Today we discussed both her knees. She reports recent return of pain over the last several weeks. Patient is requesting cortisone injections for symptom relief today. Injections were agreed to and performed today. Patient reported left knee pain greater than right today. Previous imaging reviewed with patient showing bone-on-bone degenerative changes. She will continue to supplement with OTC medications. She will follow-up in 3 months.       GENTRY Mcgovern-CNP  Orthopedic Surgery   04/19/22  3:58 PM

## 2022-04-21 DIAGNOSIS — M79.89 LEG SWELLING: ICD-10-CM

## 2022-04-21 RX ORDER — FUROSEMIDE 20 MG/1
TABLET ORAL
Qty: 30 TABLET | Refills: 5 | Status: ON HOLD
Start: 2022-04-21 | End: 2022-06-01 | Stop reason: HOSPADM

## 2022-04-25 ENCOUNTER — TELEPHONE (OUTPATIENT)
Dept: FAMILY MEDICINE CLINIC | Age: 67
End: 2022-04-25

## 2022-04-26 ENCOUNTER — CARE COORDINATION (OUTPATIENT)
Dept: CARE COORDINATION | Age: 67
End: 2022-04-26

## 2022-04-26 NOTE — CARE COORDINATION
ACM contacted patient by phone to discuss Care Coordination and offer enrollment. Patient very politely declined enrollment. ACM provided contact information and encouraged patient to reach out if any needs arise. Patient verbalized understanding. PLAN:  -ACM will remove name from care team and place patient in a temporary exclusion from Care Coordination d/t patient declining.

## 2022-05-03 ENCOUNTER — HOSPITAL ENCOUNTER (EMERGENCY)
Age: 67
Discharge: HOME | End: 2022-05-03
Attending: EMERGENCY MEDICINE
Payer: MEDICARE

## 2022-05-03 ENCOUNTER — OFFICE VISIT (OUTPATIENT)
Dept: FAMILY MEDICINE CLINIC | Age: 67
End: 2022-05-03
Payer: MEDICARE

## 2022-05-03 ENCOUNTER — APPOINTMENT (OUTPATIENT)
Dept: CT IMAGING | Age: 67
End: 2022-05-03
Payer: MEDICARE

## 2022-05-03 ENCOUNTER — TELEPHONE (OUTPATIENT)
Dept: FAMILY MEDICINE CLINIC | Age: 67
End: 2022-05-03

## 2022-05-03 VITALS
HEART RATE: 83 BPM | DIASTOLIC BLOOD PRESSURE: 76 MMHG | TEMPERATURE: 97.5 F | BODY MASS INDEX: 38.94 KG/M2 | OXYGEN SATURATION: 95 % | SYSTOLIC BLOOD PRESSURE: 132 MMHG | WEIGHT: 234 LBS

## 2022-05-03 VITALS
TEMPERATURE: 98.6 F | DIASTOLIC BLOOD PRESSURE: 76 MMHG | RESPIRATION RATE: 18 BRPM | HEART RATE: 50 BPM | OXYGEN SATURATION: 96 % | SYSTOLIC BLOOD PRESSURE: 131 MMHG

## 2022-05-03 DIAGNOSIS — R19.7 NAUSEA VOMITING AND DIARRHEA: ICD-10-CM

## 2022-05-03 DIAGNOSIS — R10.30 LOWER ABDOMINAL PAIN: Primary | ICD-10-CM

## 2022-05-03 DIAGNOSIS — E86.0 DEHYDRATION: ICD-10-CM

## 2022-05-03 DIAGNOSIS — R19.7 DIARRHEA, UNSPECIFIED TYPE: ICD-10-CM

## 2022-05-03 DIAGNOSIS — R11.2 NAUSEA VOMITING AND DIARRHEA: ICD-10-CM

## 2022-05-03 LAB
ALBUMIN SERPL-MCNC: 3.7 G/DL (ref 3.5–5.2)
ALP BLD-CCNC: 106 U/L (ref 35–104)
ALT SERPL-CCNC: 10 U/L (ref 0–32)
ANION GAP SERPL CALCULATED.3IONS-SCNC: 12 MMOL/L (ref 7–16)
AST SERPL-CCNC: 12 U/L (ref 0–31)
BACTERIA: ABNORMAL /HPF
BASOPHILS ABSOLUTE: 0.04 E9/L (ref 0–0.2)
BASOPHILS RELATIVE PERCENT: 0.3 % (ref 0–2)
BILIRUB SERPL-MCNC: 0.4 MG/DL (ref 0–1.2)
BILIRUBIN URINE: NEGATIVE
BLOOD, URINE: NEGATIVE
BUN BLDV-MCNC: 24 MG/DL (ref 6–23)
CALCIUM SERPL-MCNC: 10.6 MG/DL (ref 8.6–10.2)
CHLORIDE BLD-SCNC: 100 MMOL/L (ref 98–107)
CLARITY: CLEAR
CO2: 29 MMOL/L (ref 22–29)
COLOR: YELLOW
CREAT SERPL-MCNC: 1.3 MG/DL (ref 0.5–1)
EOSINOPHILS ABSOLUTE: 0.13 E9/L (ref 0.05–0.5)
EOSINOPHILS RELATIVE PERCENT: 1.1 % (ref 0–6)
EPITHELIAL CELLS, UA: ABNORMAL /HPF
GFR AFRICAN AMERICAN: 49
GFR NON-AFRICAN AMERICAN: 41 ML/MIN/1.73
GLUCOSE BLD-MCNC: 104 MG/DL (ref 74–99)
GLUCOSE URINE: NEGATIVE MG/DL
HCT VFR BLD CALC: 41.3 % (ref 34–48)
HEMOGLOBIN: 13.4 G/DL (ref 11.5–15.5)
IMMATURE GRANULOCYTES #: 0.09 E9/L
IMMATURE GRANULOCYTES %: 0.7 % (ref 0–5)
KETONES, URINE: NEGATIVE MG/DL
LACTIC ACID: 1.4 MMOL/L (ref 0.5–2.2)
LEUKOCYTE ESTERASE, URINE: NEGATIVE
LIPASE: 30 U/L (ref 13–60)
LYMPHOCYTES ABSOLUTE: 2.66 E9/L (ref 1.5–4)
LYMPHOCYTES RELATIVE PERCENT: 21.8 % (ref 20–42)
MCH RBC QN AUTO: 29.7 PG (ref 26–35)
MCHC RBC AUTO-ENTMCNC: 32.4 % (ref 32–34.5)
MCV RBC AUTO: 91.6 FL (ref 80–99.9)
MONOCYTES ABSOLUTE: 0.81 E9/L (ref 0.1–0.95)
MONOCYTES RELATIVE PERCENT: 6.6 % (ref 2–12)
NEUTROPHILS ABSOLUTE: 8.49 E9/L (ref 1.8–7.3)
NEUTROPHILS RELATIVE PERCENT: 69.5 % (ref 43–80)
NITRITE, URINE: NEGATIVE
PDW BLD-RTO: 14.1 FL (ref 11.5–15)
PH UA: 5.5 (ref 5–9)
PLATELET # BLD: 302 E9/L (ref 130–450)
PMV BLD AUTO: 10.4 FL (ref 7–12)
POTASSIUM SERPL-SCNC: 4.1 MMOL/L (ref 3.5–5)
PROTEIN UA: NEGATIVE MG/DL
RBC # BLD: 4.51 E12/L (ref 3.5–5.5)
RBC UA: ABNORMAL /HPF (ref 0–2)
SODIUM BLD-SCNC: 141 MMOL/L (ref 132–146)
SPECIFIC GRAVITY UA: 1.01 (ref 1–1.03)
TOTAL PROTEIN: 7.1 G/DL (ref 6.4–8.3)
UROBILINOGEN, URINE: 0.2 E.U./DL
WBC # BLD: 12.2 E9/L (ref 4.5–11.5)
WBC UA: ABNORMAL /HPF (ref 0–5)

## 2022-05-03 PROCEDURE — 74177 CT ABD & PELVIS W/CONTRAST: CPT

## 2022-05-03 PROCEDURE — 4040F PNEUMOC VAC/ADMIN/RCVD: CPT | Performed by: PHYSICIAN ASSISTANT

## 2022-05-03 PROCEDURE — 1123F ACP DISCUSS/DSCN MKR DOCD: CPT | Performed by: PHYSICIAN ASSISTANT

## 2022-05-03 PROCEDURE — 81001 URINALYSIS AUTO W/SCOPE: CPT

## 2022-05-03 PROCEDURE — 36415 COLL VENOUS BLD VENIPUNCTURE: CPT

## 2022-05-03 PROCEDURE — G8399 PT W/DXA RESULTS DOCUMENT: HCPCS | Performed by: PHYSICIAN ASSISTANT

## 2022-05-03 PROCEDURE — 80053 COMPREHEN METABOLIC PANEL: CPT

## 2022-05-03 PROCEDURE — G8427 DOCREV CUR MEDS BY ELIG CLIN: HCPCS | Performed by: PHYSICIAN ASSISTANT

## 2022-05-03 PROCEDURE — 3017F COLORECTAL CA SCREEN DOC REV: CPT | Performed by: PHYSICIAN ASSISTANT

## 2022-05-03 PROCEDURE — 83690 ASSAY OF LIPASE: CPT

## 2022-05-03 PROCEDURE — 85025 COMPLETE CBC W/AUTO DIFF WBC: CPT

## 2022-05-03 PROCEDURE — 83605 ASSAY OF LACTIC ACID: CPT

## 2022-05-03 PROCEDURE — 99214 OFFICE O/P EST MOD 30 MIN: CPT | Performed by: PHYSICIAN ASSISTANT

## 2022-05-03 PROCEDURE — G8417 CALC BMI ABV UP PARAM F/U: HCPCS | Performed by: PHYSICIAN ASSISTANT

## 2022-05-03 PROCEDURE — 1036F TOBACCO NON-USER: CPT | Performed by: PHYSICIAN ASSISTANT

## 2022-05-03 PROCEDURE — 6360000004 HC RX CONTRAST MEDICATION: Performed by: RADIOLOGY

## 2022-05-03 PROCEDURE — 99285 EMERGENCY DEPT VISIT HI MDM: CPT

## 2022-05-03 PROCEDURE — 1090F PRES/ABSN URINE INCON ASSESS: CPT | Performed by: PHYSICIAN ASSISTANT

## 2022-05-03 RX ORDER — DICYCLOMINE HCL 20 MG
20 TABLET ORAL 4 TIMES DAILY
Qty: 15 TABLET | Refills: 0 | Status: SHIPPED | OUTPATIENT
Start: 2022-05-03

## 2022-05-03 RX ORDER — ONDANSETRON 8 MG/1
8 TABLET, ORALLY DISINTEGRATING ORAL EVERY 8 HOURS PRN
Qty: 12 TABLET | Refills: 0 | Status: SHIPPED | OUTPATIENT
Start: 2022-05-03 | End: 2022-05-18 | Stop reason: SDUPTHER

## 2022-05-03 RX ADMIN — IOPAMIDOL 90 ML: 755 INJECTION, SOLUTION INTRAVENOUS at 20:46

## 2022-05-03 ASSESSMENT — PAIN SCALES - GENERAL: PAINLEVEL_OUTOF10: 8

## 2022-05-03 ASSESSMENT — PAIN DESCRIPTION - DESCRIPTORS: DESCRIPTORS: TENDER

## 2022-05-03 ASSESSMENT — PAIN - FUNCTIONAL ASSESSMENT: PAIN_FUNCTIONAL_ASSESSMENT: 0-10

## 2022-05-03 ASSESSMENT — PAIN DESCRIPTION - LOCATION: LOCATION: ABDOMEN

## 2022-05-03 ASSESSMENT — PAIN DESCRIPTION - ORIENTATION: ORIENTATION: LOWER;LEFT;RIGHT

## 2022-05-03 ASSESSMENT — PAIN DESCRIPTION - PAIN TYPE: TYPE: ACUTE PAIN

## 2022-05-03 NOTE — ED NOTES
FIRST PROVIDER CONTACT ASSESSMENT NOTE           Department of Emergency Medicine                 First Provider Note            5/3/22  2:55 PM EDT    Date of Encounter: No admission date for patient encounter. Patient Name: Laila Ca  : 1955  MRN: [id number]    Chief Complaint: No chief complaint on file. History of Present Illness:   Laila Ca is a 74 y.o. female who presents to the ED for lower abdominal pain x 1 week w/diarrhea. Denies chest pain or SOB. Focused Physical Exam:  VS:    ED Triage Vitals [22 1425]   BP Temp Temp src Pulse Resp SpO2 Height Weight   -- 98.6 °F (37 °C) -- 69 -- 94 % -- --        Physical Ex: Constitutional: Alert and non-toxic. Medical History:  has a past medical history of Anxiety, Deep vein blood clot of left lower extremity ([address] Family history of early CAD, Hyperlipemia, Hypertension, Lightheadedness, Multinodular goiter, Sleep apnea, and SOBOE (shortness of breath on exertion). Surgical History:  has a past surgical history that includes Tonsillectomy; Leg Surgery (Left, ); Colonoscopy; cardiovascular stress test; Hysterectomy, total abdominal (); Knee arthroscopy (Left, ); Breast surgery (Left, ); Salivary gland surgery (Left, 2017); cyst removal (2017); Upper gastrointestinal endoscopy (N/A, 3/29/2022); Colonoscopy (N/A, 3/29/2022); and Colonoscopy (3/29/2022). Social History:  reports that she has never smoked. She has never used smokeless tobacco. She reports that she does not drink alcohol and does not use drugs. Family History: family history includes COPD in her mother and sister; Cancer in her father; Clotting Disorder in her son; Coronary Art Dis in her father; Diabetes in her sister and sister; Heart Attack in her brother; Heart Disease in her brother, father, mother, and sister;  Heart Failure in her mother; Hypertension in her father, sister, and son; Pacemaker in her mother; Prostate Cancer in her father.     Allergies: Penicillins and Tape Sh[employer] tape]     Initial Plan of Care: Initiate Treatment-Testing, Proceed toTreatment Area When Bed Available for ED Attending/MLP to Continue Care      ---END OF FIRST PROVIDER CONTACT ASSESSMENT NOTE---  Electronically signed by EDYTA Shelton   DD: 5/3/22       Hakan Jeter [place]  05/03/22 1455

## 2022-05-03 NOTE — TELEPHONE ENCOUNTER
Received call from Sunday Brunson for nurse triage. Patient's son Onelia Darden called stating that patient is having lower abdominal pain on right side radiating across lower abdomen. He stated patient is c/o nausea, vomiting and not able to keep any food down. He states the pain has become worse in the last 24 hours. I advised ER due to PCP has no available appointment and location of pain and patient refused. I advised to go to [facility] because they have imaging available. Pt stated \"I guess\".

## 2022-05-03 NOTE — PROGRESS NOTES
5/3/22  [facility] Friday : 1955 Sex: female  Age 74 y.o. Subjective:  Chief Complaint   Patient presents with    Diarrhea    Emesis    Abdominal Pain         HPI:   [facility] Friday , 74 y.o. female presents to express care for evaluation of nausea, vomiting, diarrhea    HPI  55-year-old female presents to express care for evaluation of abdominal pain, nausea, vomiting, diarrhea. The patient has had the symptoms ongoing over the weekend. The patient cannot keep anything down. The patient tried to drink coffee and toast this morning and threw it up. The patient is having multiple episodes of diarrhea daily. The patient states that she just had a colonoscopy at the end of March that was normal.  The patient states that she has had previous hysterectomy but no other surgeries on her abdomen. The patient is not having any chest pain, shortness of breath. The patient denied any fever or chills. She does not detect any blood in the stool or the vomit. The patient is anticoagulated on Eliquis. ROS:   Unless otherwise stated in this report the patient's positive and negative responses for review of systems for constitutional, eyes, ENT, cardiovascular, respiratory, gastrointestinal, neurological, , musculoskeletal, and integument systems and related systems to the presenting problem are either stated in the history of present illness or were not pertinent or were negative for the symptoms and/or complaints related to the presenting medical problem. Positives and pertinent negatives as per HPI. All others reviewed and are negative.       PMH:     Past Medical History:   Diagnosis Date    Anxiety     Deep vein blood clot of left lower extremity ([facility] [address] 2019    Family history of early CAD     Hyperlipemia     Hypertension     Lightheadedness     Multinodular goiter     Sleep apnea     SOBOE (shortness of breath on exertion)        Past Surgical History:   Procedure Laterality Date    BREAST SURGERY Left 2014    biopsy    CARDIOVASCULAR STRESS TEST      COLONOSCOPY      COLONOSCOPY N/A 3/29/2022    COLONOSCOPY POLYPECTOMY SNARE/COLD BIOPSY performed by Bhargav Castillo MD at [place] [address]  3/29/2022    COLONOSCOPY CONTROL HEMORRHAGE performed by [facility]argav [facility]illo [facility] at [facility]  05/2017    mouth/under tongue    HYSTERECTOMY, TOTAL ABDOMINAL  1990    KNEE ARTHROSCOPY Left 2005    LEG SURGERY Left 1963    ligament repair    SALIVARY GLAND SURGERY Left 05/24/2017    TONSILLECTOMY      childhood    UPPER GASTROINTESTINAL ENDOSCOPY N/A 3/29/2022    EGD BIOPSY performed by Bhargav Castillo MD at [place] [address] History   Problem Relation Age of Onset    Heart Disease Mother     Pacemaker Mother     COPD Mother     Heart Failure Mother     Heart Disease Father     Cancer Father         prostate    Hypertension Father     Coronary Art Dis Father         63's    Prostate Cancer Father     Hypertension Sister     Diabetes Sister     Heart Disease Sister     Diabetes Sister     COPD Sister     Heart Disease Brother         age 54    Heart Attack Brother     Hypertension Son     Clotting Disorder Son        Medications:     Current Outpatient Medications:     furosemide (LASIX) 20 MG tablet, TAKE 1 TABLET BY MOUTH ONCE DAILY, Disp: 30 tablet, Rfl: 5    ELIQUIS 5 MG TABS tablet, TAKE 1 TABLET BY MOUTH TWICE DAILY, Disp: 60 tablet, Rfl: 10    meclizine (ANTIVERT) 12.5 MG tablet, Take 1 tablet by mouth 3 times daily as needed for Dizziness, Disp: 60 tablet, Rfl: 1    hydroCHLOROthiazide (HYDRODIURIL) 25 MG tablet, TAKE 1 TABLET BY MOUTH ONCE DAILY, Disp: 30 tablet, Rfl: 5    potassium chloride (KLOR-CON) 10 MEQ extended release tablet, TAKE 1 TABLET BY MOUTH ONCE DAILY, Disp: 30 tablet, Rfl: 10    metoprolol succinate (TOPROL XL) 25 MG extended release tablet, TAKE 1/2 TABLET (12.5 MG) BY MOUTH ONCE DAILY. , Disp: 15 tablet, Rfl: 10    escitalopram (LEXAPRO) 20 MG tablet, Take 1 tablet by mouth daily, Disp: 90 tablet, Rfl: 1    diclofenac sodium (VOLTAREN) 1 % GEL, APPLY 4 GRAMS TOPICALLY FOUR TIMES A DAY, Disp: 100 g, Rfl: 10    pravastatin (PRAVACHOL) 10 MG tablet, Take 1 tablet by mouth daily, Disp: 30 tablet, Rfl: 5    losartan (COZAAR) 25 MG tablet, Take 1 tablet by mouth daily, Disp: 90 tablet, Rfl: 3    buPROPion (WELLBUTRIN XL) 150 MG extended release tablet, TAKE 1 TABLET BY MOUTH EVERY MORNING, Disp: 30 tablet, Rfl: 5    albuterol sulfate HFA (VENTOLIN HFA) 108 (90 Base) MCG/ACT inhaler, Inhale 2 puffs into the lungs 4 times daily as needed for Wheezing, Disp: 54 g, Rfl: 1    ascorbic acid (V-R VITAMIN C) 250 MG tablet, Take 2 tablets by mouth daily, Disp: 60 tablet, Rfl: 0    oxybutynin (DITROPAN-XL) 10 MG extended release tablet, TAKE 2 TABLETS (20 MG) BY MOUTH ONCE DAILY, Disp: 60 tablet, Rfl: 11    vitamin D (CHOLECALCIFEROL) 50 MCG (2000 UT) TABS tablet, Take 1 tablet by mouth daily, Disp: 30 tablet, Rfl: 0    Multiple Vitamins-Minerals (THERAPEUTIC MULTIVITAMIN-MINERALS) tablet, Take 1 tablet by mouth daily, Disp: , Rfl:     CALCIUM CITRATE PO, Take 1,000 mg by mouth daily OTC, Disp: , Rfl:     Allergies: Allergies   Allergen Reactions    Penicillins Hives and Rash    Tape Simeon Angolan Tape] Hives       Social History:     Social History     Tobacco Use    Smoking status: Never Smoker    Smokeless tobacco: Never Used   Vaping Use    Vaping Use: Never used   Substance Use Topics    Alcohol use: No    Drug use: No       Patient lives at home. Physical Exam:     Vitals:    05/03/22 1122   BP: 132/76   Site: Right Upper Arm   Position: Sitting   Pulse: 83   Temp: 97.5 °F (36.4 °C)   TempSrc: Temporal   SpO2: 95%   Weight: 234 lb (106.1 kg)       Exam:  Physical Exam    Nurses note and vital signs reviewed and patient is not hypoxic.     General: The patient appears well and in no apparent distress. Patient is resting comfortably on cart. Skin: Warm, dry, no pallor noted. There is no rash noted. Head: Normocephalic, atraumatic. Eye: Normal conjunctiva  Ears, Nose, Mouth, and Throat: Wearing a mask  Cardiovascular: Regular Rate and Rhythm  Respiratory: Patient is in no distress, no accessory muscle use, lungs are clear to auscultation, no wheezing, crackles or rhonchi  Back: Non-tender, no CVA tenderness bilaterally to percussion. GI: Quiet bowel sounds, the patient does have some reproducible pain to the upper abdomen but has significant tenderness noted throughout the lower abdomen. Greatest to the left lower quadrant. The patient is not having any significant rebound or guarding associated. No pulsatile masses  Neurological: A&O x4, normal speech      Testing:           Medical Decision Making:     Vital signs reviewed    Past medical history reviewed. Allergies reviewed. Medications reviewed. Patient on arrival does not appear to be in any apparent distress or discomfort. The patient has been seen and evaluated. The patient does not appear to be toxic or lethargic. The patient does not appear to be toxic or lethargic. The patient vital signs are noted to be within normal limits. The patient cannot keep anything down. The patient will be directed to the emergency department for further evaluation, likely laboratory studies and a CT scan. The patient was comfortable with this plan and will go directly there      Clinical Impression:   Santana Mcfadden was seen today for diarrhea, emesis and abdominal pain.     Diagnoses and all orders for this visit:    Lower abdominal pain    Nausea vomiting and diarrhea    Dehydration        Go directly to the ED  SIGNATURE: Evelyn Gonzalez III, PA-C

## 2022-05-04 NOTE — ED PROVIDER NOTES
HPI:  5/3/22,   Time: 8:37 PM EDT       Mason Ruelas is a 74 y.o. female presenting to the ED for abdominal pain and diarrhea, beginning 1 week ago. The complaint has been persistent, moderate in severity, and worsened by nothing. The patient states over the last week she has been having abdominal pain. She describes as cramping sensation across her lower abdomen. She states she has had been having watery nonbloody diarrhea with it. She states she did have some episodes of nausea and vomiting but this is resolved. No fevers or chills. No chest pain or shortness of breath. No numbness or tingling. Review of Systems:   Pertinent positives and negatives are stated within HPI, all other systems reviewed and are negative.          --------------------------------------------- PAST HISTORY ---------------------------------------------  Past Medical History:  has a past medical history of Anxiety, Deep vein blood clot of left lower extremity ([address] Family history of early CAD, Hyperlipemia, Hypertension, Lightheadedness, Multinodular goiter, Sleep apnea, and SOBOE (shortness of breath on exertion). Past Surgical History:  has a past surgical history that includes Tonsillectomy; Leg Surgery (Left, 1963); Colonoscopy; cardiovascular stress test; Hysterectomy, total abdominal (1990); Knee arthroscopy (Left, 2005); Breast surgery (Left, 2014); Salivary gland surgery (Left, 05/24/2017); cyst removal (05/2017); Upper gastrointestinal endoscopy (N/A, 3/29/2022); Colonoscopy (N/A, 3/29/2022); and Colonoscopy (3/29/2022). Social History:  reports that she has never smoked. She has never used smokeless tobacco. She reports that she does not drink alcohol and does not use drugs. Family History: family history includes COPD in her mother and sister; Cancer in her father; Clotting Disorder in her son; Coronary Art Dis in her father; Diabetes in her sister and sister;  Heart Attack in her brother; Heart Disease in her brother, father, mother, and sister; Heart Failure in her mother; Hypertension in her father, sister, and son; Pacemaker in her mother; Prostate Cancer in her father. The patients home medications have been reviewed. Allergies: Penicillins and Tape [adhesive tape]        ---------------------------------------------------PHYSICAL EXAM--------------------------------------    Constitutional/General: Alert and oriented x3, well appearing, non toxic in NAD  Head: Normocephalic and atraumatic  Eyes: PERRL, EOMI, conjunctive normal, sclera non icteric  Mouth: Oropharynx clear, handling secretions, no trismus, no asymmetry of the posterior oropharynx or uvular edema  Neck: Supple, full ROM, non tender to palpation in the midline, no stridor, no crepitus, no meningeal signs  Respiratory: Lungs clear to auscultation bilaterally, no wheezes, rales, or rhonchi. Not in respiratory distress  Cardiovascular:  Regular rate. Regular rhythm. No murmurs, gallops, or rubs. 2+ distal pulses  GI:  Abdomen Soft, mild tenderness palpation in lower abdomen non distended. +BS. No organomegaly, no palpable masses,  No rebound, guarding, or rigidity. Musculoskeletal: Moves all extremities x 4. Warm and well perfused, no clubbing, cyanosis, or edema. Capillary refill <3 seconds  Integument: skin warm and dry. No rashes. Neurologic: GCS 15, no focal deficits,  Psychiatric: Normal Affect    -------------------------------------------------- RESULTS -------------------------------------------------  I have personally reviewed all laboratory and imaging results for this patient. Results are listed below.      LABS:  Results for orders placed or performed during the hospital encounter of 05/03/22   CBC with Auto Differential   Result Value Ref Range    WBC 12.2 (H) 4.5 - 11.5 E9/L    RBC 4.51 3.50 - 5.50 E12/L    Hemoglobin 13.4 11.5 - 15.5 g/dL    Hematocrit 41.3 34.0 - 48.0 %    MCV 91.6 80.0 - 99.9 fL    MCH 29.7 26.0 - 35.0 pg    MCHC 32.4 32.0 - 34.5 %    RDW 14.1 11.5 - 15.0 fL    Platelets 984 049 - 463 E9/L    MPV 10.4 7.0 - 12.0 fL    Neutrophils % 69.5 43.0 - 80.0 %    Immature Granulocytes % 0.7 0.0 - 5.0 %    Lymphocytes % 21.8 20.0 - 42.0 %    Monocytes % 6.6 2.0 - 12.0 %    Eosinophils % 1.1 0.0 - 6.0 %    Basophils % 0.3 0.0 - 2.0 %    Neutrophils Absolute 8.49 (H) 1.80 - 7.30 E9/L    Immature Granulocytes # 0.09 E9/L    Lymphocytes Absolute 2.66 1.50 - 4.00 E9/L    Monocytes Absolute 0.81 0.10 - 0.95 E9/L    Eosinophils Absolute 0.13 0.05 - 0.50 E9/L    Basophils Absolute 0.04 0.00 - 0.20 E9/L   Comprehensive Metabolic Panel   Result Value Ref Range    Sodium 141 132 - 146 mmol/L    Potassium 4.1 3.5 - 5.0 mmol/L    Chloride 100 98 - 107 mmol/L    CO2 29 22 - 29 mmol/L    Anion Gap 12 7 - 16 mmol/L    Glucose 104 (H) 74 - 99 mg/dL    BUN 24 (H) 6 - 23 mg/dL    CREATININE 1.3 (H) 0.5 - 1.0 mg/dL    GFR Non-African American 41 >=60 mL/min/1.73    GFR African American 49     Calcium 10.6 (H) 8.6 - 10.2 mg/dL    Total Protein 7.1 6.4 - 8.3 g/dL    Albumin 3.7 3.5 - 5.2 g/dL    Total Bilirubin 0.4 0.0 - 1.2 mg/dL    Alkaline Phosphatase 106 (H) 35 - 104 U/L    ALT 10 0 - 32 U/L    AST 12 0 - 31 U/L   Lactic Acid   Result Value Ref Range    Lactic Acid 1.4 0.5 - 2.2 mmol/L   Lipase   Result Value Ref Range    Lipase 30 13 - 60 U/L   Urinalysis with Microscopic   Result Value Ref Range    Color, UA Yellow Straw/Yellow    Clarity, UA Clear Clear    Glucose, Ur Negative Negative mg/dL    Bilirubin Urine Negative Negative    Ketones, Urine Negative Negative mg/dL    Specific Gravity, UA 1.015 1.005 - 1.030    Blood, Urine Negative Negative    pH, UA 5.5 5.0 - 9.0    Protein, UA Negative Negative mg/dL    Urobilinogen, Urine 0.2 <2.0 E.U./dL    Nitrite, Urine Negative Negative    Leukocyte Esterase, Urine Negative Negative    WBC, UA 1-3 0 - 5 /HPF    RBC, UA 0-1 0 - 2 /HPF    Epithelial Cells, UA RARE /HPF    Bacteria, UA FEW (A) None Seen /HPF       RADIOLOGY:  Interpreted by Radiologist.  CT ABDOMEN PELVIS W IV CONTRAST Additional Contrast? None   Final Result   No acute findings in the abdomen or pelvis.               ------------------------- NURSING NOTES AND VITALS REVIEWED ---------------------------   The nursing notes within the ED encounter and vital signs as below have been reviewed by myself. /76 Comment: Simultaneous filing. User may not have seen previous data. Pulse 50   Temp 98.6 °F (37 °C)   Resp 18   SpO2 96%   Oxygen Saturation Interpretation: Normal    The patients available past medical records and past encounters were reviewed. ------------------------------ ED COURSE/MEDICAL DECISION MAKING----------------------  Medications   iopamidol (ISOVUE-370) 76 % injection 90 mL (90 mLs IntraVENous Given 5/3/22 2046)         ED COURSE:       Medical Decision Making: This is a 68-year-old female presented to the ED for abdominal pain and diarrhea. Patient underwent laboratory work-up which showed a normal CBC except for white count of 12.2. Normal chemistry except for BUN/creatinine 24/1.3. Lactic acid normal.  Lipase normal.  Urinalysis shows no signs of infection. CT on pelvis shows no acute findings. On reevaluation pain is well controlled. Patient appears nontoxic. Patient be treated supportively. Strict return precautions given. Patient agrees with plan. I, Dr. Chandra Egan, am the primary provider for this encounter    This patient's ED course included: a personal history and physicial examination, re-evaluation prior to disposition and multiple bedside re-evaluations    This patient has remained hemodynamically stable during their ED course. Re-Evaluations:             Re-evaluation. Patients symptoms are improving    Counseling:    The emergency provider has spoken with the patient and discussed todays results, in addition to providing specific details for the plan of care and counseling regarding the diagnosis and prognosis. Questions are answered at this time and they are agreeable with the plan.       --------------------------------- IMPRESSION AND DISPOSITION ---------------------------------    IMPRESSION  1. Lower abdominal pain    2. Diarrhea, unspecified type        DISPOSITION  Disposition: Discharge to home  Patient condition is stable    NOTE: This report was transcribed using voice recognition software.  Every effort was made to ensure accuracy; however, inadvertent computerized transcription errors may be present        Rosalie Wagner DO  05/03/22 7313

## 2022-05-05 ENCOUNTER — TELEPHONE (OUTPATIENT)
Dept: FAMILY MEDICINE CLINIC | Age: 67
End: 2022-05-05

## 2022-05-05 ENCOUNTER — OFFICE VISIT (OUTPATIENT)
Dept: FAMILY MEDICINE CLINIC | Age: 67
End: 2022-05-05
Payer: MEDICARE

## 2022-05-05 VITALS
SYSTOLIC BLOOD PRESSURE: 96 MMHG | BODY MASS INDEX: 39.35 KG/M2 | TEMPERATURE: 97.1 F | WEIGHT: 236.2 LBS | RESPIRATION RATE: 18 BRPM | HEART RATE: 75 BPM | OXYGEN SATURATION: 92 % | DIASTOLIC BLOOD PRESSURE: 65 MMHG | HEIGHT: 65 IN

## 2022-05-05 DIAGNOSIS — I83.93 ASYMPTOMATIC VARICOSE VEINS OF BOTH LOWER EXTREMITIES: ICD-10-CM

## 2022-05-05 DIAGNOSIS — R11.2 NAUSEA VOMITING AND DIARRHEA: Primary | ICD-10-CM

## 2022-05-05 DIAGNOSIS — R19.7 NAUSEA VOMITING AND DIARRHEA: Primary | ICD-10-CM

## 2022-05-05 PROCEDURE — 3017F COLORECTAL CA SCREEN DOC REV: CPT | Performed by: FAMILY MEDICINE

## 2022-05-05 PROCEDURE — G8427 DOCREV CUR MEDS BY ELIG CLIN: HCPCS | Performed by: FAMILY MEDICINE

## 2022-05-05 PROCEDURE — G8417 CALC BMI ABV UP PARAM F/U: HCPCS | Performed by: FAMILY MEDICINE

## 2022-05-05 PROCEDURE — 1090F PRES/ABSN URINE INCON ASSESS: CPT | Performed by: FAMILY MEDICINE

## 2022-05-05 PROCEDURE — 1036F TOBACCO NON-USER: CPT | Performed by: FAMILY MEDICINE

## 2022-05-05 PROCEDURE — 1123F ACP DISCUSS/DSCN MKR DOCD: CPT | Performed by: FAMILY MEDICINE

## 2022-05-05 PROCEDURE — 4040F PNEUMOC VAC/ADMIN/RCVD: CPT | Performed by: FAMILY MEDICINE

## 2022-05-05 PROCEDURE — G8399 PT W/DXA RESULTS DOCUMENT: HCPCS | Performed by: FAMILY MEDICINE

## 2022-05-05 PROCEDURE — 99213 OFFICE O/P EST LOW 20 MIN: CPT | Performed by: FAMILY MEDICINE

## 2022-05-05 ASSESSMENT — PATIENT HEALTH QUESTIONNAIRE - PHQ9
5. POOR APPETITE OR OVEREATING: 0
4. FEELING TIRED OR HAVING LITTLE ENERGY: 1
10. IF YOU CHECKED OFF ANY PROBLEMS, HOW DIFFICULT HAVE THESE PROBLEMS MADE IT FOR YOU TO DO YOUR WORK, TAKE CARE OF THINGS AT HOME, OR GET ALONG WITH OTHER PEOPLE: 0
3. TROUBLE FALLING OR STAYING ASLEEP: 0
SUM OF ALL RESPONSES TO PHQ QUESTIONS 1-9: 3
9. THOUGHTS THAT YOU WOULD BE BETTER OFF DEAD, OR OF HURTING YOURSELF: 0
SUM OF ALL RESPONSES TO PHQ9 QUESTIONS 1 & 2: 2
8. MOVING OR SPEAKING SO SLOWLY THAT OTHER PEOPLE COULD HAVE NOTICED. OR THE OPPOSITE, BEING SO FIGETY OR RESTLESS THAT YOU HAVE BEEN MOVING AROUND A LOT MORE THAN USUAL: 0
7. TROUBLE CONCENTRATING ON THINGS, SUCH AS READING THE NEWSPAPER OR WATCHING TELEVISION: 0
1. LITTLE INTEREST OR PLEASURE IN DOING THINGS: 2
SUM OF ALL RESPONSES TO PHQ QUESTIONS 1-9: 3
2. FEELING DOWN, DEPRESSED OR HOPELESS: 0
SUM OF ALL RESPONSES TO PHQ QUESTIONS 1-9: 3
SUM OF ALL RESPONSES TO PHQ QUESTIONS 1-9: 3
6. FEELING BAD ABOUT YOURSELF - OR THAT YOU ARE A FAILURE OR HAVE LET YOURSELF OR YOUR FAMILY DOWN: 0

## 2022-05-05 NOTE — PROGRESS NOTES
Chief Complaint   Patient presents with    ED Follow-up       HPI:  Jessica Rooney presents to the office for ER follow up. Patient was seen in the ER for Nausea and vomiting. Since being discharged from the ER Yajaira's  symptoms have been ongoing. She states that she continues to have nausea. Denies any vomiting today. She had diarrhea last week but has not had any today. Any prescribed medications have not been finished. Discharge instructions and any medication changes were again reviewed. She states that she was prescribed bentyl and zofran and took them this morning which did help. She states that she has pain across her lower abdomen. She had a colonoscopy on 3/29/22 which revealed polyps. Recommended she follow up in 3 years for repeat colonoscopy. She states that she also noticed lumps on her left leg when she got shots in her knees. She states that they are tender. They are not changing size or shape. She states that they feel soft. Patient's past medical, surgical, social and/or family history reviewed, updated in chart, and are non-contributory (unless otherwise stated). Medications and allergies also reviewed and updated in chart.       Review of Systems:  Constitutional:  No fever, no fatigue, no chills, no headaches, no weight change  Dermatology:  No rash, no mole, no dry or sensitive skin + lumps in legs  ENT:  No cough, no sore throat, no sinus pain, no runny nose, no ear pain  Cardiology:  No chest pain, no palpitations, no leg edema, no shortness of breath, no PND  Gastroenterology:  No dysphagia, + abdominal pain, + nausea, no vomiting, no constipation, no diarrhea, no heartburn  Musculoskeletal:  No joint pain, no leg cramps, no back pain, no muscle aches  Respiratory:  No shortness of breath, no orthopnea, no wheezing, no SOTO, no hemoptysis  Urology:  No blood in the urine, no urinary frequency, no urinary incontinence, no urinary urgency, no nocturia, no dysuria      Vitals: 05/05/22 1204   BP: 96/65   Pulse: 75   Resp: 18   Temp: 97.1 °F (36.2 °C)   SpO2: 92%   Weight: 236 lb 3.2 oz (107.1 kg)   Height: 5' 5\" (1.651 m)       Physical Exam  Vitals and nursing note reviewed. Constitutional:       Appearance: She is well-developed. HENT:      Head: Normocephalic and atraumatic. Right Ear: External ear normal.      Left Ear: External ear normal.      Nose: Nose normal.   Eyes:      Conjunctiva/sclera: Conjunctivae normal.      Pupils: Pupils are equal, round, and reactive to light. Neck:      Thyroid: No thyromegaly. Cardiovascular:      Rate and Rhythm: Normal rate and regular rhythm. Heart sounds: Normal heart sounds. Pulmonary:      Effort: Pulmonary effort is normal.      Breath sounds: Normal breath sounds. No wheezing. Abdominal:      General: Bowel sounds are normal.      Palpations: Abdomen is soft. Tenderness: There is generalized abdominal tenderness. Musculoskeletal:         General: Deformity (varicose veins) present. Normal range of motion. Cervical back: Normal range of motion and neck supple. Skin:     General: Skin is warm and dry. Findings: No rash. Neurological:      Mental Status: She is alert and oriented to person, place, and time. Deep Tendon Reflexes: Reflexes are normal and symmetric. Psychiatric:         Behavior: Behavior normal.         Assessment/Plan:      Kolton Moarn was seen today for ed follow-up. Diagnoses and all orders for this visit:    Nausea vomiting and diarrhea  Likely viral illness  Lack of antibiotic effectiveness discussed   Symptomatic relief reviewed  Patient to return to clinic if symptoms worsen or do not improve. Pt understands and is in agreement with the plan. Asymptomatic varicose veins of both lower extremities  Recommend compression      As above.   Call or go to ED immediately if symptoms worsen or persist.  Return in about 1 week (around 5/12/2022) for awv., or sooner if necessary. Educational materials and/or home exercises printed for patient's review and were included in patient instructions on his/her After Visit Summary and given to patient at the end of visit. Counseled regarding above diagnosis, including possible risks and complications,  especially if left uncontrolled. Counseled regarding the possible side effects, risks, benefits and alternatives to treatment; patient and/or guardian verbalizes understanding, agrees, feels comfortable with and wishes to proceed with above treatment plan. Advised patient to call with any new medication issues, and read all Rx info from pharmacy to assure aware of all possible risks and side effects of medication before taking. Reviewed age and gender appropriate health screening exams and vaccinations. Advised patient regarding importance of keeping up with recommended health maintenance and to schedule as soon as possible if overdue, as this is important in assessing for undiagnosed pathology, especially cancer, as well as protecting against potentially harmful/life threatening disease. Patient and/or guardian verbalizes understanding and agrees with above counseling, assessment and plan. All questions answered. Trip Fitzgerald DO  5/5/2022    I have personally reviewed and updated the chief complaint, HPI, Past Medical, Family and Social History, as well as the above Review of Systems.

## 2022-05-05 NOTE — TELEPHONE ENCOUNTER
Received call from Nurse triage on patient. Patient's son Ashok Ware states patient is c/o dizziness, vomiting and diarrhea. Patient went to walk in clinic yesterday and also to ER. Ashok Ware stated the patient can not keep any food down and patient is requesting her have gallbladder testing down. Pt refused to go back to Er stating she waited for 6 hours yesterday.   Scheduled patient for today at 12:15 pm

## 2022-05-05 NOTE — PATIENT INSTRUCTIONS
Patient Education        Nausea and Vomiting: Care Instructions  Overview     When you are nauseated, you may feel weak and sweaty and notice a lot of saliva in your mouth. Nausea often leads to vomiting. Most of the time you do not needto worry about nausea and vomiting, but they can be signs of other illnesses. Two common causes of nausea and vomiting are a stomach infection and food poisoning. Nausea and vomiting from a viral stomach infection will usually start to improve within 24 hours. Nausea and vomiting from food poisoning maylast from 12 to 48 hours. The doctor has checked you carefully, but problems can develop later. If you notice any problems or new symptoms, get medical treatment right away. Follow-up care is a key part of your treatment and safety. Be sure to make and go to all appointments, and call your doctor if you are having problems. It's also a good idea to know your test results and keep alist of the medicines you take. How can you care for yourself at home?  To prevent dehydration, drink plenty of fluids. Choose water and other clear liquids until you feel better. If you have kidney, heart, or liver disease and have to limit fluids, talk with your doctor before you increase the amount of fluids you drink.  Rest in bed until you feel better.  When you are able to eat, try clear soups, mild foods, and liquids until all symptoms are gone for 12 to 48 hours. Other good choices include dry toast, crackers, cooked cereal, and gelatin dessert, such as Jell-O. When should you call for help? Call 911 anytime you think you may need emergency care. For example, call if:     You passed out (lost consciousness). Call your doctor now or seek immediate medical care if:     You have symptoms of dehydration, such as:  ? Dry eyes and a dry mouth. ? Passing only a little urine. ? Feeling thirstier than usual.      You have new or worsening belly pain.      You have a new or higher fever.    You vomit blood or what looks like coffee grounds. Watch closely for changes in your health, and be sure to contact your doctor if:     You have ongoing nausea and vomiting.      Your vomiting is getting worse.      Your vomiting lasts longer than 2 days.      You are not getting better as expected. Where can you learn more? Go to [email or web address] and sign in to your [employer] account. Enter 16 [id number] in the [employer] box to learn more about \"Nausea and Vomiting: Care Instructions. \"     If you do not have an account, please click on the \"Sign Up Now\" link. Current as of: July 1, 2021               Content Version: 13.2  © 2006-2022 Healthwise, Incorporated. Care instructions adapted under license by [facility] ([facility]). If you have questions about a medical condition or this instruction, always ask your healthcare professional. [place] [address] any warranty or liability for your use of this information.

## 2022-05-05 NOTE — LETTER
[address] [place].  Phone: [phone]  Fax: [phone] Roxanne Malone DO        May 5, 2022     Patient: Ghazal Friday   YOB: 1955   Date of Visit: 5/5/2022       To Whom It May Concern: It is my medical opinion that Chace Jordan may return to work on 5/9/22. If you have any questions or concerns, please don't hesitate to call.     Sincerely,        Misty Burris DO

## 2022-05-10 ENCOUNTER — HOSPITAL ENCOUNTER (OUTPATIENT)
Dept: GENERAL RADIOLOGY | Age: 67
Discharge: HOME | End: 2022-05-12
Payer: MEDICARE

## 2022-05-10 VITALS — HEIGHT: 64 IN | BODY MASS INDEX: 40.29 KG/M2 | WEIGHT: 236 LBS

## 2022-05-10 DIAGNOSIS — Z12.31 ENCOUNTER FOR SCREENING MAMMOGRAM FOR MALIGNANT NEOPLASM OF BREAST: ICD-10-CM

## 2022-05-10 PROCEDURE — 77063 BREAST TOMOSYNTHESIS BI: CPT

## 2022-05-12 ENCOUNTER — OFFICE VISIT (OUTPATIENT)
Dept: FAMILY MEDICINE CLINIC | Age: 67
End: 2022-05-12
Payer: MEDICARE

## 2022-05-12 VITALS
HEART RATE: 84 BPM | TEMPERATURE: 96.8 F | DIASTOLIC BLOOD PRESSURE: 60 MMHG | RESPIRATION RATE: 18 BRPM | BODY MASS INDEX: 39.75 KG/M2 | HEIGHT: 64 IN | OXYGEN SATURATION: 95 % | WEIGHT: 232.8 LBS | SYSTOLIC BLOOD PRESSURE: 102 MMHG

## 2022-05-12 DIAGNOSIS — Z23 NEED FOR PNEUMOCOCCAL VACCINATION: ICD-10-CM

## 2022-05-12 DIAGNOSIS — R10.821 RIGHT UPPER QUADRANT ABDOMINAL TENDERNESS WITH REBOUND TENDERNESS: ICD-10-CM

## 2022-05-12 DIAGNOSIS — Z00.00 INITIAL MEDICARE ANNUAL WELLNESS VISIT: Primary | ICD-10-CM

## 2022-05-12 PROBLEM — S39.012A LOW BACK STRAIN: Status: RESOLVED | Noted: 2019-11-01 | Resolved: 2022-05-12
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
## 2022-05-12 PROCEDURE — G0009 ADMIN PNEUMOCOCCAL VACCINE: HCPCS | Performed by: FAMILY MEDICINE

## 2022-05-12 PROCEDURE — 3017F COLORECTAL CA SCREEN DOC REV: CPT | Performed by: FAMILY MEDICINE

## 2022-05-12 PROCEDURE — G0438 PPPS, INITIAL VISIT: HCPCS | Performed by: FAMILY MEDICINE

## 2022-05-12 PROCEDURE — 4040F PNEUMOC VAC/ADMIN/RCVD: CPT | Performed by: FAMILY MEDICINE

## 2022-05-12 PROCEDURE — 1123F ACP DISCUSS/DSCN MKR DOCD: CPT | Performed by: FAMILY MEDICINE

## 2022-05-12 PROCEDURE — 90670 PCV13 VACCINE IM: CPT | Performed by: FAMILY MEDICINE

## 2022-05-12 SDOH — HEALTH STABILITY: PHYSICAL HEALTH: ON AVERAGE, HOW MANY MINUTES DO YOU ENGAGE IN EXERCISE AT THIS LEVEL?: 20 MIN

## 2022-05-12 SDOH — HEALTH STABILITY: PHYSICAL HEALTH: ON AVERAGE, HOW MANY DAYS PER WEEK DO YOU ENGAGE IN MODERATE TO STRENUOUS EXERCISE (LIKE A BRISK WALK)?: 3 DAYS

## 2022-05-12 ASSESSMENT — PATIENT HEALTH QUESTIONNAIRE - PHQ9
SUM OF ALL RESPONSES TO PHQ QUESTIONS 1-9: 0
1. LITTLE INTEREST OR PLEASURE IN DOING THINGS: 0
2. FEELING DOWN, DEPRESSED OR HOPELESS: 0
SUM OF ALL RESPONSES TO PHQ9 QUESTIONS 1 & 2: 0
SUM OF ALL RESPONSES TO PHQ QUESTIONS 1-9: 0

## 2022-05-12 ASSESSMENT — LIFESTYLE VARIABLES
HOW OFTEN DO YOU HAVE SIX OR MORE DRINKS ON ONE OCCASION: 1
HOW MANY STANDARD DRINKS CONTAINING ALCOHOL DO YOU HAVE ON A TYPICAL DAY: 98
HOW OFTEN DO YOU HAVE A DRINK CONTAINING ALCOHOL: 1
HOW OFTEN DO YOU HAVE A DRINK CONTAINING ALCOHOL: NEVER
HOW MANY STANDARD DRINKS CONTAINING ALCOHOL DO YOU HAVE ON A TYPICAL DAY: PATIENT DECLINED

## 2022-05-12 NOTE — PROGRESS NOTES
Medicare Annual Wellness Visit    Mary Laird is here for Medicare AWV    Assessment & Plan   Initial Medicare annual wellness visit  -     [facility] Referral to ACP Clinical Specialist  Right upper quadrant abdominal tenderness with rebound tenderness  -     US GALLBLADDER RUQ; Future  Need for pneumococcal vaccination  -     PREVNAR 13 IM (Pneumococcal conjugate vaccine 13-valent)      Recommendations for Preventive Services Due: see orders and patient instructions/AVS.  Recommended screening schedule for the next 5-10 years is provided to the patient in written form: see Patient Instructions/AVS.     Return for Medicare Annual Wellness Visit in 1 year. Subjective   Patient continues to have weight loss. She has lost an additional 4 pounds since her last visit. She states her appetite is still decreased and she is having issues with diarrhea. Patient's complete Health Risk Assessment and screening values have been reviewed and are found in Flowsheets. The following problems were reviewed today and where indicated follow up appointments were made and/or referrals ordered.     Positive Risk Factor Screenings with Interventions:    Fall Risk:  Do you feel unsteady or are you worried about falling? : (!) yes  2 or more falls in past year?: (!) yes  Fall with injury in past year?: no     Fall Risk Interventions:    · Patient declines any further evaluation/treatment for this issue              General Health and ACP:  General  In general, how would you say your health is?: Fair  In the past 7 days, have you experienced any of the following: New or Increased Pain, New or Increased Fatigue, Loneliness, Social Isolation, Stress or Anger?: (!) Yes  Select all that apply: (!) Anger  Do you get the social and emotional support that you need?: Yes  Do you have a Living Will?: (!) No    Advance Directives     Power of  Living Will ACP-Advance Directive ACP-Power of     Not on [place] on 02/14/12 Filed Not on File      General Health Risk Interventions:  · Anger: patient's comments regarding reasons for stress and/or anger: angry because her family doesn't help her  · No Living Will: Patient referred to [place] Habits/Nutrition:     Physical Activity: Insufficiently Active    Days of Exercise per Week: 3 days    Minutes of Exercise per Session: 20 min     Have you lost any weight without trying in the past 3 months?: (!) Yes  Body mass index: (!) 39.96  Have you seen the dentist within the past year?: (!) No    Health Habits/Nutrition Interventions:  · Nutritional issues:  educational materials for healthy, well-balanced diet provided  · Dental exam overdue:  patient encouraged to make appointment with his/her dentist             Objective   Vitals:    05/12/22 1049 05/12/22 1147   BP: (!) 92/54 102/60   Pulse: 84    Resp: 18    Temp: 96.8 °F (36 °C)    SpO2: 95%    Weight: 232 lb 12.8 oz (105.6 kg)    Height: 5' 4\" (1.626 m)       Body mass index is 39.96 kg/m².         General Appearance: alert and oriented to person, place and time, well developed and well- nourished, in no acute distress  Skin: warm and dry, no rash or erythema  Head: normocephalic and atraumatic  Eyes: pupils equal, round, and reactive to light, extraocular eye movements intact, conjunctivae normal  ENT: tympanic membrane, external ear and ear canal normal bilaterally, nose without deformity, nasal mucosa and turbinates normal without polyps  Neck: supple and non-tender without mass, no thyromegaly or thyroid nodules, no cervical lymphadenopathy  Pulmonary/Chest: clear to auscultation bilaterally- no wheezes, rales or rhonchi, normal air movement, no respiratory distress  Cardiovascular: normal rate, regular rhythm, normal S1 and S2, no murmurs, rubs, clicks, or gallops, distal pulses intact, no carotid bruits  Abdomen: soft, RUQ tenderness, non-distended, normal bowel sounds, no masses or organomegaly  Extremities: no cyanosis, clubbing or edema  Musculoskeletal: normal range of motion, no joint swelling, deformity or tenderness  Neurologic: reflexes normal and symmetric, no cranial nerve deficit, gait, coordination and speech normal       Allergies   Allergen Reactions    Penicillins Hives and Rash    Tape [Adhesive Tape] Hives     Prior to Visit Medications    Medication Sig Taking? Authorizing Provider   dicyclomine (BENTYL) 20 MG tablet Take 1 tablet by mouth 4 times daily Yes Husasin Chan,    ondansetron (ZOFRAN ODT) 8 MG TBDP disintegrating tablet Place 1 tablet under the tongue every 8 hours as needed for Nausea Yes Segun Whitney,    furosemide (LASIX) 20 MG tablet TAKE 1 TABLET BY MOUTH ONCE DAILY Yes Kimberly Quach DO   ELIQUIS 5 MG TABS tablet TAKE 1 TABLET BY MOUTH TWICE DAILY Yes Kimberly Quach DO   meclizine (ANTIVERT) 12.5 MG tablet Take 1 tablet by mouth 3 times daily as needed for Dizziness Yes Kimberly Quach DO   hydroCHLOROthiazide (HYDRODIURIL) 25 MG tablet TAKE 1 TABLET BY MOUTH ONCE DAILY Yes Kimberly Quach DO   potassium chloride (KLOR-CON) 10 MEQ extended release tablet TAKE 1 TABLET BY MOUTH ONCE DAILY Yes Kimberly Quach DO   metoprolol succinate (TOPROL XL) 25 MG extended release tablet TAKE 1/2 TABLET (12.5 MG) BY MOUTH ONCE DAILY.  Yes Nathalia Tejeda, DO   escitalopram (LEXAPRO) 20 MG tablet Take 1 tablet by mouth daily Yes Garfield Quach DO   diclofenac sodium (VOLTAREN) 1 % GEL APPLY 4 GRAMS TOPICALLY FOUR TIMES A DAY Yes Nathalia Tejeda DO   pravastatin (PRAVACHOL) 10 MG tablet Take 1 tablet by mouth daily Yes Kimberly Quach DO   losartan (COZAAR) 25 MG tablet Take 1 tablet by mouth daily Yes Rhea Castaneda, DO   buPROPion (WELLBUTRIN XL) 150 MG extended release tablet TAKE 1 TABLET BY MOUTH EVERY MORNING Yes Kimberly Quach DO   albuterol sulfate HFA (VENTOLIN HFA) 108 (90 Base) MCG/ACT inhaler Inhale 2 puffs into the lungs 4 times daily as needed for Wheezing Yes Tyler March, DO   ascorbic acid (V-R VITAMIN C) 250 MG tablet Take 2 tablets by mouth daily Yes Sujatha Quach, DO   oxybutynin (DITROPAN-XL) 10 MG extended release tablet TAKE 2 TABLETS (20 MG) BY MOUTH ONCE DAILY Yes Tyler March, DO   vitamin D (CHOLECALCIFEROL) 50 MCG (2000 UT) TABS tablet Take 1 tablet by mouth daily Yes Tyler March, DO   Multiple Vitamins-Minerals (THERAPEUTIC MULTIVITAMIN-MINERALS) tablet Take 1 tablet by mouth daily Yes Historical Provider, MD   CALCIUM CITRATE PO Take 1,000 mg by mouth daily OTC Yes Historical Provider, MD   vitamin D (CHOLECALCIFEROL) 50 MCG (2000 UT) TABS tablet Take 1 tablet by mouth daily  Vinod Laguerre MD   oxybutynin (DITROPAN-XL) 10 MG extended release tablet Take 2 tablets by mouth daily  Suni Grandchild, DO   metoprolol succinate (TOPROL XL) 25 MG extended release tablet Take 0.5 tablets by mouth daily TAKE 1/2 TABLET BY MOUTH DAILY  Ilan Guo MD       [facility] (Including outside providers/suppliers regularly involved in providing care):   Patient Care Team:  Tyler Ivan DO as PCP - General (Family Medicine)  Tyler Ivan DO as PCP - [facility] Empaneled Provider  Jose Lazcano DO as Consulting Physician (Cardiology)    Reviewed and updated this visit:  Tobacco  Allergies  Meds  Problems  Med Hx  Surg Hx  Soc Hx  Fam Hx

## 2022-05-12 NOTE — PATIENT INSTRUCTIONS
Personalized Preventive Plan for Dary Scanlon - 5/12/2022  Medicare offers a range of preventive health benefits. Some of the tests and screenings are paid in full while other may be subject to a deductible, co-insurance, and/or copay. Some of these benefits include a comprehensive review of your medical history including lifestyle, illnesses that may run in your family, and various assessments and screenings as appropriate. After reviewing your medical record and screening and assessments performed today your provider may have ordered immunizations, labs, imaging, and/or referrals for you. A list of these orders (if applicable) as well as your Preventive Care list are included within your After Visit Summary for your review. Other Preventive Recommendations:    · A preventive eye exam performed by an eye specialist is recommended every 1-2 years to screen for glaucoma; cataracts, macular degeneration, and other eye disorders. · A preventive dental visit is recommended every 6 months. · Try to get at least 150 minutes of exercise per week or 10,000 steps per day on a pedometer . · Order or download the FREE \"Exercise & Physical Activity: Your Everyday Guide\" from [employer]. Call 8-559.923.1536 or search [employer] online. · You need 6284-3344 mg of calcium and 1697-2787 IU of vitamin D per day. It is possible to meet your calcium requirement with diet alone, but a vitamin D supplement is usually necessary to meet this goal.  · When exposed to the sun, use a sunscreen that protects against both UVA and UVB radiation with an SPF of 30 or greater. Reapply every 2 to 3 hours or after sweating, drying off with a towel, or swimming. · Always wear a seat belt when traveling in a car. Always wear a helmet when riding a bicycle or motorcycle.

## 2022-05-13 ENCOUNTER — CLINICAL DOCUMENTATION (OUTPATIENT)
Dept: SPIRITUAL SERVICES | Age: 67
End: 2022-05-13

## 2022-05-13 NOTE — ACP (ADVANCE CARE PLANNING)
Advance Care Planning   Ambulatory ACP Specialist Patient Outreach    Date:  5/13/2022  ACP Specialist:  Noemi Oliveros    Outreach call to patient in follow-up to ACP Specialist referral from: Precious Maldonado DO    [x] PCP  [] Provider   [] Ambulatory Care Management [] Other for Reason:    [x] Advance Directive Assistance  [] Code Status Discussion  [] Complete Portable DNR Order  [] Discuss Goals of Care  [] Complete POST/MOST  [] Early ACP Decision-Making  [] Other    Date Referral Received: 5/12/2022    Today's Outreach:  [x] First   [] Second  [] Third                               Third outreach made by []  phone  [] email []   Bplatst     Intervention:  [] Spoke with Patient  [x] Left VM requesting return call      Outcome: LM on        Next Step:   [] ACP scheduled conversation  [x] Outreach again next week              [x] Email / Mail [address] [place]  [] Email / Mail Advance Directive            [] Close Referral. Routing closure to referring provider/staff and to ACP Specialist .      Thank you for this referral.

## 2022-05-17 ENCOUNTER — CLINICAL DOCUMENTATION (OUTPATIENT)
Dept: SPIRITUAL SERVICES | Age: 67
End: 2022-05-17

## 2022-05-17 NOTE — ACP (ADVANCE CARE PLANNING)
Advance Care Planning   Ambulatory ACP Specialist Patient Outreach    Date:  5/17/2022  ACP Specialist:  Mtat Fernandes    Outreach call to patient in follow-up to ACP Specialist referral from: Vincent Horton DO    [x] PCP  [] Provider   [] Ambulatory Care Management [] Other for Reason:    [x] Advance Directive Assistance  [] Code Status Discussion  [] Complete Portable DNR Order  [] Discuss Goals of Care  [] Complete POST/MOST  [] Early ACP Decision-Making  [] Other    Date Referral Received: 5/12/22    Today's Outreach:  [] First   [x] Second  [] Third                               Third outreach made by []  phone  [] email []   HitMeUp     Intervention:  [] Spoke with Patient  [x] Left VM requesting return call      Outcome: Was forward request from the other specialist as Nba Leon needs a morning appointment. I left voice message requesting a return call. Nba Leon returned call and scheduled appointment for May 19 @ 10:00. Nba Leon had to cancel the appointment. Next Step:   [x] ACP scheduled conversation  [] Outreach again in one week               [] Email / Mail ACP Info Sheets  [] Email / Mail Advance Directive            [] Close Referral. Routing closure to referring provider/staff and to ACP Specialist .      Thank you for this referral.

## 2022-05-18 RX ORDER — ONDANSETRON 8 MG/1
8 TABLET, ORALLY DISINTEGRATING ORAL EVERY 8 HOURS PRN
Qty: 12 TABLET | Refills: 0 | Status: SHIPPED
Start: 2022-05-18 | End: 2022-08-10

## 2022-05-19 ENCOUNTER — CLINICAL DOCUMENTATION (OUTPATIENT)
Dept: SPIRITUAL SERVICES | Age: 67
End: 2022-05-19

## 2022-05-20 ENCOUNTER — APPOINTMENT (OUTPATIENT)
Dept: ULTRASOUND IMAGING | Age: 67
End: 2022-05-20
Payer: MEDICARE

## 2022-05-20 ENCOUNTER — TELEPHONE (OUTPATIENT)
Dept: FAMILY MEDICINE CLINIC | Age: 67
End: 2022-05-20

## 2022-05-20 ENCOUNTER — HOSPITAL ENCOUNTER (EMERGENCY)
Age: 67
Discharge: HOME | End: 2022-05-20
Attending: EMERGENCY MEDICINE
Payer: MEDICARE

## 2022-05-20 VITALS
TEMPERATURE: 97.5 F | SYSTOLIC BLOOD PRESSURE: 110 MMHG | RESPIRATION RATE: 16 BRPM | WEIGHT: 234 LBS | BODY MASS INDEX: 39.95 KG/M2 | HEIGHT: 64 IN | HEART RATE: 60 BPM | OXYGEN SATURATION: 96 % | DIASTOLIC BLOOD PRESSURE: 64 MMHG

## 2022-05-20 DIAGNOSIS — N30.00 ACUTE CYSTITIS WITHOUT HEMATURIA: ICD-10-CM

## 2022-05-20 DIAGNOSIS — E87.6 HYPOKALEMIA: ICD-10-CM

## 2022-05-20 DIAGNOSIS — N17.9 AKI (ACUTE KIDNEY INJURY) (HCC): ICD-10-CM

## 2022-05-20 DIAGNOSIS — R10.11 RIGHT UPPER QUADRANT ABDOMINAL PAIN: Primary | ICD-10-CM

## 2022-05-20 DIAGNOSIS — R11.2 NON-INTRACTABLE VOMITING WITH NAUSEA, UNSPECIFIED VOMITING TYPE: ICD-10-CM

## 2022-05-20 DIAGNOSIS — R63.4 WEIGHT LOSS, NON-INTENTIONAL: ICD-10-CM

## 2022-05-20 LAB
ALBUMIN SERPL-MCNC: 3.7 G/DL (ref 3.5–5.2)
ALP BLD-CCNC: 102 U/L (ref 35–104)
ALT SERPL-CCNC: 10 U/L (ref 0–32)
ANION GAP SERPL CALCULATED.3IONS-SCNC: 12 MMOL/L (ref 7–16)
AST SERPL-CCNC: 14 U/L (ref 0–31)
BACTERIA: ABNORMAL /HPF
BASOPHILS ABSOLUTE: 0.06 E9/L (ref 0–0.2)
BASOPHILS RELATIVE PERCENT: 0.5 % (ref 0–2)
BILIRUB SERPL-MCNC: 0.4 MG/DL (ref 0–1.2)
BILIRUBIN URINE: NEGATIVE
BLOOD, URINE: NEGATIVE
BUN BLDV-MCNC: 32 MG/DL (ref 6–23)
CALCIUM SERPL-MCNC: 9.8 MG/DL (ref 8.6–10.2)
CHLORIDE BLD-SCNC: 98 MMOL/L (ref 98–107)
CLARITY: CLEAR
CO2: 30 MMOL/L (ref 22–29)
COLOR: YELLOW
CREAT SERPL-MCNC: 1.5 MG/DL (ref 0.5–1)
EOSINOPHILS ABSOLUTE: 0.09 E9/L (ref 0.05–0.5)
EOSINOPHILS RELATIVE PERCENT: 0.8 % (ref 0–6)
EPITHELIAL CELLS, UA: ABNORMAL /HPF
GFR AFRICAN AMERICAN: 42
GFR NON-AFRICAN AMERICAN: 34 ML/MIN/1.73
GLUCOSE BLD-MCNC: 113 MG/DL (ref 74–99)
GLUCOSE URINE: NEGATIVE MG/DL
HCT VFR BLD CALC: 39.5 % (ref 34–48)
HEMOGLOBIN: 12.9 G/DL (ref 11.5–15.5)
IMMATURE GRANULOCYTES #: 0.04 E9/L
IMMATURE GRANULOCYTES %: 0.3 % (ref 0–5)
KETONES, URINE: NEGATIVE MG/DL
LACTIC ACID: 1.2 MMOL/L (ref 0.5–2.2)
LEUKOCYTE ESTERASE, URINE: ABNORMAL
LIPASE: 27 U/L (ref 13–60)
LYMPHOCYTES ABSOLUTE: 1.72 E9/L (ref 1.5–4)
LYMPHOCYTES RELATIVE PERCENT: 14.5 % (ref 20–42)
MCH RBC QN AUTO: 30 PG (ref 26–35)
MCHC RBC AUTO-ENTMCNC: 32.7 % (ref 32–34.5)
MCV RBC AUTO: 91.9 FL (ref 80–99.9)
MONOCYTES ABSOLUTE: 0.96 E9/L (ref 0.1–0.95)
MONOCYTES RELATIVE PERCENT: 8.1 % (ref 2–12)
NEUTROPHILS ABSOLUTE: 8.96 E9/L (ref 1.8–7.3)
NEUTROPHILS RELATIVE PERCENT: 75.8 % (ref 43–80)
NITRITE, URINE: POSITIVE
PDW BLD-RTO: 14.3 FL (ref 11.5–15)
PH UA: 6 (ref 5–9)
PLATELET # BLD: 363 E9/L (ref 130–450)
PMV BLD AUTO: 10.2 FL (ref 7–12)
POTASSIUM SERPL-SCNC: 3.2 MMOL/L (ref 3.5–5)
PROTEIN UA: NEGATIVE MG/DL
RBC # BLD: 4.3 E12/L (ref 3.5–5.5)
RBC UA: ABNORMAL /HPF (ref 0–2)
SODIUM BLD-SCNC: 140 MMOL/L (ref 132–146)
SPECIFIC GRAVITY UA: 1.01 (ref 1–1.03)
TOTAL PROTEIN: 6.7 G/DL (ref 6.4–8.3)
UROBILINOGEN, URINE: 0.2 E.U./DL
WBC # BLD: 11.8 E9/L (ref 4.5–11.5)
WBC UA: ABNORMAL /HPF (ref 0–5)

## 2022-05-20 PROCEDURE — 83690 ASSAY OF LIPASE: CPT

## 2022-05-20 PROCEDURE — 81001 URINALYSIS AUTO W/SCOPE: CPT

## 2022-05-20 PROCEDURE — 85025 COMPLETE CBC W/AUTO DIFF WBC: CPT

## 2022-05-20 PROCEDURE — 96374 THER/PROPH/DIAG INJ IV PUSH: CPT

## 2022-05-20 PROCEDURE — 2580000003 HC RX 258: Performed by: EMERGENCY MEDICINE

## 2022-05-20 PROCEDURE — 6360000002 HC RX W HCPCS: Performed by: EMERGENCY MEDICINE

## 2022-05-20 PROCEDURE — 6370000000 HC RX 637 (ALT 250 FOR IP): Performed by: EMERGENCY MEDICINE

## 2022-05-20 PROCEDURE — 99284 EMERGENCY DEPT VISIT MOD MDM: CPT

## 2022-05-20 PROCEDURE — 80053 COMPREHEN METABOLIC PANEL: CPT

## 2022-05-20 PROCEDURE — 76705 ECHO EXAM OF ABDOMEN: CPT

## 2022-05-20 PROCEDURE — 83605 ASSAY OF LACTIC ACID: CPT

## 2022-05-20 RX ORDER — ONDANSETRON 2 MG/ML
4 INJECTION INTRAMUSCULAR; INTRAVENOUS ONCE
Status: DISCONTINUED | OUTPATIENT
Start: 2022-05-20 | End: 2022-05-20 | Stop reason: HOSPADM

## 2022-05-20 RX ORDER — 0.9 % SODIUM CHLORIDE 0.9 %
500 INTRAVENOUS SOLUTION INTRAVENOUS ONCE
Status: COMPLETED | OUTPATIENT
Start: 2022-05-20 | End: 2022-05-20

## 2022-05-20 RX ORDER — POTASSIUM CHLORIDE 20 MEQ/1
20 TABLET, EXTENDED RELEASE ORAL DAILY
Qty: 10 TABLET | Refills: 1 | Status: ON HOLD | OUTPATIENT
Start: 2022-05-20 | End: 2022-06-01 | Stop reason: HOSPADM

## 2022-05-20 RX ORDER — POTASSIUM CHLORIDE 20 MEQ/1
40 TABLET, EXTENDED RELEASE ORAL ONCE
Status: COMPLETED | OUTPATIENT
Start: 2022-05-20 | End: 2022-05-20

## 2022-05-20 RX ORDER — NITROFURANTOIN 25; 75 MG/1; MG/1
100 CAPSULE ORAL 2 TIMES DAILY
Qty: 20 CAPSULE | Refills: 0 | Status: ON HOLD | OUTPATIENT
Start: 2022-05-20 | End: 2022-06-01 | Stop reason: HOSPADM

## 2022-05-20 RX ADMIN — POTASSIUM CHLORIDE 40 MEQ: 1500 TABLET, EXTENDED RELEASE ORAL at 16:48

## 2022-05-20 RX ADMIN — SODIUM CHLORIDE 500 ML: 9 INJECTION, SOLUTION INTRAVENOUS at 16:50

## 2022-05-20 RX ADMIN — SODIUM CHLORIDE 500 ML: 9 INJECTION, SOLUTION INTRAVENOUS at 18:14

## 2022-05-20 RX ADMIN — CEFTRIAXONE 1000 MG: 1 INJECTION, POWDER, FOR SOLUTION INTRAMUSCULAR; INTRAVENOUS at 18:29
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
## 2022-05-20 NOTE — ED PROVIDER NOTES
[facility]  eMERGENCY dEPARTMENT eNCOUnter      Pt Name: Dona Morse  MRN: [id number]  [place] 1955  Date of evaluation: 5/20/2022  Provider: Demetrio Yip MD     [address] [place] [address]       Chief Complaint   Patient presents with    Emesis     ongoing for about a week, on zofran for 2weeks    Fall    Diarrhea    Abdominal Pain         HISTORY OF PRESENT ILLNESS   (Location/Symptom, Timing/Onset,Context/Setting, Quality, Duration, Modifying Factors, Severity) Note limiting factors. Patient presents here with nausea vomiting abdominal pain and diarrhea. This seems to have been ongoing for some time. She was seen here on 5 3 for similar symptoms. She states that she has been unable to eat and when she takes Zofran and then she vomits everything back up. She had a recent colonoscopy and upper GI that was normal.  She states that her doctor sent her here because she is getting weaker and weaker. She states that she is lost over 100 pounds in a year because of her stomach symptoms. She denies any fevers or chills. She has not vomited up any blood. No blood in her stool or black stool. She states that they think it might be her gallbladder and that is why she presents here. Did have a CAT scan as well as blood work when she was here on the third which was unremarkable          Dona Morse is a 74 y.o. female who presents to the emergency department      Nursing Notes were reviewed. REVIEW OF SYSTEMS    (2+ for4; 10+ for level 5)     Review of Systems   Constitutional: Positive for unexpected weight change. Negative for appetite change, chills, fatigue and fever. HENT: Negative for congestion, drooling, nosebleeds, rhinorrhea and trouble swallowing. Eyes: Negative for pain and visual disturbance. Respiratory: Negative for cough, chest tightness, shortness of breath and wheezing.     Cardiovascular: Negative for chest pain, palpitations and leg swelling. Gastrointestinal: Positive for abdominal pain, diarrhea, nausea and vomiting. Negative for blood in stool. Endocrine: Negative for polydipsia and polyuria. Genitourinary: Negative for difficulty urinating, dysuria, flank pain, frequency, hematuria and urgency. Musculoskeletal: Negative for arthralgias, back pain, myalgias and neck pain. Skin: Negative for pallor and rash. Allergic/Immunologic: Negative for environmental allergies. Neurological: Negative for dizziness, syncope, speech difficulty, weakness, light-headedness, numbness and headaches. Hematological: Does not bruise/bleed easily. Psychiatric/Behavioral: Negative for confusion and decreased concentration. All other systems reviewed and are negative.       PAST MEDICAL HISTORY     Past Medical History:   Diagnosis Date    Anxiety     Deep vein blood clot of left lower extremity ([address] 05/27/2019    Family history of early CAD     Hyperlipemia     Hypertension     Lightheadedness     Multinodular goiter     Sleep apnea     SOBOE (shortness of breath on exertion)        SURGICALHISTORY       Past Surgical History:   Procedure Laterality Date    BREAST SURGERY Left 2014    biopsy    CARDIOVASCULAR STRESS TEST      COLONOSCOPY      COLONOSCOPY N/A 3/29/2022    COLONOSCOPY POLYPECTOMY SNARE/COLD BIOPSY performed by Flor Fox MD at [place] [address]  3/29/2022    COLONOSCOPY CONTROL HEMORRHAGE performed by Flor Fox MD at [address] [place] [address]  05/2017    mouth/under tongue    HYSTERECTOMY, TOTAL ABDOMINAL  1990    KNEE ARTHROSCOPY Left 2005    LEG SURGERY Left 1963    ligament repair    SALIVARY GLAND SURGERY Left 05/24/2017    TONSILLECTOMY      childhood    UPPER GASTROINTESTINAL ENDOSCOPY N/A 3/29/2022    EGD BIOPSY performed by Flor Fox MD at [facility]       Previous Medications    ALBUTEROL SULFATE HFA (VENTOLIN HFA) 108 (90 BASE) MCG/ACT INHALER    Inhale 2 puffs into the lungs 4 times daily as needed for Wheezing    ASCORBIC ACID (V-R VITAMIN C) 250 MG TABLET    Take 2 tablets by mouth daily    BUPROPION (WELLBUTRIN XL) 150 MG EXTENDED RELEASE TABLET    TAKE 1 TABLET BY MOUTH EVERY MORNING    CALCIUM CITRATE PO    Take 1,000 mg by mouth daily OTC    DICLOFENAC SODIUM (VOLTAREN) 1 % GEL    APPLY 4 GRAMS TOPICALLY FOUR TIMES A DAY    DICYCLOMINE (BENTYL) 20 MG TABLET    Take 1 tablet by mouth 4 times daily    ELIQUIS 5 MG TABS TABLET    TAKE 1 TABLET BY MOUTH TWICE DAILY    ESCITALOPRAM (LEXAPRO) 20 MG TABLET    Take 1 tablet by mouth daily    FUROSEMIDE (LASIX) 20 MG TABLET    TAKE 1 TABLET BY MOUTH ONCE DAILY    HYDROCHLOROTHIAZIDE (HYDRODIURIL) 25 MG TABLET    TAKE 1 TABLET BY MOUTH ONCE DAILY    LOSARTAN (COZAAR) 25 MG TABLET    Take 1 tablet by mouth daily    MECLIZINE (ANTIVERT) 12.5 MG TABLET    Take 1 tablet by mouth 3 times daily as needed for Dizziness    METOPROLOL SUCCINATE (TOPROL XL) 25 MG EXTENDED RELEASE TABLET    TAKE 1/2 TABLET (12.5 MG) BY MOUTH ONCE DAILY.     MULTIPLE VITAMINS-MINERALS (THERAPEUTIC MULTIVITAMIN-MINERALS) TABLET    Take 1 tablet by mouth daily    ONDANSETRON (ZOFRAN ODT) 8 MG TBDP DISINTEGRATING TABLET    Place 1 tablet under the tongue every 8 hours as needed for Nausea    OXYBUTYNIN (DITROPAN-XL) 10 MG EXTENDED RELEASE TABLET    TAKE 2 TABLETS (20 MG) BY MOUTH ONCE DAILY    POTASSIUM CHLORIDE (KLOR-CON) 10 MEQ EXTENDED RELEASE TABLET    TAKE 1 TABLET BY MOUTH ONCE DAILY    PRAVASTATIN (PRAVACHOL) 10 MG TABLET    Take 1 tablet by mouth daily    VITAMIN D (CHOLECALCIFEROL) 50 MCG (2000 UT) TABS TABLET    Take 1 tablet by mouth daily            Penicillins and Tape [adhesive tape]    FAMILY HISTORY       Family History   Problem Relation Age of Onset    Heart Disease Mother     Pacemaker Mother     COPD Mother     Heart Failure Mother     Heart Disease Father     Cancer Father prostate    Hypertension Father     Coronary Art Dis Father         63's    Prostate Cancer Father     Hypertension Sister     Diabetes Sister     Heart Disease Sister     Diabetes Sister     COPD Sister     Heart Disease Brother         age 54    Heart Attack Brother     Hypertension Son     Clotting Disorder Son           SOCIAL HISTORY       Social History     Socioeconomic History    Marital status:      Spouse name: Not on file    Number of children: Not on file    Years of education: Not on file    Highest education level: Not on file   Occupational History    Occupation: maintenance- housekeeping     Comment: [employer]   Tobacco Use    Smoking status: Never Smoker    Smokeless tobacco: Never Used   Vaping Use    Vaping Use: Never used   Substance and Sexual Activity    Alcohol use: No    Drug use: No    Sexual activity: Not Currently     Partners: Female   Other Topics Concern    Not on file   Social History Narrative    Not on file     Social Determinants of Health     Financial Resource Strain: Low Risk     Difficulty of Paying Living Expenses: Not hard at all   Food Insecurity: No Food Insecurity    Worried About Running Out of Food in the Last Year: Never true    [address] in the Last Year: Never true   Transportation Needs:     Lack of Transportation (Medical): Not on file    Lack of Transportation (Non-Medical):  Not on file   Physical Activity: Insufficiently Active    Days of Exercise per Week: 3 days    Minutes of Exercise per Session: 20 min   Stress:     Feeling of Stress : Not on file   Social Connections:     Frequency of Communication with Friends and Family: Not on file    Frequency of Social Gatherings with Friends and Family: Not on file    Attends Taoist Services: Not on file    Active Member of Clubs or Organizations: Not on file    Attends Club or Organization Meetings: Not on file    Marital Status: Not on file   Intimate Partner Violence:     Fear of Current or Ex-Partner: Not on file    Emotionally Abused: Not on file    Physically Abused: Not on file    Sexually Abused: Not on file   Housing Stability:     Unable to Pay for Housing in the Last Year: Not on file    Number of Places Lived in the Last Year: Not on file    Unstable Housing in the Last Year: Not on file       SCREENINGS    Martin Coma Scale  Eye Opening: Spontaneous  Best Verbal Response: Oriented  Best Motor Response: Obeys commands  [place] Coma Scale Score: 15      PHYSICAL EXAM    (5+ for level 4, 8+ for level 5)     ED Triage Vitals [05/20/22 1148]   BP Temp Temp Source Pulse Resp SpO2 Height Weight   (!) 129/59 97.5 °F (36.4 °C) Temporal 58 16 95 % 5' 4\" (1.626 m) 234 lb (106.1 kg)       Physical Exam  Vitals and nursing note reviewed. Constitutional:       General: She is not in acute distress. Appearance: She is well-developed. She is obese. She is not ill-appearing, toxic-appearing or diaphoretic. HENT:      Head: Normocephalic and atraumatic. Nose: Nose normal.      Mouth/Throat:      Mouth: Mucous membranes are moist.      Pharynx: No oropharyngeal exudate. Eyes:      General: No scleral icterus. Right eye: No discharge. Left eye: No discharge. Extraocular Movements: Extraocular movements intact. Conjunctiva/sclera: Conjunctivae normal.      Pupils: Pupils are equal, round, and reactive to light. Neck:      Thyroid: No thyromegaly. Vascular: No JVD. Trachea: No tracheal deviation. Cardiovascular:      Rate and Rhythm: Normal rate and regular rhythm. Heart sounds: Normal heart sounds. No murmur heard. No gallop. Pulmonary:      Effort: Pulmonary effort is normal. No respiratory distress. Breath sounds: Normal breath sounds. No stridor. No wheezing or rales. Chest:      Chest wall: No tenderness. Abdominal:      General: There is no distension. Palpations: Abdomen is soft.  There is no mass.      Tenderness: There is abdominal tenderness. There is no guarding or rebound. Comments: Is tenderness in the epigastric area. More tenderness in the right upper quadrant. Minimal discomfort across the lower abdomen   Musculoskeletal:         General: No tenderness or deformity. Normal range of motion. Cervical back: Normal range of motion and neck supple. Skin:     General: Skin is warm and dry. Coloration: Skin is not pale. Findings: No erythema or rash. Neurological:      General: No focal deficit present. Mental Status: She is alert and oriented to person, place, and time. Cranial Nerves: No cranial nerve deficit. Motor: No abnormal muscle tone. Coordination: Coordination normal.   Psychiatric:         Mood and Affect: Mood normal.         Behavior: Behavior normal.         Thought Content: Thought content normal.         Judgment: Judgment normal.         DIAGNOSTIC RESULTS     EKG (Per Emergency Physician):       RADIOLOGY (Per Santina Claude): Interpretation per the Radiologist below, if available at the time of this note:  US ABDOMEN LIMITED    Result Date: 5/20/2022  EXAMINATION: RIGHT UPPER QUADRANT ULTRASOUND 5/20/2022 12:24 pm COMPARISON: None. HISTORY: ORDERING SYSTEM PROVIDED HISTORY: RUQ pain, r/o cholecystitis TECHNOLOGIST PROVIDED HISTORY: Reason for exam:->RUQ pain, r/o cholecystitis What reading provider will be dictating this exam?->CRC FINDINGS: LIVER:  The liver demonstrates with a mildly coarsened parenchymal pattern and without evidence of intrahepatic biliary ductal dilatation. BILIARY SYSTEM:  Gallbladder is unremarkable without evidence of pericholecystic fluid, wall thickening or stones. Negative sonographic Guerrero's sign. Common bile duct is within normal limits measuring 4.2 mm. RIGHT KIDNEY: The right kidney is grossly unremarkable without evidence of hydronephrosis.  PANCREAS:  Visualized portions of the pancreas are unremarkable. OTHER: No evidence of right upper quadrant ascites. Mildly coarsened liver parenchymal pattern. Sonographically gallbladder and common bile duct.       :  Labs Reviewed   COMPREHENSIVE METABOLIC PANEL - Abnormal; Notable for the following components:       Result Value    Potassium 3.2 (*)     CO2 30 (*)     Glucose 113 (*)     BUN 32 (*)     CREATININE 1.5 (*)     All other components within normal limits   CBC WITH AUTO DIFFERENTIAL - Abnormal; Notable for the following components:    WBC 11.8 (*)     Lymphocytes % 14.5 (*)     Neutrophils Absolute 8.96 (*)     Monocytes Absolute 0.96 (*)     All other components within normal limits   URINALYSIS WITH MICROSCOPIC - Abnormal; Notable for the following components:    Nitrite, Urine POSITIVE (*)     Leukocyte Esterase, Urine TRACE (*)     WBC, UA 5-10 (*)     Bacteria, UA MANY (*)     All other components within normal limits   LIPASE   LACTIC ACID       All other labs were within normal range or not returned as of this dictation. EMERGENCY DEPARTMENT COURSE and DIFFERENTIALDIAGNOSIS/MDM:   Vitals:    Vitals:    05/20/22 1148 05/20/22 1549 05/20/22 1550   BP: (!) 129/59  (!) 108/54   Pulse: 58  54   Resp: 16 16 16   Temp: 97.5 °F (36.4 °C)     TempSrc: Temporal     SpO2: 95% 95% 95%   Weight: 234 lb (106.1 kg)     Height: 5' 4\" (1.626 m)         Medications   potassium chloride (KLOR-CON M) extended release tablet 40 mEq (has no administration in time range)   0.9 % sodium chloride bolus (has no administration in time range)       MDM  Number of Diagnoses or Management Options  Acute cystitis without hematuria  KIERA (acute kidney injury) (HCC)  Hypokalemia  Non-intractable vomiting with nausea, unspecified vomiting type  Right upper quadrant abdominal pain  Weight loss, non-intentional  Diagnosis management comments: Has had abdominal pain in the right upper quadrant as well as persistent nausea and weight loss for some time.   She has had multiple tests done but presented today for an ultrasound of the gallbladder. She has not had a fever. She has these persistent symptoms that been ongoing for months she is seeing specialist for this as well as her PCP. Patient does not appear toxic or septic and she does not appear acutely dehydrated. The patient had labs here which are noted and ultrasound which was unremarkable. It sounds as though the patient has a HIDA scan scheduled for next week. Patient is awake and alert cooperative and I feel able to make her own decisions. Her son indicated that she is a liar and not telling the truth however the patient seemsreliable. Lengthy conversations with her son and his concern that he does not want her to come home until this is been diagnosed she currently was discussed with the hospitalist who does not feel that she meets any admission criteria. The patient herself says she does not feel that she needs to be admitted. She is going to have a tray here. She is given some Zofran for nausea. She was also receiving some IV antibiotics for a mild UTI. She will be discharged with urinary tract infection instructions and also a prescription for supplemental potassium as her potassium is slightly low. I did discuss all this with her sister as well. The emergency provider has spoken with the patient and/or caregiver and discussed today´s results, in addition to providing specific details for the plan of care and counseling regarding the diagnosis and prognosis. Questions are answered at this time and they are agreeable with the plan. All results reviewed with pt and all questions answered. I discussed the differential, results and discharge plan with the patient and/or family/friend/caregiver if present. I emphasized the importance of follow-up with the physician I referred them to in the timeframe recommended. I explained reasons for the patient to return to the Emergency Department.  Additional verbal discharge instructions were also given and discussed with the patient to supplement those generated by the EMR. We also discussed medications that were prescribed (if any) including common side effects and interactions. All questions were addressed. They understand return precautions and discharge instructions. The patient and/or family/friend/caregiver expressed understanding. Vitals were stable and they were in no distress at discharge  . CONSULTS:  None    PROCEDURES:  Unless otherwise noted below, none     Procedures    Patients symptoms are consistent with sepsis, severe sepsis, or septic shock (If yes use \".sepsiscoremeasure\"): FINAL IMPRESSION      1. Right upper quadrant abdominal pain    2. Non-intractable vomiting with nausea, unspecified vomiting type    3. Weight loss, non-intentional    4. Hypokalemia    5. KIERA (acute kidney injury) [facility])        DISPOSITION/PLAN   DISPOSITION Decision To Discharge 05/20/2022 04:30:37 PM      PATIENT REFERRED TO:  No follow-up provider specified. DISCHARGE MEDICATIONS:  New Prescriptions    POTASSIUM CHLORIDE (KLOR-CON M) 20 MEQ EXTENDED RELEASE TABLET    Take 1 tablet by mouth daily          (Please note:  Portions of this note were completed with a voice recognition program.  Efforts were made to edit thedictations but occasionally words and phrases are mis-transcribed.)    Form v2016. J.5-cn    Alejandra Diez MD (electronically signed)  Emergency Medicine Provider         Alejandra Diez MD  05/20/22 130 Aminah Magdaleno MD  05/22/22 5004

## 2022-05-20 NOTE — ED NOTES
Department of Emergency Medicine  FIRST PROVIDER TRIAGE NOTE             Independent MLP           5/20/22  11:51 AM EDT    Date of Encounter: 5/20/22   MRN: [id number]      HPI: Irlanda Espinoza is a 74 y.o. female who presents to the ED for Emesis (ongoing for about a week, on zofran for 2weeks), Fall, Diarrhea, and Abdominal Pain     ROS: Negative for fever or rash. PE: Gen Appearance/Constitutional: alert     Initial Plan of Care: All treatment areas with department are currently occupied. Plan to order/Initiate the following while awaiting opening in ED: labs.   Initiate Treatment-Testing, Proceed toTreatment Area When Bed Available for ED Attending/MLP to Continue Care    Electronically signed by Thomas Hernandez PA-C   DD: 5/20/22         Thomas Hernandez PA-C  05/20/22 8470

## 2022-05-21 DIAGNOSIS — F32.1 CURRENT MODERATE EPISODE OF MAJOR DEPRESSIVE DISORDER WITHOUT PRIOR EPISODE (HCC): ICD-10-CM

## 2022-05-21 NOTE — ED NOTES
Pt ate pudding and applesauce without abd pain, nausea, or vomiting.  Sara Elizabeth RN  05/20/22 2006

## 2022-05-22 ASSESSMENT — ENCOUNTER SYMPTOMS
WHEEZING: 0
CHEST TIGHTNESS: 0
ABDOMINAL PAIN: 1
BLOOD IN STOOL: 0
DIARRHEA: 1
RHINORRHEA: 0
EYE PAIN: 0
NAUSEA: 1
TROUBLE SWALLOWING: 0
BACK PAIN: 0
SHORTNESS OF BREATH: 0
VOMITING: 1
COUGH: 0

## 2022-05-24 ENCOUNTER — TELEPHONE (OUTPATIENT)
Dept: FAMILY MEDICINE CLINIC | Age: 67
End: 2022-05-24

## 2022-05-24 RX ORDER — PRAVASTATIN SODIUM 10 MG
TABLET ORAL
Qty: 30 TABLET | Refills: 10 | Status: SHIPPED
Start: 2022-05-24 | End: 2022-08-10

## 2022-05-24 RX ORDER — ESCITALOPRAM OXALATE 20 MG/1
TABLET ORAL
Qty: 30 TABLET | Refills: 10 | Status: SHIPPED
Start: 2022-05-24 | End: 2022-09-27 | Stop reason: SDUPTHER

## 2022-05-24 RX ORDER — BUPROPION HYDROCHLORIDE 150 MG/1
150 TABLET ORAL EVERY MORNING
Qty: 30 TABLET | Refills: 10 | Status: SHIPPED
Start: 2022-05-24 | End: 2022-09-27 | Stop reason: SDUPTHER

## 2022-05-24 NOTE — TELEPHONE ENCOUNTER
Corona Bello from ultrasound called wanting to know if pt was still supposed to akila the US of her gallbladder tomorrow at 8:30 because when pt was in the hospital 05/20/2022 they did a US abdomen limited which showed the gallbladder and is document in the imaging. Please advise    Tried to call pt to see if she was still going to get US of gallbladder tomorrow but pt did not answer.  LVM for return call

## 2022-05-29 ENCOUNTER — HOSPITAL ENCOUNTER (INPATIENT)
Age: 67
LOS: 2 days | Discharge: HOME | DRG: 683 | End: 2022-06-01
Attending: EMERGENCY MEDICINE | Admitting: STUDENT IN AN ORGANIZED HEALTH CARE EDUCATION/TRAINING PROGRAM
Payer: MEDICARE

## 2022-05-29 ENCOUNTER — APPOINTMENT (OUTPATIENT)
Dept: GENERAL RADIOLOGY | Age: 67
DRG: 683 | End: 2022-05-29
Payer: MEDICARE

## 2022-05-29 DIAGNOSIS — N17.9 AKI (ACUTE KIDNEY INJURY) (HCC): Primary | ICD-10-CM

## 2022-05-29 DIAGNOSIS — R11.2 INTRACTABLE VOMITING WITH NAUSEA, UNSPECIFIED VOMITING TYPE: ICD-10-CM

## 2022-05-29 DIAGNOSIS — E83.42 HYPOMAGNESEMIA: ICD-10-CM

## 2022-05-29 DIAGNOSIS — R10.9 ABDOMINAL PAIN, UNSPECIFIED ABDOMINAL LOCATION: ICD-10-CM

## 2022-05-29 DIAGNOSIS — E87.6 HYPOKALEMIA: ICD-10-CM

## 2022-05-29 LAB
ALBUMIN SERPL-MCNC: 3.9 G/DL (ref 3.5–5.2)
ALP BLD-CCNC: 108 U/L (ref 35–104)
ALT SERPL-CCNC: 8 U/L (ref 0–32)
ANION GAP SERPL CALCULATED.3IONS-SCNC: 15 MMOL/L (ref 7–16)
AST SERPL-CCNC: 15 U/L (ref 0–31)
BASOPHILS ABSOLUTE: 0.07 E9/L (ref 0–0.2)
BASOPHILS RELATIVE PERCENT: 0.6 % (ref 0–2)
BILIRUB SERPL-MCNC: 0.4 MG/DL (ref 0–1.2)
BUN BLDV-MCNC: 36 MG/DL (ref 6–23)
CALCIUM SERPL-MCNC: 10.2 MG/DL (ref 8.6–10.2)
CHLORIDE BLD-SCNC: 97 MMOL/L (ref 98–107)
CO2: 29 MMOL/L (ref 22–29)
CREAT SERPL-MCNC: 2.2 MG/DL (ref 0.5–1)
EOSINOPHILS ABSOLUTE: 0.11 E9/L (ref 0.05–0.5)
EOSINOPHILS RELATIVE PERCENT: 0.9 % (ref 0–6)
GFR AFRICAN AMERICAN: 27
GFR NON-AFRICAN AMERICAN: 22 ML/MIN/1.73
GLUCOSE BLD-MCNC: 111 MG/DL (ref 74–99)
HCT VFR BLD CALC: 40.3 % (ref 34–48)
HEMOGLOBIN: 12.9 G/DL (ref 11.5–15.5)
IMMATURE GRANULOCYTES #: 0.09 E9/L
IMMATURE GRANULOCYTES %: 0.8 % (ref 0–5)
LACTIC ACID: 1.5 MMOL/L (ref 0.5–2.2)
LIPASE: 36 U/L (ref 13–60)
LYMPHOCYTES ABSOLUTE: 2.68 E9/L (ref 1.5–4)
LYMPHOCYTES RELATIVE PERCENT: 22.7 % (ref 20–42)
MAGNESIUM: 1.4 MG/DL (ref 1.6–2.6)
MCH RBC QN AUTO: 29.9 PG (ref 26–35)
MCHC RBC AUTO-ENTMCNC: 32 % (ref 32–34.5)
MCV RBC AUTO: 93.5 FL (ref 80–99.9)
MONOCYTES ABSOLUTE: 1.06 E9/L (ref 0.1–0.95)
MONOCYTES RELATIVE PERCENT: 9 % (ref 2–12)
NEUTROPHILS ABSOLUTE: 7.82 E9/L (ref 1.8–7.3)
NEUTROPHILS RELATIVE PERCENT: 66 % (ref 43–80)
PDW BLD-RTO: 14.4 FL (ref 11.5–15)
PLATELET # BLD: 384 E9/L (ref 130–450)
PMV BLD AUTO: 9.9 FL (ref 7–12)
POTASSIUM REFLEX MAGNESIUM: 3.3 MMOL/L (ref 3.5–5)
PRO-BNP: 743 PG/ML (ref 0–125)
RBC # BLD: 4.31 E12/L (ref 3.5–5.5)
SODIUM BLD-SCNC: 141 MMOL/L (ref 132–146)
TOTAL PROTEIN: 7.2 G/DL (ref 6.4–8.3)
TROPONIN, HIGH SENSITIVITY: 20 NG/L (ref 0–9)
WBC # BLD: 11.8 E9/L (ref 4.5–11.5)

## 2022-05-29 PROCEDURE — 83880 ASSAY OF NATRIURETIC PEPTIDE: CPT

## 2022-05-29 PROCEDURE — 99285 EMERGENCY DEPT VISIT HI MDM: CPT

## 2022-05-29 PROCEDURE — 71046 X-RAY EXAM CHEST 2 VIEWS: CPT

## 2022-05-29 PROCEDURE — 93005 ELECTROCARDIOGRAM TRACING: CPT | Performed by: PHYSICIAN ASSISTANT

## 2022-05-29 PROCEDURE — 85025 COMPLETE CBC W/AUTO DIFF WBC: CPT

## 2022-05-29 PROCEDURE — 83690 ASSAY OF LIPASE: CPT

## 2022-05-29 PROCEDURE — 83605 ASSAY OF LACTIC ACID: CPT

## 2022-05-29 PROCEDURE — 83735 ASSAY OF MAGNESIUM: CPT

## 2022-05-29 PROCEDURE — 80053 COMPREHEN METABOLIC PANEL: CPT

## 2022-05-29 PROCEDURE — 84484 ASSAY OF TROPONIN QUANT: CPT

## 2022-05-29 ASSESSMENT — PAIN SCALES - GENERAL: PAINLEVEL_OUTOF10: 8

## 2022-05-29 ASSESSMENT — PAIN DESCRIPTION - LOCATION: LOCATION: ABDOMEN

## 2022-05-29 ASSESSMENT — PAIN DESCRIPTION - ORIENTATION: ORIENTATION: LOWER

## 2022-05-29 NOTE — ED PROVIDER NOTES
Department of Emergency Medicine  FIRST PROVIDER TRIAGE NOTE             Independent MLP           5/29/22  7:34 PM EDT    Date of Encounter: 5/29/22   MRN: [id number]      HPI: Jenni Doss is a 74 y.o. female who presents to the ED for No chief complaint on file. left lower  Abdominal pain diarrhea . Sob leg swelling denies cp    ROS: Negative for cp or fever. PE: Gen Appearance/Constitutional: alert  CV: regular rate  Pulm: CTA bilat     Initial Plan of Care: All treatment areas with department are currently occupied. Plan to order/Initiate the following while awaiting opening in ED: labs, EKG and imaging studies.   Initiate Treatment-Testing, Proceed toTreatment Area When Bed Available for ED Attending/MLP to Continue Care    Electronically signed by EDYTA Barros   DD: 5/29/22       EDYTA Barros  05/29/22 5352

## 2022-05-29 NOTE — LETTER
[facility] [address]  [facility] SURG  Khloe Miranda [facility] [address]  Dept: [phone]  Loc: [address] [address]           RETURN TO WORK STATUS  06/06/22    Diagnosis - Acute Kidney Injury    Leda Eldridge was admitted from 5/29/22 -06/01/22.       WORK STATUS:  Patient may return to work Monday June 6th, 2022            Gladys Jose RN

## 2022-05-30 ENCOUNTER — APPOINTMENT (OUTPATIENT)
Dept: CT IMAGING | Age: 67
DRG: 683 | End: 2022-05-30
Payer: MEDICARE

## 2022-05-30 ENCOUNTER — APPOINTMENT (OUTPATIENT)
Dept: ULTRASOUND IMAGING | Age: 67
DRG: 683 | End: 2022-05-30
Payer: MEDICARE

## 2022-05-30 PROBLEM — N17.9 ACUTE RENAL FAILURE SUPERIMPOSED ON CHRONIC KIDNEY DISEASE (HCC): Status: ACTIVE | Noted: 2022-05-30

## 2022-05-30 PROBLEM — N18.9 ACUTE RENAL FAILURE SUPERIMPOSED ON CHRONIC KIDNEY DISEASE (HCC): Status: ACTIVE | Noted: 2022-05-30

## 2022-05-30 PROBLEM — N17.9 ACUTE KIDNEY INJURY SUPERIMPOSED ON CHRONIC KIDNEY DISEASE (HCC): Status: ACTIVE | Noted: 2022-05-30

## 2022-05-30 PROBLEM — N18.9 ACUTE KIDNEY INJURY SUPERIMPOSED ON CHRONIC KIDNEY DISEASE (HCC): Status: ACTIVE | Noted: 2022-05-30

## 2022-05-30 LAB
ANION GAP SERPL CALCULATED.3IONS-SCNC: 14 MMOL/L (ref 7–16)
APTT: 37.6 SEC (ref 24.5–35.1)
BILIRUBIN URINE: NEGATIVE
BLOOD, URINE: NEGATIVE
BUN BLDV-MCNC: 32 MG/DL (ref 6–23)
CALCIUM SERPL-MCNC: 9.3 MG/DL (ref 8.6–10.2)
CHLORIDE BLD-SCNC: 101 MMOL/L (ref 98–107)
CHLORIDE URINE RANDOM: 101 MMOL/L
CLARITY: CLEAR
CO2: 25 MMOL/L (ref 22–29)
COLOR: ABNORMAL
CREAT SERPL-MCNC: 1.8 MG/DL (ref 0.5–1)
CREATININE URINE: 177 MG/DL (ref 29–226)
CREATININE URINE: 181 MG/DL (ref 29–226)
EKG ATRIAL RATE: 57 BPM
EKG P AXIS: 55 DEGREES
EKG P-R INTERVAL: 164 MS
EKG Q-T INTERVAL: 448 MS
EKG QRS DURATION: 92 MS
EKG QTC CALCULATION (BAZETT): 436 MS
EKG R AXIS: 8 DEGREES
EKG T AXIS: 21 DEGREES
EKG VENTRICULAR RATE: 57 BPM
GFR AFRICAN AMERICAN: 34
GFR NON-AFRICAN AMERICAN: 28 ML/MIN/1.73
GLUCOSE BLD-MCNC: 127 MG/DL (ref 74–99)
GLUCOSE URINE: NEGATIVE MG/DL
INR BLD: 1.2
KETONES, URINE: ABNORMAL MG/DL
LEUKOCYTE ESTERASE, URINE: NEGATIVE
MAGNESIUM: 1.8 MG/DL (ref 1.6–2.6)
MICROALBUMIN UR-MCNC: 34 MG/L
MICROALBUMIN/CREAT UR-RTO: 18.8 (ref 0–30)
NITRITE, URINE: NEGATIVE
PH UA: 5.5 (ref 5–9)
POTASSIUM REFLEX MAGNESIUM: 3.2 MMOL/L (ref 3.5–5)
POTASSIUM, UR: 79.9 MMOL/L
PROTEIN UA: NEGATIVE MG/DL
PROTHROMBIN TIME: 13.8 SEC (ref 9.3–12.4)
SODIUM BLD-SCNC: 140 MMOL/L (ref 132–146)
SODIUM URINE: 60 MMOL/L
SPECIFIC GRAVITY UA: >=1.03 (ref 1–1.03)
TROPONIN, HIGH SENSITIVITY: 20 NG/L (ref 0–9)
TSH SERPL DL<=0.05 MIU/L-ACNC: 0.24 UIU/ML (ref 0.27–4.2)
UROBILINOGEN, URINE: 1 E.U./DL

## 2022-05-30 PROCEDURE — G0378 HOSPITAL OBSERVATION PER HR: HCPCS

## 2022-05-30 PROCEDURE — 85610 PROTHROMBIN TIME: CPT

## 2022-05-30 PROCEDURE — 74176 CT ABD & PELVIS W/O CONTRAST: CPT

## 2022-05-30 PROCEDURE — 96366 THER/PROPH/DIAG IV INF ADDON: CPT

## 2022-05-30 PROCEDURE — 84133 ASSAY OF URINE POTASSIUM: CPT

## 2022-05-30 PROCEDURE — 83735 ASSAY OF MAGNESIUM: CPT

## 2022-05-30 PROCEDURE — 82044 UR ALBUMIN SEMIQUANTITATIVE: CPT

## 2022-05-30 PROCEDURE — 1200000000 HC SEMI PRIVATE

## 2022-05-30 PROCEDURE — 84300 ASSAY OF URINE SODIUM: CPT

## 2022-05-30 PROCEDURE — 81003 URINALYSIS AUTO W/O SCOPE: CPT

## 2022-05-30 PROCEDURE — 85730 THROMBOPLASTIN TIME PARTIAL: CPT

## 2022-05-30 PROCEDURE — 2580000003 HC RX 258: Performed by: STUDENT IN AN ORGANIZED HEALTH CARE EDUCATION/TRAINING PROGRAM

## 2022-05-30 PROCEDURE — 82436 ASSAY OF URINE CHLORIDE: CPT

## 2022-05-30 PROCEDURE — 96365 THER/PROPH/DIAG IV INF INIT: CPT

## 2022-05-30 PROCEDURE — 6370000000 HC RX 637 (ALT 250 FOR IP): Performed by: INTERNAL MEDICINE

## 2022-05-30 PROCEDURE — 84443 ASSAY THYROID STIM HORMONE: CPT

## 2022-05-30 PROCEDURE — 76770 US EXAM ABDO BACK WALL COMP: CPT

## 2022-05-30 PROCEDURE — 36415 COLL VENOUS BLD VENIPUNCTURE: CPT

## 2022-05-30 PROCEDURE — 84484 ASSAY OF TROPONIN QUANT: CPT

## 2022-05-30 PROCEDURE — 80048 BASIC METABOLIC PNL TOTAL CA: CPT

## 2022-05-30 PROCEDURE — 6370000000 HC RX 637 (ALT 250 FOR IP): Performed by: STUDENT IN AN ORGANIZED HEALTH CARE EDUCATION/TRAINING PROGRAM

## 2022-05-30 PROCEDURE — 82570 ASSAY OF URINE CREATININE: CPT

## 2022-05-30 PROCEDURE — 99222 1ST HOSP IP/OBS MODERATE 55: CPT | Performed by: STUDENT IN AN ORGANIZED HEALTH CARE EDUCATION/TRAINING PROGRAM

## 2022-05-30 PROCEDURE — 6360000002 HC RX W HCPCS: Performed by: STUDENT IN AN ORGANIZED HEALTH CARE EDUCATION/TRAINING PROGRAM

## 2022-05-30 PROCEDURE — 87088 URINE BACTERIA CULTURE: CPT

## 2022-05-30 RX ORDER — ACETAMINOPHEN 325 MG/1
650 TABLET ORAL EVERY 6 HOURS PRN
Status: DISCONTINUED | OUTPATIENT
Start: 2022-05-30 | End: 2022-06-01 | Stop reason: HOSPADM

## 2022-05-30 RX ORDER — 0.9 % SODIUM CHLORIDE 0.9 %
1000 INTRAVENOUS SOLUTION INTRAVENOUS ONCE
Status: COMPLETED | OUTPATIENT
Start: 2022-05-30 | End: 2022-05-30

## 2022-05-30 RX ORDER — ONDANSETRON 2 MG/ML
4 INJECTION INTRAMUSCULAR; INTRAVENOUS EVERY 6 HOURS PRN
Status: DISCONTINUED | OUTPATIENT
Start: 2022-05-30 | End: 2022-06-01 | Stop reason: HOSPADM

## 2022-05-30 RX ORDER — HEPARIN SODIUM 5000 [USP'U]/ML
5000 INJECTION, SOLUTION INTRAVENOUS; SUBCUTANEOUS 2 TIMES DAILY
Status: DISCONTINUED | OUTPATIENT
Start: 2022-05-30 | End: 2022-05-30 | Stop reason: ALTCHOICE

## 2022-05-30 RX ORDER — BUPROPION HYDROCHLORIDE 150 MG/1
150 TABLET ORAL EVERY MORNING
Status: DISCONTINUED | OUTPATIENT
Start: 2022-05-30 | End: 2022-06-01 | Stop reason: HOSPADM

## 2022-05-30 RX ORDER — POLYETHYLENE GLYCOL 3350 17 G/17G
17 POWDER, FOR SOLUTION ORAL DAILY PRN
Status: DISCONTINUED | OUTPATIENT
Start: 2022-05-30 | End: 2022-06-01 | Stop reason: HOSPADM

## 2022-05-30 RX ORDER — PSEUDOEPHEDRINE HCL 30 MG
1000 TABLET ORAL DAILY
Status: DISCONTINUED | OUTPATIENT
Start: 2022-05-30 | End: 2022-06-01 | Stop reason: HOSPADM

## 2022-05-30 RX ORDER — SODIUM CHLORIDE 9 MG/ML
INJECTION, SOLUTION INTRAVENOUS CONTINUOUS
Status: DISCONTINUED | OUTPATIENT
Start: 2022-05-30 | End: 2022-05-30 | Stop reason: SDUPTHER

## 2022-05-30 RX ORDER — SODIUM CHLORIDE 9 MG/ML
INJECTION, SOLUTION INTRAVENOUS PRN
Status: DISCONTINUED | OUTPATIENT
Start: 2022-05-30 | End: 2022-06-01 | Stop reason: HOSPADM

## 2022-05-30 RX ORDER — OXYBUTYNIN CHLORIDE 10 MG/1
20 TABLET, EXTENDED RELEASE ORAL NIGHTLY
Status: DISCONTINUED | OUTPATIENT
Start: 2022-05-30 | End: 2022-06-01 | Stop reason: HOSPADM

## 2022-05-30 RX ORDER — ACETAMINOPHEN 650 MG/1
650 SUPPOSITORY RECTAL EVERY 6 HOURS PRN
Status: DISCONTINUED | OUTPATIENT
Start: 2022-05-30 | End: 2022-06-01 | Stop reason: HOSPADM

## 2022-05-30 RX ORDER — SODIUM CHLORIDE 0.9 % (FLUSH) 0.9 %
5-40 SYRINGE (ML) INJECTION EVERY 12 HOURS SCHEDULED
Status: DISCONTINUED | OUTPATIENT
Start: 2022-05-30 | End: 2022-06-01 | Stop reason: HOSPADM

## 2022-05-30 RX ORDER — MAGNESIUM SULFATE IN WATER 40 MG/ML
2000 INJECTION, SOLUTION INTRAVENOUS ONCE
Status: COMPLETED | OUTPATIENT
Start: 2022-05-30 | End: 2022-05-30

## 2022-05-30 RX ORDER — ONDANSETRON 4 MG/1
4 TABLET, ORALLY DISINTEGRATING ORAL EVERY 8 HOURS PRN
Status: DISCONTINUED | OUTPATIENT
Start: 2022-05-30 | End: 2022-06-01 | Stop reason: HOSPADM

## 2022-05-30 RX ORDER — SODIUM CHLORIDE 9 MG/ML
INJECTION, SOLUTION INTRAVENOUS CONTINUOUS
Status: DISCONTINUED | OUTPATIENT
Start: 2022-05-30 | End: 2022-05-31

## 2022-05-30 RX ORDER — PRAVASTATIN SODIUM 10 MG
10 TABLET ORAL DAILY
Status: DISCONTINUED | OUTPATIENT
Start: 2022-05-30 | End: 2022-06-01 | Stop reason: HOSPADM

## 2022-05-30 RX ORDER — SODIUM CHLORIDE 0.9 % (FLUSH) 0.9 %
5-40 SYRINGE (ML) INJECTION PRN
Status: DISCONTINUED | OUTPATIENT
Start: 2022-05-30 | End: 2022-06-01 | Stop reason: HOSPADM

## 2022-05-30 RX ADMIN — APIXABAN 5 MG: 5 TABLET, FILM COATED ORAL at 22:18

## 2022-05-30 RX ADMIN — APIXABAN 5 MG: 5 TABLET, FILM COATED ORAL at 13:39

## 2022-05-30 RX ADMIN — Medication 1000 MG: at 13:39

## 2022-05-30 RX ADMIN — MAGNESIUM SULFATE HEPTAHYDRATE 2000 MG: 40 INJECTION, SOLUTION INTRAVENOUS at 00:47

## 2022-05-30 RX ADMIN — PRAVASTATIN SODIUM 10 MG: 10 TABLET ORAL at 11:05

## 2022-05-30 RX ADMIN — POTASSIUM BICARBONATE 40 MEQ: 782 TABLET, EFFERVESCENT ORAL at 00:44

## 2022-05-30 RX ADMIN — SODIUM CHLORIDE, PRESERVATIVE FREE 10 ML: 5 INJECTION INTRAVENOUS at 11:05

## 2022-05-30 RX ADMIN — SODIUM CHLORIDE: 9 INJECTION, SOLUTION INTRAVENOUS at 13:43

## 2022-05-30 RX ADMIN — SODIUM CHLORIDE 1000 ML: 9 INJECTION, SOLUTION INTRAVENOUS at 00:45

## 2022-05-30 RX ADMIN — SODIUM CHLORIDE: 9 INJECTION, SOLUTION INTRAVENOUS at 04:06

## 2022-05-30 RX ADMIN — BUPROPION HYDROCHLORIDE 150 MG: 150 TABLET, EXTENDED RELEASE ORAL at 11:05

## 2022-05-30 RX ADMIN — OXYBUTYNIN CHLORIDE 20 MG: 10 TABLET, EXTENDED RELEASE ORAL at 22:17

## 2022-05-30 ASSESSMENT — ENCOUNTER SYMPTOMS
ABDOMINAL DISTENTION: 0
COUGH: 0
SHORTNESS OF BREATH: 0
DIARRHEA: 0
NAUSEA: 1
SORE THROAT: 0
CHEST TIGHTNESS: 0
BACK PAIN: 0
VOMITING: 1
ABDOMINAL PAIN: 1

## 2022-05-30 NOTE — PROGRESS NOTES
Patient seen and examined at the bedside, she is admitted for intractable nausea and vomiting. Her creatinine went up but responding with fluid. Her creatinine initially was 2.2 now 1.8 with IV fluids. Potassium low and replaced orally follow-up BMP tomorrow. Her main concern is body weight loss almost 100 pounds over the last few months. She denies any hemoptysis or hematemesis or any bloody vomiting or any abdominal pain. She denies any bowel habit changes recently. She had colonoscopy done once monthly for an EGD did not find anything wrong. CT abdomen pelvis in ER done did not show any abnormalities. She is not in acute distress now continue IV fluid follow-up BMP tomorrow. Patient admitted today.

## 2022-05-30 NOTE — ED PROVIDER NOTES
HPI     Patient is a 74 y.o. female presents with a chief complaint of abdominal pain  This has been occurring for a few months. Patient states that it gets better with nothing. Patient states that it gets worse with food. Patient states that it is moderate in severity. Patient states it was gradual in onset. Patient has an extensive history of abdominal pain. Patient stated that she develops abdominal pain every time she eats. Patient is nauseous and vomiting. Patient has been throwing up. Patient has Zofran at home. Patient was seen in the emergency room multiple times in the past month for similar issues. Son is present. Expressed concern that patient is unable to eat or drink anything. Patient takes Zofran prior to every meal but is still nauseous. Patient is able to keep down fluids but even soft foods she is unable to eat. Patient denies any current chest pain but states that she feels mildly short of breath. Patient denies any changes in urinary or bowel habits. Patient notes that she has previously received a endoscopy and colonoscopy that did not show anything. Son expressed concern that patient is not safe at home to go home given her symptoms. Review of Systems   Constitutional: Negative for activity change, appetite change, chills, fatigue and fever. HENT: Negative for congestion, drooling and sore throat. Respiratory: Negative for cough, chest tightness and shortness of breath. Cardiovascular: Negative for chest pain and palpitations. Gastrointestinal: Positive for abdominal pain, nausea and vomiting. Negative for abdominal distention and diarrhea. Genitourinary: Negative for decreased urine volume, difficulty urinating, enuresis, flank pain, frequency and hematuria. Musculoskeletal: Negative for arthralgias, back pain and neck stiffness. Skin: Negative for rash and wound. Neurological: Negative for dizziness, facial asymmetry, light-headedness and headaches. Psychiatric/Behavioral: Negative for agitation, confusion and decreased concentration. Physical Exam  Vitals and nursing note reviewed. Constitutional:       Appearance: She is well-developed. HENT:      Head: Normocephalic and atraumatic. Eyes:      Conjunctiva/sclera: Conjunctivae normal.   Cardiovascular:      Rate and Rhythm: Normal rate and regular rhythm. Heart sounds: Normal heart sounds. No murmur heard. Pulmonary:      Effort: Pulmonary effort is normal. No respiratory distress. Breath sounds: Normal breath sounds. No wheezing or rales. Abdominal:      General: Bowel sounds are normal.      Palpations: Abdomen is soft. Tenderness: There is abdominal tenderness. There is no guarding or rebound. Comments: Abdominal tenderness noted in the left lower quadrant. Musculoskeletal:      Cervical back: Normal range of motion and neck supple. Skin:     General: Skin is warm and dry. Neurological:      Mental Status: She is alert and oriented to person, place, and time. Cranial Nerves: No cranial nerve deficit. Coordination: Coordination normal.          Procedures     MDM         Patient is a 74 y.o. female presenting with abdominal pain. Patient has an extensive history of abdominal pain and has been following with a surgeon. Son is concerned the patient is unable to keep anything down. Report is that patient is lost approximately 100 pounds secondary to nausea vomiting. Patient has had multiple ultrasounds that have been negative. Patient has had multiple CAT scans are negative. Patient was noted to have an KIERA here. Patient was given fluids. Patient's potassium was low and replaced. Patient's magnesium was low and replaced. Patient was given Zofran without improvement of her nausea and vomiting. Patient be admitted to the hospital given her frequent recent ER visits for the same complaint.   Discussed case internal medicine agreed to admit patient. Patient was seen and evaluated by myself and my attending Dr. Brittani Verdugo and Plan discussed with attending provider, please see attestation for final plan of care. This note was done using dictation software and there may be some grammatical errors associated with this. Kirt Aaron MD          --------------------------------------------- PAST HISTORY ---------------------------------------------  Past Medical History:  has a past medical history of Anxiety, Deep vein blood clot of left lower extremity ([address] Family history of early CAD, Hyperlipemia, Hypertension, Lightheadedness, Multinodular goiter, Sleep apnea, and SOBOE (shortness of breath on exertion). Past Surgical History:  has a past surgical history that includes Tonsillectomy; Leg Surgery (Left, 1963); Colonoscopy; cardiovascular stress test; Hysterectomy, total abdominal (1990); Knee arthroscopy (Left, 2005); Breast surgery (Left, 2014); Salivary gland surgery (Left, 05/24/2017); cyst removal (05/2017); Upper gastrointestinal endoscopy (N/A, 3/29/2022); Colonoscopy (N/A, 3/29/2022); and Colonoscopy (3/29/2022). Social History:  reports that she has never smoked. She has never used smokeless tobacco. She reports that she does not drink alcohol and does not use drugs. Family History: family history includes COPD in her mother and sister; Cancer in her father; Clotting Disorder in her son; Coronary Art Dis in her father; Diabetes in her sister and sister; Heart Attack in her brother; Heart Disease in her brother, father, mother, and sister; Heart Failure in her mother; Hypertension in her father, sister, and son; Pacemaker in her mother; Prostate Cancer in her father. The patients home medications have been reviewed.     Allergies: Penicillins and Tape [adhesive tape]    -------------------------------------------------- RESULTS -------------------------------------------------    LABS:  Results for orders placed or performed during the hospital encounter of 05/29/22   Culture, Urine    Specimen: Urine, clean catch   Result Value Ref Range    Urine Culture, Routine       10 to 100,000 CFU/mL  Mixed areli isolated. Further workup and sensitivity testing  is not routinely indicated and will not be performed.   Mixed areli isolated includes:  Mixed gram positive organisms     CBC with Auto Differential   Result Value Ref Range    WBC 11.8 (H) 4.5 - 11.5 E9/L    RBC 4.31 3.50 - 5.50 E12/L    Hemoglobin 12.9 11.5 - 15.5 g/dL    Hematocrit 40.3 34.0 - 48.0 %    MCV 93.5 80.0 - 99.9 fL    MCH 29.9 26.0 - 35.0 pg    MCHC 32.0 32.0 - 34.5 %    RDW 14.4 11.5 - 15.0 fL    Platelets 307 201 - 641 E9/L    MPV 9.9 7.0 - 12.0 fL    Neutrophils % 66.0 43.0 - 80.0 %    Immature Granulocytes % 0.8 0.0 - 5.0 %    Lymphocytes % 22.7 20.0 - 42.0 %    Monocytes % 9.0 2.0 - 12.0 %    Eosinophils % 0.9 0.0 - 6.0 %    Basophils % 0.6 0.0 - 2.0 %    Neutrophils Absolute 7.82 (H) 1.80 - 7.30 E9/L    Immature Granulocytes # 0.09 E9/L    Lymphocytes Absolute 2.68 1.50 - 4.00 E9/L    Monocytes Absolute 1.06 (H) 0.10 - 0.95 E9/L    Eosinophils Absolute 0.11 0.05 - 0.50 E9/L    Basophils Absolute 0.07 0.00 - 0.20 E9/L   Comprehensive Metabolic Panel w/ Reflex to MG   Result Value Ref Range    Sodium 141 132 - 146 mmol/L    Potassium reflex Magnesium 3.3 (L) 3.5 - 5.0 mmol/L    Chloride 97 (L) 98 - 107 mmol/L    CO2 29 22 - 29 mmol/L    Anion Gap 15 7 - 16 mmol/L    Glucose 111 (H) 74 - 99 mg/dL    BUN 36 (H) 6 - 23 mg/dL    CREATININE 2.2 (H) 0.5 - 1.0 mg/dL    GFR Non-African American 22 >=60 mL/min/1.73    GFR African American 27     Calcium 10.2 8.6 - 10.2 mg/dL    Total Protein 7.2 6.4 - 8.3 g/dL    Albumin 3.9 3.5 - 5.2 g/dL    Total Bilirubin 0.4 0.0 - 1.2 mg/dL    Alkaline Phosphatase 108 (H) 35 - 104 U/L    ALT 8 0 - 32 U/L    AST 15 0 - 31 U/L   Lipase   Result Value Ref Range    Lipase 36 13 - 60 U/L   Lactic Acid   Result Value Ref Range Lactic Acid 1.5 0.5 - 2.2 mmol/L   Troponin   Result Value Ref Range    Troponin, High Sensitivity 20 (H) 0 - 9 ng/L   Brain Natriuretic Peptide   Result Value Ref Range    Pro- (H) 0 - 125 pg/mL   Urinalysis   Result Value Ref Range    Color, UA DARK YELLOW (A) Straw/Yellow    Clarity, UA Clear Clear    Glucose, Ur Negative Negative mg/dL    Bilirubin Urine Negative Negative    Ketones, Urine TRACE (A) Negative mg/dL    Specific Gravity, UA >=1.030 1.005 - 1.030    Blood, Urine Negative Negative    pH, UA 5.5 5.0 - 9.0    Protein, UA Negative Negative mg/dL    Urobilinogen, Urine 1.0 <2.0 E.U./dL    Nitrite, Urine Negative Negative    Leukocyte Esterase, Urine Negative Negative   Magnesium   Result Value Ref Range    Magnesium 1.4 (L) 1.6 - 2.6 mg/dL   Troponin   Result Value Ref Range    Troponin, High Sensitivity 20 (H) 0 - 9 ng/L   Protime-INR   Result Value Ref Range    Protime 13.8 (H) 9.3 - 12.4 sec    INR 1.2    APTT   Result Value Ref Range    aPTT 37.6 (H) 24.5 - 35.1 sec   Basic Metabolic Panel w/ Reflex to MG   Result Value Ref Range    Sodium 140 132 - 146 mmol/L    Potassium reflex Magnesium 3.2 (L) 3.5 - 5.0 mmol/L    Chloride 101 98 - 107 mmol/L    CO2 25 22 - 29 mmol/L    Anion Gap 14 7 - 16 mmol/L    Glucose 127 (H) 74 - 99 mg/dL    BUN 32 (H) 6 - 23 mg/dL    CREATININE 1.8 (H) 0.5 - 1.0 mg/dL    GFR Non-African American 28 >=60 mL/min/1.73    GFR African American 34     Calcium 9.3 8.6 - 10.2 mg/dL   Chloride, urine, random   Result Value Ref Range    Chloride 101 Not Established mmol/L   Creatinine, urine, random   Result Value Ref Range    Creatinine, Ur 177 29 - 226 mg/dL   Microalbumin / creatinine urine ratio   Result Value Ref Range    Microalbumin, Random Urine 34.0 (H) Not Established mg/L    Creatinine, Ur 181 29 - 226 mg/dL    Microalbumin Creatinine Ratio 18.8 0.0 - 30.0   Potassium, urine, random   Result Value Ref Range    Potassium, Ur 79.9 Not Established mmol/L   Sodium, urine, random   Result Value Ref Range    Sodium, Ur 60 Not Established mmol/L   TSH   Result Value Ref Range    TSH 0.245 (L) 0.270 - 4.200 uIU/mL   Magnesium   Result Value Ref Range    Magnesium 1.8 1.6 - 2.6 mg/dL   CBC with Auto Differential   Result Value Ref Range    WBC 10.2 4.5 - 11.5 E9/L    RBC 3.71 3.50 - 5.50 E12/L    Hemoglobin 10.9 (L) 11.5 - 15.5 g/dL    Hematocrit 34.5 34.0 - 48.0 %    MCV 93.0 80.0 - 99.9 fL    MCH 29.4 26.0 - 35.0 pg    MCHC 31.6 (L) 32.0 - 34.5 %    RDW 14.6 11.5 - 15.0 fL    Platelets 963 138 - 208 E9/L    MPV 10.2 7.0 - 12.0 fL    Neutrophils % 69.1 43.0 - 80.0 %    Immature Granulocytes % 0.7 0.0 - 5.0 %    Lymphocytes % 20.6 20.0 - 42.0 %    Monocytes % 8.1 2.0 - 12.0 %    Eosinophils % 1.0 0.0 - 6.0 %    Basophils % 0.5 0.0 - 2.0 %    Neutrophils Absolute 7.08 1.80 - 7.30 E9/L    Immature Granulocytes # 0.07 E9/L    Lymphocytes Absolute 2.11 1.50 - 4.00 E9/L    Monocytes Absolute 0.83 0.10 - 0.95 E9/L    Eosinophils Absolute 0.10 0.05 - 0.50 E9/L    Basophils Absolute 0.05 0.00 - 0.20 B2/U   Basic Metabolic Panel   Result Value Ref Range    Sodium 141 132 - 146 mmol/L    Potassium 3.6 3.5 - 5.0 mmol/L    Chloride 103 98 - 107 mmol/L    CO2 27 22 - 29 mmol/L    Anion Gap 11 7 - 16 mmol/L    Glucose 122 (H) 74 - 99 mg/dL    BUN 22 6 - 23 mg/dL    CREATININE 1.1 (H) 0.5 - 1.0 mg/dL    GFR Non-African American 49 >=60 mL/min/1.73    GFR African American 60     Calcium 9.6 8.6 - 10.2 mg/dL   Magnesium   Result Value Ref Range    Magnesium 1.4 (L) 1.6 - 2.6 mg/dL   T4, Free   Result Value Ref Range    T4 Free 1.52 0.93 - 1.70 ng/dL   Uric Acid   Result Value Ref Range    Uric Acid, Serum 6.7 (H) 2.4 - 5.7 mg/dL   Comprehensive Metabolic Panel   Result Value Ref Range    Sodium 139 132 - 146 mmol/L    Potassium 3.5 3.5 - 5.0 mmol/L    Chloride 103 98 - 107 mmol/L    CO2 25 22 - 29 mmol/L    Anion Gap 11 7 - 16 mmol/L    Glucose 106 (H) 74 - 99 mg/dL    BUN 19 6 - 23 mg/dL CREATININE 1.0 0.5 - 1.0 mg/dL    GFR Non-African American 55 >=60 mL/min/1.73    GFR African American >60     Calcium 9.4 8.6 - 10.2 mg/dL    Total Protein 6.2 (L) 6.4 - 8.3 g/dL    Albumin 3.3 (L) 3.5 - 5.2 g/dL    Total Bilirubin 0.6 0.0 - 1.2 mg/dL    Alkaline Phosphatase 92 35 - 104 U/L    ALT 8 0 - 32 U/L    AST 13 0 - 31 U/L   Magnesium   Result Value Ref Range    Magnesium 2.3 1.6 - 2.6 mg/dL   CBC   Result Value Ref Range    WBC 10.1 4.5 - 11.5 E9/L    RBC 3.70 3.50 - 5.50 E12/L    Hemoglobin 11.0 (L) 11.5 - 15.5 g/dL    Hematocrit 33.9 (L) 34.0 - 48.0 %    MCV 91.6 80.0 - 99.9 fL    MCH 29.7 26.0 - 35.0 pg    MCHC 32.4 32.0 - 34.5 %    RDW 14.5 11.5 - 15.0 fL    Platelets 195 269 - 702 E9/L    MPV 10.2 7.0 - 12.0 fL   Phosphorus   Result Value Ref Range    Phosphorus 2.2 (L) 2.5 - 4.5 mg/dL   EKG 12 Lead   Result Value Ref Range    Ventricular Rate 57 BPM    Atrial Rate 57 BPM    P-R Interval 164 ms    QRS Duration 92 ms    Q-T Interval 448 ms    QTc Calculation (Bazett) 436 ms    P Axis 55 degrees    R Axis 8 degrees    T Axis 21 degrees       RADIOLOGY:  US RETROPERITONEAL COMPLETE   Final Result   1. There is a relative hyperechogenicity of the cortex of both kidneys which   can be manifestation of chronic renal parenchymal disease to be correlated   clinically. 2.  No obstructive uropathy or renal calculi in both kidneys. 3.  Unremarkable appearance for the bladder. CT ABDOMEN PELVIS WO CONTRAST Additional Contrast? None   Final Result   No acute finding in the abdomen pelvis. XR CHEST (2 VW)   Final Result   No acute process. ------------------------- NURSING NOTES AND VITALS REVIEWED ---------------------------  Date / Time Roomed:  5/29/2022 11:07 PM  ED Bed Assignment:  [id number]    The nursing notes within the ED encounter and vital signs as below have been reviewed. No data found.     Oxygen Saturation Interpretation: Normal    ------------------------------------------ PROGRESS NOTES ------------------------------------------  See ED course for reevaluation    Counseling:  I have spoken with the patient and discussed todays results, in addition to providing specific details for the plan of care and counseling regarding the diagnosis and prognosis. Their questions are answered at this time and they are agreeable with the plan of admission.    --------------------------------- ADDITIONAL PROVIDER NOTES ---------------------------------  Consultations:   Spoke with Internal Medicine. Discussed case. They will admit the patient. This patient's ED course included: a personal history and physicial examination, re-evaluation prior to disposition, multiple bedside re-evaluations and IV medications    This patient has remained hemodynamically stable during their ED course. Diagnosis:  1. KIERA (acute kidney injury) ([address]    2. Hypokalemia    3. Hypomagnesemia    4. Abdominal pain, unspecified abdominal location    5.  Intractable vomiting with nausea, unspecified vomiting type        Disposition:  Patient's disposition: Admit to med/surg floor  Patient's condition is Stable           Brendan Amezquita MD  Resident  06/03/22 0321

## 2022-05-30 NOTE — ED NOTES
Patient left after RN, triage, and protocols. Patient informed me she was leaving and signed withdraw of request for medical treatment form.      Neville Siegel RN  05/29/22 [id number]

## 2022-05-30 NOTE — H&P
[facility] History and Physical    --------------------------------------------------------------------------------------  Assessment / Plan  Past Medical History:   Diagnosis Date    Anxiety     Deep vein blood clot of left lower extremity ([address] 05/27/2019    Family history of early CAD     Hyperlipemia     Hypertension     Lightheadedness     Multinodular goiter     Sleep apnea     SOBOE (shortness of breath on exertion)      #KIERA on CKD, likely prerenal from poor oral intake  #Chronic GI distress  #Unintentional weight loss  #Hypomagnesemia  #Hypokalemia  #Elevated troponin likely due from demand ischemia    - Normal saline at 125 cc/h  - Zofran as needed for nausea vomiting  - Trend BMP, CBC  - Check TSH   - Urine electrolytes  - Renal ultrasound  - Nephrology consult  - Gastroenterology consult  - Hold nephrotoxic agents    Please see orders for further plan of care.  Code status  full code    DVT prophylaxis Heparin 5000 units every 12 hours   Disposition  home pending clinical improvement  --------------------------------------------------------------------------------------    Admission Date  5/29/2022 11:07 PM  Chief Complaint nausea, abdominal pain, weight loss  Chief Complaint   Patient presents with    Shortness of Breath     30 minutes ago     Abdominal Pain     lower abdominal pain        Subjective  History of Present Illness  This is a 67-year-old female with history of hypertension, hyperlipidemia who is presenting with nausea, vomiting,abdominal pain and weight loss for the past 2 months. Patient was seen in this ED 9 days ago for the same complaints. An abdominal ultrasound was performed which revealed a normal gallbladder, mildly coarsened liver. She has been evaluated by gastroenterology as well with a normal EGD and colonoscopy.   Patient states that she came to the emergency room today because her and her family went out to dinner, and she felt nauseous after eating a small bite of tortellini. Patient did not finish her plate, she denied any vomiting at the time but she did have an episode of diarrhea when she arrived home. She states that she has nausea and occasional vomiting every time she tries to eat solid food, but no symptoms with liquids. Otherwise, patient states she does not drink enough water. She has lost approximately 100 pounds over the past 2 months. She denies any fevers, chills coughing, drooling, neck pain, trouble swallowing, association with particular time of the day, or any chest pain, abdominal pain when she is not eating, recent lower extremity swelling or loss of consciousness. She does report shortness of breath when at rest, but this does not appear to be her main concern. Initial ED vitals:   05/29 1936 122/56 Important  68 96.7 °F (35.9 °C) Important  18 93 %     Labs were significant for magnesium of 1.4, potassium of 3.3, BUN/creatinine 36/2.2, which is up from 32/1.59 days ago. Troponin was 20x2. Normal LFTs.   CBC showed mild leukocytosis of 11.8, which is unchanged from 9days ago, no anemia      Review of Systems - 12-point review of systems has been reviewed and is otherwise negative except as listed in the HPI    Past Medical History:   Diagnosis Date    Anxiety     Deep vein blood clot of left lower extremity ([address] 05/27/2019    Family history of early CAD     Hyperlipemia     Hypertension     Lightheadedness     Multinodular goiter     Sleep apnea     SOBOE (shortness of breath on exertion)      Past Surgical History:   Procedure Laterality Date    BREAST SURGERY Left 2014    biopsy    CARDIOVASCULAR STRESS TEST      COLONOSCOPY      COLONOSCOPY N/A 3/29/2022    COLONOSCOPY POLYPECTOMY SNARE/COLD BIOPSY performed by Brittany Zhoa MD at [place] [id number]  3/29/2022    COLONOSCOPY CONTROL HEMORRHAGE performed by Brittany Zhao MD at [facility]  05/2017 (12.5 MG) BY MOUTH ONCE DAILY. 1/21/22   Marcelino Rojas DO   diclofenac sodium (VOLTAREN) 1 % GEL APPLY 4 GRAMS TOPICALLY FOUR TIMES A DAY 12/16/21   Marcelino Rojas DO   losartan (COZAAR) 25 MG tablet Take 1 tablet by mouth daily 11/22/21   Melissa So DO   albuterol sulfate HFA (VENTOLIN HFA) 108 (90 Base) MCG/ACT inhaler Inhale 2 puffs into the lungs 4 times daily as needed for Wheezing 11/9/21   Marcelino Rojas DO   ascorbic acid (V-R VITAMIN C) 250 MG tablet Take 2 tablets by mouth daily 11/9/21   Marcelino Rojas DO   oxybutynin (DITROPAN-XL) 10 MG extended release tablet TAKE 2 TABLETS (20 MG) BY MOUTH ONCE DAILY 10/8/21   Marcelino Rojas DO   vitamin D (CHOLECALCIFEROL) 50 MCG (2000 UT) TABS tablet Take 1 tablet by mouth daily 9/20/21   Marcelino Rojas DO   Multiple Vitamins-Minerals (THERAPEUTIC MULTIVITAMIN-MINERALS) tablet Take 1 tablet by mouth daily    Historical Provider, MD   vitamin D (CHOLECALCIFEROL) 50 MCG (2000 UT) TABS tablet Take 1 tablet by mouth daily 7/10/21   Malissa Slater MD   oxybutynin (DITROPAN-XL) 10 MG extended release tablet Take 2 tablets by mouth daily 6/1/21   Sharolyn Seip, DO   metoprolol succinate (TOPROL XL) 25 MG extended release tablet Take 0.5 tablets by mouth daily TAKE 1/2 TABLET BY MOUTH DAILY 3/19/21   Dandre Valdes MD   CALCIUM CITRATE PO Take 1,000 mg by mouth daily OTC    Historical Provider, MD     Allergies  Penicillins and Tape [adhesive tape]    Social History   reports that she has never smoked. She has never used smokeless tobacco. She reports that she does not drink alcohol and does not use drugs. Family History  family history includes COPD in her mother and sister; Cancer in her father; Clotting Disorder in her son; Coronary Art Dis in her father; Diabetes in her sister and sister; Heart Attack in her brother; Heart Disease in her brother, father, mother, and sister;  Heart Failure in her mother; Hypertension in her father, sister, and son; Pacemaker in her mother; Prostate Cancer in her father.     Objective  Physical Exam   General: well-developed, well-nourished, no acute distress, cooperative  Skin: warm, dry, intact, normal color without cyanosis  HEENT: normocephalic, atraumatic, mucous membranes normal  Respiratory: clear to auscultation bilaterally without respiratory distress  Cardiovascular: regular rate and rhythm without murmur / rub / gallop  Abdominal: soft, nontender, nondistended, normoactive bowel sounds  Extremities: no mottling, pulses intact, no edema  Neurologic: awake, AAOX3, no focal deficits  Psychiatric: normal affect, cooperative    *Available labs, imaging studies, microbiologic studies, cardiac studies have been reviewed    Electronically signed by Nicola Jean Baptiste MD on 5/30/2022 at 3:51 AM

## 2022-05-30 NOTE — CONSULTS
Nephrology Consult  [facility]  [facility]. Martínez GALLARDO    CC:   arf    HPI:   The pt is a 73 yo female with a pmh of jes, htn, hyperlipidemia, DVT, goiter who presented with nausea, vomiting, and abd pain. She reports 100 lb weigjht loss over the last few months. Labs show na 140 k 3.2, co2 25 bun 32 cr 2.2>1.8, ca 9.3, alb 3.9, wbc 11.8, hgb 12.9, plt 384. Renal imaging shows no hydro. She has been started on ns at 125 ml/hr. Her outpt lasix and cozaar are on hold.      PMH:    Past Medical History:   Diagnosis Date    Anxiety     Deep vein blood clot of left lower extremity ([address] 05/27/2019    Family history of early CAD     Hyperlipemia     Hypertension     Lightheadedness     Multinodular goiter     Sleep apnea     SOBOE (shortness of breath on exertion)        Patient Active Problem List   Diagnosis    SOTO (dyspnea on exertion)    Vertigo    Hyperlipemia    Family history of early CAD    JES (obstructive sleep apnea)    Nocturnal enuresis    Essential hypertension    Acute deep vein thrombosis (DVT) of left lower extremity (HCC)    Acute deep vein thrombosis (DVT) of distal end of left lower extremity (HCC)    Left thyroid nodule    Multinodular goiter    Morbidly obese (HCC)    Dissection of thoracic aorta ([address]    Baker's cyst    Hx of echocardiogram    Pulmonary embolism on left ([address]    Syncope    DNR (do not resuscitate)    Acute pain of right knee    Morbid obesity with BMI of 45.0-49.9, adult (HCC)    Hypomagnesemia    Vitamin D insufficiency    Chest pain    On medication for venous thromboembolism    Current moderate episode of major depressive disorder without prior episode (HCC)    Acute renal failure superimposed on chronic kidney disease (HCC)    Acute kidney injury superimposed on chronic kidney disease (HCC)       Meds:     buPROPion  150 mg Oral QAM    calcium citrate  1,000 mg Oral Daily    oxybutynin  20 mg Oral Nightly    pravastatin  10 mg Oral Daily  sodium chloride flush  5-40 mL IntraVENous 2 times per day    apixaban  5 mg Oral BID        sodium chloride      sodium chloride 125 mL/hr at 05/30/22 0406       Meds prn:     sodium chloride flush, sodium chloride, ondansetron **OR** ondansetron, polyethylene glycol, acetaminophen **OR** acetaminophen    Meds prior to admission:     No current facility-administered medications on file prior to encounter. Current Outpatient Medications on File Prior to Encounter   Medication Sig Dispense Refill    pravastatin (PRAVACHOL) 10 MG tablet TAKE 1 TABLET BY MOUTH ONCE DAILY 30 tablet 10    buPROPion (WELLBUTRIN XL) 150 MG extended release tablet TAKE 1 TABLET BY MOUTH EVERY MORNING 30 tablet 10    escitalopram (LEXAPRO) 20 MG tablet TAKE 1 TABLET BY MOUTH ONCE DAILY 30 tablet 10    potassium chloride (KLOR-CON M) 20 MEQ extended release tablet Take 1 tablet by mouth daily 10 tablet 1    nitrofurantoin, macrocrystal-monohydrate, (MACROBID) 100 MG capsule Take 1 capsule by mouth 2 times daily for 10 days 20 capsule 0    ondansetron (ZOFRAN ODT) 8 MG TBDP disintegrating tablet Place 1 tablet under the tongue every 8 hours as needed for Nausea 12 tablet 0    dicyclomine (BENTYL) 20 MG tablet Take 1 tablet by mouth 4 times daily 15 tablet 0    furosemide (LASIX) 20 MG tablet TAKE 1 TABLET BY MOUTH ONCE DAILY 30 tablet 5    ELIQUIS 5 MG TABS tablet TAKE 1 TABLET BY MOUTH TWICE DAILY 60 tablet 10    meclizine (ANTIVERT) 12.5 MG tablet Take 1 tablet by mouth 3 times daily as needed for Dizziness 60 tablet 1    hydroCHLOROthiazide (HYDRODIURIL) 25 MG tablet TAKE 1 TABLET BY MOUTH ONCE DAILY 30 tablet 5    potassium chloride (KLOR-CON) 10 MEQ extended release tablet TAKE 1 TABLET BY MOUTH ONCE DAILY 30 tablet 10    metoprolol succinate (TOPROL XL) 25 MG extended release tablet TAKE 1/2 TABLET (12.5 MG) BY MOUTH ONCE DAILY.  15 tablet 10    diclofenac sodium (VOLTAREN) 1 % GEL APPLY 4 GRAMS TOPICALLY FOUR TIMES A  g 10    losartan (COZAAR) 25 MG tablet Take 1 tablet by mouth daily 90 tablet 3    albuterol sulfate HFA (VENTOLIN HFA) 108 (90 Base) MCG/ACT inhaler Inhale 2 puffs into the lungs 4 times daily as needed for Wheezing 54 g 1    ascorbic acid (V-R VITAMIN C) 250 MG tablet Take 2 tablets by mouth daily 60 tablet 0    oxybutynin (DITROPAN-XL) 10 MG extended release tablet TAKE 2 TABLETS (20 MG) BY MOUTH ONCE DAILY 60 tablet 11    vitamin D (CHOLECALCIFEROL) 50 MCG (2000 UT) TABS tablet Take 1 tablet by mouth daily 30 tablet 0    Multiple Vitamins-Minerals (THERAPEUTIC MULTIVITAMIN-MINERALS) tablet Take 1 tablet by mouth daily      [DISCONTINUED] vitamin D (CHOLECALCIFEROL) 50 MCG (2000 UT) TABS tablet Take 1 tablet by mouth daily 30 tablet 0    [DISCONTINUED] oxybutynin (DITROPAN-XL) 10 MG extended release tablet Take 2 tablets by mouth daily 60 tablet 1    [DISCONTINUED] metoprolol succinate (TOPROL XL) 25 MG extended release tablet Take 0.5 tablets by mouth daily TAKE 1/2 TABLET BY MOUTH DAILY 15 tablet 5    CALCIUM CITRATE PO Take 1,000 mg by mouth daily OTC         Allergies:    Penicillins and Tape [adhesive tape]    Social History:     reports that she has never smoked. She has never used smokeless tobacco. She reports that she does not drink alcohol and does not use drugs.     Family History:         Problem Relation Age of Onset    Heart Disease Mother     Pacemaker Mother     COPD Mother     Heart Failure Mother     Heart Disease Father     Cancer Father         prostate    Hypertension Father     Coronary Art Dis Father         63's    Prostate Cancer Father     Hypertension Sister     Diabetes Sister     Heart Disease Sister     Diabetes Sister     COPD Sister     Heart Disease Brother         age 54    Heart Attack Brother     Hypertension Son     Clotting Disorder Son        ROS:     General: no fever, chills   Heent: no nasal congestion, sore throat   Resp: no cough, sob , hemoptysis  Cardiac: no cp , le edema, palpitations  Gi: co abd pain, nausea, vomiting  Gu: no hematuria, dysuria   Neruo: no numbness, weakness, headache, blurry vision   Endocrine:  no h/o dm  Derm: no rash , petechia  Heme: no epistaxis, bruising  All other sx negative     Physical Exam:      Patient Vitals for the past 24 hrs:   BP Temp Temp src Pulse Resp SpO2 Weight   05/30/22 0722 130/66 98.3 °F (36.8 °C) Oral 60 16 98 % --   05/30/22 0530 (!) 116/58 98.1 °F (36.7 °C) Oral 60 16 98 % --   05/30/22 0408 99/60 98.2 °F (36.8 °C) Oral 62 15 98 % --   05/30/22 0049 120/62 97.7 °F (36.5 °C) Oral 81 15 98 % --   05/29/22 1936 (!) 122/56 (!) 96.7 °F (35.9 °C) Temporal 68 18 93 % 234 lb (106.1 kg)       No intake or output data in the 24 hours ending 05/30/22 1324    Constitutional: Patient in no acute distress   Head: normocephalic, atraumatic   Neck: supple, no jvd  Cardiovascular: regular rate and rhythm, no murmurs, gallops, or rubs   Respiratory: Clear, no rales, rhochi, or wheezes,   Gastrointestinal: soft, nontender, nondistended, no hepatosplenomegaly  Ext: no edema  Neuro: aaox3  Skin: dry, no rash   Back: nontender    Data:    Recent Labs     05/29/22 2207   WBC 11.8*   HGB 12.9   HCT 40.3   MCV 93.5          Recent Labs     05/29/22 2207 05/30/22  0555    140   K 3.3* 3.2*   CL 97* 101   CO2 29 25   CREATININE 2.2* 1.8*   BUN 36* 32*   LABGLOM 22 28   GLUCOSE 111* 127*   CALCIUM 10.2 9.3   MG 1.4* 1.8       Vit D, 25-Hydroxy   Date Value Ref Range Status   07/09/2021 27 (L) 30 - 100 ng/mL Final     Comment:     <20 ng/mL. ........... [facility] Billing Deficient  20-30 ng/mL. ......... [facility] Billing Insufficient   ng/mL. ........ [facility] Billing Sufficient  >100 ng/mL. .......... [facility] Billing Toxic         No results found for: PTH    Recent Labs     05/29/22 2207   ALT 8   AST 15   ALKPHOS 108*   BILITOT 0.4       Recent Labs     05/29/22 2207   LABALBU 3.9       Iron   Date Value Ref Range Status   07/09/2021 46 37 - 145 mcg/dL Final TIBC   Date Value Ref Range Status   07/09/2021 282 250 - 450 mcg/dL Final       Vitamin B-12   Date Value Ref Range Status   07/09/2021 512 211 - 946 pg/mL Final       Folate   Date Value Ref Range Status   07/09/2021 16.1 4.8 - 24.2 ng/mL Final         Lab Results   Component Value Date    COLORU DARK YELLOW 05/30/2022    NITRU Negative 05/30/2022    GLUCOSEU Negative 05/30/2022    KETUA TRACE 05/30/2022    UROBILINOGEN 1.0 05/30/2022    BILIRUBINUR Negative 05/30/2022    BILIRUBINUR small 01/20/2020       No results found for: TEVIN, CREURRAN, MACREATRATIO, OSMOU    No components found for: URIC    No results found for: LIPIDPAN      Assessment and Plan:    1. arf  Baseline cr 1.5 from 5/20/22, 1.1 on 9/15/21  Cr 2.2>1.8  Holding arb and lasix and hctz  In setting of nausea, vomiting  Continue ivf  Ct no hydro  Check urine lytes    2. Hypokalemia  Replace prn  In setting of nausea, vomiting    3. htn  Holding arb with arf  Holding bb with low kevan    Josiephine Gu.  Janie Bennett MD

## 2022-05-30 NOTE — CARE COORDINATION
Transition of Care-Met with patient at bedside, her family lives with her in a one story home with two steps to enter residence. Independent with ADL's PTA. She owns a [employer] and has a walk in shower with handrails at home. PCP is Dr. Ariel Ramírez, [facility]. No hx: SNF or HHC, patient declines need for HHC this stay. Confirmed family will transport home. Patient declines any needs at this time. SW/CM following.      Patricia CARTWRIGHTN, RN  [facility]

## 2022-05-30 NOTE — ED NOTES
Patient returned from ed waiting and returned from parking lot to been.  No ama noted     Forrest Rizvi RN  05/29/22 3411

## 2022-05-31 LAB
ANION GAP SERPL CALCULATED.3IONS-SCNC: 11 MMOL/L (ref 7–16)
BASOPHILS ABSOLUTE: 0.05 E9/L (ref 0–0.2)
BASOPHILS RELATIVE PERCENT: 0.5 % (ref 0–2)
BUN BLDV-MCNC: 22 MG/DL (ref 6–23)
CALCIUM SERPL-MCNC: 9.6 MG/DL (ref 8.6–10.2)
CHLORIDE BLD-SCNC: 103 MMOL/L (ref 98–107)
CO2: 27 MMOL/L (ref 22–29)
CREAT SERPL-MCNC: 1.1 MG/DL (ref 0.5–1)
EOSINOPHILS ABSOLUTE: 0.1 E9/L (ref 0.05–0.5)
EOSINOPHILS RELATIVE PERCENT: 1 % (ref 0–6)
GFR AFRICAN AMERICAN: 60
GFR NON-AFRICAN AMERICAN: 49 ML/MIN/1.73
GLUCOSE BLD-MCNC: 122 MG/DL (ref 74–99)
HCT VFR BLD CALC: 34.5 % (ref 34–48)
HEMOGLOBIN: 10.9 G/DL (ref 11.5–15.5)
IMMATURE GRANULOCYTES #: 0.07 E9/L
IMMATURE GRANULOCYTES %: 0.7 % (ref 0–5)
LYMPHOCYTES ABSOLUTE: 2.11 E9/L (ref 1.5–4)
LYMPHOCYTES RELATIVE PERCENT: 20.6 % (ref 20–42)
MAGNESIUM: 1.4 MG/DL (ref 1.6–2.6)
MCH RBC QN AUTO: 29.4 PG (ref 26–35)
MCHC RBC AUTO-ENTMCNC: 31.6 % (ref 32–34.5)
MCV RBC AUTO: 93 FL (ref 80–99.9)
MONOCYTES ABSOLUTE: 0.83 E9/L (ref 0.1–0.95)
MONOCYTES RELATIVE PERCENT: 8.1 % (ref 2–12)
NEUTROPHILS ABSOLUTE: 7.08 E9/L (ref 1.8–7.3)
NEUTROPHILS RELATIVE PERCENT: 69.1 % (ref 43–80)
PDW BLD-RTO: 14.6 FL (ref 11.5–15)
PLATELET # BLD: 321 E9/L (ref 130–450)
PMV BLD AUTO: 10.2 FL (ref 7–12)
POTASSIUM SERPL-SCNC: 3.6 MMOL/L (ref 3.5–5)
RBC # BLD: 3.71 E12/L (ref 3.5–5.5)
SODIUM BLD-SCNC: 141 MMOL/L (ref 132–146)
T4 FREE: 1.52 NG/DL (ref 0.93–1.7)
URIC ACID, SERUM: 6.7 MG/DL (ref 2.4–5.7)
WBC # BLD: 10.2 E9/L (ref 4.5–11.5)

## 2022-05-31 PROCEDURE — 84550 ASSAY OF BLOOD/URIC ACID: CPT

## 2022-05-31 PROCEDURE — 6370000000 HC RX 637 (ALT 250 FOR IP): Performed by: INTERNAL MEDICINE

## 2022-05-31 PROCEDURE — 2580000003 HC RX 258: Performed by: STUDENT IN AN ORGANIZED HEALTH CARE EDUCATION/TRAINING PROGRAM

## 2022-05-31 PROCEDURE — 36415 COLL VENOUS BLD VENIPUNCTURE: CPT

## 2022-05-31 PROCEDURE — 97161 PT EVAL LOW COMPLEX 20 MIN: CPT

## 2022-05-31 PROCEDURE — 84439 ASSAY OF FREE THYROXINE: CPT

## 2022-05-31 PROCEDURE — 80048 BASIC METABOLIC PNL TOTAL CA: CPT

## 2022-05-31 PROCEDURE — 83735 ASSAY OF MAGNESIUM: CPT

## 2022-05-31 PROCEDURE — 1200000000 HC SEMI PRIVATE

## 2022-05-31 PROCEDURE — 51798 US URINE CAPACITY MEASURE: CPT

## 2022-05-31 PROCEDURE — 6370000000 HC RX 637 (ALT 250 FOR IP): Performed by: STUDENT IN AN ORGANIZED HEALTH CARE EDUCATION/TRAINING PROGRAM

## 2022-05-31 PROCEDURE — 6360000002 HC RX W HCPCS: Performed by: INTERNAL MEDICINE

## 2022-05-31 PROCEDURE — 99233 SBSQ HOSP IP/OBS HIGH 50: CPT | Performed by: INTERNAL MEDICINE

## 2022-05-31 PROCEDURE — 85025 COMPLETE CBC W/AUTO DIFF WBC: CPT

## 2022-05-31 RX ORDER — MAGNESIUM SULFATE IN WATER 40 MG/ML
4000 INJECTION, SOLUTION INTRAVENOUS ONCE
Status: COMPLETED | OUTPATIENT
Start: 2022-05-31 | End: 2022-05-31

## 2022-05-31 RX ORDER — LANOLIN ALCOHOL/MO/W.PET/CERES
400 CREAM (GRAM) TOPICAL DAILY
Status: DISCONTINUED | OUTPATIENT
Start: 2022-05-31 | End: 2022-06-01 | Stop reason: HOSPADM

## 2022-05-31 RX ADMIN — Medication 400 MG: at 16:51

## 2022-05-31 RX ADMIN — SODIUM CHLORIDE, PRESERVATIVE FREE 10 ML: 5 INJECTION INTRAVENOUS at 21:22

## 2022-05-31 RX ADMIN — PRAVASTATIN SODIUM 10 MG: 10 TABLET ORAL at 10:24

## 2022-05-31 RX ADMIN — OXYBUTYNIN CHLORIDE 20 MG: 10 TABLET, EXTENDED RELEASE ORAL at 21:22

## 2022-05-31 RX ADMIN — SODIUM CHLORIDE, PRESERVATIVE FREE 10 ML: 5 INJECTION INTRAVENOUS at 10:24

## 2022-05-31 RX ADMIN — MAGNESIUM SULFATE HEPTAHYDRATE 4000 MG: 40 INJECTION, SOLUTION INTRAVENOUS at 16:52

## 2022-05-31 RX ADMIN — APIXABAN 5 MG: 5 TABLET, FILM COATED ORAL at 10:24

## 2022-05-31 RX ADMIN — BUPROPION HYDROCHLORIDE 150 MG: 150 TABLET, EXTENDED RELEASE ORAL at 10:24

## 2022-05-31 RX ADMIN — Medication 1000 MG: at 10:24

## 2022-05-31 RX ADMIN — APIXABAN 5 MG: 5 TABLET, FILM COATED ORAL at 21:22

## 2022-05-31 ASSESSMENT — PAIN SCALES - GENERAL
PAINLEVEL_OUTOF10: 0
PAINLEVEL_OUTOF10: 0

## 2022-05-31 NOTE — PROGRESS NOTES
Physical Therapy  Facility/Department: [facility] SURG  Physical Therapy Initial Assessment    Name: Phill Medina  : 1955  MRN: [id number]  Date of Service: 2022        Patient Diagnosis(es): The primary encounter diagnosis was KIERA (acute kidney injury) ([facility] Diagnoses of Hypokalemia, Hypomagnesemia, Abdominal pain, unspecified abdominal location, and Intractable vomiting with nausea, unspecified vomiting type were also pertinent to this visit. Past Medical History:  has a past medical history of Anxiety, Deep vein blood clot of left lower extremity ([facility] Family history of early CAD, Hyperlipemia, Hypertension, Lightheadedness, Multinodular goiter, Sleep apnea, and SOBOE (shortness of breath on exertion). Past Surgical History:  has a past surgical history that includes Tonsillectomy; Leg Surgery (Left, ); Colonoscopy; cardiovascular stress test; Hysterectomy, total abdominal (); Knee arthroscopy (Left, ); Breast surgery (Left, ); Salivary gland surgery (Left, 2017); cyst removal (2017); Upper gastrointestinal endoscopy (N/A, 3/29/2022); Colonoscopy (N/A, 3/29/2022); and Colonoscopy (3/29/2022). Requires PT Follow-Up: Yes     Evaluating Therapist: Maggi Mai PT     Referring Provider:  Cesar Hurd MD    PT order : PT eval and treat     Room #: 623    DIAGNOSIS: hypokalemia   PRECAUTIONS: falls     Social:  Pt lives with  Family  in a  1  floor plan  1+1 steps  to enter. Prior to admission pt walked with  Ww. Has lift chair      Initial Evaluation  Date: 2022  Treatment      Short Term/ Long Term   Goals   Was pt agreeable to Eval/treatment? yes      Does pt have pain?  R knee      Bed Mobility  Rolling:  Nt   Supine to sit:  SBA with difficulty   Sit to supine:  NT   Scooting:  Min assist in sit    S/I   Transfers Sit to stand:  Min assist with bed height elevated   Stand to sit: min assist   Stand pivot:  NT    S/I   Ambulation    70  feet with ww  with  SBA/CGA    150  feet with  ww  with  S/I        Stair negotiation: ascended and descended NT    2-4  steps with  B  rail with  SBA /CGA    LE ROM  Grossly WFL      LE strength  4-/ 5      AM- PAC RAW score   16/ 24            Pt is alert and Oriented x  3      Balance:  SBA/CGA . Fall risk due to  R knee pain, h/o falls, decreased strength   Edema: B LEs   Endurance: decreased        ASSESSMENT  Pt displays functional ability as noted in the objective portion of this evaluation. Conditions Requiring Skilled Therapeutic Intervention:    [x]Decreased strength     []Decreased ROM  [x]Decreased functional mobility  [x]Decreased balance   [x]Decreased endurance   []Decreased posture  []Decreased sensation  []Decreased coordination   []Decreased vision  []Decreased safety awareness   [x]Increased pain         Treatment/Education:    Pt in bed  upon arrival ; agreeable to PT. Mobility as above. SBA supine to sit, but with increased time and difficulty. Cues given for technique and sequencing. Pt instructed in hand placement and controlled sit with transfers. Slow andre with mildly antalgic gait. At risk for falls     Pt educated on fall risk,  Safety with mobility        Patient response to education:   Pt verbalized understanding Pt demonstrated skill Pt requires further education in this area   x  with cues   x       Comments:  Pt left  In chair after session, with call light in reach. Rehab potential is Good for reaching above PT goals. Pts/ family goals   1. Home     Patient and or family understand(s) diagnosis, prognosis, and plan of care. -  Yes     PLAN  PT care will be provided in accordance with the objectives noted above. Whenever appropriate, clear delegation orders will be provided for nursing staff. Exercises and functional mobility practice will be used as well as appropriate assistive devices or modalities to obtain goals.  Patient and family education will also be administered as needed. PLAN OF CARE:    Current Treatment Recommendations     [x] Strengthening to improve independence with functional mobility   [] ROM to improve independence with functional mobility   [x] Balance Training to improve static/dynamic balance and to reduce fall risk  [x] Endurance Training to improve activity tolerance during functional mobility   [x] Transfer Training to improve safety and independence with all functional transfers   [x] Gait Training to improve gait mechanics, endurance and assess need for appropriate assistive device  [x] Stair Training in preparation for safe discharge home and/or into the community   [x] Positioning to prevent skin breakdown and contractures  [x] Safety and Education Training   [] Patient/Caregiver Education   [] HEP  [] Other     Frequency of treatments will be 2-5x/week x  7 -10 days. Time in: 1313  Time out: 1329       Evaluation Time includes thorough review of current medical information, gathering information on past medical history/social history and prior level of function, completion of standardized testing/informal observation of tasks, assessment of data and education on plan of care and goals.     CPT codes:  [x] Low Complexity PT evaluation 77424  [] Moderate Complexity PT evaluation 56616  [] High Complexity PT evaluation 41762  [] PT Re-evaluation 09080  [] Gait training 13658  minutes  [] Therapeutic activities 87734  minutes  [] Therapeutic exercises 59223  minutes  [] Neuromuscular reeducation 61237  minutes       Michael Delaney number:  PT [id number]

## 2022-05-31 NOTE — PROGRESS NOTES
Department of Internal Medicine  Nephrology Attending Progress Note        SUBJECTIVE:  Mrs Young Kassandra up in chair complaining of worsening edema. Renal parameters are improving, family concerned about her uric acid apparently family history of gout.      PMH  CKD 3 baseline creatinine 1  Hyperlipidemia  Hypertension  History of DVT left leg  History of multinodular goiter  History of sleep apnea  Elevated BMI        Physical Exam:    Vitals:    05/31/22 0804   BP: (!) 113/90   Pulse: 78   Resp: 18   Temp: 97.9 °F (36.6 °C)   SpO2: 97%       I/O last 24 hours:  Intake/Output inaccurate    Weight: 234--> 243    Constitutional: Alert oriented in no acute distress   Head: normocephalic, atraumatic   Neck: supple, no jvd  Cardiovascular: regular rate and rhythm, no murmurs, gallops, or rubs   Respiratory: Clear, no rales, rhochi, or wheezes,   Gastrointestinal: soft, nontender, nondistended, no hepatosplenomegaly  Ext:  +2 bilateral pitting edema  Neuro: aaox3  Skin: dry, no rash   Back: nontender    DATA:    CBC with Differential:    Lab Results   Component Value Date    WBC 10.2 05/31/2022    RBC 3.71 05/31/2022    HGB 10.9 05/31/2022    HCT 34.5 05/31/2022     05/31/2022    MCV 93.0 05/31/2022    MCH 29.4 05/31/2022    MCHC 31.6 05/31/2022    RDW 14.6 05/31/2022    SEGSPCT 82 02/10/2012    LYMPHOPCT 20.6 05/31/2022    MONOPCT 8.1 05/31/2022    BASOPCT 0.5 05/31/2022    MONOSABS 0.83 05/31/2022    LYMPHSABS 2.11 05/31/2022    EOSABS 0.10 05/31/2022    BASOSABS 0.05 05/31/2022     BMP:    Lab Results   Component Value Date     05/31/2022    K 3.6 05/31/2022    K 3.2 05/30/2022     05/31/2022    CO2 27 05/31/2022    BUN 22 05/31/2022    LABALBU 3.9 05/29/2022    LABALBU 4.2 02/10/2012    CREATININE 1.1 05/31/2022    CALCIUM 9.6 05/31/2022    GFRAA 60 05/31/2022    LABGLOM 49 05/31/2022    GLUCOSE 122 05/31/2022    GLUCOSE 130 02/10/2012     Magnesium:    Lab Results   Component Value Date    MG 1.4 05/31/2022     Phosphorus:  No results found for: PHOS  Uric Acid:    Lab Results   Component Value Date    LABURIC 6.7 05/31/2022     TSH:    Lab Results   Component Value Date    TSH 0.245 05/30/2022          magnesium sulfate  4,000 mg IntraVENous Once    buPROPion  150 mg Oral QAM    calcium citrate  1,000 mg Oral Daily    oxybutynin  20 mg Oral Nightly    pravastatin  10 mg Oral Daily    sodium chloride flush  5-40 mL IntraVENous 2 times per day    apixaban  5 mg Oral BID      sodium chloride       sodium chloride flush, sodium chloride, ondansetron **OR** ondansetron, polyethylene glycol, acetaminophen **OR** acetaminophen    IMPRESSION/RECOMMENDATIONS:      Acute kidney injury improving will discontinue IV fluids as patient now has edema, check bladder scan for postvoid residual, will check uric acid as per family request  Hypokalemia improving  History of hypertension blood pressure has improved, will hold another 24 hours before resuming medications  Hypomagnesemia begin oral magnesium supplementation  Edema related to IV hydration will discontinue IV fluids but hold on initiation of diuretic therapy        Jett Fang MD  5/31/2022 2:19 PM

## 2022-05-31 NOTE — PROGRESS NOTES
[facility] Progress Note    Admitting Date and Time: 5/29/2022 11:07 PM  Admit Dx: Hypokalemia [E87.6]  Hypomagnesemia [E83.42]  KIERA (acute kidney injury) ([address] [N17.9]  Abdominal pain, unspecified abdominal location [R10.9]  Acute kidney injury superimposed on chronic kidney disease ([address] [N17.9, N18.9]  Intractable vomiting with nausea, unspecified vomiting type [R11.2]  Acute renal failure superimposed on chronic kidney disease, unspecified CKD stage, unspecified acute renal failure type ([address] [N17.9, N18.9]      Assessment:    Principal Problem:    Acute renal failure superimposed on chronic kidney disease ([address]  Active Problems:    Acute kidney injury superimposed on chronic kidney disease ([address]  Resolved Problems:    * No resolved hospital problems. *      Plan:  Intractable nausea and vomiting-resolved    KIERA on CKD, baseline creatinine 1.1-1.3, likely prerenal in the setting of the above  -Nephrology on board, CR 1.1 this a.m. IV fluids were discontinued. -Renal ultrasound with no hydronephrosis    Electrolyte abnormalities with hypokalemia and hypomagnesemia, 2/2 nausea and vomiting.  -Supplement and recheck. Unintentional weight loss, lost about 100 pounds unintentionally in 2-3 months.  -She told me she had recent colonoscopy and EGD that was reported normal, except for polyps. Repeat in 5 years.  -She denied any hemoptysis, hematemesis. No recent change in bowel habits. CT A/P as well as CXR. Noted with no acute finding  -Recent mammography as of 5/22 with no evidence of malignancy.  -Outpatient follow-up with PCP. Hx hypertension- losartan and diuretics remains on hold  -BP is borderline off of medications.  -Likely DC tomorrow with no blood pressure medications.     Hx hyperlipidemia on pravastatin    Hx VTE-on Eliquis  Hx JES on CPAP      Heparin  Full code    Subjective:  Patient is being followed for Hypokalemia [E87.6]  Hypomagnesemia [E83.42]  KIERA (acute kidney injury) ([facility] [N17.9]  Abdominal pain, unspecified abdominal location [R10.9]  Acute kidney injury superimposed on chronic kidney disease ([facility] [N17.9, N18.9]  Intractable vomiting with nausea, unspecified vomiting type [R11.2]  Acute renal failure superimposed on chronic kidney disease, unspecified CKD stage, unspecified acute renal failure type ([facility] [N17.9, N18.9]     Patient was seen and examined this AM.  She is awake alert. States she feels much better. Denied abdominal pain, nausea or vomiting at this time. Was already started on a diet and is tolerating well.  -Likely discharge tomorrow morning. ROS: denies fever, chills, cp, sob, n/v, HA unless stated above.      magnesium sulfate  4,000 mg IntraVENous Once    buPROPion  150 mg Oral QAM    calcium citrate  1,000 mg Oral Daily    oxybutynin  20 mg Oral Nightly    pravastatin  10 mg Oral Daily    sodium chloride flush  5-40 mL IntraVENous 2 times per day    apixaban  5 mg Oral BID     sodium chloride flush, 5-40 mL, PRN  sodium chloride, , PRN  ondansetron, 4 mg, Q8H PRN   Or  ondansetron, 4 mg, Q6H PRN  polyethylene glycol, 17 g, Daily PRN  acetaminophen, 650 mg, Q6H PRN   Or  acetaminophen, 650 mg, Q6H PRN         Objective:    BP (!) 113/90   Pulse 78   Temp 97.9 °F (36.6 °C) (Oral)   Resp 18   Wt 243 lb 9.6 oz (110.5 kg)   SpO2 97%   BMI 41.81 kg/m²     General Appearance: alert and oriented to person, place and time and in no acute distress  Skin: warm and dry  Head: normocephalic and atraumatic  Eyes: pupils equal, round, and reactive to light, extraocular eye movements intact, conjunctivae normal  Neck: neck supple and non tender without mass   Pulmonary/Chest: clear to auscultation bilaterally- no wheezes, rales or rhonchi, normal air movement, no respiratory distress  Cardiovascular: normal rate, normal S1 and S2 and no carotid bruits  Abdomen: soft, non-tender, non-distended, normal bowel sounds, no masses or organomegaly  Extremities: no cyanosis, no clubbing and no edema  Neurologic: no cranial nerve deficit and speech normal        Recent Labs     05/29/22 2207 05/30/22  0555 05/31/22  1305    140 141   K 3.3* 3.2* 3.6   CL 97* 101 103   CO2 29 25 27   BUN 36* 32* 22   CREATININE 2.2* 1.8* 1.1*   GLUCOSE 111* 127* 122*   CALCIUM 10.2 9.3 9.6       Recent Labs     05/29/22 2207 05/31/22  0557   WBC 11.8* 10.2   RBC 4.31 3.71   HGB 12.9 10.9*   HCT 40.3 34.5   MCV 93.5 93.0   MCH 29.9 29.4   MCHC 32.0 31.6*   RDW 14.4 14.6    321   MPV 9.9 10.2         NOTE: This report was transcribed using voice recognition software. Every effort was made to ensure accuracy; however, inadvertent computerized transcription errors may be present.   Electronically signed by Ruth Reynolds MD on 5/31/2022 at 1:59 PM

## 2022-05-31 NOTE — PROGRESS NOTES
P Quality Flow/Interdisciplinary Rounds Progress Note        Quality Flow Rounds held on May 31, 2022    Disciplines Attending:  Bedside Nurse, ,  and Nursing Unit Leadership    Virginia Bolanos was admitted on 5/29/2022 11:07 PM    Anticipated Discharge Date:       Disposition:    Nii Score:  Nii Scale Score: 20    Readmission Risk              Risk of Unplanned Readmission:  20           Discussed patient goal for the day, patient clinical progression, and barriers to discharge. The following Goal(s) of the Day/Commitment(s) have been identified:Check discharge.       Angel Lawler RN  May 31, 2022

## 2022-06-01 VITALS
WEIGHT: 243.6 LBS | HEART RATE: 102 BPM | DIASTOLIC BLOOD PRESSURE: 83 MMHG | OXYGEN SATURATION: 95 % | RESPIRATION RATE: 18 BRPM | BODY MASS INDEX: 41.81 KG/M2 | SYSTOLIC BLOOD PRESSURE: 121 MMHG | TEMPERATURE: 97.6 F

## 2022-06-01 LAB
ALBUMIN SERPL-MCNC: 3.3 G/DL (ref 3.5–5.2)
ALP BLD-CCNC: 92 U/L (ref 35–104)
ALT SERPL-CCNC: 8 U/L (ref 0–32)
ANION GAP SERPL CALCULATED.3IONS-SCNC: 11 MMOL/L (ref 7–16)
AST SERPL-CCNC: 13 U/L (ref 0–31)
BILIRUB SERPL-MCNC: 0.6 MG/DL (ref 0–1.2)
BUN BLDV-MCNC: 19 MG/DL (ref 6–23)
CALCIUM SERPL-MCNC: 9.4 MG/DL (ref 8.6–10.2)
CHLORIDE BLD-SCNC: 103 MMOL/L (ref 98–107)
CO2: 25 MMOL/L (ref 22–29)
CREAT SERPL-MCNC: 1 MG/DL (ref 0.5–1)
GFR AFRICAN AMERICAN: >60
GFR NON-AFRICAN AMERICAN: 55 ML/MIN/1.73
GLUCOSE BLD-MCNC: 106 MG/DL (ref 74–99)
HCT VFR BLD CALC: 33.9 % (ref 34–48)
HEMOGLOBIN: 11 G/DL (ref 11.5–15.5)
MAGNESIUM: 2.3 MG/DL (ref 1.6–2.6)
MCH RBC QN AUTO: 29.7 PG (ref 26–35)
MCHC RBC AUTO-ENTMCNC: 32.4 % (ref 32–34.5)
MCV RBC AUTO: 91.6 FL (ref 80–99.9)
PDW BLD-RTO: 14.5 FL (ref 11.5–15)
PHOSPHORUS: 2.2 MG/DL (ref 2.5–4.5)
PLATELET # BLD: 315 E9/L (ref 130–450)
PMV BLD AUTO: 10.2 FL (ref 7–12)
POTASSIUM SERPL-SCNC: 3.5 MMOL/L (ref 3.5–5)
RBC # BLD: 3.7 E12/L (ref 3.5–5.5)
SODIUM BLD-SCNC: 139 MMOL/L (ref 132–146)
TOTAL PROTEIN: 6.2 G/DL (ref 6.4–8.3)
URINE CULTURE, ROUTINE: NORMAL
WBC # BLD: 10.1 E9/L (ref 4.5–11.5)

## 2022-06-01 PROCEDURE — 84100 ASSAY OF PHOSPHORUS: CPT

## 2022-06-01 PROCEDURE — 80053 COMPREHEN METABOLIC PANEL: CPT

## 2022-06-01 PROCEDURE — 2580000003 HC RX 258: Performed by: STUDENT IN AN ORGANIZED HEALTH CARE EDUCATION/TRAINING PROGRAM

## 2022-06-01 PROCEDURE — 36415 COLL VENOUS BLD VENIPUNCTURE: CPT

## 2022-06-01 PROCEDURE — 6370000000 HC RX 637 (ALT 250 FOR IP): Performed by: INTERNAL MEDICINE

## 2022-06-01 PROCEDURE — 97165 OT EVAL LOW COMPLEX 30 MIN: CPT

## 2022-06-01 PROCEDURE — 6370000000 HC RX 637 (ALT 250 FOR IP): Performed by: STUDENT IN AN ORGANIZED HEALTH CARE EDUCATION/TRAINING PROGRAM

## 2022-06-01 PROCEDURE — 83735 ASSAY OF MAGNESIUM: CPT

## 2022-06-01 PROCEDURE — 99233 SBSQ HOSP IP/OBS HIGH 50: CPT | Performed by: INTERNAL MEDICINE

## 2022-06-01 PROCEDURE — 85027 COMPLETE CBC AUTOMATED: CPT

## 2022-06-01 RX ORDER — LANOLIN ALCOHOL/MO/W.PET/CERES
400 CREAM (GRAM) TOPICAL DAILY
Qty: 30 TABLET | Refills: 0 | Status: SHIPPED | OUTPATIENT
Start: 2022-06-02 | End: 2022-06-30 | Stop reason: SDUPTHER

## 2022-06-01 RX ADMIN — APIXABAN 5 MG: 5 TABLET, FILM COATED ORAL at 10:41

## 2022-06-01 RX ADMIN — ONDANSETRON 4 MG: 4 TABLET, ORALLY DISINTEGRATING ORAL at 05:49

## 2022-06-01 RX ADMIN — BUPROPION HYDROCHLORIDE 150 MG: 150 TABLET, EXTENDED RELEASE ORAL at 10:41

## 2022-06-01 RX ADMIN — Medication 1000 MG: at 10:41

## 2022-06-01 RX ADMIN — Medication 400 MG: at 10:41

## 2022-06-01 RX ADMIN — POTASSIUM & SODIUM PHOSPHATES POWDER PACK 280-160-250 MG 500 MG: 280-160-250 PACK at 10:45

## 2022-06-01 RX ADMIN — SODIUM CHLORIDE, PRESERVATIVE FREE 10 ML: 5 INJECTION INTRAVENOUS at 10:42

## 2022-06-01 RX ADMIN — PRAVASTATIN SODIUM 10 MG: 10 TABLET ORAL at 10:41

## 2022-06-01 ASSESSMENT — PAIN SCALES - GENERAL: PAINLEVEL_OUTOF10: 0

## 2022-06-01 NOTE — PROGRESS NOTES
Perfect serve message sent to Dr. Daniel Mccormack regarding discharge. Ok to discharge per Dr. Star Shine updated.     Electronically signed by [place] RN on 6/1/2022 at 11:38 AM

## 2022-06-01 NOTE — PLAN OF CARE
Problem: Discharge Planning  Goal: Discharge to home or other facility with appropriate resources  Outcome: Progressing  Flowsheets (Taken 5/31/2022 2005)  Discharge to home or other facility with appropriate resources: Identify barriers to discharge with patient and caregiver     Problem: Pain  Goal: Verbalizes/displays adequate comfort level or baseline comfort level  Outcome: Progressing     Problem: Metabolic/Fluid and Electrolytes - Adult  Goal: Electrolytes maintained within normal limits  Outcome: Progressing     Problem: Metabolic/Fluid and Electrolytes - Adult  Goal: Hemodynamic stability and optimal renal function maintained  Outcome: Progressing

## 2022-06-01 NOTE — PROGRESS NOTES
Patient/family education to increase safety and functional independence             Environmental modifications for safe mobility and completion of ADLs                             Therapeutic activity to improve functional performance during ADLs. Therapeutic exercise to improve tolerance and functional strength for ADLs    Balance retraining/tolerance tasks for facilitation of postural control with dynamic challenges during ADLs .       Positioning to improve functional independence    []Recommended Adaptive Equipment: TBD     Home Living: Pt lives with family, 1 story with 2 steps/no rail but a heavy duty hook to grab onto    Bathroom setup: walk in shower, shower seat and grab bars    Equipment owned: cane, [address] chair      Prior Level of Function: assist as needed  with ADLs , assist  with IADLs; ambulated with cane       Pain Level: B knee pain ; per patient chronic   Cognition: A&O: 4/4;    Memory:  Good    Sequencing:  Good    Problem solving:  Fair+   Judgement/safety:  Fair +     Functional Assessment:  AM-PAC Daily Activity Raw Score: 17/24   Initial Eval Status  Date: 6/1/22 Treatment Status  Date: STGs = LTGs  Time frame: 10-14 days   Feeding Independent      Grooming SBA/set-up   Standing at sink washing hands   Mod i   UB Dressing SBA/set-up   Mod i   LB Dressing Min A  Patient unable to cross legs to reach socks - d/t knee pain   SBA    Bathing Min A  SBA    Toileting SBA   Mod I    Bed Mobility  Up in chair upon entry   SBA    Functional Transfers Min/CGA   Sit-stand from chair   Supervision    Functional Mobility SBA, w/walker   Ambulated to/from bathroom   Supervision  with good tolerance    Balance Sitting:     Static:  Independent     Dynamic:Min A  Standing: CGA   Independent/supervision    Activity Tolerance Fair with light activity   No SOB noted   Good  with ADL activity    Visual/  Perceptual Glasses: reading                 Hand Dominance right   AROM (PROM) Strength Additional Info:    RUE  WFL WFL good  and wfl FMC/dexterity noted during ADL tasks       LUE WFL WFL good  and wfl FMC/dexterity noted during ADL tasks       Hearing: WFL   Sensation:  No c/o numbness or tingling   Tone: WFL  Edema: LE    Comments: Upon arrival patient sitting in chair . At end of session, patient returned to  with call light and phone within reach, all lines and tubes intact. Overall patient demonstrated  decreased independence and safety during completion of ADL/functional transfer/mobility tasks. Pt would benefit from continued skilled OT to increase safety and independence with completion of ADL/IADL tasks for functional independence and quality of life. Rehab Potential: good for established goals     Patient / Family Goal: return home      Patient and/or family were instructed on functional diagnosis, prognosis/goals and OT plan of care. Demonstrated good   understanding. Eval Complexity: Low    Time In: 1204  Time Out: 1220  T    Min Units   OT Eval Low 97165 x  1   OT Eval Medium 70745      OT Eval High 31133      OT Re-Eval [id number]       Therapeutic Ex 30256      Therapeutic Activities 32164       ADL/Self Care 42372       Orthotic Management 44995       Manual 90815     Neuro Re-Ed 49889       Non-Billable Time          Evaluation Time additionally includes thorough review of current medical information, gathering information on past medical history/social history and prior level of function, interpretation of standardized testing/informal observation of tasks, assessment of data and development of plan of care and goals.             Alissa Rodriguez  OTR/L  OT [id number]

## 2022-06-01 NOTE — PROGRESS NOTES
Received another call from Sylvia Justin re: concern for weightloss and N/V and \"lack of answers\"  Patient denies N/V and states that she tolerated a Kinyarwanda toast breakfast, chicken sandwhich for lunch and a pot pie for dinner without any symptoms.

## 2022-06-01 NOTE — DISCHARGE SUMMARY
[facility] Physician Discharge Summary       No follow-up provider specified. Activity level: As tolerated     Dispo: Home    Condition on discharge: Stable     Patient ID:  Irlanda Espinoza  [id number]  52 y.o.  1955    Admit date: 5/29/2022    Discharge date and time:  6/1/2022  11:50 AM    Admission Diagnoses: Principal Problem:    Acute renal failure superimposed on chronic kidney disease ([address]  Active Problems:    Acute kidney injury superimposed on chronic kidney disease ([address]  Resolved Problems:    * No resolved hospital problems. *      Discharge Diagnoses: Principal Problem:    Acute renal failure superimposed on chronic kidney disease ([address]  Active Problems:    Acute kidney injury superimposed on chronic kidney disease ([address]  Resolved Problems:    * No resolved hospital problems. *      Consults:  IP CONSULT TO NEPHROLOGY    Hospital Course:   Patient Irlanda Espinoza is a 74 y.o. presented with Hypokalemia [E87.6]  Hypomagnesemia [E83.42]  KIERA (acute kidney injury) ([address] [N17.9]  Abdominal pain, unspecified abdominal location [R10.9]  Acute kidney injury superimposed on chronic kidney disease ([address] [N17.9, N18.9]  Intractable vomiting with nausea, unspecified vomiting type [R11.2]  Acute renal failure superimposed on chronic kidney disease, unspecified CKD stage, unspecified acute renal failure type ([address] [N17.9, N259]    78-year-old female with past medical history significant for hypertension,  hyperlipidemia CKD. Patient presented to the ED on 5/30 with complaints of worsening nausea, vomiting, as well as abdominal pain. W/up in the ED showed Acute kidney injury for which Nephrology service was consulted and was deemed prerenal secondary to volume loss. She was started on IVF with continuous improvement is symptoms and normalization fof her kidney function. She will be discharged in a stable condition.  She recently had EGD as well as colonoscopy, found to have GERD, tubular adenoma for repeat scopes in 3 years. During her hospitalization, she was also noted to have borderline BP off of her BP medicsations. She will be discharged off of hypertension meds and to be followed by her PCP for BP check and further titration. Discharge Exam:  General Appearance: alert and oriented to person, place and time and in no acute distress  Skin: warm and dry  Head: normocephalic and atraumatic  Eyes: pupils equal, round, and reactive to light, extraocular eye movements intact, conjunctivae normal  Neck: neck supple and non tender without mass   Pulmonary/Chest: clear to auscultation bilaterally- no wheezes, rales or rhonchi, normal air movement, no respiratory distress  Cardiovascular: normal rate, normal S1 and S2 and no carotid bruits  Abdomen: soft, non-tender, non-distended, normal bowel sounds, no masses or organomegaly  Extremities: no cyanosis, no clubbing and no edema  Neurologic: no cranial nerve deficit and speech normal    I/O last 3 completed shifts:  In: -   Out: 200 [Urine:200]  No intake/output data recorded. LABS:  Recent Labs     05/30/22  0555 05/31/22  1305 06/01/22  0238    141 139   K 3.2* 3.6 3.5    103 103   CO2 25 27 25   BUN 32* 22 19   CREATININE 1.8* 1.1* 1.0   GLUCOSE 127* 122* 106*   CALCIUM 9.3 9.6 9.4       Recent Labs     05/29/22  2207 05/31/22  0557 06/01/22  0238   WBC 11.8* 10.2 10.1   RBC 4.31 3.71 3.70   HGB 12.9 10.9* 11.0*   HCT 40.3 34.5 33.9*   MCV 93.5 93.0 91.6   MCH 29.9 29.4 29.7   MCHC 32.0 31.6* 32.4   RDW 14.4 14.6 14.5    321 315   MPV 9.9 10.2 10.2       No results for input(s): POCGLU in the last 72 hours.     Imaging:  CT ABDOMEN PELVIS WO CONTRAST Additional Contrast? None    Result Date: 5/30/2022  EXAMINATION: CT OF THE ABDOMEN AND PELVIS WITHOUT CONTRAST 5/30/2022 1:26 am TECHNIQUE: CT of the abdomen and pelvis was performed without the administration of intravenous contrast. Multiplanar reformatted images are provided for review. Automated exposure control, iterative reconstruction, and/or weight based adjustment of the mA/kV was utilized to reduce the radiation dose to as low as reasonably achievable. COMPARISON: 05/03/2022 HISTORY: ORDERING SYSTEM PROVIDED HISTORY: left lower quadrant abdominal pain TECHNOLOGIST PROVIDED HISTORY: Reason for exam:->left lower quadrant abdominal pain Additional Contrast?->None Decision Support Exception - unselect if not a suspected or confirmed emergency medical condition->Emergency Medical Condition (MA) FINDINGS: Lower Chest: Mild bibasilar dependent atelectasis. Organs: Unremarkable liver, spleen, pancreas, adrenals, and gallbladder on this unenhanced study. GI/Bowel: Normal appendix. Bowel loops nonobstructed. No colonic diverticulosis. Pelvis: Status posthysterectomy. No adnexal mass. Incompletely distended urinary bladder. Peritoneum/Retroperitoneum: No free air or free fluid. No adenopathy. Vascular calcification with no abdominal aortic aneurysm. Bones/Soft Tissues: Multilevel lumbar spondylosis and minimal dextroscoliosis. Mild-to-moderate osteoarthritis of the bilateral hip joints. No acute finding in the abdomen pelvis. XR CHEST (2 VW)    Result Date: 5/29/2022  EXAMINATION: TWO XRAY VIEWS OF THE CHEST 5/29/2022 7:56 pm COMPARISON: 09/15/2021 HISTORY: ORDERING SYSTEM PROVIDED HISTORY: sob TECHNOLOGIST PROVIDED HISTORY: Reason for exam:->sob FINDINGS: The lungs are without acute focal process. There is no effusion or pneumothorax. The cardiomediastinal silhouette is without acute process. The osseous structures are without acute process. No acute process.      US RETROPERITONEAL COMPLETE    Result Date: 5/30/2022  EXAMINATION: RETROPERITONEAL ULTRASOUND OF THE KIDNEYS AND URINARY BLADDER 5/30/2022 COMPARISON: None HISTORY: ORDERING SYSTEM PROVIDED HISTORY: ckd worsening TECHNOLOGIST PROVIDED HISTORY: Reason for exam:->ckd worsening What reading provider will be dictating this exam?->CRC FINDINGS: Kidneys: Right kidney: 10.9 x 5.5 x 5.7 cm; cortical thickness 14.5 mm Left kidney: 9.4 x 4.5 x 4.6 cm; cortical thickness 8 mm. In the right kidney there is a small cyst of 10 mm. In the left kidney there is a small cyst of 6 mm. Both kidneys maintain preserved size. The right kidney has preserved cortical thickness. There is a relative thinning left kidney cortex. There is a slightly hyperechogenicity of the renal cortex bilateral with relative sonolucent appearance of the renal pyramids which can be manifestation of chronic renal parenchymal disease to be correlated clinically. There is no renal calculus or signs for obstructive uropathy in the right or in the left kidneys. Bladder: Volume of the bladder at the time the present study was 170 mL. There is slight thickening of the bladder wall. No calculus or/or masses seen in the bladder lumen. There was visualization of the right and left ureter jets. No postvoid study was done of the bladder. 1.  There is a relative hyperechogenicity of the cortex of both kidneys which can be manifestation of chronic renal parenchymal disease to be correlated clinically. 2.  No obstructive uropathy or renal calculi in both kidneys. 3.  Unremarkable appearance for the bladder.        Patient Instructions:      Medication List      START taking these medications    magnesium oxide 400 (240 Mg) MG tablet  Commonly known as: MAG-OX  Take 1 tablet by mouth daily  Start taking on: June 2, 2022        Sofi Elder taking these medications    albuterol sulfate  (90 Base) MCG/ACT inhaler  Commonly known as: Ventolin HFA  Inhale 2 puffs into the lungs 4 times daily as needed for Wheezing     ascorbic acid 250 MG tablet  Commonly known as: V-R VITAMIN C  Take 2 tablets by mouth daily     buPROPion 150 MG extended release tablet  Commonly known as: WELLBUTRIN XL  TAKE 1 TABLET BY MOUTH EVERY MORNING     CALCIUM CITRATE PO diclofenac sodium 1 % Gel  Commonly known as: VOLTAREN  APPLY 4 GRAMS TOPICALLY FOUR TIMES A DAY     dicyclomine 20 MG tablet  Commonly known as: BENTYL  Take 1 tablet by mouth 4 times daily     Eliquis 5 MG Tabs tablet  Generic drug: apixaban  TAKE 1 TABLET BY MOUTH TWICE DAILY     escitalopram 20 MG tablet  Commonly known as: LEXAPRO  TAKE 1 TABLET BY MOUTH ONCE DAILY     meclizine 12.5 MG tablet  Commonly known as: ANTIVERT  Take 1 tablet by mouth 3 times daily as needed for Dizziness     metoprolol succinate 25 MG extended release tablet  Commonly known as: TOPROL XL  TAKE 1/2 TABLET (12.5 MG) BY MOUTH ONCE DAILY.      ondansetron 8 MG Tbdp disintegrating tablet  Commonly known as: Zofran ODT  Place 1 tablet under the tongue every 8 hours as needed for Nausea     oxybutynin 10 mg extended release tablet  Commonly known as: DITROPAN-XL  TAKE 2 TABLETS (20 MG) BY MOUTH ONCE DAILY     pravastatin 10 MG tablet  Commonly known as: PRAVACHOL  TAKE 1 TABLET BY MOUTH ONCE DAILY     therapeutic multivitamin-minerals tablet     vitamin D 50 MCG (2000 UT) Tabs tablet  Commonly known as: CHOLECALCIFEROL  Take 1 tablet by mouth daily        STOP taking these medications    furosemide 20 MG tablet  Commonly known as: LASIX     hydroCHLOROthiazide 25 MG tablet  Commonly known as: HYDRODIURIL     losartan 25 MG tablet  Commonly known as: COZAAR     nitrofurantoin (macrocrystal-monohydrate) 100 MG capsule  Commonly known as: MACROBID     potassium chloride 10 MEQ extended release tablet  Commonly known as: KLOR-CON     potassium chloride 20 MEQ extended release tablet  Commonly known as: KLOR-CON M           Where to Get Your Medications      These medications were sent to [address] [place] [address] [phone]  [address] [place] [address]    Phone: [phone]   · magnesium oxide 400 (240 Mg) MG tablet           Note that more than 30 minutes was spent in preparing discharge papers, discussing discharge with patient, medication review, etc.    Signed:  Electronically signed by Johnathon Burton MD on 6/1/2022 at 11:50 AM

## 2022-06-01 NOTE — PROGRESS NOTES
Patient's son Ambar Gage called today. Concerns with his mother' s diagnosis. Has request to have Dr. Chapito Reeder consulted for  A possible flow test of the gallbladder. Charge nurse notified on 2nd shift. Will address with rounds tomorrow.  Electronically signed by Bertrand Yanez RN on 5/31/2022 at 8:04 PM

## 2022-06-02 ENCOUNTER — CARE COORDINATION (OUTPATIENT)
Dept: CASE MANAGEMENT | Age: 67
End: 2022-06-02

## 2022-06-02 DIAGNOSIS — N18.9 ACUTE KIDNEY INJURY SUPERIMPOSED ON CHRONIC KIDNEY DISEASE (HCC): Primary | ICD-10-CM

## 2022-06-02 DIAGNOSIS — N17.9 ACUTE KIDNEY INJURY SUPERIMPOSED ON CHRONIC KIDNEY DISEASE (HCC): Primary | ICD-10-CM

## 2022-06-02 NOTE — PROGRESS NOTES
CLINICAL PHARMACY NOTE: MEDS TO BEDS    Total # of Prescriptions Filled: 1   The following medications were delivered to the patient:  · Mag ox 400 mg       Additional Documentation:

## 2022-06-02 NOTE — CARE COORDINATION
[facility] Transitions Initial Follow Up Call    Call within 2 business days of discharge: Yes    Patient: Jie Sy Patient : 1955   MRN: [id number]  Reason for Admission: KIERA; Hypokalemia; Hypomagnesemia; Abdominal Pain; Intractable Vomiting with Nausea  Discharge Date: 22 RARS: Readmission Risk Score: 15.1 ( )      Last Discharge [facility]       Complaint Diagnosis Description Type Department Provider    22 Shortness of Breath; Abdominal Pain KEIRA (acute kidney injury) ([facility] . .. ED to Hosp-Admission (Discharged) (ADMITTED) Miriam Enamorado MD; Adelso Sales. .. CTN received voice message from patient; CTN returned call to patient for hospital follow up. Transitions of Care Initial Call    Was this an external facility discharge? No Discharge Facility: [facility]    Challenges to be reviewed by the provider   Additional needs identified to be addressed with provider: No           Method of communication with provider : none    Advance Care Planning:   Does patient have an Advance Directive: decision maker updated. Care Transition Nurse contacted the patient by telephone to perform post hospital discharge assessment. Verified name and  with patient as identifiers. Provided introduction to self, and explanation of the CTN role. CTN reviewed discharge instructions, medical action plan and red flags with patient who verbalized understanding. Patient given an opportunity to ask questions and does not have any further questions or concerns at this time. Were discharge instructions available to patient? Yes. Reviewed appropriate site of care based on symptoms and resources available to patient including: PCP  Specialist  When to call 911. The patient agrees to contact the PCP office for questions related to their healthcare.      Medication reconciliation was performed with patient, who verbalizes understanding of administration of home medications.  -[id number] entered      CTN provided contact information. Plan for follow-up call in 5-7 days based on severity of symptoms and risk factors. Plan for next call: symptom management-symptom assessment  follow up appointment-verify appointment was scheduled with Dr. Gris Phan 24 Hour Call    Do you have a copy of your discharge instructions?: Yes  Do you have all of your prescriptions and are they filled?: Yes  Have you scheduled your follow up appointment?: Yes  How are you going to get to your appointment?: Car - family or friend to transport  Do you feel like you have everything you need to keep you well at home?: Yes  Care Transitions Interventions  No Identified Needs    Patient is pleasant in conversation. -reports decreased swelling in feet   -reports bilateral knees are \"sore\"; reports relief after applying Voltaren gel  -denies any C/O nausea, vomiting, or abdominal pain   -denies weakness or fatigue   -reports good appetite; keeping hydrated   -reports urinating without difficulty; clear/yellow urine   -reports regular bowel patterns   -denies any C/O chest pain, palpitations, lightheaded, or dizziness   -ambulating with a walker  -awaiting call back from Dr. Rowdy Lanza office for appointment scheduling    Emotional support provided; discussed will continue to follow.          Follow Up  Future Appointments   Date Time Provider Luci Lee   6/14/2022 10:00 AM DO Anahy Rinaldi [facility] AND [facility]OM[facility]   6/28/2022  3:15 PM DO Chrissy Grijalva [facility]   7/19/2022  2:00 PM GENTRY Riggins - CNP [facility]   8/25/2022 10:00 AM Ayan Burleson MD [facility]   5/15/2023 11:00 AM DO Anahy Rinaldi RN

## 2022-06-02 NOTE — CARE COORDINATION
[facility] Transitions Initial Follow Up Call    Call within 2 business days of discharge: Yes    Patient: Xin Canas Patient : 1955   MRN: [id number]  Reason for Admission: KIERA; Hypokalemia; Hypomagnesemia; Abdominal Pain; Intractable Vomiting with Nausea  Discharge Date: 22 RARS: Readmission Risk Score: 15.1 ( )      Last Discharge [address] [address]       Complaint Diagnosis Description Type Department Provider    22 Shortness of Breath; Abdominal Pain KIERA (acute kidney injury) ([facility] [address] . .. ED to Hosp-Admission (Discharged) (ADMITTED) Reymundo Ahumada, MD; Chantale Stanford. .. Attempted to reach patient by phone for initial post hospital discharge follow up. HIPAA compliant message left on patient's voicemail; CTN contact information provided. Noted in chart, patient has hospital follow up appointment with PCP on 2022.       Follow Up  Future Appointments   Date Time Provider Luci Lee   2022 10:00 AM DO Anahy Saldivar [facility] AND WOMEN'S [facility]   2022  3:15 PM DO Franklyn Yarbrough [facility]   2022  2:00 PM Hyman Spatz, APRN - CNP [facility]   2022 10:00 AM Silvano Kathleen MD [facility] ENDO [facility]   5/15/2023 11:00 AM DO Anahy Saldivar RN

## 2022-06-03 ENCOUNTER — TELEPHONE (OUTPATIENT)
Dept: FAMILY MEDICINE CLINIC | Age: 67
End: 2022-06-03

## 2022-06-07 ENCOUNTER — CARE COORDINATION (OUTPATIENT)
Dept: CASE MANAGEMENT | Age: 67
End: 2022-06-07

## 2022-06-07 NOTE — CARE COORDINATION
[facility] Transitions Follow Up Call    2022    Patient: Jenni Doss  Patient : 1955   MRN: [id number]  Reason for Admission:   Discharge Date: 22 RARS: Readmission Risk Score: 15.1 ( )       Attempted to reach patient by phone regarding follow up; Care Transitions. HIPAA compliant message left on patient's voicemail; CTN contact information provided.      Follow Up  Future Appointments   Date Time Provider Luci Lee   2022 10:00 AM DO Anahy Macias [facility] AND [facility]OM[facility]   2022  3:15 PM DO Chrissy Esparza [facility]   2022  2:00 PM GENTRY Martines CNP [facility]   2022 10:00 AM Rafal Carty MD [facility]   5/15/2023 11:00 AM DO Anahy Macias RN

## 2022-06-08 ENCOUNTER — CARE COORDINATION (OUTPATIENT)
Dept: CASE MANAGEMENT | Age: 67
End: 2022-06-08

## 2022-06-08 NOTE — CARE COORDINATION
[facility] Transitions Follow Up Call    2022    Patient: Mary Laird  Patient : 1955   MRN: [id number]  Reason for Admission:   Discharge Date: 22 RARS: Readmission Risk Score: 15.1 ( )    Second attempt to reach patient by phone regarding follow up; Care Transitions, Subsequent Call. HIPAA compliant message left on patient's voicemail; CTN contact information provided. Episode to be resolved and CTN to sign off if return call is not received from the patient. CTN spoke with patient's son, Purvi Barrett, listed as EC in patient's chart. -CTN introduced self and explained role/reason for call.   -Mr. Julisa Rdz reports the patient continues to have swelling in BLE and difficulty ambulating   -reports patient has returned to work. -Patient has an appointment with the Nephrologist on 2022. Discussed for any worsening in symptoms patient needs to go to the ED for evaluation/treatment.        Follow Up  Future Appointments   Date Time Provider Luci Lee   2022  3:00 PM DO Anahy John [facility] AND [facility]OM[facility]   2022  3:15 PM DO Chrissy Templeton [facility]   2022  2:00 PM GENTRY Rivera - CNP [facility]   2022 10:00 AM Harish Putnam MD [facility]   5/15/2023 11:00 AM DO Anahy John RN

## 2022-06-14 ENCOUNTER — CLINICAL DOCUMENTATION (OUTPATIENT)
Dept: SPIRITUAL SERVICES | Age: 67
End: 2022-06-14

## 2022-06-14 NOTE — ACP (ADVANCE CARE PLANNING)
Advance Care Planning   Ambulatory ACP Specialist Patient Outreach    Date:  6/14/2022  ACP Specialist:  NANCY Medina    Outreach call to patient in follow-up to ACP Specialist referral from: Nurys Patel DO    [x] PCP  [] Provider   [] Ambulatory Care Management [] Other for Reason:    [x] Advance Directive Assistance  [] Code Status Discussion  [] Complete Portable DNR Order  [] Discuss Goals of Care  [] Complete POST/MOST  [] Early ACP Decision-Making  [] Other    Date Referral Received: 05/13/2022    Today's Outreach:  [] First   [] Second  [x] Third    Third outreach made by [x]  phone  [] email []   Kloud Angelst     Intervention:  [] Spoke with Patient  [x] Left VM requesting return call      Outcome: Pal[place]ion message received and reviewed. Placed call to pt as message stated interest in ACP visit. No answer; left general VM and will make one more attempt in about 1 week. Next Step:   [] ACP scheduled conversation  [x] Outreach again in one week               [] Email / Mail ACP Info Sheets  [] Email / Mail Advance Directive            [] Close Referral. Routing closure to referring provider/staff and to ACP Specialist .      Thank you for this referral.

## 2022-06-21 ENCOUNTER — CLINICAL DOCUMENTATION (OUTPATIENT)
Dept: SPIRITUAL SERVICES | Age: 67
End: 2022-06-21

## 2022-06-21 NOTE — ACP (ADVANCE CARE PLANNING)
Advance Care Planning   Ambulatory ACP Specialist Patient Outreach    Date:  6/21/2022  ACP Specialist:  NANCY Elias    Outreach call to patient in follow-up to ACP Specialist referral from: Jonas Tan DO    [x] PCP  [] Provider   [] Ambulatory Care Management [] Other for Reason:    [x] Advance Directive Assistance  [] Code Status Discussion  [] Complete Portable DNR Order  [] Discuss Goals of Care  [] Complete POST/MOST  [] Early ACP Decision-Making  [] Other    Date Referral Received: 05/13/2022    Today's Outreach:  [] First   [] Second  [] Third [x] [address] outreach made by [x]  phone  [] email [x]   [place]     Intervention:  [] Spoke with Patient  [x] Left VM requesting return call      Outcome: Made outreach attempt today re: pt request for ACP appt. No answer; did leave VM with my contact info. Referral can be closed as per process and pt to be updated in [place]. Next Step:   [] ACP scheduled conversation  [] Outreach again in one week               [] Email / Mail ACP Info Sheets  [] Email / Mail Advance Directive            [x] Close Referral. Routing closure to referring provider/staff and to ACP Specialist .      Thank you for this referral.

## 2022-06-22 ENCOUNTER — HOSPITAL ENCOUNTER (OUTPATIENT)
Age: 67
Discharge: HOME | End: 2022-06-22
Payer: MEDICARE

## 2022-06-22 LAB
ANION GAP SERPL CALCULATED.3IONS-SCNC: 17 MMOL/L (ref 7–16)
BUN BLDV-MCNC: 35 MG/DL (ref 6–23)
CALCIUM SERPL-MCNC: 9.9 MG/DL (ref 8.6–10.2)
CHLORIDE BLD-SCNC: 97 MMOL/L (ref 98–107)
CO2: 24 MMOL/L (ref 22–29)
CREAT SERPL-MCNC: 1.7 MG/DL (ref 0.5–1)
GFR AFRICAN AMERICAN: 36
GFR NON-AFRICAN AMERICAN: 30 ML/MIN/1.73
GLUCOSE BLD-MCNC: 93 MG/DL (ref 74–99)
HCT VFR BLD CALC: 40 % (ref 34–48)
HEMOGLOBIN: 12.7 G/DL (ref 11.5–15.5)
MAGNESIUM: 1.4 MG/DL (ref 1.6–2.6)
MCH RBC QN AUTO: 29.1 PG (ref 26–35)
MCHC RBC AUTO-ENTMCNC: 31.8 % (ref 32–34.5)
MCV RBC AUTO: 91.5 FL (ref 80–99.9)
PDW BLD-RTO: 14.7 FL (ref 11.5–15)
PHOSPHORUS: 3.1 MG/DL (ref 2.5–4.5)
PLATELET # BLD: 373 E9/L (ref 130–450)
PMV BLD AUTO: 10.4 FL (ref 7–12)
POTASSIUM SERPL-SCNC: 3.7 MMOL/L (ref 3.5–5)
RBC # BLD: 4.37 E12/L (ref 3.5–5.5)
SODIUM BLD-SCNC: 138 MMOL/L (ref 132–146)
WBC # BLD: 8 E9/L (ref 4.5–11.5)

## 2022-06-22 PROCEDURE — 80048 BASIC METABOLIC PNL TOTAL CA: CPT

## 2022-06-22 PROCEDURE — 83735 ASSAY OF MAGNESIUM: CPT

## 2022-06-22 PROCEDURE — 85027 COMPLETE CBC AUTOMATED: CPT

## 2022-06-22 PROCEDURE — 84100 ASSAY OF PHOSPHORUS: CPT

## 2022-06-22 PROCEDURE — 36415 COLL VENOUS BLD VENIPUNCTURE: CPT

## 2022-06-29 ENCOUNTER — OFFICE VISIT (OUTPATIENT)
Dept: FAMILY MEDICINE CLINIC | Age: 67
End: 2022-06-29
Payer: MEDICARE

## 2022-06-29 ENCOUNTER — TELEPHONE (OUTPATIENT)
Dept: FAMILY MEDICINE CLINIC | Age: 67
End: 2022-06-29

## 2022-06-29 VITALS
WEIGHT: 229.4 LBS | HEART RATE: 81 BPM | DIASTOLIC BLOOD PRESSURE: 52 MMHG | SYSTOLIC BLOOD PRESSURE: 94 MMHG | BODY MASS INDEX: 38.22 KG/M2 | RESPIRATION RATE: 18 BRPM | OXYGEN SATURATION: 95 % | TEMPERATURE: 98.2 F | HEIGHT: 65 IN

## 2022-06-29 DIAGNOSIS — N18.32 STAGE 3B CHRONIC KIDNEY DISEASE (HCC): ICD-10-CM

## 2022-06-29 DIAGNOSIS — E86.0 DEHYDRATION: ICD-10-CM

## 2022-06-29 DIAGNOSIS — R11.2 NAUSEA VOMITING AND DIARRHEA: Primary | ICD-10-CM

## 2022-06-29 DIAGNOSIS — Z09 HOSPITAL DISCHARGE FOLLOW-UP: ICD-10-CM

## 2022-06-29 DIAGNOSIS — R19.7 NAUSEA VOMITING AND DIARRHEA: Primary | ICD-10-CM

## 2022-06-29 PROBLEM — E87.6 HYPOKALEMIA: Status: ACTIVE | Noted: 2022-06-22

## 2022-06-29 PROCEDURE — 1111F DSCHRG MED/CURRENT MED MERGE: CPT | Performed by: FAMILY MEDICINE

## 2022-06-29 PROCEDURE — 1123F ACP DISCUSS/DSCN MKR DOCD: CPT | Performed by: FAMILY MEDICINE

## 2022-06-29 PROCEDURE — 99213 OFFICE O/P EST LOW 20 MIN: CPT | Performed by: FAMILY MEDICINE

## 2022-06-29 PROCEDURE — G8417 CALC BMI ABV UP PARAM F/U: HCPCS | Performed by: FAMILY MEDICINE

## 2022-06-29 PROCEDURE — 1090F PRES/ABSN URINE INCON ASSESS: CPT | Performed by: FAMILY MEDICINE

## 2022-06-29 PROCEDURE — G8399 PT W/DXA RESULTS DOCUMENT: HCPCS | Performed by: FAMILY MEDICINE

## 2022-06-29 PROCEDURE — G8427 DOCREV CUR MEDS BY ELIG CLIN: HCPCS | Performed by: FAMILY MEDICINE

## 2022-06-29 PROCEDURE — 3017F COLORECTAL CA SCREEN DOC REV: CPT | Performed by: FAMILY MEDICINE

## 2022-06-29 PROCEDURE — 1036F TOBACCO NON-USER: CPT | Performed by: FAMILY MEDICINE

## 2022-06-29 NOTE — PROGRESS NOTES
Post-Discharge Transitional Care  Follow Up      Jennifer Callejas   YOB: 1955    Date of Office Visit:  6/29/2022  Date of Hospital Admission: 5/29/22  Date of Hospital Discharge: 6/1/22  Risk of hospital readmission (high >=14%. Medium >=10%) :Readmission Risk Score: 15.1 ( )      Care management risk score Rising risk (score 2-5) and Complex Care (Scores >=6): 10     Non face to face  following discharge, date last encounter closed (first attempt may have been earlier): *No documented post hospital discharge outreach found in the last 14 days    Call initiated 2 business days of discharge: *No response recorded in the last 14 days    ASSESSMENT/PLAN:   Nausea vomiting and diarrhea  Dehydration  Hospital discharge follow-up  -     [place] DISCHARGE MEDS RECONCILED W/ CURRENT OUTPATIENT MED LIST  Stage 3b chronic kidney disease ([facility] [address]  Follows with nephrology  Labs to be repeated in 2 weeks. Medical Decision Making: moderate complexity  Return in 1 month (on 7/29/2022). Subjective:   HPI:  Follow up of Hospital problems/diagnosis(es): Acute Kidney injury, dehdyration    Inpatient course: Discharge summary reviewed- see chart. Interval history/Current status: patient states that she is feeling a little better but still does not have an appetite. She states that she eats just because she knows she has to eat. She states that she drinks water all day. She states that she gets about 5 glasses of water in a day. She denies nausea, vomiting, diarrhea, swelling in her legs, chest pain. She was follows by nephrology yesterday. They are repeating labs in 2 weeks to reevaluate magnesium and kidney function. She has been taking oral magnesium.       Patient Active Problem List   Diagnosis    SOTO (dyspnea on exertion)    Vertigo    Hyperlipemia    Family history of early CAD    JES (obstructive sleep apnea)    Nocturnal enuresis    Essential hypertension    Acute deep vein thrombosis (DVT) of left lower extremity (HCC)    Acute deep vein thrombosis (DVT) of distal end of left lower extremity ([facility] [address]    Left thyroid nodule    Multinodular goiter    Morbidly obese ([facility] [address]    [facility]    Baker's cyst    Hx of echocardiogram    Pulmonary embolism on left ([facility] [address]    Syncope    DNR (do not resuscitate)    Acute pain of right knee    Morbid obesity with BMI of 45.0-49.9, adult ([facility]    Hypomagnesemia    Vitamin D insufficiency    Chest pain    On medication for venous thromboembolism    Current moderate episode of major depressive disorder without prior episode ([facility]    Acute renal failure superimposed on chronic kidney disease ([facility]    Acute kidney injury superimposed on chronic kidney disease ([facility]    Stage 3b chronic kidney disease ([facility]    Hypokalemia       Medications listed as ordered at the time of discharge from hospital     Medication List          Accurate as of June 29, 2022  5:30 PM. If you have any questions, ask your nurse or doctor.             CONTINUE taking these medications    albuterol sulfate  (90 Base) MCG/ACT inhaler  Commonly known as: Ventolin HFA  Inhale 2 puffs into the lungs 4 times daily as needed for Wheezing     ascorbic acid 250 MG tablet  Commonly known as: V-R VITAMIN C  Take 2 tablets by mouth daily     buPROPion 150 MG extended release tablet  Commonly known as: WELLBUTRIN XL  TAKE 1 TABLET BY MOUTH EVERY MORNING     CALCIUM CITRATE PO     diclofenac sodium 1 % Gel  Commonly known as: VOLTAREN  APPLY 4 GRAMS TOPICALLY FOUR TIMES A DAY     dicyclomine 20 MG tablet  Commonly known as: BENTYL  Take 1 tablet by mouth 4 times daily     Eliquis 5 MG Tabs tablet  Generic drug: apixaban  TAKE 1 TABLET BY MOUTH TWICE DAILY     escitalopram 20 MG tablet  Commonly known as: LEXAPRO  TAKE 1 TABLET BY MOUTH ONCE DAILY     magnesium oxide 400 (240 Mg) MG tablet  Commonly known as: MAG-OX  Take 1 tablet by mouth daily     meclizine 12.5 MG tablet  Commonly known as: ANTIVERT  Take 1 tablet by mouth 3 times daily as needed for Dizziness     metoprolol succinate 25 MG extended release tablet  Commonly known as: TOPROL XL  TAKE 1/2 TABLET (12.5 MG) BY MOUTH ONCE DAILY. ondansetron 8 MG Tbdp disintegrating tablet  Commonly known as: Zofran ODT  Place 1 tablet under the tongue every 8 hours as needed for Nausea     oxybutynin 10 MG extended release tablet  Commonly known as: DITROPAN-XL  TAKE 2 TABLETS (20 MG) BY MOUTH ONCE DAILY     pravastatin 10 MG tablet  Commonly known as: PRAVACHOL  TAKE 1 TABLET BY MOUTH ONCE DAILY     therapeutic multivitamin-minerals tablet     vitamin D 50 MCG (2000 UT) Tabs tablet  Commonly known as: CHOLECALCIFEROL  Take 1 tablet by mouth daily              Medications marked \"taking\" at this time  Outpatient Medications Marked as Taking for the 6/29/22 encounter (Office Visit) with Alex Esteban, DO   Medication Sig Dispense Refill    magnesium oxide (MAG-OX) 400 (240 Mg) MG tablet Take 1 tablet by mouth daily 30 tablet 0    pravastatin (PRAVACHOL) 10 MG tablet TAKE 1 TABLET BY MOUTH ONCE DAILY 30 tablet 10    buPROPion (WELLBUTRIN XL) 150 MG extended release tablet TAKE 1 TABLET BY MOUTH EVERY MORNING 30 tablet 10    escitalopram (LEXAPRO) 20 MG tablet TAKE 1 TABLET BY MOUTH ONCE DAILY 30 tablet 10    ondansetron (ZOFRAN ODT) 8 MG TBDP disintegrating tablet Place 1 tablet under the tongue every 8 hours as needed for Nausea 12 tablet 0    dicyclomine (BENTYL) 20 MG tablet Take 1 tablet by mouth 4 times daily 15 tablet 0    ELIQUIS 5 MG TABS tablet TAKE 1 TABLET BY MOUTH TWICE DAILY 60 tablet 10    meclizine (ANTIVERT) 12.5 MG tablet Take 1 tablet by mouth 3 times daily as needed for Dizziness 60 tablet 1    metoprolol succinate (TOPROL XL) 25 MG extended release tablet TAKE 1/2 TABLET (12.5 MG) BY MOUTH ONCE DAILY.  15 tablet 10    diclofenac sodium (VOLTAREN) 1 % GEL APPLY 4 GRAMS TOPICALLY FOUR TIMES A  g 10    albuterol sulfate HFA (VENTOLIN HFA) 108 (90 Base) MCG/ACT inhaler Inhale 2 puffs into the lungs 4 times daily as needed for Wheezing 54 g 1    ascorbic acid (V-R VITAMIN C) 250 MG tablet Take 2 tablets by mouth daily 60 tablet 0    oxybutynin (DITROPAN-XL) 10 MG extended release tablet TAKE 2 TABLETS (20 MG) BY MOUTH ONCE DAILY 60 tablet 11    vitamin D (CHOLECALCIFEROL) 50 MCG (2000 UT) TABS tablet Take 1 tablet by mouth daily 30 tablet 0    Multiple Vitamins-Minerals (THERAPEUTIC MULTIVITAMIN-MINERALS) tablet Take 1 tablet by mouth daily      CALCIUM CITRATE PO Take 1,000 mg by mouth daily OTC          Medications patient taking as of now reconciled against medications ordered at time of hospital discharge: Yes    A comprehensive review of systems was negative except for what was noted in the HPI.     Objective:    BP (!) 94/52   Pulse 81   Temp 98.2 °F (36.8 °C)   Resp 18   Ht 5' 5\" (1.651 m)   Wt 229 lb 6.4 oz (104.1 kg)   SpO2 95%   BMI 38.17 kg/m²   General Appearance: alert and oriented to person, place and time, well developed and well- nourished, in no acute distress  Skin: warm and dry, no rash or erythema  Head: normocephalic and atraumatic  Eyes: pupils equal, round, and reactive to light, extraocular eye movements intact, conjunctivae normal  ENT: tympanic membrane, external ear and ear canal normal bilaterally, nose without deformity, nasal mucosa and turbinates normal without polyps  Neck: supple and non-tender without mass, no thyromegaly or thyroid nodules, no cervical lymphadenopathy  Pulmonary/Chest: clear to auscultation bilaterally- no wheezes, rales or rhonchi, normal air movement, no respiratory distress  Cardiovascular: normal rate, regular rhythm, normal S1 and S2, no murmurs, rubs, clicks, or gallops, distal pulses intact, no carotid bruits  Abdomen: soft, non-tender, non-distended, normal bowel sounds, no masses or organomegaly  Extremities: no cyanosis, clubbing or edema  Musculoskeletal: normal range of motion, no joint swelling, deformity or tenderness  Neurologic: reflexes normal and symmetric, no cranial nerve deficit, gait, coordination and speech normal      An electronic signature was used to authenticate this note.   --Jorge Luis Rinaldi, DO

## 2022-06-29 NOTE — TELEPHONE ENCOUNTER
Spoke with Poornima Padgett and informed. Poornima Padgett stated that the patient is refusing to go to ED. Poornima Padgett states he will bring patient for appointment and PCP will need to talk to patient.

## 2022-06-29 NOTE — TELEPHONE ENCOUNTER
Pt son called in wanting doc to be aware for pt appt  that pt went to see the [facility] yesterday and stated the kidney doctor wanted her to go to the ED for dehydration and elevated kidney levels but pt refused to go to ED. Pt son also stated pt fasting blood sugar was 186 on her blood work she had done with the kidney group and was concerned about that. Please advise.  Pt appt today is at 4:15

## 2022-06-30 ENCOUNTER — PATIENT MESSAGE (OUTPATIENT)
Dept: FAMILY MEDICINE CLINIC | Age: 67
End: 2022-06-30

## 2022-06-30 RX ORDER — LANOLIN ALCOHOL/MO/W.PET/CERES
400 CREAM (GRAM) TOPICAL DAILY
Qty: 30 TABLET | Refills: 0 | Status: SHIPPED
Start: 2022-06-30 | End: 2022-08-11 | Stop reason: SDUPTHER

## 2022-06-30 NOTE — TELEPHONE ENCOUNTER
From: Dary Scanlon  To: Dr. Los Willson: 6/30/2022 6:44 AM EDT  Subject: Refill     Good morning l was then yesterday l forget to ask for a refill on Magnesium 400 mouth one daily 248 mg. kyler galeana

## 2022-07-05 ENCOUNTER — OFFICE VISIT (OUTPATIENT)
Dept: ORTHOPEDIC SURGERY | Age: 67
End: 2022-07-05
Payer: MEDICARE

## 2022-07-05 VITALS — HEIGHT: 65 IN | WEIGHT: 229 LBS | BODY MASS INDEX: 38.15 KG/M2

## 2022-07-05 DIAGNOSIS — M17.0 PRIMARY OSTEOARTHRITIS OF BOTH KNEES: Primary | ICD-10-CM

## 2022-07-05 DIAGNOSIS — M25.561 PAIN IN BOTH KNEES, UNSPECIFIED CHRONICITY: ICD-10-CM

## 2022-07-05 DIAGNOSIS — M25.562 PAIN IN BOTH KNEES, UNSPECIFIED CHRONICITY: ICD-10-CM

## 2022-07-05 PROCEDURE — 20610 DRAIN/INJ JOINT/BURSA W/O US: CPT | Performed by: NURSE PRACTITIONER

## 2022-07-05 RX ORDER — TRIAMCINOLONE ACETONIDE 40 MG/ML
40 INJECTION, SUSPENSION INTRA-ARTICULAR; INTRAMUSCULAR ONCE
Status: DISCONTINUED | OUTPATIENT
Start: 2022-07-05 | End: 2022-07-05

## 2022-07-05 RX ORDER — LIDOCAINE HYDROCHLORIDE 10 MG/ML
4 INJECTION, SOLUTION INFILTRATION; PERINEURAL ONCE
Status: DISCONTINUED | OUTPATIENT
Start: 2022-07-05 | End: 2022-07-05

## 2022-07-05 RX ORDER — TRIAMCINOLONE ACETONIDE 40 MG/ML
40 INJECTION, SUSPENSION INTRA-ARTICULAR; INTRAMUSCULAR ONCE
Status: COMPLETED | OUTPATIENT
Start: 2022-07-05 | End: 2022-07-05

## 2022-07-05 RX ORDER — LIDOCAINE HYDROCHLORIDE 10 MG/ML
4 INJECTION, SOLUTION INFILTRATION; PERINEURAL ONCE
Status: COMPLETED | OUTPATIENT
Start: 2022-07-05 | End: 2022-07-05

## 2022-07-05 RX ADMIN — TRIAMCINOLONE ACETONIDE 40 MG: 40 INJECTION, SUSPENSION INTRA-ARTICULAR; INTRAMUSCULAR at 15:14

## 2022-07-05 RX ADMIN — LIDOCAINE HYDROCHLORIDE 4 ML: 10 INJECTION, SOLUTION INFILTRATION; PERINEURAL at 15:13

## 2022-07-05 NOTE — PROGRESS NOTES
Follow Up Visit     Alhaji Diego returns today for follow up visit regarding bilateral knee osteoarthritis. Series of cortisone injections with good relief of symptoms. She reports left knee pain remains improved. She reports return of right knee pain requesting an injection to the right knee today. REVIEW OF SYSTEMS:     Constitutional:  Negative for weight loss, fevers, chills, fatigue  Cardiovascular: Negative for chest pain, palpitations  Pulmonary: Negative for shortness of breath, labored breathing, cough  GI: negative for abdominal pain, nausea, vomitting   MSK: per HPI  Skin: negative for rash, open wounds    All other systems reviewed and are negative       Physical Exam:     Height: 5' 5\" (1.651 m), Weight: 229 lb (103.9 kg) (per pt)    General: Alert and oriented x3, no acute distress  Cardiovascular/pulmonary: No labored breathing, peripheral perfusion intact  Musculoskeletal:    Right knee exam range of motion 0-110, stable patella tracked midline. Valgus varus testing intact. No swelling deformity or palpable effusion present    Controlled Substances Monitoring:      Imaging:  No new imaging obtained today. Previous imaging reviewed showing bone-on-bone degenerative changes to both knees right greater than left    Procedure Note: Knee Cortisone injection     The right knee was identified as the injection site. The risk and benefits of a cortisone injection were explained and the patient consented to the injection. Under sterile conditions, the knee was injected with a mixture of 40 mg of Kenalog and 4 mL of 1% Lidocaine without complication. A sterile bandage was applied.     Administrations This Visit     lidocaine 1 % injection 4 mL     Admin Date  07/05/2022 Action  Given Dose  4 mL Route  Intra-artICUlar Administered By  Yesy Hussein RN          triamcinolone acetonide [facility]) injection 40 mg     Admin Date  07/05/2022 Action  Given Dose  40 mg Route  Intra-artICUlar Administered By  Babita Davies RN                    Assessment: Bilateral knee osteoarthritis right symptomatic today      Plan: Today we discussed both her knees. She reports good relief of symptoms following previous injections. She reports today that left knee pain remains improved. She reports that the right knee pain has been worsening over the last several weeks requested repeat injections today. Cortisone injection was agreed to and performed today. She will follow-up in 3 months.         GENTRY Bower-CNP  Orthopedic Surgery   07/05/22  3:56 PM

## 2022-07-07 ENCOUNTER — HOSPITAL ENCOUNTER (OUTPATIENT)
Age: 67
Discharge: HOME | End: 2022-07-07
Payer: MEDICARE

## 2022-07-07 LAB
ALBUMIN SERPL-MCNC: 3.8 G/DL (ref 3.5–5.2)
ALP BLD-CCNC: 101 U/L (ref 35–104)
ALT SERPL-CCNC: 7 U/L (ref 0–32)
ANION GAP SERPL CALCULATED.3IONS-SCNC: 15 MMOL/L (ref 7–16)
AST SERPL-CCNC: 12 U/L (ref 0–31)
BILIRUB SERPL-MCNC: 0.5 MG/DL (ref 0–1.2)
BUN BLDV-MCNC: 25 MG/DL (ref 6–23)
CALCIUM SERPL-MCNC: 9.6 MG/DL (ref 8.6–10.2)
CHLORIDE BLD-SCNC: 105 MMOL/L (ref 98–107)
CO2: 23 MMOL/L (ref 22–29)
CREAT SERPL-MCNC: 1.1 MG/DL (ref 0.5–1)
GFR AFRICAN AMERICAN: 60
GFR NON-AFRICAN AMERICAN: 49 ML/MIN/1.73
GLUCOSE BLD-MCNC: 121 MG/DL (ref 74–99)
MAGNESIUM: 1.7 MG/DL (ref 1.6–2.6)
POTASSIUM SERPL-SCNC: 4.1 MMOL/L (ref 3.5–5)
SODIUM BLD-SCNC: 143 MMOL/L (ref 132–146)
TOTAL PROTEIN: 6.9 G/DL (ref 6.4–8.3)

## 2022-07-07 PROCEDURE — 83735 ASSAY OF MAGNESIUM: CPT

## 2022-07-07 PROCEDURE — 80053 COMPREHEN METABOLIC PANEL: CPT

## 2022-07-07 PROCEDURE — 36415 COLL VENOUS BLD VENIPUNCTURE: CPT

## 2022-07-08 ENCOUNTER — HOSPITAL ENCOUNTER (OUTPATIENT)
Dept: ULTRASOUND IMAGING | Age: 67
Discharge: HOME | End: 2022-07-10
Payer: MEDICARE

## 2022-07-08 DIAGNOSIS — E04.2 MULTINODULAR GOITER: ICD-10-CM

## 2022-07-08 PROCEDURE — 76536 US EXAM OF HEAD AND NECK: CPT

## 2022-07-15 ENCOUNTER — HOSPITAL ENCOUNTER (EMERGENCY)
Age: 67
Discharge: LEFT BEFORE DISCHARGE | End: 2022-07-15
Payer: OTHER MISCELLANEOUS

## 2022-07-15 ENCOUNTER — APPOINTMENT (OUTPATIENT)
Dept: CT IMAGING | Age: 67
End: 2022-07-15
Payer: OTHER MISCELLANEOUS

## 2022-07-15 VITALS
HEIGHT: 65 IN | SYSTOLIC BLOOD PRESSURE: 153 MMHG | WEIGHT: 235 LBS | OXYGEN SATURATION: 97 % | RESPIRATION RATE: 20 BRPM | BODY MASS INDEX: 39.15 KG/M2 | DIASTOLIC BLOOD PRESSURE: 76 MMHG | TEMPERATURE: 97.9 F | HEART RATE: 58 BPM
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
## 2022-07-15 DIAGNOSIS — S16.1XXA ACUTE STRAIN OF NECK MUSCLE, INITIAL ENCOUNTER: ICD-10-CM

## 2022-07-15 PROCEDURE — 99282 EMERGENCY DEPT VISIT SF MDM: CPT

## 2022-07-15 ASSESSMENT — PAIN - FUNCTIONAL ASSESSMENT: PAIN_FUNCTIONAL_ASSESSMENT: 0-10

## 2022-07-15 ASSESSMENT — PAIN DESCRIPTION - PAIN TYPE: TYPE: ACUTE PAIN

## 2022-07-15 ASSESSMENT — PAIN DESCRIPTION - DESCRIPTORS: DESCRIPTORS: POUNDING;PRESSURE;SHARP

## 2022-07-15 ASSESSMENT — PAIN SCALES - GENERAL: PAINLEVEL_OUTOF10: 8

## 2022-07-15 ASSESSMENT — PAIN DESCRIPTION - LOCATION: LOCATION: NECK

## 2022-07-15 ASSESSMENT — PAIN DESCRIPTION - ORIENTATION: ORIENTATION: MID

## 2022-07-16 NOTE — ED NOTES
Refuses CT scans. States she feels fine. Instructed to return to ED if condition worsens. Instructed to apply ice to areas of discomfort. Instructed to return to ED if any HA, N/V, numbness, tingling or weakness, trouble talking or walking. Verbalized understanding.   Patient's family member removed patient's cervical collar     Alina Solo RN  07/15/22 [id number]
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
## 2022-07-21 ENCOUNTER — TELEPHONE (OUTPATIENT)
Dept: FAMILY MEDICINE CLINIC | Age: 67
End: 2022-07-21

## 2022-07-21 NOTE — TELEPHONE ENCOUNTER
Pt called in stating that she has been having bilateral foot swelling the last couple days she is wondering if you would be willing to put her back on the water pill?

## 2022-08-05 ENCOUNTER — HOSPITAL ENCOUNTER (OUTPATIENT)
Age: 67
Discharge: HOME | End: 2022-08-05
Payer: MEDICARE

## 2022-08-05 LAB
ANION GAP SERPL CALCULATED.3IONS-SCNC: 11 MMOL/L (ref 7–16)
BUN BLDV-MCNC: 19 MG/DL (ref 6–23)
CALCIUM SERPL-MCNC: 10.3 MG/DL (ref 8.6–10.2)
CHLORIDE BLD-SCNC: 104 MMOL/L (ref 98–107)
CO2: 25 MMOL/L (ref 22–29)
CREAT SERPL-MCNC: 1.1 MG/DL (ref 0.5–1)
GFR AFRICAN AMERICAN: 60
GFR NON-AFRICAN AMERICAN: 49 ML/MIN/1.73
GLUCOSE BLD-MCNC: 89 MG/DL (ref 74–99)
POTASSIUM SERPL-SCNC: 3.9 MMOL/L (ref 3.5–5)
SODIUM BLD-SCNC: 140 MMOL/L (ref 132–146)

## 2022-08-05 PROCEDURE — 36415 COLL VENOUS BLD VENIPUNCTURE: CPT

## 2022-08-05 PROCEDURE — 80048 BASIC METABOLIC PNL TOTAL CA: CPT

## 2022-08-10 ENCOUNTER — OFFICE VISIT (OUTPATIENT)
Dept: FAMILY MEDICINE CLINIC | Age: 67
End: 2022-08-10

## 2022-08-10 VITALS
HEIGHT: 65 IN | DIASTOLIC BLOOD PRESSURE: 70 MMHG | BODY MASS INDEX: 38.22 KG/M2 | TEMPERATURE: 97.5 F | RESPIRATION RATE: 18 BRPM | OXYGEN SATURATION: 94 % | WEIGHT: 229.4 LBS | SYSTOLIC BLOOD PRESSURE: 128 MMHG | HEART RATE: 64 BPM

## 2022-08-10 DIAGNOSIS — Z53.21 PATIENT LEFT WITHOUT BEING SEEN: Primary | ICD-10-CM

## 2022-08-10 PROCEDURE — 99999 PR OFFICE/OUTPT VISIT,PROCEDURE ONLY: CPT | Performed by: FAMILY MEDICINE

## 2022-08-10 RX ORDER — FUROSEMIDE 20 MG/1
20 TABLET ORAL
COMMUNITY
Start: 2022-07-22

## 2022-08-10 RX ORDER — LANOLIN ALCOHOL/MO/W.PET/CERES
400 CREAM (GRAM) TOPICAL DAILY
Qty: 30 TABLET | Refills: 0 | Status: CANCELLED | OUTPATIENT
Start: 2022-08-10

## 2022-08-11 RX ORDER — LANOLIN ALCOHOL/MO/W.PET/CERES
400 CREAM (GRAM) TOPICAL DAILY
Qty: 30 TABLET | Refills: 0 | Status: SHIPPED
Start: 2022-08-11 | End: 2022-08-12 | Stop reason: SDUPTHER

## 2022-08-12 RX ORDER — LANOLIN ALCOHOL/MO/W.PET/CERES
400 CREAM (GRAM) TOPICAL DAILY
Qty: 30 TABLET | Refills: 0 | Status: SHIPPED
Start: 2022-08-12 | End: 2022-10-12 | Stop reason: SDUPTHER

## 2022-08-16 DIAGNOSIS — N39.41 URGE INCONTINENCE: ICD-10-CM

## 2022-08-16 RX ORDER — OXYBUTYNIN CHLORIDE 10 MG/1
TABLET, EXTENDED RELEASE ORAL
Qty: 60 TABLET | Refills: 5 | Status: SHIPPED
Start: 2022-08-16 | End: 2022-09-27 | Stop reason: SDUPTHER

## 2022-09-12 RX ORDER — ZINC SULFATE 50(220)MG
CAPSULE ORAL
Qty: 30 CAPSULE | Refills: 10 | Status: SHIPPED
Start: 2022-09-12 | End: 2022-10-03 | Stop reason: SDUPTHER

## 2022-09-15 ENCOUNTER — OFFICE VISIT (OUTPATIENT)
Dept: FAMILY MEDICINE CLINIC | Age: 67
End: 2022-09-15
Payer: MEDICARE

## 2022-09-15 VITALS
HEART RATE: 61 BPM | TEMPERATURE: 97.5 F | WEIGHT: 227 LBS | SYSTOLIC BLOOD PRESSURE: 136 MMHG | BODY MASS INDEX: 37.82 KG/M2 | DIASTOLIC BLOOD PRESSURE: 82 MMHG | OXYGEN SATURATION: 95 % | HEIGHT: 65 IN | RESPIRATION RATE: 18 BRPM

## 2022-09-15 DIAGNOSIS — I10 ESSENTIAL HYPERTENSION: Primary | ICD-10-CM

## 2022-09-15 DIAGNOSIS — N39.41 URGE INCONTINENCE: ICD-10-CM

## 2022-09-15 DIAGNOSIS — Z23 NEED FOR VACCINATION: ICD-10-CM

## 2022-09-15 DIAGNOSIS — E78.00 PURE HYPERCHOLESTEROLEMIA: Chronic | ICD-10-CM

## 2022-09-15 PROCEDURE — 99214 OFFICE O/P EST MOD 30 MIN: CPT | Performed by: FAMILY MEDICINE

## 2022-09-15 PROCEDURE — 1123F ACP DISCUSS/DSCN MKR DOCD: CPT | Performed by: FAMILY MEDICINE

## 2022-09-15 PROCEDURE — 0509F URINE INCON PLAN DOCD: CPT | Performed by: FAMILY MEDICINE

## 2022-09-15 PROCEDURE — 3017F COLORECTAL CA SCREEN DOC REV: CPT | Performed by: FAMILY MEDICINE

## 2022-09-15 PROCEDURE — 1036F TOBACCO NON-USER: CPT | Performed by: FAMILY MEDICINE

## 2022-09-15 PROCEDURE — 90694 VACC AIIV4 NO PRSRV 0.5ML IM: CPT | Performed by: FAMILY MEDICINE

## 2022-09-15 PROCEDURE — G0008 ADMIN INFLUENZA VIRUS VAC: HCPCS | Performed by: FAMILY MEDICINE

## 2022-09-15 PROCEDURE — G8417 CALC BMI ABV UP PARAM F/U: HCPCS | Performed by: FAMILY MEDICINE

## 2022-09-15 PROCEDURE — G8399 PT W/DXA RESULTS DOCUMENT: HCPCS | Performed by: FAMILY MEDICINE

## 2022-09-15 PROCEDURE — G8427 DOCREV CUR MEDS BY ELIG CLIN: HCPCS | Performed by: FAMILY MEDICINE

## 2022-09-15 PROCEDURE — 1090F PRES/ABSN URINE INCON ASSESS: CPT | Performed by: FAMILY MEDICINE

## 2022-09-15 RX ORDER — PRAVASTATIN SODIUM 10 MG
TABLET ORAL
COMMUNITY
Start: 2022-09-10 | End: 2022-09-27 | Stop reason: SDUPTHER

## 2022-09-15 RX ORDER — DIAPER,BRIEF,ADULT, DISPOSABLE
EACH MISCELLANEOUS
Qty: 100 EACH | Refills: 5 | Status: SHIPPED | OUTPATIENT
Start: 2022-09-15

## 2022-09-15 SDOH — ECONOMIC STABILITY: FOOD INSECURITY: WITHIN THE PAST 12 MONTHS, YOU WORRIED THAT YOUR FOOD WOULD RUN OUT BEFORE YOU GOT MONEY TO BUY MORE.: NEVER TRUE

## 2022-09-15 SDOH — ECONOMIC STABILITY: FOOD INSECURITY: WITHIN THE PAST 12 MONTHS, THE FOOD YOU BOUGHT JUST DIDN'T LAST AND YOU DIDN'T HAVE MONEY TO GET MORE.: NEVER TRUE

## 2022-09-15 ASSESSMENT — SOCIAL DETERMINANTS OF HEALTH (SDOH): HOW HARD IS IT FOR YOU TO PAY FOR THE VERY BASICS LIKE FOOD, HOUSING, MEDICAL CARE, AND HEATING?: NOT HARD AT ALL

## 2022-09-15 NOTE — PROGRESS NOTES
Hypertension:  Patient is here for follow up chronic hypertension. This is  generally controlled on current medication regimen. Takes meds as directed and tolerates them well. Most recent labs reviewed with patient and are not remarkable. No symptoms from htn standpoint per ROS. Patient is  compliant with lifestyle modifications. Patient does not smoke. Comorbid conditions include htn, CKD, hyperlipidemia. Hyperlipidemia:  Patient presents with hyperlipidemia. Is asymptomatic. This is a chronic problem. Patient is  well controlled, as reviewed and seen on most recent labs. Is currently on pravachol. Compliance with treatment thus far has been fair. No adverse effects. The patient does not use medications that may worsen dyslipidemias (corticosteroids, progestins, anabolic steroids, diuretics, beta-blockers, amiodarone, cyclosporine, olanzapine). The patient exercises rarely. Patient is not a smoker. Patient states that she needs a new prescription for depends. She says the one that she is getting from her medical supply company are too big. She is down to a size large. Patient's past medical, surgical, social and/or family history reviewed, updated in chart, and are non-contributory (unless otherwise stated). Medications and allergies also reviewed and updated in chart.         Review of Systems:  Constitutional:  No fever, no fatigue, no chills, no headaches, no weight change  Dermatology:  No rash, no mole, no dry or sensitive skin  ENT:  No cough, no sore throat, no sinus pain, no runny nose, no ear pain  Cardiology:  No chest pain, no palpitations, no leg edema, no shortness of breath, no PND  Gastroenterology:  No dysphagia, no abdominal pain, no nausea, no vomiting, no constipation, no diarrhea, no heartburn  Musculoskeletal:  No joint pain, no leg cramps, no back pain, no muscle aches  Respiratory:  No shortness of breath, no orthopnea, no wheezing, no SOTO, no hemoptysis  Urology:  No blood in the urine, no urinary frequency, + urinary incontinence, no urinary urgency, no nocturia, no dysuria      Vitals:    09/15/22 1124   BP: 136/82   Pulse: 61   Resp: 18   Temp: 97.5 °F (36.4 °C)   SpO2: 95%   Weight: 227 lb (103 kg)   Height: 5' 5\" (1.651 m)       Physical Exam  Vitals and nursing note reviewed. Constitutional:       Appearance: She is well-developed. She is obese. HENT:      Head: Normocephalic and atraumatic. Right Ear: External ear normal.      Left Ear: External ear normal.      Nose: Nose normal.   Eyes:      Conjunctiva/sclera: Conjunctivae normal.      Pupils: Pupils are equal, round, and reactive to light. Neck:      Thyroid: No thyromegaly. Cardiovascular:      Rate and Rhythm: Normal rate and regular rhythm. Heart sounds: Normal heart sounds. Pulmonary:      Effort: Pulmonary effort is normal.      Breath sounds: Normal breath sounds. No wheezing. Abdominal:      General: Bowel sounds are normal.      Palpations: Abdomen is soft. Tenderness: There is no abdominal tenderness. Musculoskeletal:         General: Normal range of motion. Cervical back: Normal range of motion and neck supple. Skin:     General: Skin is warm and dry. Findings: No rash. Neurological:      Mental Status: She is alert and oriented to person, place, and time. Deep Tendon Reflexes: Reflexes are normal and symmetric. Psychiatric:         Behavior: Behavior normal.       Assessment/Plan:      Manuel Quarles was seen today for hypertension. Diagnoses and all orders for this visit:    Essential hypertension  Blood pressure well controlled  Diet and exercise were discussed and recommended to the patient. Continue toprol as prescribed. Pure hypercholesterolemia  -     Lipid Panel; Future  Labs ordered  Diet and exercise were discussed and recommended to the patient. Continue pravachol.     Urge incontinence  -     Incontinence Supply Disposable (DEPEND UNDERWEAR LARGE) MISC; Use as directed    Need for vaccination  -     Influenza, FLUAD, (age 72 y+), IM, PF, 0.5 mL    As above. Call or go to ED immediately if symptoms worsen or persist.  Return in about 3 months (around 12/15/2022) for htn, hyperlipidemia. , or sooner if necessary. Educational materials and/or home exercises printed for patient's review and were included in patient instructions on his/her After Visit Summary and given to patient at the end of visit. Counseled regarding above diagnosis, including possible risks and complications,  especially if left uncontrolled. Counseled regarding the possible side effects, risks, benefits and alternatives to treatment; patient and/or guardian verbalizes understanding, agrees, feels comfortable with and wishes to proceed with above treatment plan. Advised patient to call with any new medication issues, and read all Rx info from pharmacy to assure aware of all possible risks and side effects of medication before taking. Reviewed age and gender appropriate health screening exams and vaccinations. Advised patient regarding importance of keeping up with recommended health maintenance and to schedule as soon as possible if overdue, as this is important in assessing for undiagnosed pathology, especially cancer, as well as protecting against potentially harmful/life threatening disease. Patient and/or guardian verbalizes understanding and agrees with above counseling, assessment and plan. All questions answered. Vineet Alex DO  9/15/2022    I have personally reviewed and updated the chief complaint, HPI, Past Medical, Family and Social History, as well as the above Review of Systems.

## 2022-09-19 DIAGNOSIS — I10 ESSENTIAL HYPERTENSION: ICD-10-CM

## 2022-09-19 RX ORDER — METOPROLOL SUCCINATE 25 MG/1
TABLET, EXTENDED RELEASE ORAL
Qty: 45 TABLET | Refills: 3 | Status: SHIPPED
Start: 2022-09-19 | End: 2022-10-03 | Stop reason: SDUPTHER

## 2022-09-26 ENCOUNTER — OFFICE VISIT (OUTPATIENT)
Dept: FAMILY MEDICINE CLINIC | Age: 67
End: 2022-09-26
Payer: MEDICARE

## 2022-09-26 ENCOUNTER — CLINICAL DOCUMENTATION (OUTPATIENT)
Dept: SPIRITUAL SERVICES | Age: 67
End: 2022-09-26

## 2022-09-26 ENCOUNTER — TELEPHONE (OUTPATIENT)
Dept: FAMILY MEDICINE CLINIC | Age: 67
End: 2022-09-26

## 2022-09-26 VITALS
WEIGHT: 222 LBS | HEART RATE: 90 BPM | SYSTOLIC BLOOD PRESSURE: 128 MMHG | RESPIRATION RATE: 16 BRPM | HEIGHT: 65 IN | OXYGEN SATURATION: 95 % | BODY MASS INDEX: 36.99 KG/M2 | TEMPERATURE: 97.7 F | DIASTOLIC BLOOD PRESSURE: 78 MMHG

## 2022-09-26 DIAGNOSIS — L03.116 CELLULITIS OF LEFT LOWER EXTREMITY: ICD-10-CM

## 2022-09-26 DIAGNOSIS — S70.362A INSECT BITE OF LEFT THIGH, INITIAL ENCOUNTER: Primary | ICD-10-CM
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
## 2022-09-26 PROCEDURE — 3017F COLORECTAL CA SCREEN DOC REV: CPT | Performed by: NURSE PRACTITIONER

## 2022-09-26 PROCEDURE — 99213 OFFICE O/P EST LOW 20 MIN: CPT | Performed by: NURSE PRACTITIONER

## 2022-09-26 PROCEDURE — 1123F ACP DISCUSS/DSCN MKR DOCD: CPT | Performed by: NURSE PRACTITIONER

## 2022-09-26 PROCEDURE — 1090F PRES/ABSN URINE INCON ASSESS: CPT | Performed by: NURSE PRACTITIONER

## 2022-09-26 PROCEDURE — 1036F TOBACCO NON-USER: CPT | Performed by: NURSE PRACTITIONER

## 2022-09-26 PROCEDURE — G8427 DOCREV CUR MEDS BY ELIG CLIN: HCPCS | Performed by: NURSE PRACTITIONER

## 2022-09-26 PROCEDURE — G8399 PT W/DXA RESULTS DOCUMENT: HCPCS | Performed by: NURSE PRACTITIONER

## 2022-09-26 PROCEDURE — G8417 CALC BMI ABV UP PARAM F/U: HCPCS | Performed by: NURSE PRACTITIONER

## 2022-09-26 RX ORDER — METHYLPREDNISOLONE 4 MG/1
TABLET ORAL
Qty: 1 KIT | Refills: 0 | Status: SHIPPED | OUTPATIENT
Start: 2022-09-26

## 2022-09-26 RX ORDER — DOXYCYCLINE HYCLATE 100 MG/1
100 CAPSULE ORAL 2 TIMES DAILY
Qty: 14 CAPSULE | Refills: 0 | Status: SHIPPED | OUTPATIENT
Start: 2022-09-26 | End: 2022-10-03

## 2022-09-26 NOTE — PROGRESS NOTES
Advance Care Planning   Ambulatory ACP Specialist Patient Outreach    Date:  9/26/2022  ACP Specialist:  NEPTALI Arreola    Outreach call to patient in follow-up to ACP Specialist referral from: Tammi Bui DO    [x] PCP  [] Provider   [] Ambulatory Care Management [] Other for Reason:    [x] Advance Directive Assistance  [] Code Status Discussion  [] Complete Portable DNR Order  [] Discuss Goals of Care  [] Complete POST/MOST  [] Early ACP Decision-Making  [] Other    Date Referral Received: 5/13/22    Today's Outreach:  [] First   [] Second  [x] Fourth                               Third outreach made by []  phone  [] email []   [employer]     Intervention:  [] Spoke with Patient  [x] Left VM requesting return call      Outcome: ACP Specialist left message for patient and sent a [employer] message to offer assistance with ACP. Referral will be closed unless patient responds. Next Step:   [] ACP scheduled conversation  [] Outreach again in one week               [] Email / Mail ACP Info Sheets  [] Email / Mail Advance Directive            [x] Close Referral. Routing closure to referring provider/staff and to ACP Specialist . [] Closure Letter mailed to Patient with Invitation to Contact ACP Specialist if/when ready. Thank you for this referral.      9/27/22 Addendum:  Patient returned call to staff and requested an in person ACP conversation.  Staff will contact chaplains in ' ans for [address] for patient and follow up with this request.

## 2022-09-26 NOTE — PROGRESS NOTES
22  Chad Mcintosh : 1955 Sex: female  Age 69 y.o. Subjective:  Chief Complaint   Patient presents with    Cyst     Red lump left leg, left leg upper thigh and lower leg , painful, 3 days ago       HPI:   Chad Mcintosh , 69 y.o. female presents to the clinic for evaluation of reddened area to the left thigh x 3 days. The patient also reports the area is warm to touch, tender, and swollen. Patient reports possible insect bite. The patient has not taken any treatment for symptoms. The patient reports changed symptoms over time. The patient denies bleeding, drainage, fever, lymphogenic streaking, myalgia, arthralgia, chills, and lethargy. The patient also denies headache, chest pain, abdominal pain, shortness of breath, and nausea / vomiting / diarrhea. ROS:   Unless otherwise stated in this report the patient's positive and negative responses for review of systems for constitutional, eyes, ENT, cardiovascular, respiratory, gastrointestinal, neurological, , musculoskeletal, and integument systems and related systems to the presenting problem are either stated in the history of present illness or were not pertinent or were negative for the symptoms and/or complaints related to the presenting medical problem. Positives and pertinent negatives as per HPI. All others reviewed and are negative.       PMH:     Past Medical History:   Diagnosis Date    Anxiety     Deep vein blood clot of left lower extremity ([address] 2019    Family history of early CAD     Hyperlipemia     Hypertension     Lightheadedness     Multinodular goiter     Sleep apnea     SOBOE (shortness of breath on exertion)        Past Surgical History:   Procedure Laterality Date    BREAST SURGERY Left     biopsy    CARDIOVASCULAR STRESS TEST      COLONOSCOPY      COLONOSCOPY N/A 3/29/2022    COLONOSCOPY POLYPECTOMY SNARE/COLD BIOPSY performed by Jie Liriano MD at [address]  3/29/2022 COLONOSCOPY CONTROL HEMORRHAGE performed by Hien Simms MD at [facility]  05/2017    mouth/under tongue    HYSTERECTOMY, TOTAL ABDOMINAL (CERVIX REMOVED)  1990    KNEE ARTHROSCOPY Left 2005    LEG SURGERY Left 1963    ligament repair    SALIVARY GLAND SURGERY Left 05/24/2017    TONSILLECTOMY      childhood    UPPER GASTROINTESTINAL ENDOSCOPY N/A 3/29/2022    EGD BIOPSY performed by Hien Simms MD at [facility] 57 History   Problem Relation Age of Onset    Heart Disease Mother     Pacemaker Mother     COPD Mother     Heart Failure Mother     Heart Disease Father     Cancer Father         prostate    Hypertension Father     Coronary Art Dis Father         63's    Prostate Cancer Father     Hypertension Sister     Diabetes Sister     Heart Disease Sister     Diabetes Sister     COPD Sister     Heart Disease Brother         age 54    Heart Attack Brother     Hypertension Son     Clotting Disorder Son        Medications:     Current Outpatient Medications:     methylPREDNISolone (MEDROL DOSEPACK) 4 MG tablet, Take by mouth., Disp: 1 kit, Rfl: 0    doxycycline hyclate (VIBRAMYCIN) 100 MG capsule, Take 1 capsule by mouth 2 times daily for 7 days, Disp: 14 capsule, Rfl: 0    metoprolol succinate (TOPROL XL) 25 MG extended release tablet, TAKE 1/2 TABLET (12.5 MG) BY MOUTH ONCE DAILY. , Disp: 45 tablet, Rfl: 3    pravastatin (PRAVACHOL) 10 MG tablet, , Disp: , Rfl:     Incontinence Supply Disposable (DEPEND UNDERWEAR LARGE) [facility], Use as directed, Disp: 100 each, Rfl: 5    Zinc 220 (50 Zn) MG CAPS, TAKE 1 CAPSULE BY MOUTH ONCE DAILY, Disp: 30 capsule, Rfl: 10    oxybutynin (DITROPAN-XL) 10 MG extended release tablet, TAKE 2 TABLETS (20 MG) BY MOUTH ONCE DAILY, Disp: 60 tablet, Rfl: 5    magnesium oxide (MAG-OX) 400 (240 Mg) MG tablet, Take 1 tablet by mouth in the morning., Disp: 30 tablet, Rfl: 0    buPROPion (WELLBUTRIN XL) 150 MG extended release tablet, TAKE 1 TABLET BY MOUTH EVERY MORNING, Disp: 30 tablet, Rfl: 10    escitalopram (LEXAPRO) 20 MG tablet, TAKE 1 TABLET BY MOUTH ONCE DAILY, Disp: 30 tablet, Rfl: 10    dicyclomine (BENTYL) 20 MG tablet, Take 1 tablet by mouth 4 times daily, Disp: 15 tablet, Rfl: 0    ELIQUIS 5 MG TABS tablet, TAKE 1 TABLET BY MOUTH TWICE DAILY, Disp: 60 tablet, Rfl: 10    ascorbic acid (V-R VITAMIN C) 250 MG tablet, Take 2 tablets by mouth daily, Disp: 60 tablet, Rfl: 0    vitamin D (CHOLECALCIFEROL) 50 MCG (2000 UT) TABS tablet, Take 1 tablet by mouth daily, Disp: 30 tablet, Rfl: 0    Multiple Vitamins-Minerals (THERAPEUTIC MULTIVITAMIN-MINERALS) tablet, Take 1 tablet by mouth daily, Disp: , Rfl:     CALCIUM CITRATE PO, Take 1,000 mg by mouth daily OTC, Disp: , Rfl:     furosemide (LASIX) 20 MG tablet, Take 20 mg by mouth three times a week, Disp: , Rfl:     Allergies: Allergies   Allergen Reactions    Penicillins Hives and Rash    Tape Juan Jose Standing Tape] Hives       Social History:     Social History     Tobacco Use    Smoking status: Never    Smokeless tobacco: Never   Vaping Use    Vaping Use: Never used   Substance Use Topics    Alcohol use: No    Drug use: No       Physical Exam:     Vitals:    09/26/22 0949   BP: 128/78   Pulse: 90   Resp: 16   Temp: 97.7 °F (36.5 °C)   TempSrc: Temporal   SpO2: 95%   Weight: 222 lb (100.7 kg)   Height: 5' 5\" (1.651 m)       Physical Exam (PE)   Constitutional: Alert, development consistent with age. HENT:      Head: Normocephalic. Right Ear: External ear normal.      Left Ear: External ear normal.      Nose: Normal.      Mouth/Throat:     Mouth: Mucous membranes are moist.      Pharynx: Oropharynx is clear. Eyes: Pupils: Pupils are equal, round, and reactive to light. Neck: Normal ROM. Supple. Cardiovascular: Heart RRR without pathologic murmurs or gallops. Pulmonary: Respiratory effort normal.  Normal breath sounds. Abdomen: Soft, nontender, normal bowel sounds.   Skin:  Normal turgor and appropriately dry to touch. 2 cm indurated area to left thigh with erythema, warmth, and tenderness. No bleeding or drainage. No lymphangitic streaking. No fluctuance noted. Musculoskeletal: General: Normal strength / ROM. Neurological:  Orientation age-appropriate unless noted elseware. Motor functions intact. Psychiatric: Mood and Affect: Mood normal. Behavior: Behavior normal.    Testing:   (All laboratory and radiology results have been personally reviewed by myself)  Labs:  No results found for this visit on 09/26/22. Imaging: All Radiology results interpreted by Radiologist unless otherwise noted. No orders to display       Assessment / Plan:   The patient's vitals, allergies, medications, and past medical history have been reviewed. Lalo Meraz was seen today for cyst.    Diagnoses and all orders for this visit:    Insect bite of left thigh, initial encounter  -     methylPREDNISolone (MEDROL DOSEPACK) 4 MG tablet; Take by mouth. Cellulitis of left lower extremity  -     doxycycline hyclate (VIBRAMYCIN) 100 MG capsule; Take 1 capsule by mouth 2 times daily for 7 days      - Disposition: Home    - Educational material printed for patient's review and were included in patient instructions. After Visit Summary was given to patient at the end of visit. - Discussed symptomatic treatments with the patient today. The patient is to schedule a follow-up with PCP in the next 2-3 days for reevaluation. Red flag symptoms were also discussed with the patient today. If symptoms worsen the patient is to go directly to the emergency department for reevaluation and treatment. Pt verbalizes understanding and is in agreement with plan of care. All questions answered. SIGNATURE: KAIT Sharma    *NOTE: This report was transcribed using voice recognition software. Every effort was made to ensure accuracy; however, inadvertent computerized transcription errors may be present.

## 2022-09-26 NOTE — TELEPHONE ENCOUNTER
Pt called in with a bump on her leg with redness maybe a bug bite not sure; she is going to [facility] to get checked out today

## 2022-09-27 ENCOUNTER — CLINICAL DOCUMENTATION (OUTPATIENT)
Dept: SPIRITUAL SERVICES | Age: 67
End: 2022-09-27

## 2022-09-27 DIAGNOSIS — N39.41 URGE INCONTINENCE: ICD-10-CM

## 2022-09-27 DIAGNOSIS — F32.1 CURRENT MODERATE EPISODE OF MAJOR DEPRESSIVE DISORDER WITHOUT PRIOR EPISODE (HCC): ICD-10-CM

## 2022-09-27 RX ORDER — OXYBUTYNIN CHLORIDE 10 MG/1
TABLET, EXTENDED RELEASE ORAL
Qty: 60 TABLET | Refills: 0 | Status: SHIPPED
Start: 2022-09-27 | End: 2022-10-06

## 2022-09-27 RX ORDER — ESCITALOPRAM OXALATE 20 MG/1
TABLET ORAL
Qty: 30 TABLET | Refills: 0 | Status: SHIPPED
Start: 2022-09-27 | End: 2022-10-06

## 2022-09-27 RX ORDER — BUPROPION HYDROCHLORIDE 150 MG/1
150 TABLET ORAL EVERY MORNING
Qty: 30 TABLET | Refills: 0 | Status: SHIPPED
Start: 2022-09-27 | End: 2022-10-06

## 2022-09-27 RX ORDER — PRAVASTATIN SODIUM 10 MG
10 TABLET ORAL DAILY
Qty: 30 TABLET | Refills: 0 | Status: SHIPPED
Start: 2022-09-27 | End: 2022-10-06

## 2022-09-27 NOTE — ACP (ADVANCE CARE PLANNING)
Advance Care Planning   Ambulatory ACP Specialist Patient Outreach    Date:  9/27/2022  ACP Specialist:  Curtis Merchant    Outreach call to patient in follow-up to ACP Specialist referral from: Dylan Alvarado DO    [x] PCP  [] Provider   [] Ambulatory Care Management [] Other for Reason:    [x] Advance Directive Assistance  [] Code Status Discussion  [] Complete Portable DNR Order  [] Discuss Goals of Care  [] Complete POST/MOST  [] Early ACP Decision-Making  [] Other    Date Referral Received:5/12/22    Today's Outreach:  [] First   [] Second  [] Third 4th outreach                               Third outreach made by []  phone  [] email []   [employer]     Intervention:  [] Spoke with Patient  [x] Left VM requesting return call      Outcome:I left voice message to call and schedule an appointment for ACP.  again on 9/28. Next Step:   [x] ACP scheduled conversation  [] Outreach again in one week               [] Email / Mail ACP Info Sheets  [] Email / Mail Advance Directive            [] Close Referral. Routing closure to referring provider/staff and to ACP Specialist . [] Closure Letter mailed to Patient with Invitation to Contact ACP Specialist if/when ready.     Thank you for this referral.

## 2022-09-28 NOTE — ACP (ADVANCE CARE PLANNING)
Advance Care Planning   Ambulatory ACP Specialist Patient Outreach    Date:  9/27/2022  ACP Specialist:  Adonis Mosqueda    Outreach call to patient in follow-up to ACP Specialist referral from: Maria Guadalupe Mcintosh DO    [x] PCP  [] Provider   [] Ambulatory Care Management [] Other for Reason:    [x] Advance Directive Assistance  [] Code Status Discussion  [] Complete Portable DNR Order  [] Discuss Goals of Care  [] Complete POST/MOST  [] Early ACP Decision-Making  [] Other    Date Referral Received:5/12/2022    Today's Outreach:  [] First   [] Second  [] Third                               Third outreach made by []  phone  [x] email []   [facility]     Intervention:  [] Spoke with Patient  [x] Left VM requesting return call      Outcome:I left Carmen Do a voice message and sent a e-mail with my contact information. Next Step:   [x] ACP scheduled conversation  [] Outreach again in one week               [] Email / Mail ACP Info Sheets  [] Email / Mail Advance Directive            [] Close Referral. Routing closure to referring provider/staff and to ACP Specialist . [] Closure Letter mailed to Patient with Invitation to Contact ACP Specialist if/when ready.     Thank you for this referral.

## 2022-10-01 ENCOUNTER — PATIENT MESSAGE (OUTPATIENT)
Dept: FAMILY MEDICINE CLINIC | Age: 67
End: 2022-10-01

## 2022-10-01 DIAGNOSIS — Z20.822 SUSPECTED COVID-19 VIRUS INFECTION: ICD-10-CM

## 2022-10-01 DIAGNOSIS — I10 ESSENTIAL HYPERTENSION: ICD-10-CM

## 2022-10-03 RX ORDER — IBUPROFEN 200 MG
2 CAPSULE ORAL DAILY
Qty: 60 TABLET | Refills: 5 | Status: SHIPPED
Start: 2022-10-03 | End: 2022-10-12 | Stop reason: SDUPTHER

## 2022-10-03 RX ORDER — ZINC SULFATE 50(220)MG
CAPSULE ORAL
Qty: 30 CAPSULE | Refills: 5 | Status: SHIPPED
Start: 2022-10-03 | End: 2022-10-12 | Stop reason: SDUPTHER

## 2022-10-03 RX ORDER — METOPROLOL SUCCINATE 25 MG/1
TABLET, EXTENDED RELEASE ORAL
Qty: 45 TABLET | Refills: 3 | Status: SHIPPED
Start: 2022-10-03 | End: 2022-10-29 | Stop reason: SDUPTHER

## 2022-10-03 RX ORDER — ASCORBIC ACID 250 MG
500 TABLET ORAL DAILY
Qty: 60 TABLET | Refills: 0 | Status: SHIPPED | OUTPATIENT
Start: 2022-10-03

## 2022-10-03 NOTE — TELEPHONE ENCOUNTER
From: Silke Vasquez  To: Dr. Cathleen Bañuelos: 10/1/2022 1:04 PM EDT  Subject: Pills    I would like to know if I have to take his medicine because they did not send them to methlpredni spline 4 mg. doxycycline huckster 100mg metoprolol succinate 25 mg zinc dicyline 20 Vitamins vitamin C and calcium please look on my chart and see these are the only ones I did not get let me know have a nice day

## 2022-10-06 DIAGNOSIS — N39.41 URGE INCONTINENCE: ICD-10-CM

## 2022-10-06 DIAGNOSIS — F32.1 CURRENT MODERATE EPISODE OF MAJOR DEPRESSIVE DISORDER WITHOUT PRIOR EPISODE (HCC): ICD-10-CM

## 2022-10-06 RX ORDER — BUPROPION HYDROCHLORIDE 150 MG/1
TABLET ORAL
Qty: 30 TABLET | Refills: 5 | Status: SHIPPED
Start: 2022-10-06 | End: 2022-10-29 | Stop reason: SDUPTHER

## 2022-10-06 RX ORDER — PRAVASTATIN SODIUM 10 MG
10 TABLET ORAL DAILY
Qty: 30 TABLET | Refills: 5 | Status: SHIPPED
Start: 2022-10-06 | End: 2022-10-14

## 2022-10-06 RX ORDER — ESCITALOPRAM OXALATE 20 MG/1
TABLET ORAL
Qty: 30 TABLET | Refills: 5 | Status: SHIPPED
Start: 2022-10-06 | End: 2022-10-12 | Stop reason: SDUPTHER

## 2022-10-06 RX ORDER — OXYBUTYNIN CHLORIDE 10 MG/1
TABLET, EXTENDED RELEASE ORAL
Qty: 60 TABLET | Refills: 5 | Status: SHIPPED
Start: 2022-10-06 | End: 2022-10-29 | Stop reason: SDUPTHER

## 2022-10-09 NOTE — PROGRESS NOTES
Follow Up Visit     Dian Higgins returns today for follow up visit regarding right and left knee OA. Treatment thus far has included Cortisone injection, last injection 11/27/19. She reports symptoms are improved but knee pain started to return a few weeks ago. Patient is requesting bilateral knee injections today. Physical Exam:     Height: 5' 5\" (1.651 m), Weight: 260 lb (117.9 kg), BP: (!) 157/81    General: Alert and oriented x3, no acute distress  Cardiovascular/pulmonary: No labored breathing, peripheral perfusion intact  Musculoskeletal:    On exam of the knees, there is medial joint line tenderness bilateral.  Range of motion is about 5 to 120 degrees. Knees are stable    Controlled Substances Monitoring:      Imaging:  No new images. Previous images reviewed showing bilateral knee osteoarthritis    Procedure Note: Knee Cortisone injection     The bilateral knees were identified as the injection site. The risk and benefits of a cortisone injection were explained and the patient consented to the injection. Under sterile conditions, each knee was injected with a mixture of 40 mg of Kenalog and 4 mL of 1% Lidocaine without complication. A sterile bandage was applied. Administrations This Visit     lidocaine 1 % injection 4 mL     Admin Date  02/28/2020 Action  Given Dose  4 mL Route  Intra-articular Administered By  Dano Ardon RN    Admin Date  02/28/2020 Action  Given Dose  4 mL Route  Intra-articular Administered By  Dano Ardon RN          triamcinolone acetonide [facility]) injection 40 mg     Admin Date  02/28/2020 Action  Given Dose  40 mg Route  Intra-articular Administered By  Dano Ardon RN    Admin Date  02/28/2020 Action  Given Dose  40 mg Route  Intra-articular Administered By  Dano Ardon RN                  Assessment: Bilateral knee osteoarthritis      Plan:   She has had return of her pain. She would like to try steroid shots.  Symptoms

## 2022-10-11 NOTE — ACP (ADVANCE CARE PLANNING)
Advance Care Planning   Ambulatory ACP Specialist Patient Outreach    Date:  10/11/2022  ACP Specialist:  Juan David Akins    Outreach call to patient in follow-up to ACP Specialist referral from: Karen Pennington DO    [x] PCP  [] Provider   [] Ambulatory Care Management [] Other for Reason:    [x] Advance Directive Assistance  [] Code Status Discussion  [] Complete Portable DNR Order  [] Discuss Goals of Care  [] Complete POST/MOST  [] Early ACP Decision-Making  [] Other    Date Referral Received:5/12/2022    Today's Outreach:  [] First   [] Second  [] Third                              Third outreach made by []  phone  [] email []   [employer]     Intervention:  [] Spoke with Patient  [] Left VM requesting return call      Outcome:    Several attempts to reach patient. I am closing referral.    Next Step:   [] ACP scheduled conversation  [] Outreach again in one week               [] Email / Mail ACP Info Sheets  [] Email / Mail Advance Directive            [x] Close Referral. Routing closure to referring provider/staff and to ACP Specialist . [] Closure Letter mailed to Patient with Invitation to Contact ACP Specialist if/when ready.     Thank you for this referral.

## 2022-10-12 ENCOUNTER — TELEPHONE (OUTPATIENT)
Dept: FAMILY MEDICINE CLINIC | Age: 67
End: 2022-10-12
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
## 2022-10-12 DIAGNOSIS — S70.362A INSECT BITE OF LEFT THIGH, INITIAL ENCOUNTER: ICD-10-CM

## 2022-10-12 DIAGNOSIS — F32.1 CURRENT MODERATE EPISODE OF MAJOR DEPRESSIVE DISORDER WITHOUT PRIOR EPISODE (HCC): ICD-10-CM

## 2022-10-12 DIAGNOSIS — N39.41 URGE INCONTINENCE: ICD-10-CM

## 2022-10-12 RX ORDER — ZINC SULFATE 50(220)MG
CAPSULE ORAL
Qty: 30 CAPSULE | Refills: 5 | OUTPATIENT
Start: 2022-10-12

## 2022-10-12 RX ORDER — OXYBUTYNIN CHLORIDE 10 MG/1
TABLET, EXTENDED RELEASE ORAL
Qty: 60 TABLET | Refills: 5 | OUTPATIENT
Start: 2022-10-12

## 2022-10-12 RX ORDER — ESCITALOPRAM OXALATE 20 MG/1
TABLET ORAL
Qty: 30 TABLET | Refills: 5 | Status: SHIPPED
Start: 2022-10-12 | End: 2022-10-29 | Stop reason: SDUPTHER

## 2022-10-12 RX ORDER — METHYLPREDNISOLONE 4 MG/1
TABLET ORAL
Qty: 1 KIT | Refills: 0 | OUTPATIENT
Start: 2022-10-12

## 2022-10-12 RX ORDER — ZINC SULFATE 50(220)MG
CAPSULE ORAL
Qty: 30 CAPSULE | Refills: 5 | Status: SHIPPED
Start: 2022-10-12 | End: 2022-10-29 | Stop reason: SDUPTHER

## 2022-10-12 RX ORDER — CHOLECALCIFEROL (VITAMIN D3) 50 MCG
2000 TABLET ORAL DAILY
Qty: 30 TABLET | Refills: 0 | Status: SHIPPED
Start: 2022-10-12 | End: 2022-10-29 | Stop reason: SDUPTHER

## 2022-10-12 RX ORDER — LANOLIN ALCOHOL/MO/W.PET/CERES
400 CREAM (GRAM) TOPICAL DAILY
Qty: 30 TABLET | Refills: 0 | OUTPATIENT
Start: 2022-10-12

## 2022-10-12 RX ORDER — IBUPROFEN 200 MG
2 CAPSULE ORAL DAILY
Qty: 60 TABLET | Refills: 5 | Status: SHIPPED
Start: 2022-10-12 | End: 2022-10-29 | Stop reason: SDUPTHER

## 2022-10-12 RX ORDER — LANOLIN ALCOHOL/MO/W.PET/CERES
400 CREAM (GRAM) TOPICAL DAILY
Qty: 30 TABLET | Refills: 0 | Status: SHIPPED
Start: 2022-10-12 | End: 2022-10-29 | Stop reason: SDUPTHER

## 2022-10-12 RX ORDER — IBUPROFEN 200 MG
2 CAPSULE ORAL DAILY
Qty: 60 TABLET | Refills: 5 | OUTPATIENT
Start: 2022-10-12

## 2022-10-13 ENCOUNTER — HOSPITAL ENCOUNTER (OUTPATIENT)
Age: 67
Discharge: HOME | End: 2022-10-13
Payer: MEDICARE

## 2022-10-13 DIAGNOSIS — E78.00 PURE HYPERCHOLESTEROLEMIA: Chronic | ICD-10-CM

## 2022-10-13 LAB
ANION GAP SERPL CALCULATED.3IONS-SCNC: 11 MMOL/L (ref 7–16)
BUN BLDV-MCNC: 22 MG/DL (ref 6–23)
CALCIUM SERPL-MCNC: 9.4 MG/DL (ref 8.6–10.2)
CHLORIDE BLD-SCNC: 106 MMOL/L (ref 98–107)
CHOLESTEROL, TOTAL: 208 MG/DL (ref 0–199)
CO2: 25 MMOL/L (ref 22–29)
CREAT SERPL-MCNC: 0.9 MG/DL (ref 0.5–1)
GFR AFRICAN AMERICAN: >60
GFR NON-AFRICAN AMERICAN: >60 ML/MIN/1.73
GLUCOSE BLD-MCNC: 88 MG/DL (ref 74–99)
HDLC SERPL-MCNC: 69 MG/DL
LDL CHOLESTEROL CALCULATED: 115 MG/DL (ref 0–99)
POTASSIUM SERPL-SCNC: 4.5 MMOL/L (ref 3.5–5)
SODIUM BLD-SCNC: 142 MMOL/L (ref 132–146)
TRIGL SERPL-MCNC: 119 MG/DL (ref 0–149)
VLDLC SERPL CALC-MCNC: 24 MG/DL

## 2022-10-13 PROCEDURE — 80048 BASIC METABOLIC PNL TOTAL CA: CPT

## 2022-10-13 PROCEDURE — 80061 LIPID PANEL: CPT

## 2022-10-13 PROCEDURE — 36415 COLL VENOUS BLD VENIPUNCTURE: CPT

## 2022-10-14 DIAGNOSIS — E78.00 PURE HYPERCHOLESTEROLEMIA: Primary | ICD-10-CM

## 2022-10-14 RX ORDER — PRAVASTATIN SODIUM 20 MG
20 TABLET ORAL DAILY
Qty: 30 TABLET | Refills: 2 | Status: SHIPPED | OUTPATIENT
Start: 2022-10-14

## 2022-10-28 ENCOUNTER — CLINICAL DOCUMENTATION (OUTPATIENT)
Dept: SPIRITUAL SERVICES | Age: 67
End: 2022-10-28

## 2022-10-28 NOTE — ACP (ADVANCE CARE PLANNING)
Advance Care Planning   Ambulatory ACP Specialist Patient Outreach    Date:  10/28/2022  ACP Specialist:  NANCY White    Outreach call to patient in follow-up to ACP Specialist referral from: Verito Awad DO    [x] PCP  [] Provider   [] Ambulatory Care Management [] Other for Reason:    [x] Advance Directive Assistance  [] Code Status Discussion  [] Complete Portable DNR Order  [] Discuss Goals of Care  [] Complete POST/MOST  [] Early ACP Decision-Making  [] Other    Date Referral Received:05/12/2022    Today's Outreach:  MyChart referral/outreach by phone as planned today. [] First   [] Second  [] Third                               Third outreach made by []  phone  [] email []   [employer]     Intervention:  [] Spoke with Patient  [x] Left VM requesting return call      Outcome:Placed call for scheduled ACP visit as confirmed with pt via [employer]. No answer; left VM and will touch base with ACP  re: any additional follow up or closing of referral.       Next Step:   [] ACP scheduled conversation  [x] Review with ACP             [] Email / Mail [address]  [] Email / Mail Advance Directive            [] Close Referral. Routing closure to referring provider/staff and to ACP Specialist . [] Closure Letter mailed to Patient with Invitation to Contact ACP Specialist if/when ready. Thank you for this referral.    11/01/2022 ADDENDUM:  [employer] message noted pt is still interested in ACP. Placed call to pt and left VM; pt returned call immediately and was agreeable for this SW to email some date/time with the hopes of getting another ACP visit scheduled. 11/8/2022 ADDENDUM:  Sent pt another email requesting preference for date/time to schedule ACP visit. Sending [employer] message as well.     11/15/2022 ADDENDUM:  Pt returned SW call and we have rescheduled ACP for Fri 11/18/2022 at 8am.

## 2022-10-29 DIAGNOSIS — F32.1 CURRENT MODERATE EPISODE OF MAJOR DEPRESSIVE DISORDER WITHOUT PRIOR EPISODE (HCC): ICD-10-CM

## 2022-10-29 DIAGNOSIS — I10 ESSENTIAL HYPERTENSION: ICD-10-CM

## 2022-10-29 DIAGNOSIS — N39.41 URGE INCONTINENCE: ICD-10-CM

## 2022-10-31 RX ORDER — LANOLIN ALCOHOL/MO/W.PET/CERES
400 CREAM (GRAM) TOPICAL DAILY
Qty: 30 TABLET | Refills: 0 | Status: SHIPPED
Start: 2022-10-31 | End: 2022-11-05 | Stop reason: SDUPTHER

## 2022-10-31 RX ORDER — METOPROLOL SUCCINATE 25 MG/1
TABLET, EXTENDED RELEASE ORAL
Qty: 45 TABLET | Refills: 3 | Status: SHIPPED | OUTPATIENT
Start: 2022-10-31

## 2022-10-31 RX ORDER — ZINC SULFATE 50(220)MG
CAPSULE ORAL
Qty: 30 CAPSULE | Refills: 5 | Status: SHIPPED | OUTPATIENT
Start: 2022-10-31

## 2022-10-31 RX ORDER — IBUPROFEN 200 MG
2 CAPSULE ORAL DAILY
Qty: 60 TABLET | Refills: 5 | Status: SHIPPED | OUTPATIENT
Start: 2022-10-31

## 2022-10-31 RX ORDER — OXYBUTYNIN CHLORIDE 10 MG/1
TABLET, EXTENDED RELEASE ORAL
Qty: 60 TABLET | Refills: 5 | Status: SHIPPED | OUTPATIENT
Start: 2022-10-31

## 2022-10-31 RX ORDER — ESCITALOPRAM OXALATE 20 MG/1
TABLET ORAL
Qty: 30 TABLET | Refills: 5 | Status: SHIPPED | OUTPATIENT
Start: 2022-10-31

## 2022-10-31 RX ORDER — CHOLECALCIFEROL (VITAMIN D3) 50 MCG
2000 TABLET ORAL DAILY
Qty: 30 TABLET | Refills: 0 | Status: SHIPPED | OUTPATIENT
Start: 2022-10-31

## 2022-10-31 RX ORDER — BUPROPION HYDROCHLORIDE 150 MG/1
150 TABLET ORAL EVERY MORNING
Qty: 30 TABLET | Refills: 5 | Status: SHIPPED | OUTPATIENT
Start: 2022-10-31

## 2022-11-01 ENCOUNTER — HOSPITAL ENCOUNTER (OUTPATIENT)
Dept: ULTRASOUND IMAGING | Age: 67
Discharge: HOME | End: 2022-11-03
Payer: MEDICARE

## 2022-11-01 DIAGNOSIS — R10.821 RIGHT UPPER QUADRANT ABDOMINAL TENDERNESS WITH REBOUND TENDERNESS: ICD-10-CM

## 2022-11-01 PROCEDURE — 76705 ECHO EXAM OF ABDOMEN: CPT

## 2022-11-02 ENCOUNTER — TELEPHONE (OUTPATIENT)
Dept: ADMINISTRATIVE | Age: 67
End: 2022-11-02

## 2022-11-02 NOTE — TELEPHONE ENCOUNTER
Pt calling to cancel her OV this a.m. with Dr. Ori Albrecht - her alarm did not go off and she slept in. Please call her when the Jan schedule is available to r/s apt. Thank you. She will be a yearly at this point.

## 2022-11-07 RX ORDER — LANOLIN ALCOHOL/MO/W.PET/CERES
400 CREAM (GRAM) TOPICAL DAILY
Qty: 30 TABLET | Refills: 1 | Status: SHIPPED | OUTPATIENT
Start: 2022-11-07

## 2022-11-08 ENCOUNTER — HOSPITAL ENCOUNTER (OUTPATIENT)
Age: 67
Discharge: HOME | End: 2022-11-08
Payer: MEDICARE

## 2022-11-08 LAB
ALBUMIN SERPL-MCNC: 3.6 G/DL (ref 3.5–5.2)
ALP BLD-CCNC: 105 U/L (ref 35–104)
ALT SERPL-CCNC: 7 U/L (ref 0–32)
ANION GAP SERPL CALCULATED.3IONS-SCNC: 14 MMOL/L (ref 7–16)
AST SERPL-CCNC: 12 U/L (ref 0–31)
BACTERIA: ABNORMAL /HPF
BILIRUB SERPL-MCNC: 0.3 MG/DL (ref 0–1.2)
BILIRUBIN URINE: NEGATIVE
BLOOD, URINE: ABNORMAL
BUN BLDV-MCNC: 26 MG/DL (ref 6–23)
CALCIUM SERPL-MCNC: 10.3 MG/DL (ref 8.6–10.2)
CHLORIDE BLD-SCNC: 100 MMOL/L (ref 98–107)
CLARITY: CLEAR
CO2: 23 MMOL/L (ref 22–29)
COLOR: YELLOW
CREAT SERPL-MCNC: 1.1 MG/DL (ref 0.5–1)
CREATININE URINE: 103 MG/DL (ref 29–226)
GFR SERPL CREATININE-BSD FRML MDRD: 54 ML/MIN/1.73
GLUCOSE BLD-MCNC: 87 MG/DL (ref 74–99)
GLUCOSE URINE: NEGATIVE MG/DL
HCT VFR BLD CALC: 42.4 % (ref 34–48)
HEMOGLOBIN: 13.8 G/DL (ref 11.5–15.5)
KETONES, URINE: NEGATIVE MG/DL
LEUKOCYTE ESTERASE, URINE: ABNORMAL
MAGNESIUM: 1.5 MG/DL (ref 1.6–2.6)
MCH RBC QN AUTO: 29 PG (ref 26–35)
MCHC RBC AUTO-ENTMCNC: 32.5 % (ref 32–34.5)
MCV RBC AUTO: 89.1 FL (ref 80–99.9)
MICROALBUMIN UR-MCNC: <12 MG/L
MICROALBUMIN/CREAT UR-RTO: ABNORMAL (ref 0–30)
NITRITE, URINE: NEGATIVE
PARATHYROID HORMONE INTACT: 40 PG/ML (ref 15–65)
PDW BLD-RTO: 14.5 FL (ref 11.5–15)
PH UA: 6 (ref 5–9)
PHOSPHORUS: 3.2 MG/DL (ref 2.5–4.5)
PLATELET # BLD: 374 E9/L (ref 130–450)
PMV BLD AUTO: 10.3 FL (ref 7–12)
POTASSIUM SERPL-SCNC: 4.2 MMOL/L (ref 3.5–5)
PROTEIN UA: NEGATIVE MG/DL
RBC # BLD: 4.76 E12/L (ref 3.5–5.5)
RBC UA: ABNORMAL /HPF (ref 0–2)
SODIUM BLD-SCNC: 137 MMOL/L (ref 132–146)
SPECIFIC GRAVITY UA: 1.02 (ref 1–1.03)
TOTAL PROTEIN: 7.2 G/DL (ref 6.4–8.3)
UROBILINOGEN, URINE: 1 E.U./DL
VITAMIN D 25-HYDROXY: 38 NG/ML (ref 30–100)
WBC # BLD: 7.3 E9/L (ref 4.5–11.5)
WBC UA: ABNORMAL /HPF (ref 0–5)

## 2022-11-08 PROCEDURE — 82306 VITAMIN D 25 HYDROXY: CPT

## 2022-11-08 PROCEDURE — 82570 ASSAY OF URINE CREATININE: CPT

## 2022-11-08 PROCEDURE — 82044 UR ALBUMIN SEMIQUANTITATIVE: CPT

## 2022-11-08 PROCEDURE — 85027 COMPLETE CBC AUTOMATED: CPT

## 2022-11-08 PROCEDURE — 83970 ASSAY OF PARATHORMONE: CPT

## 2022-11-08 PROCEDURE — 83735 ASSAY OF MAGNESIUM: CPT

## 2022-11-08 PROCEDURE — 36415 COLL VENOUS BLD VENIPUNCTURE: CPT

## 2022-11-08 PROCEDURE — 84100 ASSAY OF PHOSPHORUS: CPT

## 2022-11-08 PROCEDURE — 80053 COMPREHEN METABOLIC PANEL: CPT

## 2022-11-08 PROCEDURE — 81001 URINALYSIS AUTO W/SCOPE: CPT

## 2022-11-18 ENCOUNTER — CLINICAL DOCUMENTATION (OUTPATIENT)
Dept: SPIRITUAL SERVICES | Age: 67
End: 2022-11-18

## 2022-11-18 NOTE — ACP (ADVANCE CARE PLANNING)
Advance Care Planning   Ambulatory ACP Specialist Patient Outreach    Date:  11/18/2022  ACP Specialist:  NANCY Todd    Outreach call to patient in follow-up to ACP Specialist referral from: Frandy Modi DO    [x] PCP  [] Provider   [] Ambulatory Care Management [] Other for Reason:    [x] Advance Directive Assistance  [] Code Status Discussion  [] Complete Portable DNR Order  [] Discuss Goals of Care  [] Complete POST/MOST  [] Early ACP Decision-Making  [] Other    Date Referral Received:05/12/2022    Today's Outreach: This outreach is one of numerous attempts to reach this pt to discuss ACP. Intervention:  [] Spoke with Patient  [x] Left VM requesting return call      Outcome:Several members of the ACP team have made outreach to this pt and have responded to UQ Communicationst requests from pt to provide ACP education, visit. ACP visit was scheduled for today at 8am per pt request. Pt did not answer. Left a detailed message for pt. This referral can be closed. Pt has ACP team contact info and can reach out if/when she is ready to proceed with the ACP process. Next Step:   [] ACP scheduled conversation  [] Outreach again in one week               [] Email / Mail ACP Info Sheets  [] Email / Mail Advance Directive            [x] Close Referral. Routing closure to referring provider/staff and to ACP Specialist . [] Closure Letter mailed to Patient with Invitation to Contact ACP Specialist if/when ready. Thank you for this referral.    01/03/2023 ADDENDUM:  Pt called and said she is now available to meet for ACP visit. Scheduled for Jan 4 at 3pm virtual visit.

## 2022-12-02 ENCOUNTER — TELEPHONE (OUTPATIENT)
Dept: FAMILY MEDICINE CLINIC | Age: 67
End: 2022-12-02

## 2022-12-02 NOTE — TELEPHONE ENCOUNTER
Pt son called in stating pt has now called off of work for the 2nd day due to abdominal pain. Pt told son that she had dome orange juice and then her stomach started to hurt super bad. Pt son is requesting an order for pt to have HIDA scan to look at her gallbladder. I offered appt for pt to come in and son stated she would but pt can only do morning appts. Can we double book? I advised pt go to ED but she refused. Please advise.

## 2022-12-08 DIAGNOSIS — E78.00 PURE HYPERCHOLESTEROLEMIA: ICD-10-CM

## 2022-12-09 RX ORDER — PRAVASTATIN SODIUM 20 MG
20 TABLET ORAL DAILY
Qty: 90 TABLET | Refills: 3 | Status: SHIPPED | OUTPATIENT
Start: 2022-12-09

## 2022-12-15 ENCOUNTER — OFFICE VISIT (OUTPATIENT)
Dept: ORTHOPEDIC SURGERY | Age: 67
End: 2022-12-15

## 2022-12-15 DIAGNOSIS — M17.0 PRIMARY OSTEOARTHRITIS OF BOTH KNEES: Primary | ICD-10-CM

## 2022-12-15 RX ORDER — TRIAMCINOLONE ACETONIDE 40 MG/ML
40 INJECTION, SUSPENSION INTRA-ARTICULAR; INTRAMUSCULAR ONCE
Status: COMPLETED | OUTPATIENT
Start: 2022-12-15 | End: 2022-12-15

## 2022-12-15 RX ORDER — LIDOCAINE HYDROCHLORIDE 10 MG/ML
4 INJECTION, SOLUTION INFILTRATION; PERINEURAL ONCE
Status: COMPLETED | OUTPATIENT
Start: 2022-12-15 | End: 2022-12-15

## 2022-12-15 RX ADMIN — TRIAMCINOLONE ACETONIDE 40 MG: 40 INJECTION, SUSPENSION INTRA-ARTICULAR; INTRAMUSCULAR at 13:35

## 2022-12-15 RX ADMIN — LIDOCAINE HYDROCHLORIDE 4 ML: 10 INJECTION, SOLUTION INFILTRATION; PERINEURAL at 13:33

## 2022-12-15 RX ADMIN — LIDOCAINE HYDROCHLORIDE 4 ML: 10 INJECTION, SOLUTION INFILTRATION; PERINEURAL at 13:34

## 2022-12-15 NOTE — PROGRESS NOTES
Follow Up Visit     Cata Gonzalez returns today for follow up visit regarding Bilateral Knee Pain. Reports previous cortisone injections this past July provided good symptom relief. She reports recent return of pain. REVIEW OF SYSTEMS:     Constitutional:  Negative for weight loss, fevers, chills, fatigue  Cardiovascular: Negative for chest pain, palpitations  Pulmonary: Negative for shortness of breath, labored breathing, cough  GI: negative for abdominal pain, nausea, vomitting   MSK: per HPI  Skin: negative for rash, open wounds    All other systems reviewed and are negative       Physical Exam:     No data recorded    General: Alert and oriented x3, no acute distress  Cardiovascular/pulmonary: No labored breathing, peripheral perfusion intact  Musculoskeletal:    Bilateral knee exam range of motion 0-115, patella is tracking midline. Valgus/varus stress intact at 0 30 degrees. No palpable effusion or swelling present. Stable knees on exam.    Controlled Substances Monitoring:      Imaging:  No new imaging obtained today. Previous imaging reviewed showing bilateral knee bone-on-bone osteoarthritis    Procedure Note: Knee Cortisone injection     The bilateral knees were identified as the injection site. The risk and benefits of a cortisone injection were explained and the patient consented to the injection. Under sterile conditions, each knee was injected with a mixture of 40 mg of Kenalog and 4 mL of 1% Lidocaine without complication. A sterile bandage was applied.     Administrations This Visit       lidocaine 1 % injection 4 mL       Admin Date  12/15/2022 Action  Given Dose  4 mL Route  Intra-artICUlar Administered By  Ja Nagel RN               Admin Date  12/15/2022 Action  Given Dose  4 mL Route  Intra-artICUlar Administered By  Ja Nagel RN              triamcinolone acetonide (KENALOG-40) injection 40 mg       Admin Date  12/15/2022 Action  Given Dose  40 mg Route  Intra-artICUlar Administered By  Sarah Overton RN      Admin Date  12/15/2022 Action  Given Dose  40 mg Route  Intra-artICUlar Administered By  Sarah Overton RN                    Assessment: Bilateral knee osteoarthritis      Plan:   Patient reports symptom relief following cortisone injections this past July. States she did miss follow-up appointment due to hospitalization due to poor kidney function. She reports symptoms have been gradually returning recently and would like to repeat cortisone injections today. She is interested in Visco supplement injections. Previously was unable to have injections approved due to prior health insurance we will look to have these resubmitted in the near future. Will call patient to schedule follow-up appointment once Visco injections are obtained.       GENTRY Bernal-CNP  Orthopedic Surgery   12/15/22  10:01 PM

## 2022-12-16 ENCOUNTER — TELEPHONE (OUTPATIENT)
Dept: ORTHOPEDIC SURGERY | Age: 67
End: 2022-12-16

## 2022-12-16 NOTE — TELEPHONE ENCOUNTER
Patient was seen in office on 12/15/2022, they would like to proceed with bilateral knee visco supplementation authorization.      Form filled out / process started for bilateral knee Gelsyn ~3 authorization

## 2022-12-27 ENCOUNTER — OFFICE VISIT (OUTPATIENT)
Dept: ORTHOPEDIC SURGERY | Age: 67
End: 2022-12-27
Payer: MEDICARE

## 2022-12-27 DIAGNOSIS — M17.0 PRIMARY OSTEOARTHRITIS OF BOTH KNEES: Primary | ICD-10-CM

## 2022-12-27 PROCEDURE — 20610 DRAIN/INJ JOINT/BURSA W/O US: CPT | Performed by: NURSE PRACTITIONER

## 2022-12-27 NOTE — PROGRESS NOTES
Follow Up Visit for Injection    Madiha Franco returns for follow up visit for bilateral knee Gelsyn-3 injection 1. She reports improved pain in the knee. Physical Exam:   General: Alert and oriented x3, no acute distress  Cardiovascular/pulmonary: No labored breathing, peripheral perfusion intact  Musculoskeletal:    Bilateral knee exam range of motion 0-1 20, skin warm dry intact. Knee stable on exam.  No swelling or effusion present. Imaging: Reviewed     Procedure Note: Knee viscosupplementation injection     The bilateral knee was identified as the injection site. The risk and benefits of injection were explained and the patient consented to the injection. Under sterile conditions, the knee was injected with a full dose of Gelsyn-3. A sterile bandage was applied.     Administrations This Visit       sodium hyaluronate (Viscosup) injection SOSY 16.8 mg       Admin Date  12/27/2022 Action  Given Dose  16.8 mg Route  Intra-artICUlar Administered By  Guilherme Winkler CMA               Admin Date  12/27/2022 Action  Given Dose  16.8 mg Route  Intra-artICUlar Administered By  Guilherme Winkler CMA                      Assessment/Plan: S/p bilateral knee Gelsyn-3 injection #1 for osteoarthritis  -Continue activity modification/NSAIDS/ICE prn  -f/u next week for 2 injection      GENTRY Lugo-CNP  Orthopedic Surgery   12/27/22  4:02 PM

## 2023-01-03 ENCOUNTER — NURSE ONLY (OUTPATIENT)
Dept: ORTHOPEDIC SURGERY | Age: 68
End: 2023-01-03
Payer: MEDICARE

## 2023-01-03 VITALS — BODY MASS INDEX: 37.32 KG/M2 | HEIGHT: 65 IN | WEIGHT: 224 LBS

## 2023-01-03 DIAGNOSIS — M25.562 PAIN IN BOTH KNEES, UNSPECIFIED CHRONICITY: ICD-10-CM

## 2023-01-03 DIAGNOSIS — M17.0 PRIMARY OSTEOARTHRITIS OF BOTH KNEES: Primary | ICD-10-CM

## 2023-01-03 DIAGNOSIS — M25.561 PAIN IN BOTH KNEES, UNSPECIFIED CHRONICITY: ICD-10-CM

## 2023-01-03 PROCEDURE — 20610 DRAIN/INJ JOINT/BURSA W/O US: CPT | Performed by: NURSE PRACTITIONER

## 2023-01-03 NOTE — PROGRESS NOTES
Follow Up Visit for Injection    Alhaji Diego returns for follow up visit for Gelsyn ~3  injection 2. She reports improved pain in the knee. Physical Exam:   General: Alert and oriented x3, no acute distress  Cardiovascular/pulmonary: No labored breathing, peripheral perfusion intact  Musculoskeletal:    Bilateral knee exam range of motion 0-100, joint line nontender, no swelling deformity or palpable effusion. Skin warm dry and intact. Patella tracked midline. Knees are stable on exam.    Imaging: Reviewed     Procedure Note: Knee viscosupplementation injection     The right and left knee was identified as the injection site. The risk and benefits of injection were explained and the patient consented to the injection. Under sterile conditions, the knee was injected with a full dose of Gelsyn ~3. A sterile bandage was applied.     Administrations This Visit       sodium hyaluronate (Viscosup) injection SOSY 16.8 mg       Admin Date  01/03/2023 Action  Given Dose  16.8 mg Route  Intra-artICUlar Administered By  Ronald Enrique ATC               Admin Date  01/03/2023 Action  Given Dose  16.8 mg Route  Intra-artICUlar Administered By  Ronald Enrique ATC                      Assessment/Plan: S/p left and right knee Gelsyn ~ 3 injection #2 for osteoarthritis  -Continue activity modification/NSAIDS/ICE prn  -f/u next week for Gelsyn ~3 injection    Alexx Mancera APRN-CNP  Orthopedic Surgery   01/03/23  4:03 PM

## 2023-01-04 ENCOUNTER — CLINICAL DOCUMENTATION (OUTPATIENT)
Dept: SPIRITUAL SERVICES | Age: 68
End: 2023-01-04

## 2023-01-04 NOTE — ACP (ADVANCE CARE PLANNING)
Advance Care Planning     Advance Care Planning Clinical Specialist  Conversation Note      Date of ACP Conversation: 1/4/2023    Conversation Conducted with: Patient with Decision Making Capacity    ACP Clinical Specialist: NANCY Vega      Healthcare Decision Maker:     Current Designated Healthcare Decision Maker:     Primary Decision Maker: Treva Aden - Brother/Sister - [phone]    Secondary Decision Maker: Nena Payne - Brother/Sister - [phone]    Supplemental (Other) Decision Maker: Elliott Nolan - Chel - [phone]    [facility] and updated to align with pt's HCPOA document.     Care Preferences    Hospitalization:  \"If your health worsens and it becomes clear that your chance of recovery is unlikely, what would your preference be regarding hospitalization?\"    Choice:  [] The patient wants hospitalization.  [x] The patient prefers comfort-focused treatment without hospitalization. (Living Will reviewed and completed today).    Ventilation:  \"If you were in your present state of health and suddenly became very ill and were unable to breathe on your own, what would your preference be about the use of a ventilator (breathing machine) if it were available to you?\"      If the patient would desire the use of ventilator (breathing machine), answer \"yes\".  If not, \"no\": yes    \"If your health worsens and it becomes clear that your chance of recovery is unlikely, what would your preference be about the use of a ventilator (breathing machine) if it were available to you?\"     Would the patient desire the use of ventilator (breathing machine)?: No      Resuscitation  \"CPR works best to restart the heart when there is a sudden event, like a heart attack, in someone who is otherwise healthy. Unfortunately, CPR does not typically restart the heart for people who have serious health conditions or who are very sick.\"    \"In the event your heart stopped as a result of an underlying  serious health condition, would you want attempts to be made to restart your heart (answer \"yes\" for attempt to resuscitate) or would you prefer a natural death (answer \"no\" for do not attempt to resuscitate)? \"  No; pt shared she wants to die a natural death. [x] Yes   [] No   Educated Patient / Anand Held regarding differences between Advance Directives and portable DNR orders. Length of ACP Conversation in minutes:  40    Conversation Outcomes:  [x] ACP discussion completed  [] Existing advance directive reviewed with patient; no changes to patient's previously recorded wishes  [x] New Advance Directive completed  [] Portable Do Not Rescitate prepared for Provider review and signature  [] POLST/POST/MOLST/MOST prepared for Provider review and signature      Follow-up plan:    [] Schedule follow-up conversation to continue planning  [] Referred individual to Provider for additional questions/concerns   [x] Advised patient/agent/surrogate to review completed ACP document and update if needed with changes in condition, patient preferences or care setting    [x] This note routed to one or more involved healthcare providers    Summary:   Met with pt today to complete ACP visit. Reviewed pt's care preferences as outlined above. Reviewed pt's wishes for HCPOA. Pt has named her sisters and son (see order above). Pt shared she does wish for her HCDM to work together should they need to make medical decisions on her behalf. Completed the HCPOA with pt today. Reviewed the LW and completed this as well; pt shared she does not want \"no machines and I want a natural death. \" Assisted pt in completing her LW, HCPOA via docusign. This was signed with pt today and copy placed in pt's medical record. Pt has this SW contact and will reach out should she have any other questions or concerns. Thanked pt for her time today. Referral is to remain closed.

## 2023-01-10 ENCOUNTER — NURSE ONLY (OUTPATIENT)
Dept: ORTHOPEDIC SURGERY | Age: 68
End: 2023-01-10
Payer: MEDICARE

## 2023-01-10 VITALS — HEIGHT: 65 IN | WEIGHT: 224 LBS | BODY MASS INDEX: 37.32 KG/M2

## 2023-01-10 DIAGNOSIS — M17.0 PRIMARY OSTEOARTHRITIS OF BOTH KNEES: Primary | ICD-10-CM

## 2023-01-10 PROCEDURE — 20610 DRAIN/INJ JOINT/BURSA W/O US: CPT | Performed by: NURSE PRACTITIONER

## 2023-01-10 NOTE — PROGRESS NOTES
Follow Up Visit for Injection    Mau Perez returns for follow up visit for Gelsyn-3 injection 3. She reports improved pain in the knees. Physical Exam:   General: Alert and oriented x3, no acute distress  Cardiovascular/pulmonary: No labored breathing, peripheral perfusion intact  Musculoskeletal:    Bilateral knee exam range of motion 0-100, joint line nontender. No swelling or palpable effusion present. Skin warm dry intact. Knees are stable on exam    Imaging: Reviewed     Procedure Note: Knee viscosupplementation injection     The bilateral knee was identified as the injection site. The risk and benefits of injection were explained and the patient consented to the injection. Under sterile conditions, the knee was injected with a full dose of Gelsyn-3. A sterile bandage was applied.     Administrations This Visit       sodium hyaluronate (Viscosup) injection SOSY 16.8 mg       Admin Date  01/10/2023 Action  Given Dose  16.8 mg Route  Intra-artICUlar Administered By  Kiara Bal CMA      Admin Date  01/10/2023 Action  Given Dose  16.8 mg Route  Intra-artICUlar Administered By  Kiara Bal CMA                      Assessment/Plan: S/p bilateral knee Gelsyn-3 injection #3 for osteoarthritis  -Continue activity modification/NSAIDS/ICE prn  -f/u 3 months    GENTRY Bernal-CNP  Orthopedic Surgery   01/10/23  4:58 PM

## 2023-01-12 ENCOUNTER — OFFICE VISIT (OUTPATIENT)
Dept: CARDIOLOGY CLINIC | Age: 68
End: 2023-01-12
Payer: MEDICARE

## 2023-01-12 VITALS
DIASTOLIC BLOOD PRESSURE: 90 MMHG | HEIGHT: 64 IN | BODY MASS INDEX: 38.89 KG/M2 | RESPIRATION RATE: 16 BRPM | HEART RATE: 60 BPM | SYSTOLIC BLOOD PRESSURE: 130 MMHG | WEIGHT: 227.8 LBS

## 2023-01-12 DIAGNOSIS — I10 ESSENTIAL HYPERTENSION: Primary | ICD-10-CM

## 2023-01-12 PROCEDURE — G8427 DOCREV CUR MEDS BY ELIG CLIN: HCPCS | Performed by: INTERNAL MEDICINE

## 2023-01-12 PROCEDURE — 93000 ELECTROCARDIOGRAM COMPLETE: CPT | Performed by: INTERNAL MEDICINE

## 2023-01-12 PROCEDURE — 3017F COLORECTAL CA SCREEN DOC REV: CPT | Performed by: INTERNAL MEDICINE

## 2023-01-12 PROCEDURE — 1036F TOBACCO NON-USER: CPT | Performed by: INTERNAL MEDICINE

## 2023-01-12 PROCEDURE — 3080F DIAST BP >= 90 MM HG: CPT | Performed by: INTERNAL MEDICINE

## 2023-01-12 PROCEDURE — 1090F PRES/ABSN URINE INCON ASSESS: CPT | Performed by: INTERNAL MEDICINE

## 2023-01-12 PROCEDURE — 3075F SYST BP GE 130 - 139MM HG: CPT | Performed by: INTERNAL MEDICINE

## 2023-01-12 PROCEDURE — G8484 FLU IMMUNIZE NO ADMIN: HCPCS | Performed by: INTERNAL MEDICINE

## 2023-01-12 PROCEDURE — G8417 CALC BMI ABV UP PARAM F/U: HCPCS | Performed by: INTERNAL MEDICINE

## 2023-01-12 PROCEDURE — 1123F ACP DISCUSS/DSCN MKR DOCD: CPT | Performed by: INTERNAL MEDICINE

## 2023-01-12 PROCEDURE — G8399 PT W/DXA RESULTS DOCUMENT: HCPCS | Performed by: INTERNAL MEDICINE

## 2023-01-12 PROCEDURE — 99214 OFFICE O/P EST MOD 30 MIN: CPT | Performed by: INTERNAL MEDICINE

## 2023-01-12 NOTE — PROGRESS NOTES
CHIEF COMPLAINT: PE/SOTO    HISTORY OF PRESENT ILLNESS: Patient is a 69 y.o. female seen at the request of Tori Pressley DO. Patient with complaint of SOTO. States it started several months ago. Diagnosed with PE in April. 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 Echo in April 2021 was benign. No CP or SOB.      Past Medical History:   Diagnosis Date    Anxiety     Deep vein blood clot of left lower extremity ([facility] [address] 05/27/2019    Family history of early CAD     Hyperlipemia     Hypertension     Lightheadedness     Multinodular goiter     Sleep apnea     SOBOE (shortness of breath on exertion)        Patient Active Problem List   Diagnosis    SOTO (dyspnea on exertion)    Vertigo    Hyperlipemia    Family history of early CAD    JES (obstructive sleep apnea)    Nocturnal enuresis    Essential hypertension    Acute deep vein thrombosis (DVT) of left lower extremity (HCC)    Acute deep vein thrombosis (DVT) of distal end of left lower extremity (HCC)    Left thyroid nodule    Multinodular goiter    Morbidly obese (HCC)    Dissection of thoracic aorta    Baker's cyst    Hx of echocardiogram    Pulmonary embolism on left ([address]    Syncope    DNR (do not resuscitate)    Acute pain of right knee    Morbid obesity with BMI of 45.0-49.9, adult ([address]    Hypomagnesemia    Vitamin D insufficiency    Chest pain    On medication for venous thromboembolism    Current moderate episode of major depressive disorder without prior episode ([address]    Acute renal failure superimposed on chronic kidney disease ([address]    Acute kidney injury superimposed on chronic kidney disease (HCC)    Stage 3b chronic kidney disease (HCC)    Hypokalemia       Allergies   Allergen Reactions    Penicillins Hives and Rash    Tape [Adhesive Tape] Hives       Current Outpatient Medications   Medication Sig Dispense Refill    pravastatin (PRAVACHOL) 20 MG tablet TAKE 1 TABLET BY MOUTH  DAILY 90 tablet 3 magnesium oxide (MAG-OX) 400 (240 Mg) MG tablet Take 1 tablet by mouth daily 30 tablet 1    metoprolol succinate (TOPROL XL) 25 MG extended release tablet TAKE 1/2 TABLET (12.5 MG) BY MOUTH ONCE DAILY. 45 tablet 3    calcium citrate (CALCITRATE) 950 (200 Ca) MG tablet Take 2 tablets by mouth daily OTC 60 tablet 5    escitalopram (LEXAPRO) 20 MG tablet 1 tab po daily 30 tablet 5    apixaban (ELIQUIS) 5 MG TABS tablet TAKE 1 TABLET BY MOUTH  TWICE DAILY 60 tablet 5    buPROPion (WELLBUTRIN XL) 150 MG extended release tablet Take 1 tablet by mouth every morning 30 tablet 5    oxybutynin (DITROPAN-XL) 10 MG extended release tablet TAKE 2 TABLETS BY MOUTH  ONCE DAILY 60 tablet 5    Incontinence Supply Disposable (DEPEND UNDERWEAR LARGE) MISC Use as directed 100 each 5    furosemide (LASIX) 20 MG tablet Take 20 mg by mouth three times a week       No current facility-administered medications for this visit.        Social History     Socioeconomic History    Marital status:      Spouse name: Not on file    Number of children: Not on file    Years of education: Not on file    Highest education level: Not on file   Occupational History    Occupation: maintenance- housekeeping     Comment: [employer]   Tobacco Use    Smoking status: Never    Smokeless tobacco: Never   Vaping Use    Vaping Use: Never used   Substance and Sexual Activity    Alcohol use: No    Drug use: No    Sexual activity: Not Currently     Partners: Female   Other Topics Concern    Not on file   Social History Narrative    Not on file     Social Determinants of Health     Financial Resource Strain: Low Risk     Difficulty of Paying Living Expenses: Not hard at all   Food Insecurity: No Food Insecurity    Worried About Running Out of Food in the Last Year: Never true    Ran Out of Food in the Last Year: Never true   Transportation Needs: Not on file   Physical Activity: Insufficiently Active    Days of Exercise per Week: 3 days    Minutes of Exercise per Session: 20 min   Stress: Not on file   Social Connections: Not on file   Intimate Partner Violence: Not on file   Housing Stability: Not on file       Family History   Problem Relation Age of Onset    Heart Disease Mother     Pacemaker Mother     COPD Mother     Heart Failure Mother     Heart Disease Father     Cancer Father         prostate    Hypertension Father     Coronary Art Dis Father         63's    Prostate Cancer Father     Hypertension Sister     Diabetes Sister     Heart Disease Sister     Diabetes Sister     COPD Sister     Heart Disease Brother         age 54    Heart Attack Brother     Hypertension Son     Clotting Disorder Son        Review of Systems:  Heart: as above   Lungs: as above   Eyes: denies changes in vision or discharge. Ears: denies changes in hearing or pain. Nose: denies epistaxis or masses   Throat: denies sore throat or trouble swallowing. Neuro: denies numbness, tingling, tremors. Skin: denies rashes or itching. : denies hematuria, dysuria   GI: denies vomiting, diarrhea   Psych: denies mood changed, anxiety, depression. All other systems negative. Physical Exam   BP (!) 130/90   Pulse 60   Resp 16   Ht 5' 4\" (1.626 m)   Wt 227 lb 12.8 oz (103.3 kg)   BMI 39.10 kg/m²   Constitutional: Oriented to person, place, and time. Well-developed and well-nourished. No distress. Head: Normocephalic and atraumatic. Eyes: EOM are normal. Pupils are equal, round, and reactive to light. Neck: Normal range of motion. Neck supple. No hepatojugular reflux and no JVD present. Carotid bruit is not present. No tracheal deviation present. No thyromegaly present. Cardiovascular: Normal rate, regular rhythm, normal heart sounds and intact distal pulses. Exam reveals no gallop and no friction rub. No murmur heard. Pulmonary/Chest: Effort normal and breath sounds normal. No respiratory distress. No wheezes. No rales. No tenderness. Abdominal: Soft.  Bowel sounds are normal. No distension and no mass. No tenderness. No rebound and no guarding. Musculoskeletal: Normal range of motion. No edema and no tenderness. Lymphadenopathy:   No cervical adenopathy. No groin adenopathy. Neurological: Alert and oriented to person, place, and time. Skin: Skin is warm and dry. No rash noted. Not diaphoretic. No erythema. Psychiatric: Normal mood and affect. Behavior is normal.     EKG personally reviewed 01/12/23:  normal sinus rhythm, nonspecific ST and T waves changes. Echo Summary 4/29/2021:   Normal left ventricular systolic function. Ejection fraction is visually estimated at 60%. Normal right ventricular size and function (TAPSE 2.3 cm). Indeterminate diastolic function. No apparent right to left interatrial shunting on bubble study. Mild mitral regurgitation. Mild tricuspid regurgitation. PASP is estimated at 33 mmHg. ASSESSMENT AND PLAN:  Patient Active Problem List   Diagnosis    SOTO (dyspnea on exertion)    Vertigo    Hyperlipemia    Family history of early CAD    JES (obstructive sleep apnea)    Nocturnal enuresis    Essential hypertension    Acute deep vein thrombosis (DVT) of left lower extremity (HCC)    Acute deep vein thrombosis (DVT) of distal end of left lower extremity (HCC)    Left thyroid nodule    Multinodular goiter    Morbidly obese (HCC)    Dissection of thoracic aorta    Baker's cyst    Hx of echocardiogram    Pulmonary embolism on left ([address]    Syncope    DNR (do not resuscitate)    Acute pain of right knee    Morbid obesity with BMI of 45.0-49.9, adult ([address]    Hypomagnesemia    Vitamin D insufficiency    Chest pain    On medication for venous thromboembolism    Current moderate episode of major depressive disorder without prior episode ([address]    Acute renal failure superimposed on chronic kidney disease ([address]    Acute kidney injury superimposed on chronic kidney disease (HCC)    Stage 3b chronic kidney disease ([address]    Hypokalemia     1. Chest pain:     Normal stress 7/27/2021.     2. SOTO/Hx of PE:    Echo benign 4/29/2021. Stress normal 7/27/2021. On Eliquis. 2. HTN: Observe. 3. Lipids: Statin. 4. EJS: CPAP. 5. Hx of DVT/PE: Recurrent. Lifelong eliquis. 6. Dizziness: EKG okay. Xin Medina D.O.   Cardiologist  Cardiology, [facility]

## 2023-01-19 ENCOUNTER — OFFICE VISIT (OUTPATIENT)
Dept: FAMILY MEDICINE CLINIC | Age: 68
End: 2023-01-19

## 2023-01-19 VITALS
DIASTOLIC BLOOD PRESSURE: 84 MMHG | OXYGEN SATURATION: 98 % | WEIGHT: 232.2 LBS | RESPIRATION RATE: 18 BRPM | SYSTOLIC BLOOD PRESSURE: 126 MMHG | BODY MASS INDEX: 38.69 KG/M2 | TEMPERATURE: 97.4 F | HEART RATE: 55 BPM | HEIGHT: 65 IN

## 2023-01-19 DIAGNOSIS — E78.00 PURE HYPERCHOLESTEROLEMIA: ICD-10-CM

## 2023-01-19 DIAGNOSIS — I10 ESSENTIAL HYPERTENSION: ICD-10-CM

## 2023-01-19 DIAGNOSIS — N18.32 STAGE 3B CHRONIC KIDNEY DISEASE (HCC): ICD-10-CM

## 2023-01-19 DIAGNOSIS — K51.40 PSEUDOPOLYPOSIS OF COLON WITHOUT COMPLICATION, UNSPECIFIED PART OF COLON (HCC): ICD-10-CM

## 2023-01-19 DIAGNOSIS — I26.99 PULMONARY EMBOLISM ON LEFT (HCC): ICD-10-CM

## 2023-01-19 DIAGNOSIS — K21.9 GASTROESOPHAGEAL REFLUX DISEASE, UNSPECIFIED WHETHER ESOPHAGITIS PRESENT: ICD-10-CM

## 2023-01-19 DIAGNOSIS — I10 ESSENTIAL HYPERTENSION: Primary | ICD-10-CM

## 2023-01-19 DIAGNOSIS — F32.1 CURRENT MODERATE EPISODE OF MAJOR DEPRESSIVE DISORDER WITHOUT PRIOR EPISODE (HCC): ICD-10-CM

## 2023-01-19 DIAGNOSIS — E66.01 SEVERE OBESITY (BMI 35.0-39.9) WITH COMORBIDITY (HCC): ICD-10-CM

## 2023-01-19 LAB
ALBUMIN SERPL-MCNC: 3.6 G/DL (ref 3.5–5.2)
ALP BLD-CCNC: 117 U/L (ref 35–104)
ALT SERPL-CCNC: 13 U/L (ref 0–32)
ANION GAP SERPL CALCULATED.3IONS-SCNC: 12 MMOL/L (ref 7–16)
AST SERPL-CCNC: 19 U/L (ref 0–31)
BASOPHILS ABSOLUTE: 0.06 E9/L (ref 0–0.2)
BASOPHILS RELATIVE PERCENT: 0.6 % (ref 0–2)
BILIRUB SERPL-MCNC: 0.2 MG/DL (ref 0–1.2)
BUN BLDV-MCNC: 28 MG/DL (ref 6–23)
CALCIUM SERPL-MCNC: 9.6 MG/DL (ref 8.6–10.2)
CHLORIDE BLD-SCNC: 98 MMOL/L (ref 98–107)
CHOLESTEROL, TOTAL: 189 MG/DL (ref 0–199)
CO2: 28 MMOL/L (ref 22–29)
CREAT SERPL-MCNC: 1 MG/DL (ref 0.5–1)
EOSINOPHILS ABSOLUTE: 0.12 E9/L (ref 0.05–0.5)
EOSINOPHILS RELATIVE PERCENT: 1.3 % (ref 0–6)
GFR SERPL CREATININE-BSD FRML MDRD: >60 ML/MIN/1.73
GLUCOSE BLD-MCNC: 90 MG/DL (ref 74–99)
HCT VFR BLD CALC: 43.5 % (ref 34–48)
HDLC SERPL-MCNC: 64 MG/DL
HEMOGLOBIN: 14.3 G/DL (ref 11.5–15.5)
IMMATURE GRANULOCYTES #: 0.04 E9/L
IMMATURE GRANULOCYTES %: 0.4 % (ref 0–5)
LDL CHOLESTEROL CALCULATED: 101 MG/DL (ref 0–99)
LYMPHOCYTES ABSOLUTE: 3.06 E9/L (ref 1.5–4)
LYMPHOCYTES RELATIVE PERCENT: 31.9 % (ref 20–42)
MCH RBC QN AUTO: 29.1 PG (ref 26–35)
MCHC RBC AUTO-ENTMCNC: 32.9 % (ref 32–34.5)
MCV RBC AUTO: 88.4 FL (ref 80–99.9)
MONOCYTES ABSOLUTE: 0.75 E9/L (ref 0.1–0.95)
MONOCYTES RELATIVE PERCENT: 7.8 % (ref 2–12)
NEUTROPHILS ABSOLUTE: 5.56 E9/L (ref 1.8–7.3)
NEUTROPHILS RELATIVE PERCENT: 58 % (ref 43–80)
PDW BLD-RTO: 14.5 FL (ref 11.5–15)
PLATELET # BLD: 369 E9/L (ref 130–450)
PMV BLD AUTO: 10.5 FL (ref 7–12)
POTASSIUM SERPL-SCNC: 4.6 MMOL/L (ref 3.5–5)
RBC # BLD: 4.92 E12/L (ref 3.5–5.5)
SODIUM BLD-SCNC: 138 MMOL/L (ref 132–146)
TOTAL PROTEIN: 7 G/DL (ref 6.4–8.3)
TRIGL SERPL-MCNC: 118 MG/DL (ref 0–149)
VLDLC SERPL CALC-MCNC: 24 MG/DL
WBC # BLD: 9.6 E9/L (ref 4.5–11.5)

## 2023-01-19 RX ORDER — FAMOTIDINE 20 MG/1
20 TABLET, FILM COATED ORAL DAILY
Qty: 90 TABLET | OUTPATIENT
Start: 2023-01-19

## 2023-01-19 RX ORDER — FAMOTIDINE 20 MG/1
20 TABLET, FILM COATED ORAL DAILY
Qty: 30 TABLET | Refills: 3 | Status: SHIPPED | OUTPATIENT
Start: 2023-01-19

## 2023-01-19 ASSESSMENT — PATIENT HEALTH QUESTIONNAIRE - PHQ9
SUM OF ALL RESPONSES TO PHQ QUESTIONS 1-9: 1
SUM OF ALL RESPONSES TO PHQ QUESTIONS 1-9: 1
9. THOUGHTS THAT YOU WOULD BE BETTER OFF DEAD, OR OF HURTING YOURSELF: 0
4. FEELING TIRED OR HAVING LITTLE ENERGY: 0
SUM OF ALL RESPONSES TO PHQ QUESTIONS 1-9: 1
7. TROUBLE CONCENTRATING ON THINGS, SUCH AS READING THE NEWSPAPER OR WATCHING TELEVISION: 0
8. MOVING OR SPEAKING SO SLOWLY THAT OTHER PEOPLE COULD HAVE NOTICED. OR THE OPPOSITE, BEING SO FIGETY OR RESTLESS THAT YOU HAVE BEEN MOVING AROUND A LOT MORE THAN USUAL: 0
5. POOR APPETITE OR OVEREATING: 0
3. TROUBLE FALLING OR STAYING ASLEEP: 0
2. FEELING DOWN, DEPRESSED OR HOPELESS: 1
SUM OF ALL RESPONSES TO PHQ QUESTIONS 1-9: 1
10. IF YOU CHECKED OFF ANY PROBLEMS, HOW DIFFICULT HAVE THESE PROBLEMS MADE IT FOR YOU TO DO YOUR WORK, TAKE CARE OF THINGS AT HOME, OR GET ALONG WITH OTHER PEOPLE: 0
6. FEELING BAD ABOUT YOURSELF - OR THAT YOU ARE A FAILURE OR HAVE LET YOURSELF OR YOUR FAMILY DOWN: 0

## 2023-01-19 NOTE — PROGRESS NOTES
Hypertension:  Patient is here for follow up chronic hypertension. This is  generally controlled on current medication regimen. Takes meds as directed and tolerates them well. Most recent labs reviewed with patient and are not remarkable. No symptoms from htn standpoint per ROS. Patient is  compliant with lifestyle modifications. Patient does not smoke. Comorbid conditions include hyperlipidemia. Hyperlipidemia:  Patient presents with hyperlipidemia. Is asymptomatic. This is a chronic problem. Patient is  well controlled, as reviewed and seen on most recent labs. Is currently on pravachol. Compliance with treatment thus far has been fair. No adverse effects. The patient does not use medications that may worsen dyslipidemias (corticosteroids, progestins, anabolic steroids, diuretics, beta-blockers, amiodarone, cyclosporine, olanzapine). The patient exercises daily. Patient is not a smoker. She states once in a while if she drinks pop, acid will burn her mouth and agitate her stomach. She will get abdominal pain in her lower abdomen. Pain is dull in nature. Pain is 6 out of 10. Alleviating factors: water. Exacerbating factors: nothing. She denies nausea, vomiting, diarrhea, constipation, blood in her stool, dark and tarry stools. She denies dysuria, hematuria, urinary frequency, urinary urgency. She continues to have incontinence issues and wears her supplies. Patient's past medical, surgical, social and/or family history reviewed, updated in chart, and are non-contributory (unless otherwise stated). Medications and allergies also reviewed and updated in chart.         Review of Systems:  Constitutional:  No fever, no fatigue, no chills, no headaches, no weight change  Dermatology:  No rash, no mole, no dry or sensitive skin  ENT:  No cough, no sore throat, no sinus pain, no runny nose, no ear pain  Cardiology:  No chest pain, no palpitations, no leg edema, no shortness of breath, no PND  Gastroenterology:  No dysphagia, no abdominal pain, no nausea, no vomiting, no constipation, no diarrhea, + heartburn  Musculoskeletal:  No joint pain, no leg cramps, no back pain, no muscle aches  Respiratory:  No shortness of breath, no orthopnea, no wheezing, no SOTO, no hemoptysis  Urology:  No blood in the urine, no urinary frequency, no urinary incontinence, no urinary urgency, no nocturia, no dysuria      Vitals:    01/19/23 1431   BP: 126/84   Pulse: 55   Resp: 18   Temp: 97.4 °F (36.3 °C)   SpO2: 98%   Weight: 232 lb 3.2 oz (105.3 kg)   Height: 5' 5\" (1.651 m)       Physical Exam  Vitals and nursing note reviewed. Constitutional:       Appearance: She is well-developed. She is obese. HENT:      Head: Normocephalic and atraumatic. Right Ear: External ear normal.      Left Ear: External ear normal.      Nose: Nose normal.   Eyes:      Conjunctiva/sclera: Conjunctivae normal.      Pupils: Pupils are equal, round, and reactive to light. Neck:      Thyroid: No thyromegaly. Cardiovascular:      Rate and Rhythm: Normal rate and regular rhythm. Heart sounds: Normal heart sounds. Pulmonary:      Effort: Pulmonary effort is normal.      Breath sounds: Normal breath sounds. No wheezing. Abdominal:      General: Bowel sounds are normal.      Palpations: Abdomen is soft. Tenderness: There is abdominal tenderness in the epigastric area. Musculoskeletal:         General: Normal range of motion. Cervical back: Normal range of motion and neck supple. Skin:     General: Skin is warm and dry. Findings: No rash. Neurological:      Mental Status: She is alert and oriented to person, place, and time. Deep Tendon Reflexes: Reflexes are normal and symmetric. Psychiatric:         Behavior: Behavior normal.       Assessment/Plan:      Keith Dumont was seen today for hypertension and abdominal pain.     Diagnoses and all orders for this visit:    Essential hypertension  - Comprehensive Metabolic Panel; Future  -     CBC with Auto Differential; Future  Blood pressure well controlled  Continue toprol as prescribed  Diet and exercise were discussed and recommended to the patient. Labs ordered. Gastroesophageal reflux disease, unspecified whether esophagitis present  -     famotidine (PEPCID) 20 MG tablet; Take 1 tablet by mouth daily  Will start pepcid  Side effects reviewed. Pure hypercholesterolemia  -     Comprehensive Metabolic Panel; Future  -     Lipid Panel; Future  Labs ordered  Diet and exercise were discussed and recommended to the patient. Pseudopolyposis of colon without complication, unspecified part of colon ([address]  Follows with GI    Severe obesity (BMI 35.0-39. 9) with comorbidity ([address]  Diet and exercise were discussed and recommended to the patient. Pulmonary embolism on left ([address]  Stable  Taking eliquis as prescribed. Current moderate episode of major depressive disorder without prior episode ([address]  Well controlled  Continue lexapro and wellbutrin    Stage 3b chronic kidney disease ([address]  -     Comprehensive Metabolic Panel; Future    As above. Call or go to ED immediately if symptoms worsen or persist.  Return in about 4 weeks (around 2/16/2023) for gerd. , or sooner if necessary. Educational materials and/or home exercises printed for patient's review and were included in patient instructions on his/her After Visit Summary and given to patient at the end of visit. Counseled regarding above diagnosis, including possible risks and complications,  especially if left uncontrolled. Counseled regarding the possible side effects, risks, benefits and alternatives to treatment; patient and/or guardian verbalizes understanding, agrees, feels comfortable with and wishes to proceed with above treatment plan.     Advised patient to call with any new medication issues, and read all Rx info from pharmacy to assure aware of all possible risks and side effects of medication before taking. Reviewed age and gender appropriate health screening exams and vaccinations. Advised patient regarding importance of keeping up with recommended health maintenance and to schedule as soon as possible if overdue, as this is important in assessing for undiagnosed pathology, especially cancer, as well as protecting against potentially harmful/life threatening disease. Patient and/or guardian verbalizes understanding and agrees with above counseling, assessment and plan. All questions answered. Ashley Gunn DO  1/19/2023    I have personally reviewed and updated the chief complaint, HPI, Past Medical, Family and Social History, as well as the above Review of Systems.

## 2023-01-23 RX ORDER — IBUPROFEN 200 MG
2 CAPSULE ORAL DAILY
Qty: 60 TABLET | Refills: 5 | Status: SHIPPED | OUTPATIENT
Start: 2023-01-23

## 2023-04-25 ENCOUNTER — OFFICE VISIT (OUTPATIENT)
Dept: FAMILY MEDICINE CLINIC | Age: 68
End: 2023-04-25
Payer: MEDICARE

## 2023-04-25 VITALS
HEART RATE: 70 BPM | TEMPERATURE: 97.9 F | BODY MASS INDEX: 38.15 KG/M2 | WEIGHT: 229 LBS | SYSTOLIC BLOOD PRESSURE: 124 MMHG | DIASTOLIC BLOOD PRESSURE: 84 MMHG | RESPIRATION RATE: 20 BRPM | HEIGHT: 65 IN | OXYGEN SATURATION: 97 %

## 2023-04-25 DIAGNOSIS — J01.90 ACUTE BACTERIAL SINUSITIS: Primary | ICD-10-CM

## 2023-04-25 DIAGNOSIS — B96.89 ACUTE BACTERIAL SINUSITIS: Primary | ICD-10-CM

## 2023-04-25 DIAGNOSIS — H10.33 ACUTE BACTERIAL CONJUNCTIVITIS OF BOTH EYES: ICD-10-CM

## 2023-04-25 PROCEDURE — G8417 CALC BMI ABV UP PARAM F/U: HCPCS | Performed by: FAMILY MEDICINE

## 2023-04-25 PROCEDURE — 3079F DIAST BP 80-89 MM HG: CPT | Performed by: FAMILY MEDICINE

## 2023-04-25 PROCEDURE — 1123F ACP DISCUSS/DSCN MKR DOCD: CPT | Performed by: FAMILY MEDICINE

## 2023-04-25 PROCEDURE — 1090F PRES/ABSN URINE INCON ASSESS: CPT | Performed by: FAMILY MEDICINE

## 2023-04-25 PROCEDURE — 3074F SYST BP LT 130 MM HG: CPT | Performed by: FAMILY MEDICINE

## 2023-04-25 PROCEDURE — G8427 DOCREV CUR MEDS BY ELIG CLIN: HCPCS | Performed by: FAMILY MEDICINE

## 2023-04-25 PROCEDURE — 99213 OFFICE O/P EST LOW 20 MIN: CPT | Performed by: FAMILY MEDICINE

## 2023-04-25 PROCEDURE — 1036F TOBACCO NON-USER: CPT | Performed by: FAMILY MEDICINE

## 2023-04-25 PROCEDURE — G8399 PT W/DXA RESULTS DOCUMENT: HCPCS | Performed by: FAMILY MEDICINE

## 2023-04-25 PROCEDURE — 3017F COLORECTAL CA SCREEN DOC REV: CPT | Performed by: FAMILY MEDICINE

## 2023-04-25 RX ORDER — DOXYCYCLINE HYCLATE 100 MG
100 TABLET ORAL 2 TIMES DAILY
Qty: 20 TABLET | Refills: 0 | Status: SHIPPED | OUTPATIENT
Start: 2023-04-25 | End: 2023-05-05

## 2023-04-25 RX ORDER — POLYMYXIN B SULFATE AND TRIMETHOPRIM 1; 10000 MG/ML; [USP'U]/ML
1 SOLUTION OPHTHALMIC EVERY 6 HOURS
Qty: 1 EACH | Refills: 0 | Status: SHIPPED | OUTPATIENT
Start: 2023-04-25 | End: 2023-05-02

## 2023-04-25 SDOH — ECONOMIC STABILITY: FOOD INSECURITY: WITHIN THE PAST 12 MONTHS, THE FOOD YOU BOUGHT JUST DIDN'T LAST AND YOU DIDN'T HAVE MONEY TO GET MORE.: NEVER TRUE

## 2023-04-25 SDOH — ECONOMIC STABILITY: FOOD INSECURITY: WITHIN THE PAST 12 MONTHS, YOU WORRIED THAT YOUR FOOD WOULD RUN OUT BEFORE YOU GOT MONEY TO BUY MORE.: NEVER TRUE

## 2023-04-25 SDOH — ECONOMIC STABILITY: INCOME INSECURITY: HOW HARD IS IT FOR YOU TO PAY FOR THE VERY BASICS LIKE FOOD, HOUSING, MEDICAL CARE, AND HEATING?: NOT HARD AT ALL

## 2023-04-25 SDOH — ECONOMIC STABILITY: HOUSING INSECURITY
IN THE LAST 12 MONTHS, WAS THERE A TIME WHEN YOU DID NOT HAVE A STEADY PLACE TO SLEEP OR SLEPT IN A SHELTER (INCLUDING NOW)?: NO

## 2023-04-25 NOTE — PROGRESS NOTES
Congestion:  Patient is here with complaints of congestion, headaches, sinus pressure, drainage and cough for 2 week(s). Cough is  productive of thick white mucus. She has had redness and crusting of her eyes. Over the counter medications include vicks for high blood pressure. Patient does have a change in appetite. Patient is  drinking well. Patient does not smoke. Sick contacts include none. Patient's past medical, surgical, social and/or family history reviewed, updated in chart, and are non-contributory (unless otherwise stated). Medications and allergies also reviewed and updated in chart. Review of Systems:  Constitutional:  - fever, + fatigue, \no chills, + headaches, no weight change, +reduced appetite  Dermatology:  No rash, no mole, no dry or sensitive skin  ENT:  + cough, + sore throat, + sinus pain, + runny nose, no ear pain  Cardiology:  No chest pain, no palpitations, no leg edema, no shortness of breath, no PND  Gastroenterology:  No dysphagia, no abdominal pain, no nausea, no vomiting, no constipation, no diarrhea, no heartburn  Musculoskeletal:  No joint pain, no leg cramps, no back pain, no muscle aches  Respiratory:  No shortness of breath, no orthopnea, no wheezing, no SOTO, no hemoptysis  Urology:  No blood in the urine, no urinary frequency, no urinary incontinence, no urinary urgency, no nocturia, no dysuria      Vitals:    04/25/23 1321   BP: 124/84   Pulse: 70   Resp: 20   Temp: 97.9 °F (36.6 °C)   SpO2: 97%   Weight: 229 lb (103.9 kg)   Height: 5' 5\" (1.651 m)       Physical Exam  Vitals and nursing note reviewed. Constitutional:       Appearance: She is well-developed. HENT:      Head: Normocephalic and atraumatic. Right Ear: External ear normal.      Left Ear: External ear normal.      Nose: Congestion and rhinorrhea present. Mouth/Throat:      Pharynx: Posterior oropharyngeal erythema present.    Eyes:      General:         Right eye: Discharge

## 2023-05-03 ENCOUNTER — OFFICE VISIT (OUTPATIENT)
Dept: ORTHOPEDIC SURGERY | Age: 68
End: 2023-05-03
Payer: MEDICARE

## 2023-05-03 VITALS — HEIGHT: 63 IN | BODY MASS INDEX: 42.7 KG/M2 | WEIGHT: 241 LBS

## 2023-05-03 DIAGNOSIS — M17.0 PRIMARY OSTEOARTHRITIS OF BOTH KNEES: Primary | ICD-10-CM

## 2023-05-03 PROCEDURE — 1036F TOBACCO NON-USER: CPT | Performed by: ORTHOPAEDIC SURGERY

## 2023-05-03 PROCEDURE — 1123F ACP DISCUSS/DSCN MKR DOCD: CPT | Performed by: ORTHOPAEDIC SURGERY

## 2023-05-03 PROCEDURE — 3017F COLORECTAL CA SCREEN DOC REV: CPT | Performed by: ORTHOPAEDIC SURGERY

## 2023-05-03 PROCEDURE — 99214 OFFICE O/P EST MOD 30 MIN: CPT | Performed by: ORTHOPAEDIC SURGERY

## 2023-05-03 PROCEDURE — G8417 CALC BMI ABV UP PARAM F/U: HCPCS | Performed by: ORTHOPAEDIC SURGERY

## 2023-05-03 PROCEDURE — G8399 PT W/DXA RESULTS DOCUMENT: HCPCS | Performed by: ORTHOPAEDIC SURGERY

## 2023-05-03 PROCEDURE — 1090F PRES/ABSN URINE INCON ASSESS: CPT | Performed by: ORTHOPAEDIC SURGERY

## 2023-05-03 PROCEDURE — G8427 DOCREV CUR MEDS BY ELIG CLIN: HCPCS | Performed by: ORTHOPAEDIC SURGERY

## 2023-05-03 NOTE — PATIENT INSTRUCTIONS
UPCOMING ORTHOPAEDIC SURGERY INFORMATION:    PROCEDURE: RIGHT KNEE DEE DEE ROBOTIC ASSISTED TOTAL JOINT ARTHROPLASTY   DATE: 7/25/2023  FACILITY: [facility]     Please review your surgical handout and call the office at [phone] with any questions. If requested, please obtain any clearances prior to surgery. It is patients responsibility to notify providers of any clearances needed, if your provider is requesting a form to be sent, please call the office. Expect a call from pre-admission testing the week prior regarding surgery instructions. If you are to obtain any imaging prior to surgery, please make sure to answer the call from radiology regarding scheduling. Radiology's number is [phone]. An authorization request will be obtained by the office for any insurance approval needs. If you are to cancel surgery, please call the office with at least a 72 hour minimum, failure to do so will leave it up to the providers discretion for re-scheduling.

## 2023-05-03 NOTE — PROGRESS NOTES
[facility]   ORTHOPAEDIC SURGERY AND SPORTS MEDICINE  DATE OF VISIT: 05/03/23  Follow Up Visit     CHIEF COMPLAINT:   Chief Complaint   Patient presents with    Surgical Consult     Bilat knee OA. R > L in terms of pain. Wants to discuss potential surgery, has failed CSI and gel injections. Current BMI at todays visit is 43. 6. patient does have a h/o DVT, pulmonary embolism, stage 3 kidney and JES. Currently on Eliquis. Will require clearances. HPI:    Marla Levy is a 69y.o. year old female well-known to me, seen today for surgery discussion regarding her right knee. She has been managed conservatively for quite some time for bilateral knee osteoarthritis. Most recent injections provided minimal relief. She is interested in total knee replacement. REVIEW OF SYSTEMS:     Constitutional:  Negative for weight loss, fevers, chills, fatigue  Cardiovascular: Negative for chest pain, palpitations  Pulmonary: Negative for shortness of breath, labored breathing, cough  GI: negative for abdominal pain, nausea, vomiting   MSK: per HPI  Skin: negative for rash, open wounds    All other systems reviewed and are negative       Physical Exam:     Height: 5' 3\" (1.6 m), Weight - Scale: 241 lb (109.3 kg)    General: Alert and oriented x3, no acute distress  Cardiovascular/pulmonary: No labored breathing, peripheral perfusion intact  Musculoskeletal:    Exam of the right knee shows significant varus alignment partially correctable. There is medial joint line tenderness. Range of motion is 5 to 95 degrees. The knee does not display effusion. Skin is intact    Controlled Substances Monitoring:      Imaging:  X-rays of bilateral knees show significant varus osteoarthritis, worse radiographically on the right      Assessment: Bilateral knee osteoarthritis, worse on the right      Plan:   We discussed her knees today.   She has been managed conservatively for years for her knee and has had decreasing efficacy of

## 2023-05-04 ENCOUNTER — HOSPITAL ENCOUNTER (OUTPATIENT)
Age: 68
Discharge: HOME | End: 2023-05-04
Payer: MEDICARE

## 2023-05-04 LAB
ALBUMIN SERPL-MCNC: 3.9 G/DL (ref 3.5–5.2)
ALP SERPL-CCNC: 131 U/L (ref 35–104)
ALT SERPL-CCNC: 10 U/L (ref 0–32)
ANION GAP SERPL CALCULATED.3IONS-SCNC: 8 MMOL/L (ref 7–16)
AST SERPL-CCNC: 15 U/L (ref 0–31)
BACTERIA URNS QL MICRO: NORMAL /HPF
BILIRUB SERPL-MCNC: 0.5 MG/DL (ref 0–1.2)
BILIRUB UR QL STRIP: NEGATIVE
BUN SERPL-MCNC: 22 MG/DL (ref 6–23)
CALCIUM SERPL-MCNC: 9.6 MG/DL (ref 8.6–10.2)
CHLORIDE SERPL-SCNC: 103 MMOL/L (ref 98–107)
CHOLESTEROL, TOTAL: 242 MG/DL (ref 0–199)
CLARITY UR: CLEAR
CO2 SERPL-SCNC: 28 MMOL/L (ref 22–29)
COLOR UR: YELLOW
CREAT SERPL-MCNC: 1.1 MG/DL (ref 0.5–1)
CREAT UR-MCNC: 117 MG/DL (ref 29–226)
ERYTHROCYTE [DISTWIDTH] IN BLOOD BY AUTOMATED COUNT: 14.3 FL (ref 11.5–15)
GLUCOSE SERPL-MCNC: 89 MG/DL (ref 74–99)
GLUCOSE UR STRIP-MCNC: NEGATIVE MG/DL
HCT VFR BLD AUTO: 46 % (ref 34–48)
HDLC SERPL-MCNC: 71 MG/DL
HGB BLD-MCNC: 14.5 G/DL (ref 11.5–15.5)
HGB UR QL STRIP: NORMAL
KETONES UR STRIP-MCNC: NEGATIVE MG/DL
LDLC SERPL CALC-MCNC: 148 MG/DL (ref 0–99)
LEUKOCYTE ESTERASE UR QL STRIP: NEGATIVE
MAGNESIUM SERPL-MCNC: 1.6 MG/DL (ref 1.6–2.6)
MCH RBC QN AUTO: 28.9 PG (ref 26–35)
MCHC RBC AUTO-ENTMCNC: 31.5 % (ref 32–34.5)
MCV RBC AUTO: 91.6 FL (ref 80–99.9)
MICROALBUMIN UR-MCNC: <12 MG/L
MICROALBUMIN/CREAT UR-RTO: ABNORMAL (ref 0–30)
NITRITE UR QL STRIP: NEGATIVE
PH UR STRIP: 7 [PH] (ref 5–9)
PHOSPHATE SERPL-MCNC: 3.3 MG/DL (ref 2.5–4.5)
PLATELET # BLD AUTO: 323 E9/L (ref 130–450)
PMV BLD AUTO: 9.9 FL (ref 7–12)
POTASSIUM SERPL-SCNC: 4.4 MMOL/L (ref 3.5–5)
PROT SERPL-MCNC: 7 G/DL (ref 6.4–8.3)
PROT UR STRIP-MCNC: NEGATIVE MG/DL
PTH-INTACT SERPL-MCNC: 100 PG/ML (ref 15–65)
RBC # BLD AUTO: 5.02 E12/L (ref 3.5–5.5)
RBC #/AREA URNS HPF: NORMAL /HPF (ref 0–2)
SODIUM SERPL-SCNC: 139 MMOL/L (ref 132–146)
SP GR UR STRIP: 1.01 (ref 1–1.03)
TRIGL SERPL-MCNC: 116 MG/DL (ref 0–149)
UROBILINOGEN UR STRIP-ACNC: 1 E.U./DL
VITAMIN D 25-HYDROXY: 41 NG/ML (ref 30–100)
VLDLC SERPL CALC-MCNC: 23 MG/DL
WBC # BLD: 9.1 E9/L (ref 4.5–11.5)
WBC #/AREA URNS HPF: NORMAL /HPF (ref 0–5)

## 2023-05-04 PROCEDURE — 84100 ASSAY OF PHOSPHORUS: CPT

## 2023-05-04 PROCEDURE — 82044 UR ALBUMIN SEMIQUANTITATIVE: CPT

## 2023-05-04 PROCEDURE — 80053 COMPREHEN METABOLIC PANEL: CPT

## 2023-05-04 PROCEDURE — 80061 LIPID PANEL: CPT

## 2023-05-04 PROCEDURE — 82306 VITAMIN D 25 HYDROXY: CPT

## 2023-05-04 PROCEDURE — 83735 ASSAY OF MAGNESIUM: CPT

## 2023-05-04 PROCEDURE — 85027 COMPLETE CBC AUTOMATED: CPT

## 2023-05-04 PROCEDURE — 81001 URINALYSIS AUTO W/SCOPE: CPT

## 2023-05-04 PROCEDURE — 36415 COLL VENOUS BLD VENIPUNCTURE: CPT

## 2023-05-04 PROCEDURE — 83970 ASSAY OF PARATHORMONE: CPT

## 2023-05-04 PROCEDURE — 82570 ASSAY OF URINE CREATININE: CPT

## 2023-05-05 ENCOUNTER — PREP FOR PROCEDURE (OUTPATIENT)
Dept: ORTHOPEDIC SURGERY | Age: 68
End: 2023-05-05

## 2023-05-05 ENCOUNTER — TELEPHONE (OUTPATIENT)
Dept: ORTHOPEDIC SURGERY | Age: 68
End: 2023-05-05

## 2023-05-05 ENCOUNTER — TELEPHONE (OUTPATIENT)
Dept: CARDIOLOGY CLINIC | Age: 68
End: 2023-05-05

## 2023-05-05 ENCOUNTER — TELEPHONE (OUTPATIENT)
Dept: ADMINISTRATIVE | Age: 68
End: 2023-05-05

## 2023-05-05 PROBLEM — M17.11 OSTEOARTHRITIS OF RIGHT KNEE: Status: ACTIVE | Noted: 2023-05-05

## 2023-05-05 NOTE — TELEPHONE ENCOUNTER
Pt calling per Dr. Lia Nazario requesting cardiac clearance for upcoming knee surgery on July 25th. She was last seen in [address] by KM on: 1/12/23.

## 2023-05-08 NOTE — TELEPHONE ENCOUNTER
Patient is an acceptable risk to proceed with surgery. Okay to hold eliquis for 3 days prior. Shanti Bhatia D.O.   Cardiologist  Cardiology, [address] [facility]

## 2023-05-11 ENCOUNTER — OFFICE VISIT (OUTPATIENT)
Dept: ENDOCRINOLOGY | Age: 68
End: 2023-05-11
Payer: MEDICARE

## 2023-05-11 VITALS
BODY MASS INDEX: 43.05 KG/M2 | HEART RATE: 71 BPM | SYSTOLIC BLOOD PRESSURE: 148 MMHG | DIASTOLIC BLOOD PRESSURE: 83 MMHG | WEIGHT: 243 LBS

## 2023-05-11 DIAGNOSIS — E04.2 MULTINODULAR GOITER: ICD-10-CM

## 2023-05-11 DIAGNOSIS — E55.9 VITAMIN D DEFICIENCY: ICD-10-CM

## 2023-05-11 DIAGNOSIS — K21.9 GASTROESOPHAGEAL REFLUX DISEASE, UNSPECIFIED WHETHER ESOPHAGITIS PRESENT: ICD-10-CM

## 2023-05-11 DIAGNOSIS — E04.2 MULTINODULAR GOITER: Primary | ICD-10-CM

## 2023-05-11 DIAGNOSIS — F32.1 CURRENT MODERATE EPISODE OF MAJOR DEPRESSIVE DISORDER WITHOUT PRIOR EPISODE (HCC): ICD-10-CM

## 2023-05-11 LAB
T4 FREE SERPL-MCNC: 1.52 NG/DL (ref 0.93–1.7)
TSH SERPL-MCNC: 0.69 UIU/ML (ref 0.27–4.2)

## 2023-05-11 PROCEDURE — G8399 PT W/DXA RESULTS DOCUMENT: HCPCS | Performed by: CLINICAL NURSE SPECIALIST

## 2023-05-11 PROCEDURE — 1090F PRES/ABSN URINE INCON ASSESS: CPT | Performed by: CLINICAL NURSE SPECIALIST

## 2023-05-11 PROCEDURE — G8427 DOCREV CUR MEDS BY ELIG CLIN: HCPCS | Performed by: CLINICAL NURSE SPECIALIST

## 2023-05-11 PROCEDURE — 1036F TOBACCO NON-USER: CPT | Performed by: CLINICAL NURSE SPECIALIST

## 2023-05-11 PROCEDURE — 1123F ACP DISCUSS/DSCN MKR DOCD: CPT | Performed by: CLINICAL NURSE SPECIALIST

## 2023-05-11 PROCEDURE — 99214 OFFICE O/P EST MOD 30 MIN: CPT | Performed by: CLINICAL NURSE SPECIALIST

## 2023-05-11 PROCEDURE — 3079F DIAST BP 80-89 MM HG: CPT | Performed by: CLINICAL NURSE SPECIALIST

## 2023-05-11 PROCEDURE — 3017F COLORECTAL CA SCREEN DOC REV: CPT | Performed by: CLINICAL NURSE SPECIALIST

## 2023-05-11 PROCEDURE — 3077F SYST BP >= 140 MM HG: CPT | Performed by: CLINICAL NURSE SPECIALIST

## 2023-05-11 PROCEDURE — G8417 CALC BMI ABV UP PARAM F/U: HCPCS | Performed by: CLINICAL NURSE SPECIALIST

## 2023-05-11 RX ORDER — FAMOTIDINE 20 MG/1
TABLET, FILM COATED ORAL
Qty: 31 TABLET | Refills: 5 | Status: SHIPPED | OUTPATIENT
Start: 2023-05-11

## 2023-05-11 RX ORDER — BUPROPION HYDROCHLORIDE 150 MG/1
150 TABLET ORAL EVERY MORNING
Qty: 31 TABLET | Refills: 5 | Status: SHIPPED | OUTPATIENT
Start: 2023-05-11

## 2023-05-11 RX ORDER — ESCITALOPRAM OXALATE 20 MG/1
TABLET ORAL
Qty: 31 TABLET | Refills: 5 | Status: SHIPPED | OUTPATIENT
Start: 2023-05-11

## 2023-05-11 NOTE — PROGRESS NOTES
05:55 AM    T4FREE 1.52 05/31/2022 01:05 PM     Lab Results   Component Value Date/Time    LABA1C 5.6 07/28/2021 04:54 AM    GLUCOSE 89 05/04/2023 11:58 AM    GLUCOSE 130 02/10/2012 09:00 PM    MALBCR - 05/04/2023 11:54 AM    LABMICR <12.0 05/04/2023 11:54 AM    LABCREA 117 05/04/2023 11:54 AM     Lab Results   Component Value Date/Time    TRIG 116 05/04/2023 11:58 AM    HDL 71 05/04/2023 11:58 AM    LDLCALC 148 05/04/2023 11:58 AM    CHOL 242 05/04/2023 11:58 AM     Lab Results   Component Value Date/Time    VITD25 41 05/04/2023 11:58 AM    VITD25 38 11/08/2022 10:33 AM     ASSESSMENT & RECOMMENDATIONS   Olayinka Traore, a 69 y.o.-old female seen in for the following issues     Multinodular goiter   Prevalence of thyroid nodule on thyroid ultrasound is 50% and 95 % of these nodules are benign. 12/2019 FNA to dominant Lt side 2.1 and 2.4 cm nodule was benign   US 7/2022 --> stable MNG   Will continue following   US in 7/2023 r   Recheck TFT's     VitD deficiency   Encourage taking vitD 2000 U/day   Last levels WNL     I personally spent > 30 minutes reviewing  external notes from PCP and other patient's care team providers, and personally interpreted labs associated with the above diagnosis. I also ordered labs to further assess and manage the above addressed medical conditions    Return in about 1 year (around 5/11/2024). The above issues were reviewed with the patient who understood and agreed with the plan. Thank you for allowing us to participate in the care of this patient. Please do not hesitate to contact us with any additional questions. Diagnosis Orders   1. Multinodular goiter  T4, Free    TSH    US THYROID      2. Vitamin D deficiency              Shakir Austin, [address], [address] [facility],[address] [address]   Phone: [phone]  Fax: [phone]  -------------------------------------------------------------  An electronic signature was used to authenticate this note.

## 2023-05-15 ENCOUNTER — TELEPHONE (OUTPATIENT)
Dept: ENDOCRINOLOGY | Age: 68
End: 2023-05-15

## 2023-05-15 NOTE — TELEPHONE ENCOUNTER
----- Message from GENTRY Rojo - CNS sent at 5/15/2023  7:40 AM EDT -----  Please call patient and inform her thyroid function is normal.  This is an excellent result

## 2023-06-05 NOTE — DISCHARGE INSTRUCTIONS
DISCHARGE INSTRUCTIONS FOR TOTAL KNEE REPLACEMENT        Prevent blood clots - you are at risk post operatively for DVT (Deep vein    thrombosis and / or PE (Pulmonary Embolism)       Continue antiembolic stockings (JUAREZ hose) for 4 weeks from date of surgery. Remove stockings every eight hours. Check skin for redness or any breakdowns on heels and over outer ankle bones. Do not roll stockings down or fold over (this may cause a band of constriction    interfering with circulation and increasing your risk of a blood clot forming or a    skin breakdown. If you have not been wearing your stockings and notice increased swelling or    excessive swelling in your extremity then: Lie down and elevate legs for 20-30   minutes. Apply stockings. If swelling does not improve, call office. REMEMBER: Mild to moderate swelling of the operative limb is expected    for some time after surgery. Take frequent walks around  your home. Be careful outdoors- watch for uneven ground and pavement, curbs and holes. Gradually increase your activity to prevent fatigue. When at rest (sitting in a chair or lying down,resting) continue circulation exercises at least every hour - foot flaps, ankle rolls, quad and glut sets. You will continue one of the following blood thinners at home. Lovenox -  An injection once or twice a day if you have a history of blood clots,    cancer, stomach ulcers or gastrointestinal bleeding. Aspirin - 81 mg twice daily if no history of the above ailments. Coumadin - if held before surgery, your family doctor will be responsible for   managing and ordering this medication upon your discharge. Incision Care: You may shower - Your dressing is water proof. You can shower with your dressing on. After one week, remove your dressing and leave your incision open to air. REMEMBER to let water roll over incision. Incision line is    healing and tender - protect from [facility] water.  No need

## 2023-06-06 ENCOUNTER — OFFICE VISIT (OUTPATIENT)
Dept: FAMILY MEDICINE CLINIC | Age: 68
End: 2023-06-06
Payer: MEDICARE

## 2023-06-06 VITALS
WEIGHT: 245 LBS | BODY MASS INDEX: 43.41 KG/M2 | DIASTOLIC BLOOD PRESSURE: 68 MMHG | SYSTOLIC BLOOD PRESSURE: 114 MMHG | OXYGEN SATURATION: 96 % | RESPIRATION RATE: 20 BRPM | TEMPERATURE: 97.5 F | HEIGHT: 63 IN | HEART RATE: 74 BPM

## 2023-06-06 DIAGNOSIS — Z00.00 MEDICARE ANNUAL WELLNESS VISIT, SUBSEQUENT: Primary | ICD-10-CM

## 2023-06-06 PROCEDURE — G0439 PPPS, SUBSEQ VISIT: HCPCS | Performed by: FAMILY MEDICINE

## 2023-06-06 PROCEDURE — 3074F SYST BP LT 130 MM HG: CPT | Performed by: FAMILY MEDICINE

## 2023-06-06 PROCEDURE — 3017F COLORECTAL CA SCREEN DOC REV: CPT | Performed by: FAMILY MEDICINE

## 2023-06-06 PROCEDURE — 3078F DIAST BP <80 MM HG: CPT | Performed by: FAMILY MEDICINE

## 2023-06-06 PROCEDURE — 1123F ACP DISCUSS/DSCN MKR DOCD: CPT | Performed by: FAMILY MEDICINE

## 2023-06-06 RX ORDER — TOLTERODINE 4 MG/1
CAPSULE, EXTENDED RELEASE ORAL DAILY
COMMUNITY
Start: 2023-05-10

## 2023-06-06 SDOH — HEALTH STABILITY: PHYSICAL HEALTH: ON AVERAGE, HOW MANY DAYS PER WEEK DO YOU ENGAGE IN MODERATE TO STRENUOUS EXERCISE (LIKE A BRISK WALK)?: 2 DAYS

## 2023-06-06 SDOH — HEALTH STABILITY: PHYSICAL HEALTH: ON AVERAGE, HOW MANY MINUTES DO YOU ENGAGE IN EXERCISE AT THIS LEVEL?: 30 MIN

## 2023-06-06 ASSESSMENT — PATIENT HEALTH QUESTIONNAIRE - PHQ9
6. FEELING BAD ABOUT YOURSELF - OR THAT YOU ARE A FAILURE OR HAVE LET YOURSELF OR YOUR FAMILY DOWN: 0
3. TROUBLE FALLING OR STAYING ASLEEP: 0
4. FEELING TIRED OR HAVING LITTLE ENERGY: 0
7. TROUBLE CONCENTRATING ON THINGS, SUCH AS READING THE NEWSPAPER OR WATCHING TELEVISION: 0
10. IF YOU CHECKED OFF ANY PROBLEMS, HOW DIFFICULT HAVE THESE PROBLEMS MADE IT FOR YOU TO DO YOUR WORK, TAKE CARE OF THINGS AT HOME, OR GET ALONG WITH OTHER PEOPLE: 0
SUM OF ALL RESPONSES TO PHQ QUESTIONS 1-9: 0
1. LITTLE INTEREST OR PLEASURE IN DOING THINGS: 0
2. FEELING DOWN, DEPRESSED OR HOPELESS: 0
SUM OF ALL RESPONSES TO PHQ QUESTIONS 1-9: 0
5. POOR APPETITE OR OVEREATING: 0
SUM OF ALL RESPONSES TO PHQ QUESTIONS 1-9: 0
SUM OF ALL RESPONSES TO PHQ9 QUESTIONS 1 & 2: 0
9. THOUGHTS THAT YOU WOULD BE BETTER OFF DEAD, OR OF HURTING YOURSELF: 0
SUM OF ALL RESPONSES TO PHQ QUESTIONS 1-9: 0
8. MOVING OR SPEAKING SO SLOWLY THAT OTHER PEOPLE COULD HAVE NOTICED. OR THE OPPOSITE, BEING SO FIGETY OR RESTLESS THAT YOU HAVE BEEN MOVING AROUND A LOT MORE THAN USUAL: 0

## 2023-06-06 ASSESSMENT — LIFESTYLE VARIABLES
HOW MANY STANDARD DRINKS CONTAINING ALCOHOL DO YOU HAVE ON A TYPICAL DAY: PATIENT DOES NOT DRINK
HOW OFTEN DO YOU HAVE SIX OR MORE DRINKS ON ONE OCCASION: 1
HOW MANY STANDARD DRINKS CONTAINING ALCOHOL DO YOU HAVE ON A TYPICAL DAY: 0
HOW OFTEN DO YOU HAVE A DRINK CONTAINING ALCOHOL: 1
HOW OFTEN DO YOU HAVE A DRINK CONTAINING ALCOHOL: NEVER

## 2023-06-06 NOTE — PROGRESS NOTES
index is 43.4 kg/m². General Appearance: alert and oriented to person, place and time, well developed and well- nourished, in no acute distress  Skin: warm and dry, no rash or erythema  Head: normocephalic and atraumatic  Eyes: pupils equal, round, and reactive to light, extraocular eye movements intact, conjunctivae normal  ENT: tympanic membrane, external ear and ear canal normal bilaterally, nose without deformity, nasal mucosa and turbinates normal without polyps  Neck: supple and non-tender without mass, no thyromegaly or thyroid nodules, no cervical lymphadenopathy  Pulmonary/Chest: clear to auscultation bilaterally- no wheezes, rales or rhonchi, normal air movement, no respiratory distress  Cardiovascular: normal rate, regular rhythm, normal S1 and S2, no murmurs, rubs, clicks, or gallops, distal pulses intact, no carotid bruits  Abdomen: soft, non-tender, non-distended, normal bowel sounds, no masses or organomegaly  Extremities: no cyanosis, clubbing or edema        Allergies   Allergen Reactions    Penicillins Hives and Rash    Tape [Adhesive Tape] Hives     Prior to Visit Medications    Medication Sig Taking?  Authorizing Provider   tolterodine (DETROL LA) 4 MG extended release capsule Take by mouth daily Yes Historical Provider, MD   buPROPion (WELLBUTRIN XL) 150 MG extended release tablet TAKE 1 TABLET BY MOUTH EVERY MORNING Yes Meir Uribe DO   escitalopram (LEXAPRO) 20 MG tablet TAKE 1 TABLET BY MOUTH ONCE DAILY Yes Meir Uribe DO   famotidine (PEPCID) 20 MG tablet TAKE 1 TABLET BY MOUTH ONCE DAILY Yes Meir Uribe, DO   calcium citrate (CALCITRATE) 950 (200 Ca) MG tablet Take 2 tablets by mouth daily OTC Yes Meir Uribe DO   pravastatin (PRAVACHOL) 20 MG tablet TAKE 1 TABLET BY MOUTH  DAILY Yes Kimberly Quach, DO   magnesium oxide (MAG-OX) 400 (240 Mg) MG tablet Take 1 tablet by mouth daily Yes Francisco Quach, DO   metoprolol succinate (TOPROL XL) 25 MG extended release

## 2023-06-20 ENCOUNTER — TELEPHONE (OUTPATIENT)
Dept: SLEEP MEDICINE | Age: 68
End: 2023-06-20

## 2023-06-20 NOTE — TELEPHONE ENCOUNTER
Received message from pt re referral and setting up appointment. Pt had been called x2 by preservice and pt was returning the call.   Ret call and LMOM asking pt to call to schedule appointment

## 2023-07-03 ENCOUNTER — OFFICE VISIT (OUTPATIENT)
Dept: FAMILY MEDICINE CLINIC | Age: 68
End: 2023-07-03
Payer: MEDICARE

## 2023-07-03 VITALS
OXYGEN SATURATION: 97 % | SYSTOLIC BLOOD PRESSURE: 128 MMHG | RESPIRATION RATE: 18 BRPM | WEIGHT: 245 LBS | DIASTOLIC BLOOD PRESSURE: 80 MMHG | HEIGHT: 64 IN | BODY MASS INDEX: 41.83 KG/M2 | TEMPERATURE: 97.6 F | HEART RATE: 77 BPM

## 2023-07-03 DIAGNOSIS — Z01.818 PRE-OP EXAM: Primary | ICD-10-CM

## 2023-07-03 DIAGNOSIS — M17.11 PRIMARY OSTEOARTHRITIS OF RIGHT KNEE: ICD-10-CM

## 2023-07-03 DIAGNOSIS — I10 ESSENTIAL HYPERTENSION: ICD-10-CM

## 2023-07-03 PROCEDURE — 1090F PRES/ABSN URINE INCON ASSESS: CPT | Performed by: FAMILY MEDICINE

## 2023-07-03 PROCEDURE — 1036F TOBACCO NON-USER: CPT | Performed by: FAMILY MEDICINE

## 2023-07-03 PROCEDURE — G8427 DOCREV CUR MEDS BY ELIG CLIN: HCPCS | Performed by: FAMILY MEDICINE

## 2023-07-03 PROCEDURE — 3017F COLORECTAL CA SCREEN DOC REV: CPT | Performed by: FAMILY MEDICINE

## 2023-07-03 PROCEDURE — G8399 PT W/DXA RESULTS DOCUMENT: HCPCS | Performed by: FAMILY MEDICINE

## 2023-07-03 PROCEDURE — 1123F ACP DISCUSS/DSCN MKR DOCD: CPT | Performed by: FAMILY MEDICINE

## 2023-07-03 PROCEDURE — G8417 CALC BMI ABV UP PARAM F/U: HCPCS | Performed by: FAMILY MEDICINE

## 2023-07-03 PROCEDURE — 3079F DIAST BP 80-89 MM HG: CPT | Performed by: FAMILY MEDICINE

## 2023-07-03 PROCEDURE — 93000 ELECTROCARDIOGRAM COMPLETE: CPT | Performed by: FAMILY MEDICINE

## 2023-07-03 PROCEDURE — 99214 OFFICE O/P EST MOD 30 MIN: CPT | Performed by: FAMILY MEDICINE

## 2023-07-03 PROCEDURE — 3074F SYST BP LT 130 MM HG: CPT | Performed by: FAMILY MEDICINE

## 2023-07-06 ASSESSMENT — PROMIS GLOBAL HEALTH SCALE
IN GENERAL, HOW WOULD YOU RATE YOUR SATISFACTION WITH YOUR SOCIAL ACTIVITIES AND RELATIONSHIPS [ON A SCALE OF 1 (POOR) TO 5 (EXCELLENT)]?: 4
IN THE PAST 7 DAYS, HOW WOULD YOU RATE YOUR PAIN ON AVERAGE [ON A SCALE FROM 0 (NO PAIN) TO 10 (WORST IMAGINABLE PAIN)]?: 9
SUM OF RESPONSES TO QUESTIONS 3, 6, 7, & 8: 19
IN GENERAL, HOW WOULD YOU RATE YOUR PHYSICAL HEALTH [ON A SCALE OF 1 (POOR) TO 5 (EXCELLENT)]?: 3
IN GENERAL, WOULD YOU SAY YOUR QUALITY OF LIFE IS...[ON A SCALE OF 1 (POOR) TO 5 (EXCELLENT)]: 4
IN GENERAL, PLEASE RATE HOW WELL YOU CARRY OUT YOUR USUAL SOCIAL ACTIVITIES (INCLUDES ACTIVITIES AT HOME, AT WORK, AND IN YOUR COMMUNITY, AND RESPONSIBILITIES AS A PARENT, CHILD, SPOUSE, EMPLOYEE, FRIEND, ETC) [ON A SCALE OF 1 (POOR) TO 5 (EXCELLENT)]?: 4
HOW IS THE PROMIS V1.1 BEING ADMINISTERED?: 2
IN GENERAL, HOW WOULD YOU RATE YOUR MENTAL HEALTH, INCLUDING YOUR MOOD AND YOUR ABILITY TO THINK [ON A SCALE OF 1 (POOR) TO 5 (EXCELLENT)]?: 5
IN THE PAST 7 DAYS, HOW WOULD YOU RATE YOUR FATIGUE ON AVERAGE [ON A SCALE FROM 1 (NONE) TO 5 (VERY SEVERE)]?: 5
WHO IS THE PERSON COMPLETING THE PROMIS V1.1 SURVEY?: 0
TO WHAT EXTENT ARE YOU ABLE TO CARRY OUT YOUR EVERYDAY PHYSICAL ACTIVITIES SUCH AS WALKING, CLIMBING STAIRS, CARRYING GROCERIES, OR MOVING A CHAIR [ON A SCALE OF 1 (NOT AT ALL) TO 5 (COMPLETELY)]?: 2
IN GENERAL, WOULD YOU SAY YOUR HEALTH IS...[ON A SCALE OF 1 (POOR) TO 5 (EXCELLENT)]: 5
IN THE PAST 7 DAYS, HOW OFTEN HAVE YOU BEEN BOTHERED BY EMOTIONAL PROBLEMS, SUCH AS FEELING ANXIOUS, DEPRESSED, OR IRRITABLE [ON A SCALE FROM 1 (NEVER) TO 5 (ALWAYS)]?: 5
SUM OF RESPONSES TO QUESTIONS 2, 4, 5, & 10: 18

## 2023-07-06 ASSESSMENT — KOOS JR
BENDING TO THE FLOOR TO PICK UP OBJECT: 0
TWISING OR PIVOTING ON KNEE: 1
KOOS JR TOTAL INTERVAL SCORE: 59.381
HOW SEVERE IS YOUR KNEE STIFFNESS AFTER FIRST WAKING IN MORNING: 3
STRAIGHTENING KNEE FULLY: 0
GOING UP OR DOWN STAIRS: 4
RISING FROM SITTING: 1
STANDING UPRIGHT: 2

## 2023-07-11 ENCOUNTER — HOSPITAL ENCOUNTER (OUTPATIENT)
Dept: CT IMAGING | Age: 68
Discharge: HOME | End: 2023-07-13
Attending: ORTHOPAEDIC SURGERY
Payer: MEDICARE

## 2023-07-11 ENCOUNTER — HOSPITAL ENCOUNTER (OUTPATIENT)
Dept: ULTRASOUND IMAGING | Age: 68
Discharge: HOME | End: 2023-07-13
Attending: ORTHOPAEDIC SURGERY
Payer: MEDICARE

## 2023-07-11 DIAGNOSIS — M17.0 PRIMARY OSTEOARTHRITIS OF BOTH KNEES: ICD-10-CM

## 2023-07-11 PROCEDURE — 76536 US EXAM OF HEAD AND NECK: CPT

## 2023-07-11 PROCEDURE — 73700 CT LOWER EXTREMITY W/O DYE: CPT

## 2023-07-13 ENCOUNTER — TELEPHONE (OUTPATIENT)
Dept: ENDOCRINOLOGY | Age: 68
End: 2023-07-13

## 2023-07-13 NOTE — TELEPHONE ENCOUNTER
----- Message from RemCare Press, APRN - CNS sent at 7/13/2023  7:46 AM EDT -----  Please call patient and inform her thyroid ultrasound is stable when compared to previous.   We will continue to observe for now Latesha was notified lab results have been received. Dr. Barney has reviewed the results and found nothing is emergent. She will discuss the results at the follow up appointment on file. 4/5/2018 12:30 PM. Patient verbalized understanding and is receptive to the plan. She is to contact our office as needed.

## 2023-07-19 ENCOUNTER — OFFICE VISIT (OUTPATIENT)
Dept: ORTHOPEDIC SURGERY | Age: 68
End: 2023-07-19
Payer: MEDICARE

## 2023-07-19 ENCOUNTER — HOSPITAL ENCOUNTER (OUTPATIENT)
Dept: PREADMISSION TESTING | Age: 68
Discharge: HOME | End: 2023-07-19
Payer: MEDICARE

## 2023-07-19 VITALS
HEART RATE: 60 BPM | RESPIRATION RATE: 20 BRPM | BODY MASS INDEX: 40.97 KG/M2 | OXYGEN SATURATION: 97 % | HEIGHT: 64 IN | SYSTOLIC BLOOD PRESSURE: 142 MMHG | DIASTOLIC BLOOD PRESSURE: 76 MMHG | TEMPERATURE: 96.5 F | WEIGHT: 240 LBS

## 2023-07-19 VITALS — HEIGHT: 64 IN | BODY MASS INDEX: 40.97 KG/M2 | WEIGHT: 240 LBS

## 2023-07-19 DIAGNOSIS — M17.11 PRIMARY OSTEOARTHRITIS OF RIGHT KNEE: Primary | ICD-10-CM

## 2023-07-19 DIAGNOSIS — Z01.818 PRE-OP TESTING: ICD-10-CM

## 2023-07-19 LAB
ANION GAP SERPL CALCULATED.3IONS-SCNC: 12 MMOL/L (ref 7–16)
BASOPHILS # BLD: 0.05 K/UL (ref 0–0.2)
BASOPHILS NFR BLD: 1 % (ref 0–2)
BUN SERPL-MCNC: 27 MG/DL (ref 6–23)
CALCIUM SERPL-MCNC: 9.9 MG/DL (ref 8.6–10.2)
CHLORIDE SERPL-SCNC: 101 MMOL/L (ref 98–107)
CO2 SERPL-SCNC: 26 MMOL/L (ref 22–29)
CREAT SERPL-MCNC: 1.1 MG/DL (ref 0.5–1)
EOSINOPHIL # BLD: 0.1 K/UL (ref 0.05–0.5)
EOSINOPHILS RELATIVE PERCENT: 1 % (ref 0–6)
ERYTHROCYTE [DISTWIDTH] IN BLOOD BY AUTOMATED COUNT: 13.8 % (ref 11.5–15)
GFR SERPL CREATININE-BSD FRML MDRD: 52 ML/MIN/1.73M2
GLUCOSE SERPL-MCNC: 88 MG/DL (ref 74–99)
HCT VFR BLD AUTO: 45.2 % (ref 34–48)
HGB BLD-MCNC: 14.7 G/DL (ref 11.5–15.5)
IMM GRANULOCYTES # BLD AUTO: 0.04 K/UL (ref 0–0.58)
IMM GRANULOCYTES NFR BLD: 1 % (ref 0–5)
LYMPHOCYTES NFR BLD: 2.16 K/UL (ref 1.5–4)
LYMPHOCYTES RELATIVE PERCENT: 28 % (ref 20–42)
MCH RBC QN AUTO: 29.8 PG (ref 26–35)
MCHC RBC AUTO-ENTMCNC: 32.5 G/DL (ref 32–34.5)
MCV RBC AUTO: 91.5 FL (ref 80–99.9)
MONOCYTES NFR BLD: 0.66 K/UL (ref 0.1–0.95)
MONOCYTES NFR BLD: 9 % (ref 2–12)
NEUTROPHILS NFR BLD: 61 % (ref 43–80)
NEUTS SEG NFR BLD: 4.66 K/UL (ref 1.8–7.3)
PLATELET # BLD AUTO: 281 K/UL (ref 130–450)
PMV BLD AUTO: 10.2 FL (ref 7–12)
POTASSIUM SERPL-SCNC: 3.9 MMOL/L (ref 3.5–5)
PREALB SERPL-MCNC: 26 MG/DL (ref 20–40)
RBC # BLD AUTO: 4.94 M/UL (ref 3.5–5.5)
SODIUM SERPL-SCNC: 139 MMOL/L (ref 132–146)
WBC OTHER # BLD: 7.7 K/UL (ref 4.5–11.5)

## 2023-07-19 PROCEDURE — 87081 CULTURE SCREEN ONLY: CPT

## 2023-07-19 PROCEDURE — 99213 OFFICE O/P EST LOW 20 MIN: CPT | Performed by: ORTHOPAEDIC SURGERY

## 2023-07-19 PROCEDURE — 84134 ASSAY OF PREALBUMIN: CPT

## 2023-07-19 PROCEDURE — G8399 PT W/DXA RESULTS DOCUMENT: HCPCS | Performed by: ORTHOPAEDIC SURGERY

## 2023-07-19 PROCEDURE — G8427 DOCREV CUR MEDS BY ELIG CLIN: HCPCS | Performed by: ORTHOPAEDIC SURGERY

## 2023-07-19 PROCEDURE — 1090F PRES/ABSN URINE INCON ASSESS: CPT | Performed by: ORTHOPAEDIC SURGERY

## 2023-07-19 PROCEDURE — 80048 BASIC METABOLIC PNL TOTAL CA: CPT

## 2023-07-19 PROCEDURE — 85027 COMPLETE CBC AUTOMATED: CPT

## 2023-07-19 PROCEDURE — 3017F COLORECTAL CA SCREEN DOC REV: CPT | Performed by: ORTHOPAEDIC SURGERY

## 2023-07-19 PROCEDURE — G8417 CALC BMI ABV UP PARAM F/U: HCPCS | Performed by: ORTHOPAEDIC SURGERY

## 2023-07-19 PROCEDURE — 1123F ACP DISCUSS/DSCN MKR DOCD: CPT | Performed by: ORTHOPAEDIC SURGERY

## 2023-07-19 PROCEDURE — 1036F TOBACCO NON-USER: CPT | Performed by: ORTHOPAEDIC SURGERY

## 2023-07-19 PROCEDURE — 36415 COLL VENOUS BLD VENIPUNCTURE: CPT

## 2023-07-19 ASSESSMENT — KOOS JR
STANDING UPRIGHT: 4
RISING FROM SITTING: 4
KOOS JR TOTAL INTERVAL SCORE: 24.875
HOW SEVERE IS YOUR KNEE STIFFNESS AFTER FIRST WAKING IN MORNING: 4
STRAIGHTENING KNEE FULLY: 3
BENDING TO THE FLOOR TO PICK UP OBJECT: 2
TWISING OR PIVOTING ON KNEE: 3
GOING UP OR DOWN STAIRS: 4

## 2023-07-19 NOTE — PROGRESS NOTES
[address] [facility] PRE-ADMISSION TESTING INSTRUCTIONS    The Preadmission Testing patient is instructed accordingly using the following criteria (check applicable):    ARRIVAL INSTRUCTIONS:  [x] Parking the day of Surgery is located in the Main Entrance lot. Upon entering the door, make an immediate right to the surgery reception desk    [x] Bring photo ID and insurance card    [x] Bring in a copy of Living will or Durable Power of  papers. [x] Please be sure to arrange for responsible adult to provide transportation to and from the hospital    [x] Please arrange for responsible adult to be with you for the 24 hour period post procedure due to having anesthesia    [x] If you awake am of surgery not feeling well or have temperature >100 please call [phone]    GENERAL INSTRUCTIONS:    [x] Follow instructions for hydration that have been provided to you at your Pre-Admission Visit. Solid food until midnight then clear liquids. No gum, candy or mints. [x] You may brush your teeth, but do not swallow any water    [x] Take medications as instructed with 1-2 oz of water    [x] Stop herbal supplements and vitamins 5 days prior to procedure    [x] Follow preop dosing of blood thinners per physician instructions    [x] No tobacco products within 24 hours of surgery     [x] No alcohol or illegal drug use within 24 hours of surgery.     [x] Jewelry, body piercing's, eyeglasses, contact lenses and dentures are not permitted into surgery (bring cases)      [x] Please do not wear any nail polish, make up or hair products on the day of surgery    [x] You can expect a call the business day prior to procedure to notify you if your arrival time changes    [x] If you receive a survey after surgery we would greatly appreciate your comments      [x] Please notify surgeon if you develop any illness between now and time of surgery (cold, cough, sore throat, fever, nausea, vomiting) or any signs of

## 2023-07-20 LAB
MICROORGANISM SPEC CULT: NORMAL
SPECIMEN DESCRIPTION: NORMAL

## 2023-07-25 ENCOUNTER — ANESTHESIA EVENT (OUTPATIENT)
Dept: OPERATING ROOM | Age: 68
End: 2023-07-25
Payer: MEDICARE

## 2023-07-25 ENCOUNTER — ANESTHESIA (OUTPATIENT)
Dept: OPERATING ROOM | Age: 68
End: 2023-07-25
Payer: MEDICARE

## 2023-07-25 ENCOUNTER — APPOINTMENT (OUTPATIENT)
Dept: GENERAL RADIOLOGY | Age: 68
End: 2023-07-25
Attending: ORTHOPAEDIC SURGERY
Payer: MEDICARE

## 2023-07-25 ENCOUNTER — HOSPITAL ENCOUNTER (OUTPATIENT)
Age: 68
Setting detail: OBSERVATION
Discharge: OTHER INSTITUTION | End: 2023-07-27
Attending: ORTHOPAEDIC SURGERY | Admitting: ORTHOPAEDIC SURGERY
Payer: MEDICARE

## 2023-07-25 DIAGNOSIS — Z96.651 STATUS POST RIGHT KNEE REPLACEMENT: Primary | ICD-10-CM

## 2023-07-25 DIAGNOSIS — M25.561 ACUTE PAIN OF RIGHT KNEE: ICD-10-CM

## 2023-07-25 DIAGNOSIS — M17.11 OSTEOARTHRITIS OF RIGHT KNEE: ICD-10-CM

## 2023-07-25 DIAGNOSIS — Z01.818 PRE-OP TESTING: ICD-10-CM

## 2023-07-25 PROCEDURE — 99999 PR OFFICE/OUTPT VISIT,PROCEDURE ONLY: CPT | Performed by: ORTHOPAEDIC SURGERY

## 2023-07-25 PROCEDURE — G0378 HOSPITAL OBSERVATION PER HR: HCPCS

## 2023-07-25 PROCEDURE — 88311 DECALCIFY TISSUE: CPT

## 2023-07-25 PROCEDURE — 6360000002 HC RX W HCPCS: Performed by: ORTHOPAEDIC SURGERY

## 2023-07-25 PROCEDURE — 97530 THERAPEUTIC ACTIVITIES: CPT

## 2023-07-25 PROCEDURE — 7100000001 HC PACU RECOVERY - ADDTL 15 MIN: Performed by: ORTHOPAEDIC SURGERY

## 2023-07-25 PROCEDURE — 6360000002 HC RX W HCPCS

## 2023-07-25 PROCEDURE — 6360000002 HC RX W HCPCS: Performed by: STUDENT IN AN ORGANIZED HEALTH CARE EDUCATION/TRAINING PROGRAM

## 2023-07-25 PROCEDURE — 2580000003 HC RX 258: Performed by: ORTHOPAEDIC SURGERY

## 2023-07-25 PROCEDURE — 97165 OT EVAL LOW COMPLEX 30 MIN: CPT

## 2023-07-25 PROCEDURE — 73560 X-RAY EXAM OF KNEE 1 OR 2: CPT

## 2023-07-25 PROCEDURE — C1713 ANCHOR/SCREW BN/BN,TIS/BN: HCPCS | Performed by: ORTHOPAEDIC SURGERY

## 2023-07-25 PROCEDURE — 3600000015 HC SURGERY LEVEL 5 ADDTL 15MIN: Performed by: ORTHOPAEDIC SURGERY

## 2023-07-25 PROCEDURE — C1776 JOINT DEVICE (IMPLANTABLE): HCPCS | Performed by: ORTHOPAEDIC SURGERY

## 2023-07-25 PROCEDURE — 2500000003 HC RX 250 WO HCPCS: Performed by: ORTHOPAEDIC SURGERY

## 2023-07-25 PROCEDURE — 97161 PT EVAL LOW COMPLEX 20 MIN: CPT

## 2023-07-25 PROCEDURE — 2720000010 HC SURG SUPPLY STERILE: Performed by: ORTHOPAEDIC SURGERY

## 2023-07-25 PROCEDURE — 97535 SELF CARE MNGMENT TRAINING: CPT

## 2023-07-25 PROCEDURE — 64447 NJX AA&/STRD FEMORAL NRV IMG: CPT | Performed by: STUDENT IN AN ORGANIZED HEALTH CARE EDUCATION/TRAINING PROGRAM

## 2023-07-25 PROCEDURE — 27447 TOTAL KNEE ARTHROPLASTY: CPT | Performed by: ORTHOPAEDIC SURGERY

## 2023-07-25 PROCEDURE — 7100000000 HC PACU RECOVERY - FIRST 15 MIN: Performed by: ORTHOPAEDIC SURGERY

## 2023-07-25 PROCEDURE — 27447 TOTAL KNEE ARTHROPLASTY: CPT | Performed by: NURSE PRACTITIONER

## 2023-07-25 PROCEDURE — 6360000002 HC RX W HCPCS: Performed by: NURSE ANESTHETIST, CERTIFIED REGISTERED

## 2023-07-25 PROCEDURE — 2580000003 HC RX 258: Performed by: NURSE ANESTHETIST, CERTIFIED REGISTERED

## 2023-07-25 PROCEDURE — 6360000002 HC RX W HCPCS: Performed by: NURSE PRACTITIONER

## 2023-07-25 PROCEDURE — 88305 TISSUE EXAM BY PATHOLOGIST: CPT

## 2023-07-25 PROCEDURE — 2709999900 HC NON-CHARGEABLE SUPPLY: Performed by: ORTHOPAEDIC SURGERY

## 2023-07-25 PROCEDURE — 0055T BONE SRGRY CMPTR CT/MRI IMAG: CPT | Performed by: ORTHOPAEDIC SURGERY

## 2023-07-25 PROCEDURE — 3700000000 HC ANESTHESIA ATTENDED CARE: Performed by: ORTHOPAEDIC SURGERY

## 2023-07-25 PROCEDURE — 3700000001 HC ADD 15 MINUTES (ANESTHESIA): Performed by: ORTHOPAEDIC SURGERY

## 2023-07-25 PROCEDURE — 2580000003 HC RX 258: Performed by: NURSE PRACTITIONER

## 2023-07-25 PROCEDURE — C9399 UNCLASSIFIED DRUGS OR BIOLOG: HCPCS | Performed by: NURSE ANESTHETIST, CERTIFIED REGISTERED

## 2023-07-25 PROCEDURE — 6370000000 HC RX 637 (ALT 250 FOR IP): Performed by: ORTHOPAEDIC SURGERY

## 2023-07-25 PROCEDURE — 6370000000 HC RX 637 (ALT 250 FOR IP): Performed by: NURSE PRACTITIONER

## 2023-07-25 PROCEDURE — 2500000003 HC RX 250 WO HCPCS: Performed by: NURSE ANESTHETIST, CERTIFIED REGISTERED

## 2023-07-25 PROCEDURE — 3600000005 HC SURGERY LEVEL 5 BASE: Performed by: ORTHOPAEDIC SURGERY

## 2023-07-25 DEVICE — IMPLANTABLE DEVICE: Type: IMPLANTABLE DEVICE | Site: KNEE | Status: FUNCTIONAL

## 2023-07-25 DEVICE — PATELLA
Type: IMPLANTABLE DEVICE | Site: KNEE | Status: FUNCTIONAL
Brand: TRIATHLON

## 2023-07-25 DEVICE — CRUCIATE RETAINING FEMORAL
Type: IMPLANTABLE DEVICE | Site: KNEE | Status: FUNCTIONAL
Brand: TRIATHLON

## 2023-07-25 DEVICE — BASEPLATE TIB SZ 3 AP44MM ML67MM KNEE TRITANIUM 4 CRUCFRM: Type: IMPLANTABLE DEVICE | Site: KNEE | Status: FUNCTIONAL

## 2023-07-25 RX ORDER — SODIUM CHLORIDE 0.9 % (FLUSH) 0.9 %
5-40 SYRINGE (ML) INJECTION EVERY 12 HOURS SCHEDULED
Status: DISCONTINUED | OUTPATIENT
Start: 2023-07-25 | End: 2023-07-25 | Stop reason: HOSPADM

## 2023-07-25 RX ORDER — ROPIVACAINE HYDROCHLORIDE 5 MG/ML
INJECTION, SOLUTION EPIDURAL; INFILTRATION; PERINEURAL
Status: COMPLETED | OUTPATIENT
Start: 2023-07-25 | End: 2023-07-25

## 2023-07-25 RX ORDER — FAMOTIDINE 20 MG/1
20 TABLET, FILM COATED ORAL DAILY
Status: DISCONTINUED | OUTPATIENT
Start: 2023-07-26 | End: 2023-07-27 | Stop reason: HOSPADM

## 2023-07-25 RX ORDER — ONDANSETRON 2 MG/ML
4 INJECTION INTRAMUSCULAR; INTRAVENOUS EVERY 6 HOURS PRN
Status: DISCONTINUED | OUTPATIENT
Start: 2023-07-25 | End: 2023-07-27 | Stop reason: HOSPADM

## 2023-07-25 RX ORDER — DEXAMETHASONE SODIUM PHOSPHATE 10 MG/ML
INJECTION, SOLUTION INTRAMUSCULAR; INTRAVENOUS
Status: COMPLETED | OUTPATIENT
Start: 2023-07-25 | End: 2023-07-25

## 2023-07-25 RX ORDER — DEXAMETHASONE SODIUM PHOSPHATE 10 MG/ML
5 INJECTION, SOLUTION INTRAMUSCULAR; INTRAVENOUS ONCE
Status: DISCONTINUED | OUTPATIENT
Start: 2023-07-25 | End: 2023-07-27 | Stop reason: HOSPADM

## 2023-07-25 RX ORDER — TOLTERODINE 4 MG/1
4 CAPSULE, EXTENDED RELEASE ORAL DAILY
Status: DISCONTINUED | OUTPATIENT
Start: 2023-07-25 | End: 2023-07-25 | Stop reason: CLARIF

## 2023-07-25 RX ORDER — ACETAMINOPHEN 500 MG
1000 TABLET ORAL ONCE
Status: COMPLETED | OUTPATIENT
Start: 2023-07-25 | End: 2023-07-25

## 2023-07-25 RX ORDER — SODIUM CHLORIDE 0.9 % (FLUSH) 0.9 %
5-40 SYRINGE (ML) INJECTION PRN
Status: DISCONTINUED | OUTPATIENT
Start: 2023-07-25 | End: 2023-07-25 | Stop reason: HOSPADM

## 2023-07-25 RX ORDER — FENTANYL CITRATE 50 UG/ML
INJECTION, SOLUTION INTRAMUSCULAR; INTRAVENOUS PRN
Status: DISCONTINUED | OUTPATIENT
Start: 2023-07-25 | End: 2023-07-25 | Stop reason: SDUPTHER

## 2023-07-25 RX ORDER — LIDOCAINE HYDROCHLORIDE 20 MG/ML
INJECTION, SOLUTION EPIDURAL; INFILTRATION; INTRACAUDAL; PERINEURAL PRN
Status: DISCONTINUED | OUTPATIENT
Start: 2023-07-25 | End: 2023-07-25 | Stop reason: SDUPTHER

## 2023-07-25 RX ORDER — DEXAMETHASONE SODIUM PHOSPHATE 4 MG/ML
INJECTION, SOLUTION INTRA-ARTICULAR; INTRALESIONAL; INTRAMUSCULAR; INTRAVENOUS; SOFT TISSUE PRN
Status: DISCONTINUED | OUTPATIENT
Start: 2023-07-25 | End: 2023-07-25 | Stop reason: SDUPTHER

## 2023-07-25 RX ORDER — ONDANSETRON 4 MG/1
4 TABLET, ORALLY DISINTEGRATING ORAL EVERY 8 HOURS PRN
Status: DISCONTINUED | OUTPATIENT
Start: 2023-07-25 | End: 2023-07-27 | Stop reason: HOSPADM

## 2023-07-25 RX ORDER — OXYCODONE HYDROCHLORIDE 5 MG/1
5 TABLET ORAL EVERY 4 HOURS PRN
Status: DISCONTINUED | OUTPATIENT
Start: 2023-07-25 | End: 2023-07-27 | Stop reason: HOSPADM

## 2023-07-25 RX ORDER — VANCOMYCIN HYDROCHLORIDE 1 G/20ML
INJECTION, POWDER, LYOPHILIZED, FOR SOLUTION INTRAVENOUS PRN
Status: DISCONTINUED | OUTPATIENT
Start: 2023-07-25 | End: 2023-07-25 | Stop reason: ALTCHOICE

## 2023-07-25 RX ORDER — EPHEDRINE SULFATE/0.9% NACL/PF 50 MG/5 ML
SYRINGE (ML) INTRAVENOUS PRN
Status: DISCONTINUED | OUTPATIENT
Start: 2023-07-25 | End: 2023-07-25 | Stop reason: SDUPTHER

## 2023-07-25 RX ORDER — IBUPROFEN 200 MG
400 CAPSULE ORAL DAILY
Status: DISCONTINUED | OUTPATIENT
Start: 2023-07-25 | End: 2023-07-25

## 2023-07-25 RX ORDER — KETOROLAC TROMETHAMINE 30 MG/ML
15 INJECTION, SOLUTION INTRAMUSCULAR; INTRAVENOUS ONCE
Status: COMPLETED | OUTPATIENT
Start: 2023-07-25 | End: 2023-07-25

## 2023-07-25 RX ORDER — FUROSEMIDE 20 MG/1
20 TABLET ORAL
Status: DISCONTINUED | OUTPATIENT
Start: 2023-07-26 | End: 2023-07-27 | Stop reason: HOSPADM

## 2023-07-25 RX ORDER — PROPOFOL 10 MG/ML
INJECTION, EMULSION INTRAVENOUS PRN
Status: DISCONTINUED | OUTPATIENT
Start: 2023-07-25 | End: 2023-07-25 | Stop reason: SDUPTHER

## 2023-07-25 RX ORDER — SODIUM CHLORIDE 0.9 % (FLUSH) 0.9 %
5-40 SYRINGE (ML) INJECTION EVERY 12 HOURS SCHEDULED
Status: DISCONTINUED | OUTPATIENT
Start: 2023-07-25 | End: 2023-07-27 | Stop reason: HOSPADM

## 2023-07-25 RX ORDER — OXYCODONE HYDROCHLORIDE AND ACETAMINOPHEN 5; 325 MG/1; MG/1
1 TABLET ORAL EVERY 6 HOURS PRN
Qty: 28 TABLET | Refills: 0 | Status: SHIPPED | OUTPATIENT
Start: 2023-07-25 | End: 2023-07-25 | Stop reason: SDUPTHER

## 2023-07-25 RX ORDER — OXYCODONE HYDROCHLORIDE 5 MG/1
10 TABLET ORAL EVERY 4 HOURS PRN
Status: DISCONTINUED | OUTPATIENT
Start: 2023-07-25 | End: 2023-07-27 | Stop reason: HOSPADM

## 2023-07-25 RX ORDER — SODIUM CHLORIDE, SODIUM LACTATE, POTASSIUM CHLORIDE, CALCIUM CHLORIDE 600; 310; 30; 20 MG/100ML; MG/100ML; MG/100ML; MG/100ML
INJECTION, SOLUTION INTRAVENOUS CONTINUOUS PRN
Status: DISCONTINUED | OUTPATIENT
Start: 2023-07-25 | End: 2023-07-25 | Stop reason: SDUPTHER

## 2023-07-25 RX ORDER — MELOXICAM 7.5 MG/1
3.75 TABLET ORAL DAILY PRN
Status: DISCONTINUED | OUTPATIENT
Start: 2023-07-25 | End: 2023-07-27 | Stop reason: HOSPADM

## 2023-07-25 RX ORDER — SODIUM CHLORIDE 9 MG/ML
INJECTION, SOLUTION INTRAVENOUS PRN
Status: DISCONTINUED | OUTPATIENT
Start: 2023-07-25 | End: 2023-07-27 | Stop reason: HOSPADM

## 2023-07-25 RX ORDER — MIDAZOLAM HYDROCHLORIDE 2 MG/2ML
2 INJECTION, SOLUTION INTRAMUSCULAR; INTRAVENOUS ONCE
Status: COMPLETED | OUTPATIENT
Start: 2023-07-25 | End: 2023-07-25

## 2023-07-25 RX ORDER — DIPHENHYDRAMINE HYDROCHLORIDE 50 MG/ML
INJECTION INTRAMUSCULAR; INTRAVENOUS PRN
Status: DISCONTINUED | OUTPATIENT
Start: 2023-07-25 | End: 2023-07-25 | Stop reason: SDUPTHER

## 2023-07-25 RX ORDER — SODIUM CHLORIDE 9 MG/ML
INJECTION, SOLUTION INTRAVENOUS PRN
Status: DISCONTINUED | OUTPATIENT
Start: 2023-07-25 | End: 2023-07-25 | Stop reason: HOSPADM

## 2023-07-25 RX ORDER — MIDAZOLAM HYDROCHLORIDE 1 MG/ML
INJECTION INTRAMUSCULAR; INTRAVENOUS
Status: COMPLETED
Start: 2023-07-25 | End: 2023-07-25

## 2023-07-25 RX ORDER — FENTANYL CITRATE 50 UG/ML
INJECTION, SOLUTION INTRAMUSCULAR; INTRAVENOUS
Status: COMPLETED
Start: 2023-07-25 | End: 2023-07-25

## 2023-07-25 RX ORDER — SODIUM CHLORIDE 0.9 % (FLUSH) 0.9 %
5-40 SYRINGE (ML) INJECTION PRN
Status: DISCONTINUED | OUTPATIENT
Start: 2023-07-25 | End: 2023-07-27 | Stop reason: HOSPADM

## 2023-07-25 RX ORDER — ESCITALOPRAM OXALATE 10 MG/1
20 TABLET ORAL DAILY
Status: DISCONTINUED | OUTPATIENT
Start: 2023-07-26 | End: 2023-07-27 | Stop reason: HOSPADM

## 2023-07-25 RX ORDER — DEXAMETHASONE SODIUM PHOSPHATE 10 MG/ML
8 INJECTION, SOLUTION INTRAMUSCULAR; INTRAVENOUS ONCE
Status: DISCONTINUED | OUTPATIENT
Start: 2023-07-25 | End: 2023-07-25 | Stop reason: HOSPADM

## 2023-07-25 RX ORDER — PROCHLORPERAZINE EDISYLATE 5 MG/ML
5 INJECTION INTRAMUSCULAR; INTRAVENOUS
Status: DISCONTINUED | OUTPATIENT
Start: 2023-07-25 | End: 2023-07-25 | Stop reason: HOSPADM

## 2023-07-25 RX ORDER — PRAVASTATIN SODIUM 20 MG
20 TABLET ORAL DAILY
Status: DISCONTINUED | OUTPATIENT
Start: 2023-07-25 | End: 2023-07-27 | Stop reason: HOSPADM

## 2023-07-25 RX ORDER — OXYCODONE HYDROCHLORIDE AND ACETAMINOPHEN 5; 325 MG/1; MG/1
1 TABLET ORAL EVERY 6 HOURS PRN
Qty: 28 TABLET | Refills: 0 | Status: SHIPPED | OUTPATIENT
Start: 2023-07-25 | End: 2023-07-26 | Stop reason: HOSPADM

## 2023-07-25 RX ORDER — TROSPIUM CHLORIDE 20 MG/1
20 TABLET, FILM COATED ORAL
Status: DISCONTINUED | OUTPATIENT
Start: 2023-07-25 | End: 2023-07-27 | Stop reason: HOSPADM

## 2023-07-25 RX ORDER — OXYCODONE HYDROCHLORIDE 5 MG/1
5 TABLET ORAL
Status: DISCONTINUED | OUTPATIENT
Start: 2023-07-25 | End: 2023-07-25 | Stop reason: HOSPADM

## 2023-07-25 RX ORDER — ROCURONIUM BROMIDE 10 MG/ML
INJECTION, SOLUTION INTRAVENOUS PRN
Status: DISCONTINUED | OUTPATIENT
Start: 2023-07-25 | End: 2023-07-25 | Stop reason: SDUPTHER

## 2023-07-25 RX ORDER — ROPIVACAINE HYDROCHLORIDE 5 MG/ML
INJECTION, SOLUTION EPIDURAL; INFILTRATION; PERINEURAL
Status: COMPLETED
Start: 2023-07-25 | End: 2023-07-25

## 2023-07-25 RX ORDER — LABETALOL HYDROCHLORIDE 5 MG/ML
INJECTION, SOLUTION INTRAVENOUS PRN
Status: DISCONTINUED | OUTPATIENT
Start: 2023-07-25 | End: 2023-07-25 | Stop reason: SDUPTHER

## 2023-07-25 RX ORDER — FENTANYL CITRATE 50 UG/ML
50 INJECTION, SOLUTION INTRAMUSCULAR; INTRAVENOUS EVERY 5 MIN PRN
Status: DISCONTINUED | OUTPATIENT
Start: 2023-07-25 | End: 2023-07-25 | Stop reason: HOSPADM

## 2023-07-25 RX ORDER — ROPIVACAINE HYDROCHLORIDE 5 MG/ML
30 INJECTION, SOLUTION EPIDURAL; INFILTRATION; PERINEURAL ONCE
Status: DISCONTINUED | OUTPATIENT
Start: 2023-07-25 | End: 2023-07-27 | Stop reason: HOSPADM

## 2023-07-25 RX ORDER — METOPROLOL SUCCINATE 25 MG/1
12.5 TABLET, EXTENDED RELEASE ORAL DAILY
Status: DISCONTINUED | OUTPATIENT
Start: 2023-07-26 | End: 2023-07-27 | Stop reason: HOSPADM

## 2023-07-25 RX ORDER — BUPROPION HYDROCHLORIDE 150 MG/1
150 TABLET ORAL EVERY MORNING
Status: DISCONTINUED | OUTPATIENT
Start: 2023-07-26 | End: 2023-07-27 | Stop reason: HOSPADM

## 2023-07-25 RX ORDER — ACETAMINOPHEN 325 MG/1
650 TABLET ORAL EVERY 6 HOURS
Status: DISCONTINUED | OUTPATIENT
Start: 2023-07-25 | End: 2023-07-27 | Stop reason: HOSPADM

## 2023-07-25 RX ORDER — ONDANSETRON 2 MG/ML
INJECTION INTRAMUSCULAR; INTRAVENOUS PRN
Status: DISCONTINUED | OUTPATIENT
Start: 2023-07-25 | End: 2023-07-25 | Stop reason: SDUPTHER

## 2023-07-25 RX ADMIN — OXYCODONE HYDROCHLORIDE 10 MG: 5 TABLET ORAL at 23:53

## 2023-07-25 RX ADMIN — OXYCODONE HYDROCHLORIDE 10 MG: 5 TABLET ORAL at 20:12

## 2023-07-25 RX ADMIN — ACETAMINOPHEN 650 MG: 325 TABLET ORAL at 12:17

## 2023-07-25 RX ADMIN — SUGAMMADEX 300 MG: 100 INJECTION, SOLUTION INTRAVENOUS at 08:53

## 2023-07-25 RX ADMIN — ROCURONIUM BROMIDE 50 MG: 10 INJECTION, SOLUTION INTRAVENOUS at 07:04

## 2023-07-25 RX ADMIN — MIDAZOLAM HYDROCHLORIDE 2 MG: 2 INJECTION, SOLUTION INTRAMUSCULAR; INTRAVENOUS at 06:42

## 2023-07-25 RX ADMIN — ACETAMINOPHEN 650 MG: 325 TABLET ORAL at 23:28

## 2023-07-25 RX ADMIN — DIPHENHYDRAMINE HYDROCHLORIDE 12.5 MG: 50 INJECTION, SOLUTION INTRAMUSCULAR; INTRAVENOUS at 07:04

## 2023-07-25 RX ADMIN — KETOROLAC TROMETHAMINE 15 MG: 30 INJECTION, SOLUTION INTRAMUSCULAR; INTRAVENOUS at 06:24

## 2023-07-25 RX ADMIN — PROPOFOL 20 MG: 10 INJECTION, EMULSION INTRAVENOUS at 07:39

## 2023-07-25 RX ADMIN — Medication 10 MG: at 08:31

## 2023-07-25 RX ADMIN — ONDANSETRON 4 MG: 2 INJECTION INTRAMUSCULAR; INTRAVENOUS at 07:40

## 2023-07-25 RX ADMIN — FENTANYL CITRATE 50 MCG: 50 INJECTION, SOLUTION INTRAMUSCULAR; INTRAVENOUS at 07:09

## 2023-07-25 RX ADMIN — SODIUM CHLORIDE, PRESERVATIVE FREE 10 ML: 5 INJECTION INTRAVENOUS at 12:18

## 2023-07-25 RX ADMIN — FENTANYL CITRATE 50 MCG: 50 INJECTION, SOLUTION INTRAMUSCULAR; INTRAVENOUS at 07:04

## 2023-07-25 RX ADMIN — PRAVASTATIN SODIUM 20 MG: 20 TABLET ORAL at 12:18

## 2023-07-25 RX ADMIN — TROSPIUM CHLORIDE 20 MG: 20 TABLET, FILM COATED ORAL at 14:11

## 2023-07-25 RX ADMIN — DEXAMETHASONE SODIUM PHOSPHATE 5 MG: 10 INJECTION, SOLUTION INTRAMUSCULAR; INTRAVENOUS at 06:43

## 2023-07-25 RX ADMIN — LABETALOL HYDROCHLORIDE 10 MG: 5 INJECTION INTRAVENOUS at 07:39

## 2023-07-25 RX ADMIN — SODIUM CHLORIDE, POTASSIUM CHLORIDE, SODIUM LACTATE AND CALCIUM CHLORIDE: 600; 310; 30; 20 INJECTION, SOLUTION INTRAVENOUS at 08:54

## 2023-07-25 RX ADMIN — WATER 2000 MG: 1 INJECTION INTRAMUSCULAR; INTRAVENOUS; SUBCUTANEOUS at 23:28

## 2023-07-25 RX ADMIN — ACETAMINOPHEN 1000 MG: 500 TABLET ORAL at 06:04

## 2023-07-25 RX ADMIN — DEXAMETHASONE SODIUM PHOSPHATE 10 MG: 4 INJECTION, SOLUTION INTRAMUSCULAR; INTRAVENOUS at 07:04

## 2023-07-25 RX ADMIN — ROPIVACAINE HYDROCHLORIDE 30 ML: 5 INJECTION, SOLUTION EPIDURAL; INFILTRATION; PERINEURAL at 06:43

## 2023-07-25 RX ADMIN — WATER 2000 MG: 1 INJECTION INTRAMUSCULAR; INTRAVENOUS; SUBCUTANEOUS at 14:10

## 2023-07-25 RX ADMIN — LIDOCAINE HYDROCHLORIDE 100 MG: 20 INJECTION, SOLUTION EPIDURAL; INFILTRATION; INTRACAUDAL; PERINEURAL at 07:04

## 2023-07-25 RX ADMIN — SODIUM CHLORIDE: 9 INJECTION, SOLUTION INTRAVENOUS at 05:45

## 2023-07-25 RX ADMIN — ACETAMINOPHEN 650 MG: 325 TABLET ORAL at 18:15

## 2023-07-25 RX ADMIN — MIDAZOLAM HYDROCHLORIDE 2 MG: 1 INJECTION, SOLUTION INTRAMUSCULAR; INTRAVENOUS at 06:42

## 2023-07-25 RX ADMIN — TRANEXAMIC ACID 1000 MG: 100 INJECTION, SOLUTION INTRAVENOUS at 07:07

## 2023-07-25 RX ADMIN — Medication 0.5 MG: at 09:29

## 2023-07-25 RX ADMIN — HYDROMORPHONE HYDROCHLORIDE 0.5 MG: 0.5 INJECTION, SOLUTION INTRAMUSCULAR; INTRAVENOUS; SUBCUTANEOUS at 09:29

## 2023-07-25 RX ADMIN — TRANEXAMIC ACID 1000 MG: 100 INJECTION, SOLUTION INTRAVENOUS at 09:57

## 2023-07-25 RX ADMIN — PROPOFOL 150 MG: 10 INJECTION, EMULSION INTRAVENOUS at 07:04

## 2023-07-25 RX ADMIN — PROPOFOL 30 MG: 10 INJECTION, EMULSION INTRAVENOUS at 07:09

## 2023-07-25 RX ADMIN — SODIUM CHLORIDE, PRESERVATIVE FREE 10 ML: 5 INJECTION INTRAVENOUS at 20:13

## 2023-07-25 RX ADMIN — WATER 2000 MG: 1 INJECTION INTRAMUSCULAR; INTRAVENOUS; SUBCUTANEOUS at 06:53

## 2023-07-25 ASSESSMENT — PAIN DESCRIPTION - LOCATION
LOCATION: KNEE
LOCATION: SHOULDER;KNEE
LOCATION: KNEE
LOCATION: SHOULDER;KNEE
LOCATION: SHOULDER;KNEE

## 2023-07-25 ASSESSMENT — PAIN SCALES - GENERAL
PAINLEVEL_OUTOF10: 8
PAINLEVEL_OUTOF10: 4
PAINLEVEL_OUTOF10: 9
PAINLEVEL_OUTOF10: 8
PAINLEVEL_OUTOF10: 9
PAINLEVEL_OUTOF10: 9
PAINLEVEL_OUTOF10: 8
PAINLEVEL_OUTOF10: 5
PAINLEVEL_OUTOF10: 7
PAINLEVEL_OUTOF10: 3
PAINLEVEL_OUTOF10: 10

## 2023-07-25 ASSESSMENT — PAIN DESCRIPTION - DESCRIPTORS
DESCRIPTORS: HEAVINESS;DISCOMFORT
DESCRIPTORS: DISCOMFORT;HEAVINESS
DESCRIPTORS: ACHING;DISCOMFORT;SORE
DESCRIPTORS: DISCOMFORT;HEAVINESS
DESCRIPTORS: ACHING;DISCOMFORT;SORE
DESCRIPTORS: ACHING;DISCOMFORT;SORE

## 2023-07-25 ASSESSMENT — PAIN DESCRIPTION - ORIENTATION
ORIENTATION: RIGHT

## 2023-07-25 ASSESSMENT — PAIN DESCRIPTION - PAIN TYPE
TYPE: SURGICAL PAIN
TYPE: CHRONIC PAIN

## 2023-07-25 ASSESSMENT — PAIN - FUNCTIONAL ASSESSMENT
PAIN_FUNCTIONAL_ASSESSMENT: ACTIVITIES ARE NOT PREVENTED
PAIN_FUNCTIONAL_ASSESSMENT: ACTIVITIES ARE NOT PREVENTED
PAIN_FUNCTIONAL_ASSESSMENT: 0-10
PAIN_FUNCTIONAL_ASSESSMENT: ACTIVITIES ARE NOT PREVENTED

## 2023-07-25 NOTE — ANESTHESIA PROCEDURE NOTES
Peripheral Block    Patient location during procedure: pre-op  Reason for block: post-op pain management  Start time: 7/25/2023 6:43 AM  End time: 7/25/2023 6:46 AM  Staffing  Performed: anesthesiologist   Preanesthetic Checklist  Completed: patient identified, IV checked, site marked, risks and benefits discussed, surgical/procedural consents, equipment checked, pre-op evaluation, timeout performed, anesthesia consent given, oxygen available and monitors applied/VS acknowledged  Peripheral Block   Patient position: supine  Prep: ChloraPrep  Provider prep: mask  Patient monitoring: cardiac monitor, continuous pulse ox, frequent blood pressure checks, IV access and oxygen  Block type: Saphenous  Laterality: right  Injection technique: single-shot  Guidance: ultrasound guided  Local infiltration: ropivacaine and decadron  Local infiltration: ropivacaine and decadron    Needle   Needle type: insulated echogenic nerve stimulator needle   Needle gauge: 20 G  Needle localization: ultrasound guidance  Needle length: 10 cm  Assessment   Injection assessment: negative aspiration for heme, no paresthesia on injection, local visualized surrounding nerve on ultrasound and no intravascular symptoms  Paresthesia pain: none  Hemodynamics: stable  Outcomes: patient tolerated procedure well and uncomplicated    Medications Administered  dexamethasone (DECADRON) (PF) 10 mg/mL injection - Other   5 mg - 7/25/2023 6:43:00 AM  ropivacaine (NAROPIN) injection 0.5% - Perineural   30 mL - 7/25/2023 6:43:00 AM

## 2023-07-25 NOTE — CARE COORDINATION
Met with patient about diagnosis and discharge plan of care. Post op right TKA. Pt seen in ortho class. Lives with grandson in ranch home. 2 steps in with no rails. Has quad cane, wheeled walker, walk in shower and high commode. Pt wants rehab. Referral called to Dian at [facility] acceptance. Referral also given to [facility] AND [facility] care, if [facility] not approved. PCP is Dr Kulwant Grove. Will follow-mjo    ADDEND: accepted at [facility], pre-cert initiated. PASSR, wheelchair form completed.  JULIAN initiated-mjo

## 2023-07-25 NOTE — ACP (ADVANCE CARE PLANNING)
Advance Care Planning   Healthcare Decision Maker:    Primary Decision Maker: Loki Motta - Brother/Sister - [phone]    Secondary Decision Maker: Sarah Huerta - Brother/Sister - [phone]    Supplemental (Other) Decision Maker: Elliott Nolan - Chel - [phone]    Click here to complete Healthcare Decision Makers including selection of the Healthcare Decision Maker Relationship (ie \"Primary\").

## 2023-07-25 NOTE — H&P
Department of Orthopedic Surgery  Attending Pre-operative History and Physical        DIAGNOSIS:  Right knee osteoarthritis     INDICATION:  Failed Conservative Management    PROCEDURE:  RIGHT KNEE SAM ROBOTIC ASSISTED TOTAL JOINT ARTHROPLASTY     CHIEF COMPLAINT:  Right knee pain    History Obtained From:  patient    HISTORY OF PRESENT ILLNESS:      The patient is a 69 y.o. female with significant past medical history of right knee osteoarthritis who presents with right knee pain. She subsequently injection, therapy, active modification, oral anti-inflammatories. Has end-stage osteoarthritis of the right knee is little weightbearing x-rays. She continues have persistent pain affecting her actives a day living; for these reasons she wishes to proceed with surgical management. Surgery will involve a right Sam robotic assisted total knee arthroplasty. The risks, benefits, and alternatives to the procedure were discussed at length with the patient and family members. Risks include but are not limited to infection, neurovascular injury, persistent pain, arthrofibrosis, failure of hardware, need for revision surgery, pulmonary embolism, and the risks of general anesthesia. Patient verbalizes understanding of the risks and wishes to proceed.        Past Medical History:        Diagnosis Date    Anxiety     Arthritis     Deep vein blood clot of left lower extremity ([address]) 05/27/2019    Family history of early CAD     Hyperlipemia     Hypertension     Multinodular goiter     Sleep apnea     cpap    SOBOE (shortness of breath on exertion)        Past Surgical History:        Procedure Laterality Date    BREAST SURGERY Left 2014    biopsy    CARDIOVASCULAR STRESS TEST      COLONOSCOPY      COLONOSCOPY N/A 3/29/2022    COLONOSCOPY POLYPECTOMY SNARE/COLD BIOPSY performed by Brittanie Shah MD at [address] [facility]  3/29/2022    COLONOSCOPY CONTROL HEMORRHAGE performed by Brittanie Shah MD at [address] [facility] CYST REMOVAL  05/2017    mouth/under tongue    HYSTERECTOMY, TOTAL ABDOMINAL (CERVIX REMOVED)  1990    KNEE ARTHROSCOPY Left 2005    LEG SURGERY Left 1963    ligament repair    SALIVARY GLAND SURGERY Left 05/24/2017    TONSILLECTOMY      childhood    UPPER GASTROINTESTINAL ENDOSCOPY N/A 3/29/2022    EGD BIOPSY performed by Shann Hodgkins, MD at [facility]       Medications Prior to Admission:   Medications Prior to Admission: apixaban (ELIQUIS) 5 MG TABS tablet, TAKE 1 TABLET BY MOUTH TWICE DAILY  tolterodine (DETROL LA) 4 MG extended release capsule, Take by mouth daily  buPROPion (WELLBUTRIN XL) 150 MG extended release tablet, TAKE 1 TABLET BY MOUTH EVERY MORNING  escitalopram (LEXAPRO) 20 MG tablet, TAKE 1 TABLET BY MOUTH ONCE DAILY  famotidine (PEPCID) 20 MG tablet, TAKE 1 TABLET BY MOUTH ONCE DAILY  calcium citrate (CALCITRATE) 950 (200 Ca) MG tablet, Take 2 tablets by mouth daily OTC  pravastatin (PRAVACHOL) 20 MG tablet, TAKE 1 TABLET BY MOUTH  DAILY (Patient taking differently: Take 1 tablet by mouth daily Takes in evening)  magnesium oxide (MAG-OX) 400 (240 Mg) MG tablet, Take 1 tablet by mouth daily  metoprolol succinate (TOPROL XL) 25 MG extended release tablet, TAKE 1/2 TABLET (12.5 MG) BY MOUTH ONCE DAILY. Incontinence Supply Disposable (DEPEND UNDERWEAR LARGE) MISC, Use as directed  furosemide (LASIX) 20 MG tablet, Take 1 tablet by mouth three times a week    Allergies:  Penicillins and Tape [adhesive tape]    History of allergic reaction to anesthesia:  No    Social History:   TOBACCO:   reports that she has never smoked. She has never been exposed to tobacco smoke. She has never used smokeless tobacco.  ETOH:   reports no history of alcohol use. DRUGS:   reports no history of drug use.     Family History:       Problem Relation Age of Onset    Heart Disease Mother     Pacemaker Mother     COPD Mother     Heart Failure Mother     Heart Disease Father     Cancer Father         prostate

## 2023-07-25 NOTE — PROGRESS NOTES
4 Eyes Skin Assessment     NAME:  Magdaleno Luna  YOB: 1955  MEDICAL RECORD NUMBER:  [id number]    The patient is being assessed for  Admission    I agree that at least one RN has performed a thorough Head to Toe Skin Assessment on the patient. ALL assessment sites listed below have been assessed. Areas assessed by both nurses:    Head, Face, Ears, Shoulders, Back, Chest, Arms, Elbows, Hands, Sacrum. Buttock, Coccyx, Ischium, Legs. Feet and Heels, and Under Medical Devices         Does the Patient have a Wound?  No noted wound(s)       Nii Prevention initiated by RN: Yes  Wound Care Orders initiated by RN: No    Pressure Injury (Stage 3,4, Unstageable, DTI, NWPT, and Complex wounds) if present, place Wound referral order by RN under : No    New Ostomies, if present place, Ostomy referral order under : No     Nurse 1 eSignature: Electronically signed by Stacie Jimenez RN on 7/25/23 at 2:59 PM EDT    **SHARE this note so that the co-signing nurse can place an eSignature**    Nurse 2 eSignature: Electronically signed by Caron Stewart RN on 7/25/23 at 3:11 PM EDT

## 2023-07-25 NOTE — OP NOTE
OPERATIVE REPORT    PATIENT:  Katie Yoon  [id number]    DATE OF PROCEDURE:  7/25/23    SURGEON: Jessica Kathleen MD    ASSISTANT:  Jony San CNP. No qualified resident available to assist.  CNP was necessary for positioning, prepping/draping, intra-operative assistance, and wound closure. His assistance expedited the procedure and decreased operating room time. PREOPERATIVE DIAGNOSIS: Degenerative joint disease , Right knee. POSTOPERATIVE DIAGNOSIS: Degenerative joint disease,  Rightknee. OPERATION: Sam Robot Assisted Right total knee arthroplasty     ANESTHESIA: . general and ACB    OPERATIVE INDICATIONS:  The patient is a 69year old female with end stage degenerative joint disease involving the knee, for which more conservative non operative management has failed. The patient is thus indicated for total knee arthroplasty. We discussed use of the [facility] robot for assistance. The patient was agreeable. The risks and benefits, as well as alternatives were discussed at length with the patient in detail. These risks include, but are not limited to infection, nerve and/or blood vessel injury, intra or post operative fracture, need for revision surgery, failure of implants, deep vein thrombosis and pulmonary embolism, athrofibrosis, and the risks of general anesthesia provided by the anesthesiologist.   The patient understood these risks, signed an informed consent, and elected to proceed    OPERATIVE FINDINGS:   There was extensive eburnation of bone, and full thickness cartilage loss over the femoral and tibial condyles. OPERATIVE PROCEDURE: After satisfactory spinal anesthesia, the patient was placed supine on the operative table. A Bump was placed under the operative leg and a tourniquet was placed high on the operative thigh. The operative extremity was prepped and draped in sterile fashion.   Prior to procedure start, a time out was performed including confirmation of

## 2023-07-25 NOTE — ANESTHESIA POSTPROCEDURE EVALUATION
Department of Anesthesiology  Postprocedure Note    Patient: Devendra Delcid  MRN: [id number]  YOB: 1955  Date of evaluation: 7/25/2023      Procedure Summary     Date: 07/25/23 Room / Location: [place] [address] / [facility]    Anesthesia Start: 4985 Anesthesia Stop:     Procedure: [address] + PNB (Right: Knee) Diagnosis:       Osteoarthritis of right knee      (Osteoarthritis of right knee [M17.11])    Surgeons: Chandler Sullivan MD Responsible Provider: Bryant Fleming DO    Anesthesia Type: general, regional ASA Status: 3          Anesthesia Type: No value filed.     Zina Phase I:      Zina Phase II:        Anesthesia Post Evaluation    Patient location during evaluation: PACU  Patient participation: complete - patient participated  Level of consciousness: awake  Airway patency: patent  Nausea & Vomiting: no nausea and no vomiting  Complications: no  Cardiovascular status: hemodynamically stable  Respiratory status: acceptable  Hydration status: euvolemic

## 2023-07-25 NOTE — PROGRESS NOTES
in order to maximize patient's independence/participation with ADLs and other functional tasks. Functional Transfers Sit-to-Stand: MIN A   from EOB, MOD A from toilet  Mod I   Functional Mobility MIN A (FWW) within room and bathroom, cues for FWW placement and sequencing  Mod I with functional mobility (with device, as needed/appropriate) in order to maximize independence with ADLs and other functional tasks. Balance Sitting: independent  Standing: CGA (with FWW)  Mod I dynamic standing balance during completion of ADLs and other functional tasks. Activity Tolerance fair  Patient will demonstrate Good understanding and consistent implementation of energy conservation techniques and work simplification techniques into ADLs and functional tasks   Visual/  Perceptual WFL     N/A   B UE Strength 4/5  Patient will demonstrate 4+/5 B UE strength in order to maximize independence with ADLs/IADLs and functional transfers. Additional Long-Term Goal: Patient will increase functional independence to PLOF in order to allow patient to live in least restrictive environment. ROM: Additional Information:    R UE  WFL WFL  and FMC/dexterity noted during ADL tasks   L UE WFL WFL  and FMC/dexterity noted during ADL tasks   Hand Dominance: right    Hearing: WFL  Sensation: Patient does not report numbness/tingling   Tone: WFL  Edema: yes (BLE)    Comments: RN approved patient's participation in [employer] activities. Upon arrival, patient seated in armed chair. Patient was pleasant and cooperative, receptive to education throughout session. Patient completed toileting task with increased assistance required for transfer. Patient completed sinkside grooming task with CGA and assistance for item retrieval and cues for safe FWW placement. Patient with fair tolerance for functional tasks. At end of session, patient seated in armed chair with call light and phone within reach, family present and all lines and tubes intact. [id number]

## 2023-07-25 NOTE — PROGRESS NOTES
[facility] Quality Flow/Interdisciplinary Rounds Progress Note        Quality Flow Rounds held on July 25, 2023    Disciplines Attending:  Bedside Nurse, , , and Nursing Unit Leadership    Nneka Tao was admitted on 7/25/2023  5:00 AM    Anticipated Discharge Date:       Disposition:    Nii Score:  Nii Scale Score: 19    Readmission Risk              Risk of Unplanned Readmission:  0           Discussed patient goal for the day, patient clinical progression, and barriers to discharge.   The following Goal(s) of the Day/Commitment(s) have been identified:   Discharge [address]  July 25, 2023

## 2023-07-25 NOTE — ANESTHESIA PRE PROCEDURE
Department of Anesthesiology  Preprocedure Note       Name:  Katie Yoon   Age:  69 y.o.  :  1955                                          MRN:  [id number]         Date:  2023      Surgeon: Roma Garcia):  Jessica Kathleen MD    Procedure: Procedure(s):  RIGHT KNEE DEE DEE ROBOTIC ASSISTED TOTAL JOINT ARTHROPLASTY   +++ASHLEY+++   ++PNB++    Medications prior to admission:   Prior to Admission medications    Medication Sig Start Date End Date Taking? Authorizing Provider   apixaban (ELIQUIS) 5 MG TABS tablet TAKE 1 TABLET BY MOUTH TWICE DAILY 23   Mylinda Duane, DO   tolterodine (DETROL LA) 4 MG extended release capsule Take by mouth daily 5/10/23   Historical Provider, MD   buPROPion (WELLBUTRIN XL) 150 MG extended release tablet TAKE 1 TABLET BY MOUTH EVERY MORNING 23   Mylinda Duane, DO   escitalopram (LEXAPRO) 20 MG tablet TAKE 1 TABLET BY MOUTH ONCE DAILY 23   Mylinda Duane, DO   famotidine (PEPCID) 20 MG tablet TAKE 1 TABLET BY MOUTH ONCE DAILY 23   Mylinda Duane, DO   calcium citrate (CALCITRATE) 950 (200 Ca) MG tablet Take 2 tablets by mouth daily OTC 23   Mylinda Duane, DO   pravastatin (PRAVACHOL) 20 MG tablet TAKE 1 TABLET BY MOUTH  DAILY  Patient taking differently: Take 1 tablet by mouth daily Takes in evening 22   Mylinda Duane, DO   magnesium oxide (MAG-OX) 400 (240 Mg) MG tablet Take 1 tablet by mouth daily 22   Mylinda Duane, DO   metoprolol succinate (TOPROL XL) 25 MG extended release tablet TAKE 1/2 TABLET (12.5 MG) BY MOUTH ONCE DAILY.  10/31/22   Mylinda Duane, DO   Incontinence Supply Disposable (DEPEND UNDERWEAR LARGE) MISC Use as directed 9/15/22   Mylinda Duane, DO   furosemide (LASIX) 20 MG tablet Take 1 tablet by mouth three times a week 22   Historical Provider, MD   vitamin D (CHOLECALCIFEROL) 50 MCG ( UT) TABS tablet Take 1 tablet by mouth daily 7/10/21   Mohan Elizalde MD   oxybutynin

## 2023-07-25 NOTE — PROGRESS NOTES
Physical Therapy    Initial Assessment     Name: Ramone Bal  : 1955  MRN: [id number]      Date of Service: 2023    Evaluating PT: Duncan Santos, PT, DPT [id number]      Room #:  3903/1590-Z  Diagnosis:  Osteoarthritis of right knee [M17.11]  Status post right knee replacement [Z96.651]  PMHx/PSHx:   has a past medical history of Anxiety, Arthritis, Deep vein blood clot of left lower extremity ([address]), Family history of early CAD, Hyperlipemia, Hypertension, Multinodular goiter, Sleep apnea, and SOBOE (shortness of breath on exertion). Procedure/Surgery:  Right TKA   Precautions:  Fall risk, WBAT R LE    SUBJECTIVE:    Pt lives with grandson in a single story house with 1+2 stair(s) and 0+2 rail(s) to enter. Pt ambulated with quad cane or WW prior to admission. Pt also owns SPC. OBJECTIVE:   Initial Evaluation  Date: 23 Treatment Date: Short Term/ Long Term   Goals   AM-PAC 6 Clicks 92/12     Was pt agreeable to Eval/treatment? Yes     Does pt have pain? 8/10 R knee pain     Bed Mobility  Rolling: NT  Supine to sit: SBA  Sit to supine: NT  Scooting: SBA to EOB  Rolling: Independent   Supine to sit: Independent   Sit to supine: Independent   Scooting: Independent    Transfers Sit to stand: Min A  Stand to sit: Min A  Stand pivot: Min A with [employer]  Sit to stand: Supervision  Stand to sit: Supervision  Stand pivot: Supervision with [employer]   Ambulation   15 feet with [employer] with Mod A  >200 feet with [employer] with Supervision   Stair negotiation: ascended and descended NT  >3 step(s) with 1 rail(s) with SBA   ROM BUE: Refer to OT note  BLE: WFL     Strength BUE: Refer to OT note  BLE: WFL     Balance Sitting EOB: SBA  Dynamic Standing: Min A with [employer]  Sitting EOB: Independent   Dynamic Standing: Supervision with [employer]     Pt is A & O x: 4 to person, place, month/year, and situation. Sensation: Pt denies numbness and tingling of extremities. Edema: Unremarkable.      Patient education  Pt educated on PT role

## 2023-07-26 LAB
ERYTHROCYTE [DISTWIDTH] IN BLOOD BY AUTOMATED COUNT: 13.8 % (ref 11.5–15)
HCT VFR BLD AUTO: 34.3 % (ref 34–48)
HGB BLD-MCNC: 11.2 G/DL (ref 11.5–15.5)
MCH RBC QN AUTO: 30 PG (ref 26–35)
MCHC RBC AUTO-ENTMCNC: 32.7 G/DL (ref 32–34.5)
MCV RBC AUTO: 92 FL (ref 80–99.9)
PLATELET # BLD AUTO: 242 K/UL (ref 130–450)
PMV BLD AUTO: 10.2 FL (ref 7–12)
RBC # BLD AUTO: 3.73 M/UL (ref 3.5–5.5)
WBC OTHER # BLD: 12.2 K/UL (ref 4.5–11.5)

## 2023-07-26 PROCEDURE — 2580000003 HC RX 258: Performed by: NURSE PRACTITIONER

## 2023-07-26 PROCEDURE — 97530 THERAPEUTIC ACTIVITIES: CPT

## 2023-07-26 PROCEDURE — G0378 HOSPITAL OBSERVATION PER HR: HCPCS

## 2023-07-26 PROCEDURE — 85027 COMPLETE CBC AUTOMATED: CPT

## 2023-07-26 PROCEDURE — 97110 THERAPEUTIC EXERCISES: CPT

## 2023-07-26 PROCEDURE — 6370000000 HC RX 637 (ALT 250 FOR IP): Performed by: NURSE PRACTITIONER

## 2023-07-26 RX ORDER — OXYCODONE HYDROCHLORIDE AND ACETAMINOPHEN 5; 325 MG/1; MG/1
1 TABLET ORAL EVERY 6 HOURS PRN
Qty: 28 TABLET | Refills: 0 | Status: SHIPPED | OUTPATIENT
Start: 2023-07-26 | End: 2023-08-02

## 2023-07-26 RX ADMIN — ACETAMINOPHEN 650 MG: 325 TABLET ORAL at 11:33

## 2023-07-26 RX ADMIN — ESCITALOPRAM OXALATE 20 MG: 10 TABLET ORAL at 09:17

## 2023-07-26 RX ADMIN — FAMOTIDINE 20 MG: 20 TABLET, FILM COATED ORAL at 09:17

## 2023-07-26 RX ADMIN — APIXABAN 5 MG: 5 TABLET, FILM COATED ORAL at 09:17

## 2023-07-26 RX ADMIN — ACETAMINOPHEN 650 MG: 325 TABLET ORAL at 05:56

## 2023-07-26 RX ADMIN — OXYCODONE HYDROCHLORIDE 5 MG: 5 TABLET ORAL at 09:18

## 2023-07-26 RX ADMIN — BUPROPION HYDROCHLORIDE 150 MG: 150 TABLET, EXTENDED RELEASE ORAL at 09:17

## 2023-07-26 RX ADMIN — FUROSEMIDE 20 MG: 20 TABLET ORAL at 09:17

## 2023-07-26 RX ADMIN — OXYCODONE HYDROCHLORIDE 10 MG: 5 TABLET ORAL at 13:51

## 2023-07-26 RX ADMIN — OXYCODONE HYDROCHLORIDE 5 MG: 5 TABLET ORAL at 05:55

## 2023-07-26 RX ADMIN — SODIUM CHLORIDE, PRESERVATIVE FREE 10 ML: 5 INJECTION INTRAVENOUS at 21:28

## 2023-07-26 RX ADMIN — OXYCODONE HYDROCHLORIDE 10 MG: 5 TABLET ORAL at 18:14

## 2023-07-26 RX ADMIN — OXYCODONE HYDROCHLORIDE 10 MG: 5 TABLET ORAL at 21:29

## 2023-07-26 RX ADMIN — TROSPIUM CHLORIDE 20 MG: 20 TABLET, FILM COATED ORAL at 05:55

## 2023-07-26 RX ADMIN — PRAVASTATIN SODIUM 20 MG: 20 TABLET ORAL at 09:17

## 2023-07-26 RX ADMIN — ACETAMINOPHEN 650 MG: 325 TABLET ORAL at 18:14

## 2023-07-26 RX ADMIN — APIXABAN 5 MG: 5 TABLET, FILM COATED ORAL at 21:27

## 2023-07-26 RX ADMIN — METOPROLOL SUCCINATE 12.5 MG: 25 TABLET, EXTENDED RELEASE ORAL at 09:17

## 2023-07-26 RX ADMIN — TROSPIUM CHLORIDE 20 MG: 20 TABLET, FILM COATED ORAL at 15:45

## 2023-07-26 ASSESSMENT — PAIN DESCRIPTION - ORIENTATION
ORIENTATION: RIGHT

## 2023-07-26 ASSESSMENT — PAIN DESCRIPTION - DESCRIPTORS
DESCRIPTORS: ACHING;DISCOMFORT;SHARP
DESCRIPTORS: ACHING;DISCOMFORT;THROBBING
DESCRIPTORS: ACHING;DISCOMFORT
DESCRIPTORS: HEAVINESS;SORE
DESCRIPTORS: ACHING;DISCOMFORT

## 2023-07-26 ASSESSMENT — PAIN SCALES - GENERAL
PAINLEVEL_OUTOF10: 6
PAINLEVEL_OUTOF10: 7
PAINLEVEL_OUTOF10: 7
PAINLEVEL_OUTOF10: 5
PAINLEVEL_OUTOF10: 5
PAINLEVEL_OUTOF10: 8
PAINLEVEL_OUTOF10: 7
PAINLEVEL_OUTOF10: 8

## 2023-07-26 ASSESSMENT — PAIN DESCRIPTION - LOCATION
LOCATION: KNEE

## 2023-07-26 ASSESSMENT — PAIN - FUNCTIONAL ASSESSMENT
PAIN_FUNCTIONAL_ASSESSMENT: PREVENTS OR INTERFERES SOME ACTIVE ACTIVITIES AND ADLS
PAIN_FUNCTIONAL_ASSESSMENT: ACTIVITIES ARE NOT PREVENTED
PAIN_FUNCTIONAL_ASSESSMENT: PREVENTS OR INTERFERES SOME ACTIVE ACTIVITIES AND ADLS

## 2023-07-26 ASSESSMENT — PAIN DESCRIPTION - FREQUENCY
FREQUENCY: CONTINUOUS
FREQUENCY: CONTINUOUS
FREQUENCY: INTERMITTENT
FREQUENCY: INTERMITTENT

## 2023-07-26 ASSESSMENT — PAIN DESCRIPTION - PAIN TYPE
TYPE: SURGICAL PAIN

## 2023-07-26 ASSESSMENT — PAIN DESCRIPTION - ONSET
ONSET: ON-GOING

## 2023-07-26 NOTE — PROGRESS NOTES
P Quality Flow/Interdisciplinary Rounds Progress Note        Quality Flow Rounds held on July 26, 2023    Disciplines Attending:  Bedside Nurse, , , and Nursing Unit Leadership    Charis Chadwick was admitted on 7/25/2023  5:00 AM    Anticipated Discharge Date:       Disposition:    Nii Score:  Nii Scale Score: 20    Readmission Risk              Risk of Unplanned Readmission:  0           Discussed patient goal for the day, patient clinical progression, and barriers to discharge.   The following Goal(s) of the Day/Commitment(s) have been identified:   [facility], [facility]  July 26, 2023

## 2023-07-26 NOTE — PROGRESS NOTES
facilitate/challenge dynamic balance, stand tolerance for increased safety and independence with ADLs  * Therapeutic activities to facilitate gross/fine motor skills for increased independence with ADLs  * Neuro-muscular re-education: facilitation of righting/equilibrium reactions, midline orientation, scapular stability/mobility, normalization of muscle tone, and facilitation of volitional active controled movement     Recommended Adaptive Equipment: TBD      Home Living: Patient lives with her grandson (works and goes to college) in a 1-floor home with 3 steps and 1 rail to enter; patient's bedroom and bathroom are on the main floor. Bathroom Setup: walk in shower with shower chair  Equipment Owned: shower chair, raised toilet seat, cane, FWW, quad cane     Prior Level of Function (PLOF): Per patient, patient was independent with ADLs, independent with IADLs, and modified independent with functional mobility (FWW) prior to this hospitalization. Patient does report frequent falls with last fall approximately 3 months ago   Driving: no  Occupation: retired     Pain Level: knee pain  Cognition: Patient alert and oriented, pleasant, cooperative, able to follow simple directions  Memory: fair  Sequencing: fair  Problem Solving: fair  Judgement/Safety: fair     Functional Assessment:                  -PAC Daily Activity Raw Score: 16/24    Initial Eval Status  Date: 7/25/2023 Treatment Status  Date: 7/26/23 Short Term Goals = Long Term Goals   Feeding independent       Grooming CGA standing at sink to wash hands Setup seated Mod I   UB Dressing MIN A setup Mod I    LB Dressing MAX A to don socks in sitting  min A after instruction to don pants and underwear.    Mod a for socks Mod I - with use of AE, as needed/appropriate   Bathing MOD A   Mod I - with use of AE/DME, as needed/appropriate   Toileting MOD A for transfer on/off, MIN A for clothing management, SBA for hygiene in sitting  Mod I   Bed Mobility  Not assessed, seated in bedside chair  supine to sit SBA Mod I in order to maximize patient's independence/participation with ADLs and other functional tasks. Functional Transfers Sit-to-Stand: MIN A   from EOB, MOD A from toilet Sit <> stand min A Mod I   Functional Mobility MIN A (FWW) within room and bathroom, cues for FWW placement and sequencing  min A SPT to chair Mod I with functional mobility (with device, as needed/appropriate) in order to maximize independence with ADLs and other functional tasks. Balance Sitting: independent  Standing: CGA (with FWW) Standing balance min A  Mod I dynamic standing balance during completion of ADLs and other functional tasks. Activity Tolerance fair fair  Patient will demonstrate Good understanding and consistent implementation of energy conservation techniques and work simplification techniques into ADLs and functional tasks   Visual/  Perceptual WFL      N/A   B UE Strength 4/5   Patient will demonstrate 4+/5 B UE strength in order to maximize independence with ADLs/IADLs and functional transfers. Comments:  patient cleared with nursing and agreeable to session. Patient fatigued but good effort demonstrated. Education provided on ADL technique/safety to maximize independence. Patient returned to bed with call light in reach    Education/treatment: ADL and functional transfer/activity performed to increase safety and independence during self care tasks. Education provided on safety awareness, adl reeducation, functional transfer training    Pt has made progress towards set goals.      Time In: 9:07  Time Out: 9:41     Min Units   Therapeutic Ex 29666     Therapeutic Activities [id number]   ADL/Self Care 39039     Orthotic Management 62169     Neuro Re-Ed 28436     Non-Billable Time     TOTAL TIMED TREATMENT [address] [place]/L 14757

## 2023-07-26 NOTE — PROGRESS NOTES
Physical Therapy    Treatment Note    Name: Terri Officer  : 1955  MRN: [id number]      Date of Service: 2023    Evaluating PT: Stanley Rupal, PT, DPT [id number]      Room #:  2508/4752-L  Diagnosis:  Osteoarthritis of right knee [M17.11]  Status post right knee replacement [Z96.651]  PMHx/PSHx:   has a past medical history of Anxiety, Arthritis, Deep vein blood clot of left lower extremity ([address]), Family history of early CAD, Hyperlipemia, Hypertension, Multinodular goiter, Sleep apnea, and SOBOE (shortness of breath on exertion). Procedure/Surgery:  Right TKA   Precautions:  Fall risk, WBAT R LE    SUBJECTIVE:    Pt lives with grandson in a single story house with 1+2 stair(s) and 0+2 rail(s) to enter. Pt ambulated with quad cane or WW prior to admission. Pt also owns SPC. OBJECTIVE:   Initial Evaluation  Date: 23 Treatment Date:  2023 Short Term/ Long Term   Goals   AM-PAC 6 Clicks 49/75     Was pt agreeable to Eval/treatment? Yes yes    Does pt have pain? 8/10 R knee pain R posterior knee pain    Bed Mobility  Rolling: NT  Supine to sit: SBA  Sit to supine: NT  Scooting: SBA to EOB Sit to supine: Min A R LE support  Scooting; SBA side to side in bed Rolling: Independent   Supine to sit:  Independent   Sit to supine: Independent   Scooting: Independent    Transfers Sit to stand: Min A  Stand to sit: Min A  Stand pivot: Min A with [employer] Sit <> stand: Min A Sit to stand: Supervision  Stand to sit: Supervision  Stand pivot: Supervision with [employer]   Ambulation   15 feet with [employer] with Mod A 30 x 2 + 20 x 1 with [employer] Min A >200 feet with [employer] with Supervision   Stair negotiation: ascended and descended NT NT >3 step(s) with 1 rail(s) with SBA   ROM BUE: Refer to OT note  BLE: WFL     Strength BUE: Refer to OT note  BLE: WFL     Balance Sitting EOB: SBA  Dynamic Standing: Min A with [employer]  Sitting EOB: Independent   Dynamic Standing: Supervision with [employer]       Pt is alert, following

## 2023-07-26 NOTE — PROGRESS NOTES
Pt complaining of intense squeezing in her lower legs and increased pain. Assessed pt and moy hose were extremely tight and digging into pts skin. Moy hose removed no available bigger sizes at this time. Will continue to monitor.

## 2023-07-26 NOTE — PROGRESS NOTES
Physical Therapy    Treatment Note    Name: Dinorah Salcido  : 1955  MRN: [id number]      Date of Service: 2023    Evaluating PT: Shelly Goodman, PT, DPT [id number]      Room #:  6876/1548-R  Diagnosis:  Osteoarthritis of right knee [M17.11]  Status post right knee replacement [Z96.651]  PMHx/PSHx:   has a past medical history of Anxiety, Arthritis, Deep vein blood clot of left lower extremity ([address]), Family history of early CAD, Hyperlipemia, Hypertension, Multinodular goiter, Sleep apnea, and SOBOE (shortness of breath on exertion). Procedure/Surgery:  Right TKA   Precautions:  Fall risk, WBAT R LE    SUBJECTIVE:    Pt lives with grandson in a single story house with 1+2 stair(s) and 0+2 rail(s) to enter. Pt ambulated with quad cane or WW prior to admission. Pt also owns SPC. OBJECTIVE:   Initial Evaluation  Date: 23 Treatment Date:  2023 PM Short Term/ Long Term   Goals   AM-PAC 6 Clicks 39/42 28/55    Was pt agreeable to Eval/treatment? Yes yes    Does pt have pain? 8/10 R knee pain R knee pain, states this decreased after activity    Bed Mobility  Rolling: NT  Supine to sit: SBA  Sit to supine: NT  Scooting: SBA to EOB Supine to sit: Min A R LE support  Scooting; SBA seated to EOB Rolling: Independent   Supine to sit:  Independent   Sit to supine: Independent   Scooting: Independent    Transfers Sit to stand: Min A  Stand to sit: Min A  Stand pivot: Min A with [employer] Sit <> stand: Min A Sit to stand: Supervision  Stand to sit: Supervision  Stand pivot: Supervision with [employer]   Ambulation   15 feet with [employer] with Mod A 15 + 50 + 35 feet with [employer] Min A >200 feet with [employer] with Supervision   Stair negotiation: ascended and descended NT NT >3 step(s) with 1 rail(s) with SBA   ROM BUE: Refer to OT note  BLE: WFL     Strength BUE: Refer to OT note  BLE: WFL     Balance Sitting EOB: SBA  Dynamic Standing: Min A with [employer]  Sitting EOB: Independent   Dynamic Standing: Supervision with [employer]       Pt is

## 2023-07-27 VITALS
TEMPERATURE: 98.2 F | BODY MASS INDEX: 40.97 KG/M2 | DIASTOLIC BLOOD PRESSURE: 71 MMHG | SYSTOLIC BLOOD PRESSURE: 120 MMHG | WEIGHT: 240 LBS | HEART RATE: 76 BPM | HEIGHT: 64 IN | RESPIRATION RATE: 16 BRPM | OXYGEN SATURATION: 96 %

## 2023-07-27 PROCEDURE — 97530 THERAPEUTIC ACTIVITIES: CPT

## 2023-07-27 PROCEDURE — 97110 THERAPEUTIC EXERCISES: CPT

## 2023-07-27 PROCEDURE — G0378 HOSPITAL OBSERVATION PER HR: HCPCS

## 2023-07-27 PROCEDURE — 6370000000 HC RX 637 (ALT 250 FOR IP): Performed by: NURSE PRACTITIONER

## 2023-07-27 PROCEDURE — 2580000003 HC RX 258: Performed by: NURSE PRACTITIONER

## 2023-07-27 RX ADMIN — OXYCODONE HYDROCHLORIDE 5 MG: 5 TABLET ORAL at 10:53

## 2023-07-27 RX ADMIN — SODIUM CHLORIDE, PRESERVATIVE FREE 10 ML: 5 INJECTION INTRAVENOUS at 09:49

## 2023-07-27 RX ADMIN — ACETAMINOPHEN 650 MG: 325 TABLET ORAL at 05:27

## 2023-07-27 RX ADMIN — METOPROLOL SUCCINATE 12.5 MG: 25 TABLET, EXTENDED RELEASE ORAL at 09:49

## 2023-07-27 RX ADMIN — FAMOTIDINE 20 MG: 20 TABLET, FILM COATED ORAL at 09:49

## 2023-07-27 RX ADMIN — APIXABAN 5 MG: 5 TABLET, FILM COATED ORAL at 09:00

## 2023-07-27 RX ADMIN — BUPROPION HYDROCHLORIDE 150 MG: 150 TABLET, EXTENDED RELEASE ORAL at 09:48

## 2023-07-27 RX ADMIN — ESCITALOPRAM OXALATE 20 MG: 10 TABLET ORAL at 09:49

## 2023-07-27 RX ADMIN — TROSPIUM CHLORIDE 20 MG: 20 TABLET, FILM COATED ORAL at 05:27

## 2023-07-27 RX ADMIN — PRAVASTATIN SODIUM 20 MG: 20 TABLET ORAL at 09:49

## 2023-07-27 ASSESSMENT — PAIN - FUNCTIONAL ASSESSMENT: PAIN_FUNCTIONAL_ASSESSMENT: PREVENTS OR INTERFERES SOME ACTIVE ACTIVITIES AND ADLS

## 2023-07-27 ASSESSMENT — PAIN DESCRIPTION - LOCATION: LOCATION: KNEE

## 2023-07-27 ASSESSMENT — PAIN DESCRIPTION - DESCRIPTORS: DESCRIPTORS: ACHING

## 2023-07-27 ASSESSMENT — PAIN DESCRIPTION - ORIENTATION: ORIENTATION: RIGHT

## 2023-07-27 ASSESSMENT — PAIN SCALES - GENERAL: PAINLEVEL_OUTOF10: 6

## 2023-07-27 NOTE — PROGRESS NOTES
Called Dr. Randy Chan via office and left a detailed message regarding pt's request for meclizine per charge RN.

## 2023-07-27 NOTE — PROGRESS NOTES
Report was called to facility. Spoke to [facility], all questions answered. Pt pending transport by facility.

## 2023-07-27 NOTE — CARE COORDINATION
Updated plan of care. Auth approval for [facility]. Forms on chart.  Facility to , will await time-kalani

## 2023-07-27 NOTE — PROGRESS NOTES
Occupational Therapy  OT BEDSIDE TREATMENT NOTE      Date:2023  Patient Name: Jody Alvarado  MRN: [id number]  : 1955  Room: [address] [place] [address]     Evaluating OT: DION Diggs [id number]     Referring Provider: GENTRY Farris CNP  Specific Provider Orders/Date: \"OT eval and treat\" - 2023     Diagnosis: Osteoarthritis of right knee [M17.11]  Status post right knee replacement [Z96.651]    Surgery: Patient underwent R TKA on 2023. Pertinent Medical History: anxiety, DVT, arthritis     Precautions: fall risk, WBAT RLE     Assessment of Current Deficits:    [x] Functional mobility             [x]ADLs           [x] Strength                  []Cognition   [x] Functional transfers           [x] IADLs         [x] Safety Awareness   [x]Endurance   [] Fine Coordination              [x] Balance      [] Vision/perception   []Sensation     []Gross Motor Coordination  [] ROM           [] Delirium                   [] Motor Control      OT PLAN OF CARE   OT POC is based on physician orders, patient diagnosis, and results of clinical assessment.   Frequency/Duration 2-5 days/week for 2 weeks PRN   Specific OT Treatment Interventions to Include:   * Instruction/training on adapted ADL techniques and AE recommendations to increase functional independence within precautions       * Training on energy conservation strategies, correct breathing pattern and techniques to improve independence/tolerance for self-care routine  * Functional transfer/mobility training/DME recommendations for increased independence, safety, and fall prevention  * Patient/Family education to increase follow through with safety techniques and functional independence  * Recommendation of environmental modifications for increased safety with functional transfers/mobility and ADLs  * Therapeutic exercise to improve motor endurance, ROM, and functional strength for ADLs/functional transfers  * Therapeutic activities to facilitate/challenge dynamic balance, stand tolerance for increased safety and independence with ADLs  * Therapeutic activities to facilitate gross/fine motor skills for increased independence with ADLs  * Neuro-muscular re-education: facilitation of righting/equilibrium reactions, midline orientation, scapular stability/mobility, normalization of muscle tone, and facilitation of volitional active controled movement     Recommended Adaptive Equipment: TBD      Home Living: Patient lives with her grandson (works and goes to college) in a 1-floor home with 3 steps and 1 rail to enter; patient's bedroom and bathroom are on the main floor. Bathroom Setup: walk in shower with shower chair  Equipment Owned: shower chair, raised toilet seat, cane, FWW, quad cane     Prior Level of Function (PLOF): Per patient, patient was independent with ADLs, independent with IADLs, and modified independent with functional mobility (FWW) prior to this hospitalization.  Patient does report frequent falls with last fall approximately 3 months ago   Driving: no  Occupation: retired     Pain Level: knee pain  Cognition: Patient alert and oriented, pleasant, cooperative, able to follow simple directions  Memory: fair  Sequencing: fair  Problem Solving: fair  Judgement/Safety: fair     Functional Assessment:                  AM-PAC Daily Activity Raw Score: 15/24    Initial Eval Status  Date: 7/25/2023 Treatment Status  Date: 7/27/23 Short Term Goals = Long Term Goals   Feeding independent       Grooming CGA standing at sink to wash hands  Mod I   UB Dressing MIN A Min A Mod I    LB Dressing MAX A to don socks in sitting  max A to don brief Mod I - with use of AE, as needed/appropriate   Bathing MOD A   Mod I - with use of AE/DME, as needed/appropriate   Toileting MOD A for transfer on/off, MIN A for clothing management, SBA for hygiene in sitting  Mod I   Bed Mobility  Not assessed, seated in bedside chair  supine to sit min A Mod I in order

## 2023-07-27 NOTE — DISCHARGE INSTR - COC
Continuity of Care Form    Patient Name: Primo Moran   :  1955  MRN:  [id number]    [address] date:  2023  Discharge date:  2023    Code Status Order: Full Code   Advance Directives:   Advance Care Flowsheet Documentation       Date/Time Healthcare Directive Type of Healthcare Directive Copy in [address] Agent's Name Healthcare Agent's Phone Number    [phone] Yes, patient has an advance directive for healthcare treatment Living will;Durable power of  for health care Yes, copy in chart -- -- --            Admitting Physician:  Amelie Fang MD  PCP: Tex Fischer DO    Discharging Nurse: [facility] Unit/Room#: 8017/6007-L  Discharging Unit Phone Number: [phone]    Emergency Contact:   Extended Emergency Contact Information  Primary Emergency Contact: Elliott Nolan  Address: [address] [place] [address] of [address] [employer] Phone: [phone]  Mobile Phone: [phone]  Relation: Child  Secondary Emergency Contact: Elvia Hassan  Address: [address] [place] [address] of [address] [employer] Phone: [phone]  Mobile Phone: [phone]  Relation: Brother/Sister    Past Surgical History:  Past Surgical History:   Procedure Laterality Date    BREAST SURGERY Left 2014    biopsy    CARDIOVASCULAR STRESS TEST      COLONOSCOPY      COLONOSCOPY N/A 3/29/2022    COLONOSCOPY POLYPECTOMY SNARE/COLD BIOPSY performed by Nola Hurtado MD at [address]  3/29/2022    COLONOSCOPY CONTROL HEMORRHAGE performed by [facility]ado MD at  [facility]  2017    mouth/under tongue    HYSTERECTOMY, TOTAL ABDOMINAL (CERVIX REMOVED)      KNEE ARTHROSCOPY Left     LEG SURGERY Left 1963    ligament repair    SALIVARY GLAND SURGERY Left 2017    TONSILLECTOMY      childhood    TOTAL KNEE ARTHROPLASTY Right 2023    RIGHT KNEE DEE DEE ROBOTIC ASSISTED Foam impregnated with Ag 07/26/23 2255   Closure Other (Comment) 07/26/23 0400   Margins Other (Comment) 07/26/23 0400   Incision Assessment Other (Comment) 07/26/23 0400   Drainage Amount None 07/26/23 2255   Odor None 07/25/23 1035   Number of days: 2       Incision 07/25/23 Pretibial Proximal;Right (Active)   Dressing Status Old drainage noted 07/26/23 2255   Incision Cleansed Not Cleansed 07/26/23 0400   Dressing/Treatment Gauze dressing/dressing sponge;Tegaderm/transparent film dressing 07/26/23 2255   Closure Other (Comment) 07/26/23 0400   Margins Other (Comment) 07/26/23 0400   Incision Assessment Other (Comment) 07/26/23 0400   Drainage Amount Small 07/26/23 2255   Drainage Description Serosanguinous 07/26/23 2255   Odor None 07/26/23 2255   Number of days: 2        Elimination:  Continence: Bowel: Yes  Bladder: Yes  Urinary Catheter: None   Colostomy/Ileostomy/Ileal Conduit: No       Date of Last BM: 7/27/2023    Intake/Output Summary (Last 24 hours) at 7/27/2023 1218  Last data filed at 7/27/2023 2748  Gross per 24 hour   Intake --   Output 850 ml   Net -850 ml     I/O last 3 completed shifts:  In: -   Out: [place] [Urine:1350]    Safety Concerns:     History of Falls (last 30 days)    Impairments/Disabilities:      None    Nutrition Therapy:  Current Nutrition Therapy:   - Oral Diet:  General    Routes of Feeding: Oral  Liquids: Thin Liquids  Daily Fluid Restriction: no  Last Modified Barium Swallow with Video (Video Swallowing Test): not done    Treatments at the Time of Hospital Discharge:   Respiratory Treatments: ***  Oxygen Therapy:  is not on home oxygen therapy.   Ventilator:    - No ventilator support    Rehab Therapies: Physical Therapy and Occupational Therapy  Weight Bearing Status/Restrictions: No weight bearing restrictions  Other Medical Equipment (for information only, NOT a DME order):  wheelchair, walker, and bedside commode  Other Treatments: ***    Patient's personal belongings (please

## 2023-07-27 NOTE — PLAN OF CARE
Problem: Discharge Planning  Goal: Discharge to home or other facility with appropriate resources  7/27/2023 1239 by Ming Johnson RN  Outcome: Progressing  7/26/2023 2309 by Genevieve Mohr RN  Outcome: Progressing     Problem: Pain  Goal: Verbalizes/displays adequate comfort level or baseline comfort level  7/27/2023 1239 by Ming Johnson RN  Outcome: Progressing  7/26/2023 2309 by Genevieve Mohr RN  Outcome: Progressing     Problem: Safety - Adult  Goal: Free from fall injury  7/27/2023 1239 by Ming Johnson RN  Outcome: Progressing  7/26/2023 2309 by Genevieve Mohr RN  Outcome: Progressing     Problem: ABCDS Injury Assessment  Goal: Absence of physical injury  7/27/2023 1239 by Ming Johnson RN  Outcome: Progressing  7/26/2023 2309 by Genevieve Mohr RN  Outcome: Progressing

## 2023-07-27 NOTE — PROGRESS NOTES
Physical Therapy    Treatment Note    Name: Charis Cahdwick  : 1955  MRN: [id number]      Date of Service: 2023    Evaluating PT: Aburey Horton, PT, DPT [id number]      Room #:  2259/5396-A  Diagnosis:  Osteoarthritis of right knee [M17.11]  Status post right knee replacement [Z96.651]  PMHx/PSHx:   has a past medical history of Anxiety, Arthritis, Deep vein blood clot of left lower extremity ([address]), Family history of early CAD, Hyperlipemia, Hypertension, Multinodular goiter, Sleep apnea, and SOBOE (shortness of breath on exertion). Procedure/Surgery:  Right TKA   Precautions:  Fall risk, WBAT R LE    SUBJECTIVE:    Pt lives with grandson in a single story house with 1+2 stair(s) and 0+2 rail(s) to enter. Pt ambulated with quad cane or WW prior to admission. Pt also owns SPC. OBJECTIVE:   Initial Evaluation  Date: 23 Treatment Date:  2023  Short Term/ Long Term   Goals   AM-PAC 6 Clicks 78/51 77/32    Was pt agreeable to Eval/treatment? Yes yes    Does pt have pain? 8/10 R knee pain R knee pain, states this decreased after activity    Bed Mobility  Rolling: NT  Supine to sit: SBA  Sit to supine: NT  Scooting: SBA to EOB Supine to sit: Min A R LE support  Scooting; SBA seated to EOB Rolling: Independent   Supine to sit: Independent   Sit to supine: Independent   Scooting: Independent    Transfers Sit to stand: Min A  Stand to sit: Min A  Stand pivot: Min A with [employer] Sit <> stand: Mod A  Stand Pivot:  Mod A with [employer] Sit to stand: Supervision  Stand to sit: Supervision  Stand pivot: Supervision with [employer]   Ambulation   15 feet with [employer] with Mod A NT >200 feet with [employer] with Supervision   Stair negotiation: ascended and descended NT NT >3 step(s) with 1 rail(s) with SBA   ROM BUE: Refer to OT note  BLE: WFL     Strength BUE: Refer to OT note  BLE: WFL     Balance Sitting EOB: SBA  Dynamic Standing: Min A with [employer]  Sitting EOB: Independent   Dynamic Standing: Supervision with [employer]       Pt is alert,

## 2023-07-31 LAB
ALBUMIN SERPL-MCNC: 3 G/DL
ALP BLD-CCNC: 98 U/L
ALT SERPL-CCNC: 15 U/L
ANION GAP SERPL CALCULATED.3IONS-SCNC: NORMAL MMOL/L
AST SERPL-CCNC: 19 U/L
BASOPHILS ABSOLUTE: 0.02 /ΜL
BASOPHILS RELATIVE PERCENT: 0.2 %
BILIRUB SERPL-MCNC: 0.4 MG/DL (ref 0.1–1.4)
BUN BLDV-MCNC: 21 MG/DL
CALCIUM SERPL-MCNC: 8.6 MG/DL
CHLORIDE BLD-SCNC: 106 MMOL/L
CO2: 33 MMOL/L
CREAT SERPL-MCNC: 0.9 MG/DL
EGFR: 86.8
EOSINOPHILS ABSOLUTE: 0.16 /ΜL
EOSINOPHILS RELATIVE PERCENT: 2.1 %
GLUCOSE BLD-MCNC: 88 MG/DL
HCT VFR BLD CALC: 31.2 % (ref 36–46)
HEMOGLOBIN: 9.6 G/DL (ref 12–16)
LYMPHOCYTES ABSOLUTE: 2.23 /ΜL
LYMPHOCYTES RELATIVE PERCENT: 28.8 %
MCH RBC QN AUTO: 30.7 PG
MCHC RBC AUTO-ENTMCNC: ABNORMAL G/DL
MCV RBC AUTO: 92.9 FL
MONOCYTES ABSOLUTE: 0.57 /ΜL
MONOCYTES RELATIVE PERCENT: 7.4 %
NEUTROPHILS ABSOLUTE: 59.6 /ΜL
NEUTROPHILS RELATIVE PERCENT: 59.6 %
PLATELET # BLD: 299 K/ΜL
PMV BLD AUTO: 8.4 FL
POTASSIUM SERPL-SCNC: 4.3 MMOL/L
RBC # BLD: 3.35 10^6/ΜL
SODIUM BLD-SCNC: 141 MMOL/L
SURGICAL PATHOLOGY REPORT: NORMAL
TOTAL PROTEIN: 5
WBC # BLD: 7.75 10^3/ML

## 2023-07-31 NOTE — DISCHARGE SUMMARY
pain Max Daily Amount: 4 tablets, Disp-28 tablet, R-0Print           CONTINUE these medications which have NOT CHANGED    Details   MECLIZINE HCL PO Take 5 mg by mouth 3 times daily as neededHistorical Med      apixaban (ELIQUIS) 5 MG TABS tablet TAKE 1 TABLET BY MOUTH TWICE DAILY, Disp-60 tablet, R-5Normal      tolterodine (DETROL LA) 4 MG extended release capsule Take by mouth dailyHistorical Med      buPROPion (WELLBUTRIN XL) 150 MG extended release tablet TAKE 1 TABLET BY MOUTH EVERY MORNING, Disp-31 tablet, R-5Normal      escitalopram (LEXAPRO) 20 MG tablet TAKE 1 TABLET BY MOUTH ONCE DAILY, Disp-31 tablet, R-5Normal      famotidine (PEPCID) 20 MG tablet TAKE 1 TABLET BY MOUTH ONCE DAILY, Disp-31 tablet, R-5Normal      calcium citrate (CALCITRATE) 950 (200 Ca) MG tablet Take 2 tablets by mouth daily OTC, Disp-60 tablet, R-5Normal      pravastatin (PRAVACHOL) 20 MG tablet TAKE 1 TABLET BY MOUTH  DAILY, Disp-90 tablet, R-3Requesting 1 year supplyNormal      magnesium oxide (MAG-OX) 400 (240 Mg) MG tablet Take 1 tablet by mouth daily, Disp-30 tablet, R-1Normal      metoprolol succinate (TOPROL XL) 25 MG extended release tablet TAKE 1/2 TABLET (12.5 MG) BY MOUTH ONCE DAILY. , Disp-45 tablet, R-3Normal      Incontinence Supply Disposable (DEPEND UNDERWEAR LARGE) MISC Disp-100 each, R-5, PrintUse as directed      furosemide (LASIX) 20 MG tablet Take 1 tablet by mouth three times a weekHistorical Med           Activity: no driving while on analgesics; weight bearing as tolerated. Diet: regular diet  Wound Care: as directed    Follow-up with  in 1 week.   Call [phone] for appointment     Signed:  KAIT Diaz   Orthopaedic Surgery   7/31/23  9:59 AM

## 2023-08-02 ENCOUNTER — OFFICE VISIT (OUTPATIENT)
Dept: ORTHOPEDIC SURGERY | Age: 68
End: 2023-08-02

## 2023-08-02 DIAGNOSIS — Z96.651 S/P TOTAL KNEE ARTHROPLASTY, RIGHT: Primary | ICD-10-CM

## 2023-08-02 PROCEDURE — 99024 POSTOP FOLLOW-UP VISIT: CPT | Performed by: ORTHOPAEDIC SURGERY

## 2023-08-02 NOTE — PROGRESS NOTES
[facility]   ORTHOPAEDIC SURGERY AND SPORTS MEDICINE  DATE OF VISIT: 08/02/23  Follow Up Post Operative Visit     CHIEF COMPLAINT:   Chief Complaint   Patient presents with    Follow Up After Procedure     1 week p/o R TKA 7/25        Surgery: RIGHT KNEE DEE DEE ROBOTIC ASSISTED TOTAL JOINT ARTHROPLASTY  Date: 7/25/2023     Subjective:    Geovanni Ram is here for follow up visit s/p above procedure. She is doing well. She has been compliant       Controlled Substances Monitoring:        Physical Exam:    No data recorded    General: Alert and oriented x3, no acute distress  Cardiovascular/pulmonary: No labored breathing, peripheral perfusion intact  Musculoskeletal:    Exam of the knee shows intact incision. Swelling expected level. Range of motion 0 to 70 degrees. Calf is soft and compressible      Imaging: X-rays of the knee show well aligned total knee replacement    Assessment and Plan: 1 week out from right total knee arthroplasty    She is doing well. She will continue PT and transition outpatient.   We will see her back in 6 weeks      Deven Armstrong MD  Orthopaedic Surgery   8/2/23  11:21 AM

## 2023-08-03 ENCOUNTER — TELEPHONE (OUTPATIENT)
Dept: PHYSICAL THERAPY | Age: 68
End: 2023-08-03

## 2023-08-07 ENCOUNTER — TELEPHONE (OUTPATIENT)
Dept: PHYSICAL THERAPY | Age: 68
End: 2023-08-07

## 2023-08-09 ENCOUNTER — TELEPHONE (OUTPATIENT)
Dept: PHYSICAL THERAPY | Age: 68
End: 2023-08-09

## 2023-08-09 NOTE — TELEPHONE ENCOUNTER
Spoke to Tommie Rodarte and made appt for her on 8-22-23. She was going to do home therapy first and then come her. I asked her to call us to make sure her home therapy was complete before she came to her appt on 8-22-23. On 8-8-23 her appt was cxl on my chart  .  I left a message with her to make sure she didn't want the original appt on 8-22-23

## 2023-08-09 NOTE — TELEPHONE ENCOUNTER
Spoke to Daniela Malone her cxl on [facility] and she said she was going elsewhere

## 2023-08-11 DIAGNOSIS — M25.561 ACUTE PAIN OF RIGHT KNEE: ICD-10-CM

## 2023-08-11 DIAGNOSIS — Z96.651 S/P TOTAL KNEE ARTHROPLASTY, RIGHT: Primary | ICD-10-CM

## 2023-08-11 RX ORDER — NAPROXEN 375 MG/1
375 TABLET ORAL 2 TIMES DAILY PRN
Qty: 60 TABLET | Refills: 1 | Status: SHIPPED | OUTPATIENT
Start: 2023-08-11

## 2023-08-11 RX ORDER — OXYCODONE HYDROCHLORIDE AND ACETAMINOPHEN 5; 325 MG/1; MG/1
1 TABLET ORAL EVERY 6 HOURS PRN
Qty: 28 TABLET | Refills: 0 | Status: SHIPPED | OUTPATIENT
Start: 2023-08-11 | End: 2023-08-18

## 2023-08-11 NOTE — TELEPHONE ENCOUNTER
Pt phoned in requesting medication refill,     Right Sam robotic assisted total knee arthroplasty on 7/25    Pt is 2 weeks out

## 2023-09-05 ENCOUNTER — OFFICE VISIT (OUTPATIENT)
Dept: ORTHOPEDIC SURGERY | Age: 68
End: 2023-09-05

## 2023-09-05 VITALS — HEIGHT: 64 IN | WEIGHT: 240 LBS | BODY MASS INDEX: 40.97 KG/M2

## 2023-09-05 DIAGNOSIS — Z96.651 S/P TOTAL KNEE ARTHROPLASTY, RIGHT: Primary | ICD-10-CM

## 2023-09-05 PROCEDURE — 99024 POSTOP FOLLOW-UP VISIT: CPT | Performed by: NURSE PRACTITIONER

## 2023-09-05 NOTE — PROGRESS NOTES
[facility]  ORTHOPAEDICS AND SPORTS MEDICINE  DATE OF VISIT: 09/05/23  Follow Up Post Operative Visit     CHIEF COMPLAINT:   Chief Complaint   Patient presents with    Follow Up After Procedure     Surgery: RIGHT KNEE SAM ROBOTIC ASSISTED TOTAL JOINT ARTHROPLASTY  Date: 7/25/2023     Post-Op Check     Rt knee s/p 6 weeks     Wound Check     Rt knee    Pain     Rt knee 0 / 10 - walking with a cane - states other knee is bothersom       Surgery: RIGHT KNEE SAM ROBOTIC ASSISTED TOTAL JOINT ARTHROPLASTY  Date: 7/25/2023     Subjective:    Dennie Fass is here for follow up visit s/p above procedure. She is doing well. She has been compliant with postoperative care. Denies pain at time of visit today. Patient states he is interested discussing when she can proceed with a left knee replacement. Controlled Substances Monitoring:        Physical Exam:    Height: 5' 4\" (1.626 m), Weight - Scale: 240 lb (108.9 kg) (per pt)    General: Alert and oriented x3, no acute distress  Cardiovascular/pulmonary: No labored breathing, peripheral perfusion intact  Musculoskeletal:    Right knee exam range of motion 0-110, Patella tracked midline. Stable valgus varus stress testing. Knee is gross stable exam.  Neurovascular sensation is intact. No signs or symptoms of infection present      Imaging: Reviewed    Assessment and Plan: Status post right Sam robotic assisted total knee arthroplasty    Patient is about 6 weeks out procedure was above doing well. She denies pain at visit today. She has good range of motion and stability on exam.  She is set to complete PT in the near future we will continue with home exercise. Patient states he is interested in proceeding with a left knee replacement. We will discuss this with Dr. Jenna Ortega and notify the patient how we will proceed.   She will otherwise follow-up in 6 weeks with Dr. Jenna Ortega for reevaluation of her right knee and new imaging      [facility],

## 2023-09-20 ENCOUNTER — TELEPHONE (OUTPATIENT)
Dept: ORTHOPEDIC SURGERY | Age: 68
End: 2023-09-20

## 2023-10-09 DIAGNOSIS — I10 ESSENTIAL HYPERTENSION: ICD-10-CM

## 2023-10-09 RX ORDER — METOPROLOL SUCCINATE 25 MG/1
TABLET, EXTENDED RELEASE ORAL
Qty: 15 TABLET | Refills: 0 | Status: SHIPPED | OUTPATIENT
Start: 2023-10-09

## 2023-10-10 ENCOUNTER — PATIENT MESSAGE (OUTPATIENT)
Dept: FAMILY MEDICINE CLINIC | Age: 68
End: 2023-10-10

## 2023-10-10 DIAGNOSIS — Z12.31 ENCOUNTER FOR SCREENING MAMMOGRAM FOR MALIGNANT NEOPLASM OF BREAST: Primary | ICD-10-CM

## 2023-10-18 ENCOUNTER — OFFICE VISIT (OUTPATIENT)
Dept: ORTHOPEDIC SURGERY | Age: 68
End: 2023-10-18

## 2023-10-18 VITALS — HEIGHT: 64 IN | WEIGHT: 240 LBS | BODY MASS INDEX: 40.97 KG/M2

## 2023-10-18 DIAGNOSIS — M17.11 PRIMARY OSTEOARTHRITIS OF RIGHT KNEE: ICD-10-CM

## 2023-10-18 DIAGNOSIS — Z96.651 S/P TOTAL KNEE ARTHROPLASTY, RIGHT: Primary | ICD-10-CM

## 2023-10-18 PROCEDURE — 99024 POSTOP FOLLOW-UP VISIT: CPT | Performed by: ORTHOPAEDIC SURGERY

## 2023-10-18 NOTE — PROGRESS NOTES
[facility]   ORTHOPAEDIC SURGERY AND SPORTS MEDICINE  DATE OF VISIT: 10/18/23  Follow Up Post Operative Visit     CHIEF COMPLAINT:   Chief Complaint   Patient presents with    Post-Op Check     3 months out right total knee delonte robotic assisted total knee joint arthroplasty. Overall doing well. Surgery: RIGHT KNEE DELONTE ROBOTIC ASSISTED TOTAL JOINT ARTHROPLASTY  Date: 7/25/2023     Subjective:    Nata Burgess is here for follow up visit s/p above procedure. She is doing well. She has been back to normal activities having no issues. She is interested in scheduling her other knee in February    Controlled Substances Monitoring:        Physical Exam:    Height: 1.626 m (5' 4\"), Weight - Scale: 108.9 kg (240 lb)    General: Alert and oriented x3, no acute distress  Cardiovascular/pulmonary: No labored breathing, peripheral perfusion intact  Musculoskeletal:    Exam of the knee shows healed incision. Range of motion 0-1 20. Knee is stable. There is no swelling. Exam of left knee shows joint line tenderness. Range of motion is about 5 to 90 degrees. The knee is stable      Imaging: X-rays of the knee show well aligned knee replacement. Left knee shows end-stage arthritis    Assessment and Plan: 3 months out from right total knee arthroplasty doing well. Looking to proceed with left total knee  Discussed her knees today. She is doing very well on the right side. She is interested in proceeding with a left. We will look at scheduling this in the near future. We will see her back for preop visit.     Nicolás Marr MD  Orthopaedic Surgery   10/18/23  10:53 AM

## 2023-10-31 ENCOUNTER — TRANSCRIBE ORDERS (OUTPATIENT)
Dept: ADMINISTRATIVE | Age: 68
End: 2023-10-31

## 2023-10-31 DIAGNOSIS — E04.2 MULTINODULAR GOITER: Primary | ICD-10-CM

## 2023-11-01 ENCOUNTER — OFFICE VISIT (OUTPATIENT)
Dept: SLEEP MEDICINE | Age: 68
End: 2023-11-01
Payer: MEDICARE

## 2023-11-01 VITALS
DIASTOLIC BLOOD PRESSURE: 65 MMHG | SYSTOLIC BLOOD PRESSURE: 134 MMHG | HEART RATE: 72 BPM | WEIGHT: 258.6 LBS | HEIGHT: 64 IN | BODY MASS INDEX: 44.15 KG/M2 | RESPIRATION RATE: 18 BRPM | OXYGEN SATURATION: 97 %

## 2023-11-01 DIAGNOSIS — E78.00 PURE HYPERCHOLESTEROLEMIA: ICD-10-CM

## 2023-11-01 DIAGNOSIS — G47.33 OSA (OBSTRUCTIVE SLEEP APNEA): Primary | ICD-10-CM

## 2023-11-01 DIAGNOSIS — E66.01 CLASS 3 SEVERE OBESITY WITH SERIOUS COMORBIDITY AND BODY MASS INDEX (BMI) OF 40.0 TO 44.9 IN ADULT, UNSPECIFIED OBESITY TYPE (HCC): ICD-10-CM

## 2023-11-01 DIAGNOSIS — G47.10 HYPERSOMNOLENCE: ICD-10-CM

## 2023-11-01 PROCEDURE — 99204 OFFICE O/P NEW MOD 45 MIN: CPT | Performed by: INTERNAL MEDICINE

## 2023-11-01 PROCEDURE — 3017F COLORECTAL CA SCREEN DOC REV: CPT | Performed by: INTERNAL MEDICINE

## 2023-11-01 PROCEDURE — 3078F DIAST BP <80 MM HG: CPT | Performed by: INTERNAL MEDICINE

## 2023-11-01 PROCEDURE — G8428 CUR MEDS NOT DOCUMENT: HCPCS | Performed by: INTERNAL MEDICINE

## 2023-11-01 PROCEDURE — G8417 CALC BMI ABV UP PARAM F/U: HCPCS | Performed by: INTERNAL MEDICINE

## 2023-11-01 PROCEDURE — 1090F PRES/ABSN URINE INCON ASSESS: CPT | Performed by: INTERNAL MEDICINE

## 2023-11-01 PROCEDURE — 1036F TOBACCO NON-USER: CPT | Performed by: INTERNAL MEDICINE

## 2023-11-01 PROCEDURE — 1123F ACP DISCUSS/DSCN MKR DOCD: CPT | Performed by: INTERNAL MEDICINE

## 2023-11-01 PROCEDURE — G8484 FLU IMMUNIZE NO ADMIN: HCPCS | Performed by: INTERNAL MEDICINE

## 2023-11-01 PROCEDURE — 3074F SYST BP LT 130 MM HG: CPT | Performed by: INTERNAL MEDICINE

## 2023-11-01 PROCEDURE — G8399 PT W/DXA RESULTS DOCUMENT: HCPCS | Performed by: INTERNAL MEDICINE

## 2023-11-01 ASSESSMENT — SLEEP AND FATIGUE QUESTIONNAIRES
HOW LIKELY ARE YOU TO NOD OFF OR FALL ASLEEP WHILE SITTING INACTIVE IN A PUBLIC PLACE: 2
HOW LIKELY ARE YOU TO NOD OFF OR FALL ASLEEP WHILE SITTING AND TALKING TO SOMEONE: 3
NECK CIRCUMFERENCE (INCHES): 15.25
HOW LIKELY ARE YOU TO NOD OFF OR FALL ASLEEP WHILE LYING DOWN TO REST IN THE AFTERNOON WHEN CIRCUMSTANCES PERMIT: 3
HOW LIKELY ARE YOU TO NOD OFF OR FALL ASLEEP WHILE WATCHING TV: 3
HOW LIKELY ARE YOU TO NOD OFF OR FALL ASLEEP WHILE SITTING AND READING: 0
ESS TOTAL SCORE: 12
HOW LIKELY ARE YOU TO NOD OFF OR FALL ASLEEP WHILE SITTING QUIETLY AFTER LUNCH WITHOUT ALCOHOL: 0
HOW LIKELY ARE YOU TO NOD OFF OR FALL ASLEEP WHEN YOU ARE A PASSENGER IN A CAR FOR AN HOUR WITHOUT A BREAK: 1
HOW LIKELY ARE YOU TO NOD OFF OR FALL ASLEEP IN A CAR, WHILE STOPPED FOR A FEW MINUTES IN TRAFFIC: 0

## 2023-11-01 NOTE — PROGRESS NOTES
physical therapy , doing exercises at home   RBD: no  Hypnagogic/hypnopompic hallucinations: no  Sleep paralysis: no  Sleep walking/talking/eating: no      PMH:  Past Medical History:   Diagnosis Date    Anxiety     Arthritis     Deep vein blood clot of left lower extremity ([address]) 05/27/2019    Family history of early CAD     Hyperlipemia     Hypertension     Multinodular goiter     Sleep apnea     cpap    SOBOE (shortness of breath on exertion)         PSH:  Past Surgical History:   Procedure Laterality Date    BREAST SURGERY Left 2014    biopsy    CARDIOVASCULAR STRESS TEST      COLONOSCOPY      COLONOSCOPY N/A 3/29/2022    COLONOSCOPY POLYPECTOMY SNARE/COLD BIOPSY performed by Cindy Fraser MD at [facility]  3/29/2022    COLONOSCOPY CONTROL HEMORRHAGE performed by Cindy Fraser MD at [address]  05/2017    mouth/under tongue    HYSTERECTOMY, TOTAL ABDOMINAL (CERVIX REMOVED)  1990    KNEE ARTHROSCOPY Left 2005    LEG SURGERY Left 1963    ligament repair    SALIVARY GLAND SURGERY Left 05/24/2017    TONSILLECTOMY      childhood    TOTAL KNEE ARTHROPLASTY Right 7/25/2023    RIGHT KNEE DEE DEE ROBOTIC ASSISTED TOTAL JOINT ARTHROPLASTY + PNB performed by Gillian cMdowell MD at [facility] N/A 3/29/2022    EGD BIOPSY performed by Cindy Fraser MD at [facility] Hx:  Social History     Tobacco Use    Smoking status: Never     Passive exposure: Never    Smokeless tobacco: Never   Vaping Use    Vaping Use: Never used   Substance Use Topics    Alcohol use: No    Drug use: No        Fam Hx:  Family History   Problem Relation Age of Onset    Heart Disease Mother     Pacemaker Mother     COPD Mother     Heart Failure Mother     Heart Disease Father     Cancer Father         prostate    Hypertension Father     Coronary Art Dis Father         63's    Prostate Cancer Father     Hypertension Sister     Diabetes Sister     Heart Disease Sister

## 2023-11-02 ASSESSMENT — ENCOUNTER SYMPTOMS
EYES NEGATIVE: 1
ALLERGIC/IMMUNOLOGIC NEGATIVE: 1
RESPIRATORY NEGATIVE: 1
GASTROINTESTINAL NEGATIVE: 1

## 2023-11-03 ENCOUNTER — HOSPITAL ENCOUNTER (OUTPATIENT)
Age: 68
Discharge: HOME | End: 2023-11-03
Payer: MEDICARE

## 2023-11-03 ENCOUNTER — HOSPITAL ENCOUNTER (OUTPATIENT)
Dept: ULTRASOUND IMAGING | Age: 68
End: 2023-11-03
Payer: MEDICARE

## 2023-11-03 DIAGNOSIS — E04.2 MULTINODULAR GOITER: ICD-10-CM

## 2023-11-03 DIAGNOSIS — Z01.818 PRE-OP EXAM: ICD-10-CM

## 2023-11-03 LAB
ALBUMIN SERPL-MCNC: 4.1 G/DL (ref 3.5–5.2)
ALP SERPL-CCNC: 139 U/L (ref 35–104)
ALT SERPL-CCNC: 8 U/L (ref 0–32)
ANION GAP SERPL CALCULATED.3IONS-SCNC: 11 MMOL/L (ref 7–16)
AST SERPL-CCNC: 14 U/L (ref 0–31)
BASOPHILS # BLD: 0.06 K/UL (ref 0–0.2)
BASOPHILS NFR BLD: 1 % (ref 0–2)
BILIRUB SERPL-MCNC: 0.4 MG/DL (ref 0–1.2)
BUN SERPL-MCNC: 23 MG/DL (ref 6–23)
CALCIUM SERPL-MCNC: 9.8 MG/DL (ref 8.6–10.2)
CHLORIDE SERPL-SCNC: 102 MMOL/L (ref 98–107)
CO2 SERPL-SCNC: 24 MMOL/L (ref 22–29)
CREAT SERPL-MCNC: 1 MG/DL (ref 0.5–1)
CREAT UR-MCNC: 230.8 MG/DL (ref 29–226)
EOSINOPHIL # BLD: 0.09 K/UL (ref 0.05–0.5)
EOSINOPHILS RELATIVE PERCENT: 1 % (ref 0–6)
ERYTHROCYTE [DISTWIDTH] IN BLOOD BY AUTOMATED COUNT: 14.6 % (ref 11.5–15)
GFR SERPL CREATININE-BSD FRML MDRD: 58 ML/MIN/1.73M2
GLUCOSE SERPL-MCNC: 85 MG/DL (ref 74–99)
HCT VFR BLD AUTO: 43.9 % (ref 34–48)
HGB BLD-MCNC: 14.3 G/DL (ref 11.5–15.5)
IMM GRANULOCYTES # BLD AUTO: 0.04 K/UL (ref 0–0.58)
IMM GRANULOCYTES NFR BLD: 0 % (ref 0–5)
LYMPHOCYTES NFR BLD: 1.07 K/UL (ref 1.5–4)
LYMPHOCYTES RELATIVE PERCENT: 11 % (ref 20–42)
MAGNESIUM SERPL-MCNC: 1.8 MG/DL (ref 1.6–2.6)
MCH RBC QN AUTO: 29.2 PG (ref 26–35)
MCHC RBC AUTO-ENTMCNC: 32.6 G/DL (ref 32–34.5)
MCV RBC AUTO: 89.8 FL (ref 80–99.9)
MONOCYTES NFR BLD: 0.63 K/UL (ref 0.1–0.95)
MONOCYTES NFR BLD: 6 % (ref 2–12)
NEUTROPHILS NFR BLD: 81 % (ref 43–80)
NEUTS SEG NFR BLD: 8.22 K/UL (ref 1.8–7.3)
PHOSPHATE SERPL-MCNC: 2.9 MG/DL (ref 2.5–4.5)
PLATELET # BLD AUTO: 300 K/UL (ref 130–450)
PMV BLD AUTO: 10.1 FL (ref 7–12)
POTASSIUM SERPL-SCNC: 4.4 MMOL/L (ref 3.5–5)
PROT SERPL-MCNC: 6.9 G/DL (ref 6.4–8.3)
PTH-INTACT SERPL-MCNC: 69.1 PG/ML (ref 15–65)
RBC # BLD AUTO: 4.89 M/UL (ref 3.5–5.5)
SODIUM SERPL-SCNC: 137 MMOL/L (ref 132–146)
TOTAL PROTEIN, URINE: 28 MG/DL (ref 0–12)
URINE TOTAL PROTEIN CREATININE RATIO: 0.12 (ref 0–0.2)
WBC OTHER # BLD: 10.1 K/UL (ref 4.5–11.5)

## 2023-11-03 PROCEDURE — 80053 COMPREHEN METABOLIC PANEL: CPT

## 2023-11-03 PROCEDURE — 36415 COLL VENOUS BLD VENIPUNCTURE: CPT

## 2023-11-03 PROCEDURE — 84156 ASSAY OF PROTEIN URINE: CPT

## 2023-11-03 PROCEDURE — 82306 VITAMIN D 25 HYDROXY: CPT

## 2023-11-03 PROCEDURE — 80061 LIPID PANEL: CPT

## 2023-11-03 PROCEDURE — 76536 US EXAM OF HEAD AND NECK: CPT

## 2023-11-03 PROCEDURE — 82570 ASSAY OF URINE CREATININE: CPT

## 2023-11-03 PROCEDURE — 83970 ASSAY OF PARATHORMONE: CPT

## 2023-11-03 PROCEDURE — 85025 COMPLETE CBC W/AUTO DIFF WBC: CPT

## 2023-11-03 PROCEDURE — 84100 ASSAY OF PHOSPHORUS: CPT

## 2023-11-03 PROCEDURE — 83735 ASSAY OF MAGNESIUM: CPT

## 2023-11-04 LAB
25(OH)D3 SERPL-MCNC: 36.7 NG/ML (ref 30–100)
CHOLEST SERPL-MCNC: 211 MG/DL
HDLC SERPL-MCNC: 71 MG/DL
LDLC SERPL CALC-MCNC: 116 MG/DL
TRIGL SERPL-MCNC: 120 MG/DL
VLDLC SERPL CALC-MCNC: 24 MG/DL

## 2023-11-06 ENCOUNTER — TELEPHONE (OUTPATIENT)
Dept: FAMILY MEDICINE CLINIC | Age: 68
End: 2023-11-06

## 2023-11-06 ENCOUNTER — OFFICE VISIT (OUTPATIENT)
Dept: FAMILY MEDICINE CLINIC | Age: 68
End: 2023-11-06
Payer: MEDICARE

## 2023-11-06 VITALS
TEMPERATURE: 97.5 F | DIASTOLIC BLOOD PRESSURE: 70 MMHG | RESPIRATION RATE: 17 BRPM | HEART RATE: 80 BPM | OXYGEN SATURATION: 94 % | SYSTOLIC BLOOD PRESSURE: 120 MMHG | WEIGHT: 255 LBS | BODY MASS INDEX: 43.54 KG/M2 | HEIGHT: 64 IN

## 2023-11-06 DIAGNOSIS — R68.89 FLU-LIKE SYMPTOMS: ICD-10-CM

## 2023-11-06 DIAGNOSIS — U07.1 COVID-19: Primary | ICD-10-CM

## 2023-11-06 PROBLEM — N17.9 ACUTE RENAL FAILURE SUPERIMPOSED ON CHRONIC KIDNEY DISEASE (HCC): Status: RESOLVED | Noted: 2022-05-30 | Resolved: 2023-11-06

## 2023-11-06 PROBLEM — M25.561 ACUTE PAIN OF RIGHT KNEE: Status: RESOLVED | Noted: 2021-07-09 | Resolved: 2023-11-06

## 2023-11-06 PROBLEM — N18.9 ACUTE RENAL FAILURE SUPERIMPOSED ON CHRONIC KIDNEY DISEASE (HCC): Status: RESOLVED | Noted: 2022-05-30 | Resolved: 2023-11-06

## 2023-11-06 PROBLEM — N18.9 ACUTE KIDNEY INJURY SUPERIMPOSED ON CHRONIC KIDNEY DISEASE (HCC): Status: RESOLVED | Noted: 2022-05-30 | Resolved: 2023-11-06

## 2023-11-06 PROBLEM — N17.9 ACUTE KIDNEY INJURY SUPERIMPOSED ON CHRONIC KIDNEY DISEASE (HCC): Status: RESOLVED | Noted: 2022-05-30 | Resolved: 2023-11-06

## 2023-11-06 PROBLEM — M71.20 BAKER'S CYST: Status: RESOLVED | Noted: 2021-04-21 | Resolved: 2023-11-06

## 2023-11-06 LAB
INFLUENZA A ANTIBODY: NOT DETECTED
INFLUENZA B ANTIBODY: NOT DETECTED
Lab: ABNORMAL
PERFORMING INSTRUMENT: ABNORMAL
QC PASS/FAIL: ABNORMAL
SARS-COV-2, POC: DETECTED

## 2023-11-06 PROCEDURE — 3017F COLORECTAL CA SCREEN DOC REV: CPT | Performed by: FAMILY MEDICINE

## 2023-11-06 PROCEDURE — G8427 DOCREV CUR MEDS BY ELIG CLIN: HCPCS | Performed by: FAMILY MEDICINE

## 2023-11-06 PROCEDURE — 1090F PRES/ABSN URINE INCON ASSESS: CPT | Performed by: FAMILY MEDICINE

## 2023-11-06 PROCEDURE — 1123F ACP DISCUSS/DSCN MKR DOCD: CPT | Performed by: FAMILY MEDICINE

## 2023-11-06 PROCEDURE — 87804 INFLUENZA ASSAY W/OPTIC: CPT | Performed by: FAMILY MEDICINE

## 2023-11-06 PROCEDURE — 99213 OFFICE O/P EST LOW 20 MIN: CPT | Performed by: FAMILY MEDICINE

## 2023-11-06 PROCEDURE — 3074F SYST BP LT 130 MM HG: CPT | Performed by: FAMILY MEDICINE

## 2023-11-06 PROCEDURE — 87426 SARSCOV CORONAVIRUS AG IA: CPT | Performed by: FAMILY MEDICINE

## 2023-11-06 PROCEDURE — G8484 FLU IMMUNIZE NO ADMIN: HCPCS | Performed by: FAMILY MEDICINE

## 2023-11-06 PROCEDURE — 1036F TOBACCO NON-USER: CPT | Performed by: FAMILY MEDICINE

## 2023-11-06 PROCEDURE — G8417 CALC BMI ABV UP PARAM F/U: HCPCS | Performed by: FAMILY MEDICINE

## 2023-11-06 PROCEDURE — G8399 PT W/DXA RESULTS DOCUMENT: HCPCS | Performed by: FAMILY MEDICINE

## 2023-11-06 PROCEDURE — 3078F DIAST BP <80 MM HG: CPT | Performed by: FAMILY MEDICINE

## 2023-11-06 NOTE — PROGRESS NOTES
possible side effects, risks, benefits and alternatives to treatment; patient and/or guardian verbalizes understanding, agrees, feels comfortable with and wishes to proceed with above treatment plan. Advised patient to call with any new medication issues, and read all Rx info from pharmacy to assure aware of all possible risks and side effects of medication before taking. Reviewed age and gender appropriate health screening exams and vaccinations. Advised patient regarding importance of keeping up with recommended health maintenance and to schedule as soon as possible if overdue, as this is important in assessing for undiagnosed pathology, especially cancer, as well as protecting against potentially harmful/life threatening disease. Patient and/or guardian verbalizes understanding and agrees with above counseling, assessment and plan. All questions answered. Sebastian Quintanilla DO  11/6/2023    I have personally reviewed and updated the chief complaint, HPI, Past Medical, Family and Social History, as well as the above Review of Systems.

## 2023-11-14 ENCOUNTER — TELEPHONE (OUTPATIENT)
Dept: ENDOCRINOLOGY | Age: 68
End: 2023-11-14

## 2023-11-14 NOTE — TELEPHONE ENCOUNTER
----- Message from GENTRY Rutledge sent at 11/13/2023  8:02 AM EST -----  Please call patient and inform her I have reviewed thyroid ultrasound. Ultrasound is stable when compared to previous. This is an excellent result.   We will discuss further at next office visit

## 2023-11-29 ENCOUNTER — TELEPHONE (OUTPATIENT)
Dept: GENERAL RADIOLOGY | Age: 68
End: 2023-11-29

## 2023-11-29 SDOH — HEALTH STABILITY: PHYSICAL HEALTH
ON AVERAGE, HOW MANY DAYS PER WEEK DO YOU ENGAGE IN MODERATE TO STRENUOUS EXERCISE (LIKE A BRISK WALK)?: PATIENT DECLINED

## 2023-11-29 ASSESSMENT — SOCIAL DETERMINANTS OF HEALTH (SDOH)
WITHIN THE LAST YEAR, HAVE YOU BEEN AFRAID OF YOUR PARTNER OR EX-PARTNER?: NO
WITHIN THE LAST YEAR, HAVE YOU BEEN KICKED, HIT, SLAPPED, OR OTHERWISE PHYSICALLY HURT BY YOUR PARTNER OR EX-PARTNER?: NO
WITHIN THE LAST YEAR, HAVE YOU BEEN HUMILIATED OR EMOTIONALLY ABUSED IN OTHER WAYS BY YOUR PARTNER OR EX-PARTNER?: NO
WITHIN THE LAST YEAR, HAVE TO BEEN RAPED OR FORCED TO HAVE ANY KIND OF SEXUAL ACTIVITY BY YOUR PARTNER OR EX-PARTNER?: NO

## 2023-11-29 NOTE — TELEPHONE ENCOUNTER
Patient aaron she wanted to cancel her appointment for today. I returned her call and aaron to call back to reschedule her appointment.

## 2023-11-30 ENCOUNTER — OFFICE VISIT (OUTPATIENT)
Dept: ORTHOPEDIC SURGERY | Age: 68
End: 2023-11-30

## 2023-11-30 ENCOUNTER — TELEPHONE (OUTPATIENT)
Dept: ORTHOPEDIC SURGERY | Age: 68
End: 2023-11-30

## 2023-11-30 ENCOUNTER — TELEPHONE (OUTPATIENT)
Dept: FAMILY MEDICINE CLINIC | Age: 68
End: 2023-11-30

## 2023-11-30 VITALS — BODY MASS INDEX: 43.54 KG/M2 | HEIGHT: 64 IN | WEIGHT: 255 LBS

## 2023-11-30 DIAGNOSIS — M17.12 PRIMARY OSTEOARTHRITIS OF LEFT KNEE: Primary | ICD-10-CM

## 2023-11-30 DIAGNOSIS — K21.9 GASTROESOPHAGEAL REFLUX DISEASE, UNSPECIFIED WHETHER ESOPHAGITIS PRESENT: ICD-10-CM

## 2023-11-30 DIAGNOSIS — M65.321 TRIGGER INDEX FINGER OF RIGHT HAND: ICD-10-CM

## 2023-11-30 DIAGNOSIS — F32.1 CURRENT MODERATE EPISODE OF MAJOR DEPRESSIVE DISORDER WITHOUT PRIOR EPISODE (HCC): ICD-10-CM

## 2023-11-30 DIAGNOSIS — I10 ESSENTIAL HYPERTENSION: ICD-10-CM

## 2023-11-30 DIAGNOSIS — M79.641 RIGHT HAND PAIN: Primary | ICD-10-CM

## 2023-11-30 RX ORDER — BETAMETHASONE SODIUM PHOSPHATE AND BETAMETHASONE ACETATE 3; 3 MG/ML; MG/ML
6 INJECTION, SUSPENSION INTRA-ARTICULAR; INTRALESIONAL; INTRAMUSCULAR; SOFT TISSUE ONCE
Status: COMPLETED | OUTPATIENT
Start: 2023-11-30 | End: 2023-11-30

## 2023-11-30 RX ORDER — LIDOCAINE HYDROCHLORIDE 10 MG/ML
1 INJECTION, SOLUTION INFILTRATION; PERINEURAL ONCE
Status: COMPLETED | OUTPATIENT
Start: 2023-11-30 | End: 2023-11-30

## 2023-11-30 RX ADMIN — LIDOCAINE HYDROCHLORIDE 1 ML: 10 INJECTION, SOLUTION INFILTRATION; PERINEURAL at 15:04

## 2023-11-30 RX ADMIN — BETAMETHASONE SODIUM PHOSPHATE AND BETAMETHASONE ACETATE 6 MG: 3; 3 INJECTION, SUSPENSION INTRA-ARTICULAR; INTRALESIONAL; INTRAMUSCULAR; SOFT TISSUE at 15:04

## 2023-11-30 NOTE — TELEPHONE ENCOUNTER
[facility]  ORTHOPAEDIC SURGERY SCHEDULING NOTE    Patient wishes to proceed after surgery discussion with Dr. Bennett.     Surgical education was discussed and patient was given the surgical handout. Pre/post-op appointments were made as needed. Patient is also aware to obtain any medical clearances prior to surgery, if requested. Notified that pre-admission testing will also be reaching out for education and instructions on arrival time prior to procedure.     Patient is to obtain clearance(s) from: pcp     Authorization submitted to [facility] MEDICARE   Status:     Surgery: LEFT DEE DEE ROBOTIC ASSISTED TOTAL JOINT ARTHOPLASTY   OR DATE: 2/20/23 [facility]   Vendor: [facility]  Block: JOSE A  CPT: 54022  DX: M17.12   Special Needs (if applicable): CT        Scheduled: [facility] OR Staff on 11/30

## 2023-11-30 NOTE — PROGRESS NOTES
[facility]  PRIMARY CARE SPORTS MEDICINE  DATE OF VISIT : 2023    Patient : Dillan Meyers  Age : 77 y.o.  : 1955  MRN : [id number]   ______________________________________________________________________    Chief Complaint :   Chief Complaint   Patient presents with    Hand Pain     Patient states that she woke up about 1 week ago and all digits were locked down into flexion. Patient has tried OTC with little relief. HPI : Dillan Meyers is 77 y.o. female who presented to the clinic today for evaluation of right hand pain. Patient complaints of right index finger catching. This has been present 1 week with no known mechanism of injury or cause for onset. This affects most every activity involving gripping. Originally only a catching sensation was present, however now locking and pain are present. Treatment to date: Activity modification and OTC medications without significant relief.       Past Medical History :  Past Medical History:   Diagnosis Date    Anxiety     Arthritis     Deep vein blood clot of left lower extremity ([address]) 2019    Family history of early CAD     Hyperlipemia     Hypertension     Multinodular goiter     Sleep apnea     cpap    SOBOE (shortness of breath on exertion)      Past Surgical History:   Procedure Laterality Date    BREAST SURGERY Left     biopsy    CARDIOVASCULAR STRESS TEST      COLONOSCOPY      COLONOSCOPY N/A 3/29/2022    COLONOSCOPY POLYPECTOMY SNARE/COLD BIOPSY performed by Christinia Kawasaki, MD at [facility]  3/29/2022    COLONOSCOPY CONTROL HEMORRHAGE performed by Christinia Kawasaki, MD at  [facility]  2017    mouth/under tongue    HYSTERECTOMY, TOTAL ABDOMINAL (CERVIX REMOVED)      KNEE ARTHROSCOPY Left     LEG SURGERY Left     ligament repair    SALIVARY GLAND SURGERY Left 2017    TONSILLECTOMY      childhood    TOTAL KNEE ARTHROPLASTY Right 2023    RIGHT KNEE DEE DEE ROBOTIC

## 2023-11-30 NOTE — TELEPHONE ENCOUNTER
Pt called in stating that her surgery was changed from Fe. 5th to Feb. 20th, and pre op appt is Jan. 29. Is Pre op appt ok or needs to be rescheduled. Notified Pt that she can keep pre op appt.

## 2023-12-04 ENCOUNTER — PATIENT MESSAGE (OUTPATIENT)
Dept: FAMILY MEDICINE CLINIC | Age: 68
End: 2023-12-04

## 2023-12-04 DIAGNOSIS — N39.41 URGE INCONTINENCE: ICD-10-CM

## 2023-12-04 DIAGNOSIS — E78.00 PURE HYPERCHOLESTEROLEMIA: ICD-10-CM

## 2023-12-04 RX ORDER — LANOLIN ALCOHOL/MO/W.PET/CERES
400 CREAM (GRAM) TOPICAL DAILY
Qty: 30 TABLET | Refills: 1 | Status: SHIPPED | OUTPATIENT
Start: 2023-12-04

## 2023-12-04 RX ORDER — METOPROLOL SUCCINATE 25 MG/1
TABLET, EXTENDED RELEASE ORAL
Qty: 15 TABLET | Refills: 5 | Status: SHIPPED | OUTPATIENT
Start: 2023-12-04

## 2023-12-04 RX ORDER — IBUPROFEN 200 MG
2 CAPSULE ORAL DAILY
Qty: 60 TABLET | Refills: 5 | Status: SHIPPED | OUTPATIENT
Start: 2023-12-04

## 2023-12-04 RX ORDER — FAMOTIDINE 20 MG/1
TABLET, FILM COATED ORAL
Qty: 30 TABLET | Refills: 5 | Status: SHIPPED | OUTPATIENT
Start: 2023-12-04

## 2023-12-04 RX ORDER — ESCITALOPRAM OXALATE 20 MG/1
TABLET ORAL
Qty: 30 TABLET | Refills: 5 | Status: SHIPPED | OUTPATIENT
Start: 2023-12-04

## 2023-12-04 RX ORDER — BUPROPION HYDROCHLORIDE 150 MG/1
150 TABLET ORAL EVERY MORNING
Qty: 30 TABLET | Refills: 5 | Status: SHIPPED | OUTPATIENT
Start: 2023-12-04

## 2023-12-04 RX ORDER — PRAVASTATIN SODIUM 20 MG
20 TABLET ORAL DAILY
Qty: 90 TABLET | Refills: 3 | Status: SHIPPED | OUTPATIENT
Start: 2023-12-04

## 2023-12-04 NOTE — TELEPHONE ENCOUNTER
From: Farzad Escalante  To: Dr. Ke Hogan: 2023 10:21 AM EST  Subject: Medication    I would like to know if you send my refills to my [facility] pharmacist because they are all  and I need all refills. Let me know if you did send them to [facility]. Have a nice day.

## 2023-12-28 ENCOUNTER — PATIENT MESSAGE (OUTPATIENT)
Dept: FAMILY MEDICINE CLINIC | Age: 68
End: 2023-12-28

## 2023-12-28 DIAGNOSIS — H91.93 BILATERAL HEARING LOSS, UNSPECIFIED HEARING LOSS TYPE: Primary | ICD-10-CM

## 2023-12-28 NOTE — TELEPHONE ENCOUNTER
From: Debbie Huang  To: Dr. Deedee Jones: 12/28/2023 2:43 PM EST  Subject: Ear    I would like you to recommend me to a ear doctor so I could get a hearing test done

## 2024-01-16 ENCOUNTER — TELEPHONE (OUTPATIENT)
Dept: AUDIOLOGY | Age: 69
End: 2024-01-16

## 2024-01-16 NOTE — TELEPHONE ENCOUNTER
Patient left message on [facility] phone. The message was very garbled, but it may be she was returning call to schedule for appointment. Has referral in workqueue.     Called back and left message.     Electronically signed by Maryanne Ruiz on 1/16/2024 at 11:27 AM

## 2024-01-19 NOTE — DISCHARGE INSTRUCTIONS
to wash incision with soap   and water.  Pat incision dry with clean hand towel or let air dry.   DO NOT take baths, go in swimming pools/hot tubs/lakes, or submerge your operative leg under water until you are cleared by Dr. Bennett.      Do not apply creams, liniments, lotions or ointments directly on incision line.   If incision is draining, keep covered with sterile gauze.  Change dressing daily and each time you shower.    Do not touch incision line with your fingers or scratch incision with your   fingernails (your fingernails harbor germs and bacteria).   Do not allow pets near your incision - do not allow pets to smell or lick incision.    Weight Bearing:   You can be weight bearing as tolerated on your operative leg.  Use a walker/cane as needed.        Signs and symptoms to report to your doctor:     Persistent drainage from the incision.   Increased redness or swelling of the incision or operative extremity.   Increased or excessive pain at the incision site or in the thigh or calf.   Fever over 101 degrees with chills (take your temperature at home and   Record).    Go to Emergency Room if you experience any of the following:     Chest pain or tightness   Shortness of breath or difficulty breathing   Anxiety or feeling of impending doom      Note: The above are signs and symptoms of a blood clot in your    lungs.  Although this is rare, it can occur.  You must be evaluated in the   Emergency Room.    Rehabilitation:   Continue exercises at home as instructed by the Physical and Occupational   Therapists.   Continue Hip / Knee Precautions until cleared by Dr. Bennett   Begin a formal Outpatient Physical Therapy program as soon as you are able.    First post op visit will include:              Wound check               X-ray of operative joint              Exam by your surgeon               Prescription for Outpatient Physical Therapy     Once you are home:   Call office for appointment at [phone]

## 2024-01-22 ENCOUNTER — OFFICE VISIT (OUTPATIENT)
Dept: FAMILY MEDICINE CLINIC | Age: 69
End: 2024-01-22
Payer: MEDICARE

## 2024-01-22 VITALS
HEIGHT: 64 IN | HEART RATE: 83 BPM | OXYGEN SATURATION: 97 % | SYSTOLIC BLOOD PRESSURE: 130 MMHG | WEIGHT: 262.2 LBS | TEMPERATURE: 97.3 F | DIASTOLIC BLOOD PRESSURE: 82 MMHG | RESPIRATION RATE: 17 BRPM | BODY MASS INDEX: 44.76 KG/M2

## 2024-01-22 DIAGNOSIS — Z23 FLU VACCINE NEED: ICD-10-CM

## 2024-01-22 DIAGNOSIS — I26.99 PULMONARY EMBOLISM ON LEFT (HCC): ICD-10-CM

## 2024-01-22 DIAGNOSIS — E78.00 PURE HYPERCHOLESTEROLEMIA: Chronic | ICD-10-CM

## 2024-01-22 DIAGNOSIS — N18.32 STAGE 3B CHRONIC KIDNEY DISEASE (HCC): ICD-10-CM

## 2024-01-22 DIAGNOSIS — K51.40 PSEUDOPOLYPOSIS OF COLON WITHOUT COMPLICATION, UNSPECIFIED PART OF COLON (HCC): ICD-10-CM

## 2024-01-22 DIAGNOSIS — I10 ESSENTIAL HYPERTENSION: Chronic | ICD-10-CM

## 2024-01-22 DIAGNOSIS — M25.562 CHRONIC PAIN OF LEFT KNEE: ICD-10-CM

## 2024-01-22 DIAGNOSIS — I71.019 DISSECTION OF THORACIC AORTA, UNSPECIFIED PART (HCC): ICD-10-CM

## 2024-01-22 DIAGNOSIS — F32.1 CURRENT MODERATE EPISODE OF MAJOR DEPRESSIVE DISORDER WITHOUT PRIOR EPISODE (HCC): ICD-10-CM

## 2024-01-22 DIAGNOSIS — G89.29 CHRONIC PAIN OF LEFT KNEE: ICD-10-CM

## 2024-01-22 DIAGNOSIS — Z01.818 PRE-OP EXAM: Primary | ICD-10-CM

## 2024-01-22 PROBLEM — R07.9 CHEST PAIN: Status: RESOLVED | Noted: 2021-07-27 | Resolved: 2024-01-22

## 2024-01-22 PROCEDURE — 1123F ACP DISCUSS/DSCN MKR DOCD: CPT | Performed by: FAMILY MEDICINE

## 2024-01-22 PROCEDURE — 1090F PRES/ABSN URINE INCON ASSESS: CPT | Performed by: FAMILY MEDICINE

## 2024-01-22 PROCEDURE — 99214 OFFICE O/P EST MOD 30 MIN: CPT | Performed by: FAMILY MEDICINE

## 2024-01-22 PROCEDURE — G8427 DOCREV CUR MEDS BY ELIG CLIN: HCPCS | Performed by: FAMILY MEDICINE

## 2024-01-22 PROCEDURE — 93000 ELECTROCARDIOGRAM COMPLETE: CPT | Performed by: FAMILY MEDICINE

## 2024-01-22 PROCEDURE — 3079F DIAST BP 80-89 MM HG: CPT | Performed by: FAMILY MEDICINE

## 2024-01-22 PROCEDURE — 1036F TOBACCO NON-USER: CPT | Performed by: FAMILY MEDICINE

## 2024-01-22 PROCEDURE — G8484 FLU IMMUNIZE NO ADMIN: HCPCS | Performed by: FAMILY MEDICINE

## 2024-01-22 PROCEDURE — G8417 CALC BMI ABV UP PARAM F/U: HCPCS | Performed by: FAMILY MEDICINE

## 2024-01-22 PROCEDURE — 3017F COLORECTAL CA SCREEN DOC REV: CPT | Performed by: FAMILY MEDICINE

## 2024-01-22 PROCEDURE — G0008 ADMIN INFLUENZA VIRUS VAC: HCPCS | Performed by: FAMILY MEDICINE

## 2024-01-22 PROCEDURE — G8399 PT W/DXA RESULTS DOCUMENT: HCPCS | Performed by: FAMILY MEDICINE

## 2024-01-22 PROCEDURE — 3075F SYST BP GE 130 - 139MM HG: CPT | Performed by: FAMILY MEDICINE

## 2024-01-22 PROCEDURE — 90694 VACC AIIV4 NO PRSRV 0.5ML IM: CPT | Performed by: FAMILY MEDICINE

## 2024-01-22 SDOH — ECONOMIC STABILITY: INCOME INSECURITY: HOW HARD IS IT FOR YOU TO PAY FOR THE VERY BASICS LIKE FOOD, HOUSING, MEDICAL CARE, AND HEATING?: NOT HARD AT ALL

## 2024-01-22 SDOH — ECONOMIC STABILITY: FOOD INSECURITY: WITHIN THE PAST 12 MONTHS, THE FOOD YOU BOUGHT JUST DIDN'T LAST AND YOU DIDN'T HAVE MONEY TO GET MORE.: NEVER TRUE

## 2024-01-22 SDOH — ECONOMIC STABILITY: FOOD INSECURITY: WITHIN THE PAST 12 MONTHS, YOU WORRIED THAT YOUR FOOD WOULD RUN OUT BEFORE YOU GOT MONEY TO BUY MORE.: NEVER TRUE

## 2024-01-22 ASSESSMENT — PATIENT HEALTH QUESTIONNAIRE - PHQ9
2. FEELING DOWN, DEPRESSED OR HOPELESS: 1
4. FEELING TIRED OR HAVING LITTLE ENERGY: 1
SUM OF ALL RESPONSES TO PHQ QUESTIONS 1-9: 2
SUM OF ALL RESPONSES TO PHQ9 QUESTIONS 1 & 2: 1
SUM OF ALL RESPONSES TO PHQ QUESTIONS 1-9: 2
7. TROUBLE CONCENTRATING ON THINGS, SUCH AS READING THE NEWSPAPER OR WATCHING TELEVISION: 0
10. IF YOU CHECKED OFF ANY PROBLEMS, HOW DIFFICULT HAVE THESE PROBLEMS MADE IT FOR YOU TO DO YOUR WORK, TAKE CARE OF THINGS AT HOME, OR GET ALONG WITH OTHER PEOPLE: 0
5. POOR APPETITE OR OVEREATING: 0
3. TROUBLE FALLING OR STAYING ASLEEP: 0
SUM OF ALL RESPONSES TO PHQ QUESTIONS 1-9: 2
8. MOVING OR SPEAKING SO SLOWLY THAT OTHER PEOPLE COULD HAVE NOTICED. OR THE OPPOSITE, BEING SO FIGETY OR RESTLESS THAT YOU HAVE BEEN MOVING AROUND A LOT MORE THAN USUAL: 0
1. LITTLE INTEREST OR PLEASURE IN DOING THINGS: 0
SUM OF ALL RESPONSES TO PHQ QUESTIONS 1-9: 2
9. THOUGHTS THAT YOU WOULD BE BETTER OFF DEAD, OR OF HURTING YOURSELF: 0
6. FEELING BAD ABOUT YOURSELF - OR THAT YOU ARE A FAILURE OR HAVE LET YOURSELF OR YOUR FAMILY DOWN: 0

## 2024-01-22 NOTE — PROGRESS NOTES
Chief Complaint   Patient presents with    Pre-op Exam       HPI:  Patient is here for preoperative clearance.    Pre-surgical evaluation:  Patient is here by request of Dr. Ingram who has upcoming left total knee replacement surgery scheduled with patient.  Patient will be undergoing general anaesthesia. She denies any issues with anesthesia.  She has no complaints or concerns today.  She is  generally healthy.  Chronic medical problems include hypertension, hyperlipidemia, depression, JES, chronic kidney disease..  These are generally controlled and stable at this time.  /82 today.  Most recent labs reviewed with patient and  are not remarkable.  Patient does not smoke.  Patient does not drink alcohol.  Patient  does not use drugs.  Overall doing well.  Patient does not have chest pain, palpitations, shortness of breath, or orthopnea.  No known heart, kidney or liver issue to date to the best of patient's knowledge, my knowledge, or of that recorded in patient's current medical record.  Patient has had cardiac testing in the past including EKG, stress test, and/or catheterization.  If available, these records have been reviewed today. Patient does have JES.  Patient is scheduled for clearance from pulmonology and cardiology    Past Medical History:   Diagnosis Date    Anxiety     Arthritis     Deep vein blood clot of left lower extremity (HCC) 05/27/2019    Family history of early CAD     Hyperlipemia     Hypertension     Multinodular goiter     Sleep apnea     cpap    SOBOE (shortness of breath on exertion)         Past Surgical History:   Procedure Laterality Date    BREAST SURGERY Left 2014    biopsy    CARDIOVASCULAR STRESS TEST      COLONOSCOPY      COLONOSCOPY N/A 3/29/2022    COLONOSCOPY POLYPECTOMY SNARE/COLD BIOPSY performed by Karlo Soto MD at [facility]    COLONOSCOPY  3/29/2022    COLONOSCOPY CONTROL HEMORRHAGE performed by Karlo Soto MD at [facility]    CYST  Terbinafine Pregnancy And Lactation Text: This medication is Pregnancy Category B and is considered safe during pregnancy. It is also excreted in breast milk and breast feeding isn't recommended.

## 2024-01-24 ENCOUNTER — HOSPITAL ENCOUNTER (OUTPATIENT)
Dept: CT IMAGING | Age: 69
Discharge: HOME | End: 2024-01-26
Attending: ORTHOPAEDIC SURGERY
Payer: MEDICARE

## 2024-01-24 DIAGNOSIS — M17.12 PRIMARY OSTEOARTHRITIS OF LEFT KNEE: ICD-10-CM

## 2024-01-24 PROCEDURE — 73700 CT LOWER EXTREMITY W/O DYE: CPT

## 2024-01-29 ENCOUNTER — OFFICE VISIT (OUTPATIENT)
Dept: SLEEP MEDICINE | Age: 69
End: 2024-01-29
Payer: MEDICARE

## 2024-01-29 VITALS
WEIGHT: 266.54 LBS | SYSTOLIC BLOOD PRESSURE: 144 MMHG | BODY MASS INDEX: 45.5 KG/M2 | DIASTOLIC BLOOD PRESSURE: 78 MMHG | OXYGEN SATURATION: 98 % | HEIGHT: 64 IN | RESPIRATION RATE: 18 BRPM | HEART RATE: 71 BPM

## 2024-01-29 DIAGNOSIS — G47.10 HYPERSOMNOLENCE: ICD-10-CM

## 2024-01-29 DIAGNOSIS — G47.33 OSA (OBSTRUCTIVE SLEEP APNEA): Primary | ICD-10-CM

## 2024-01-29 PROCEDURE — 99214 OFFICE O/P EST MOD 30 MIN: CPT | Performed by: INTERNAL MEDICINE

## 2024-01-29 PROCEDURE — 3078F DIAST BP <80 MM HG: CPT | Performed by: INTERNAL MEDICINE

## 2024-01-29 PROCEDURE — 1090F PRES/ABSN URINE INCON ASSESS: CPT | Performed by: INTERNAL MEDICINE

## 2024-01-29 PROCEDURE — 1036F TOBACCO NON-USER: CPT | Performed by: INTERNAL MEDICINE

## 2024-01-29 PROCEDURE — 1123F ACP DISCUSS/DSCN MKR DOCD: CPT | Performed by: INTERNAL MEDICINE

## 2024-01-29 PROCEDURE — 3017F COLORECTAL CA SCREEN DOC REV: CPT | Performed by: INTERNAL MEDICINE

## 2024-01-29 PROCEDURE — 3077F SYST BP >= 140 MM HG: CPT | Performed by: INTERNAL MEDICINE

## 2024-01-29 PROCEDURE — G8399 PT W/DXA RESULTS DOCUMENT: HCPCS | Performed by: INTERNAL MEDICINE

## 2024-01-29 PROCEDURE — G8427 DOCREV CUR MEDS BY ELIG CLIN: HCPCS | Performed by: INTERNAL MEDICINE

## 2024-01-29 PROCEDURE — G8417 CALC BMI ABV UP PARAM F/U: HCPCS | Performed by: INTERNAL MEDICINE

## 2024-01-29 PROCEDURE — G8484 FLU IMMUNIZE NO ADMIN: HCPCS | Performed by: INTERNAL MEDICINE

## 2024-01-29 ASSESSMENT — ENCOUNTER SYMPTOMS
ALLERGIC/IMMUNOLOGIC NEGATIVE: 1
RESPIRATORY NEGATIVE: 1
GASTROINTESTINAL NEGATIVE: 1
EYES NEGATIVE: 1

## 2024-01-29 ASSESSMENT — SLEEP AND FATIGUE QUESTIONNAIRES
HOW LIKELY ARE YOU TO NOD OFF OR FALL ASLEEP WHILE SITTING AND READING: 2
HOW LIKELY ARE YOU TO NOD OFF OR FALL ASLEEP WHILE SITTING QUIETLY AFTER LUNCH WITHOUT ALCOHOL: 0
HOW LIKELY ARE YOU TO NOD OFF OR FALL ASLEEP WHILE WATCHING TV: 2
HOW LIKELY ARE YOU TO NOD OFF OR FALL ASLEEP WHEN YOU ARE A PASSENGER IN A CAR FOR AN HOUR WITHOUT A BREAK: 1
HOW LIKELY ARE YOU TO NOD OFF OR FALL ASLEEP WHILE LYING DOWN TO REST IN THE AFTERNOON WHEN CIRCUMSTANCES PERMIT: 2
HOW LIKELY ARE YOU TO NOD OFF OR FALL ASLEEP WHILE SITTING INACTIVE IN A PUBLIC PLACE: 1
HOW LIKELY ARE YOU TO NOD OFF OR FALL ASLEEP WHILE SITTING AND TALKING TO SOMEONE: 2
HOW LIKELY ARE YOU TO NOD OFF OR FALL ASLEEP IN A CAR, WHILE STOPPED FOR A FEW MINUTES IN TRAFFIC: 0

## 2024-01-29 NOTE — PROGRESS NOTES
Extraocular movements intact.   Cardiovascular:      Rate and Rhythm: Normal rate and regular rhythm.   Pulmonary:      Effort: Pulmonary effort is normal.      Breath sounds: Normal breath sounds.   Musculoskeletal:         General: No signs of injury.   Skin:     General: Skin is warm and dry.   Neurological:      General: No focal deficit present.      Mental Status: She is alert.   Psychiatric:         Mood and Affect: Mood normal.                 1/29/2024     9:35 AM 11/1/2023    11:06 AM 3/7/2018     2:43 PM 8/23/2017     3:01 PM 1/16/2017     9:19 AM   Sleep Medicine   Sitting and reading 2 0 2 0 2   Watching TV 2 3 2 0 0   Sitting, inactive in a public place (e.g. a theatre or a meeting) 1 2 1 0 0   As a passenger in a car for an hour without a break 1 1 2 1 1   Lying down to rest in the afternoon when circumstances permit 2 3 3 3 0   Sitting and talking to someone 2 3 0 0 0   Sitting quietly after a lunch without alcohol 0 0 1 0 0   In a car, while stopped for a few minutes in traffic 0 0 0 0 0   Union Sleepiness Score 10 12 11 4 3   Neck circumference (Inches)  15.25 16.25 16.5 15.5         SLEEP STUDY HISTORY      9- HSAT  BMI 46.8  AHI 16 spO2 regina 74% T90 35% TST          PERTINENT LAB RESULTS  No results found for: \"FERRITIN\"       Assessment:      Yajaira was seen today for sleep apnea.    Diagnoses and all orders for this visit:    EJS (obstructive sleep apnea)  -     DME Order for CPAP as OP  -     DME Order for CPAP as OP    Hypersomnolence       Plan:      Pt brought machine, was able to download data from SD card   CPAP set to 7cwp and residual AHI is 9   Would like to change to 5-15, unable to download settings to card - getting error message  Will contact [facility] to assist with device changes   ESS 10 suggesting excessive sleepiness. Pt was hypoxemic on HST, will eventually get overnight pulse ox to confirm resolution of hypoxemia, once residual AHI controlled on download

## 2024-01-30 ENCOUNTER — NURSE ONLY (OUTPATIENT)
Dept: FAMILY MEDICINE CLINIC | Age: 69
End: 2024-01-30
Payer: MEDICARE

## 2024-01-30 DIAGNOSIS — N18.32 STAGE 3B CHRONIC KIDNEY DISEASE (HCC): ICD-10-CM

## 2024-01-30 DIAGNOSIS — I10 ESSENTIAL HYPERTENSION: Chronic | ICD-10-CM

## 2024-01-30 DIAGNOSIS — Z01.818 PRE-OP EXAM: ICD-10-CM

## 2024-01-30 DIAGNOSIS — E78.00 PURE HYPERCHOLESTEROLEMIA: Chronic | ICD-10-CM

## 2024-01-30 PROCEDURE — 36415 COLL VENOUS BLD VENIPUNCTURE: CPT | Performed by: FAMILY MEDICINE

## 2024-01-31 ENCOUNTER — TELEPHONE (OUTPATIENT)
Dept: CARDIOLOGY CLINIC | Age: 69
End: 2024-01-31

## 2024-01-31 LAB
ABSOLUTE IMMATURE GRANULOCYTE: 0.06 K/UL (ref 0–0.58)
ALBUMIN SERPL-MCNC: 3.9 G/DL (ref 3.5–5.2)
ALP BLD-CCNC: 119 U/L (ref 35–104)
ALT SERPL-CCNC: 9 U/L (ref 0–32)
ANION GAP SERPL CALCULATED.3IONS-SCNC: 12 MMOL/L (ref 7–16)
AST SERPL-CCNC: 15 U/L (ref 0–31)
BASOPHILS ABSOLUTE: 0.06 K/UL (ref 0–0.2)
BASOPHILS RELATIVE PERCENT: 1 % (ref 0–2)
BILIRUB SERPL-MCNC: 0.4 MG/DL (ref 0–1.2)
BUN BLDV-MCNC: 21 MG/DL (ref 6–23)
CALCIUM SERPL-MCNC: 9.8 MG/DL (ref 8.6–10.2)
CHLORIDE BLD-SCNC: 104 MMOL/L (ref 98–107)
CHOLESTEROL: 204 MG/DL
CO2: 24 MMOL/L (ref 22–29)
CREAT SERPL-MCNC: 1.1 MG/DL (ref 0.5–1)
EOSINOPHILS ABSOLUTE: 0.11 K/UL (ref 0.05–0.5)
EOSINOPHILS RELATIVE PERCENT: 1 % (ref 0–6)
GFR SERPL CREATININE-BSD FRML MDRD: 57 ML/MIN/1.73M2
GLUCOSE BLD-MCNC: 94 MG/DL (ref 74–99)
HCT VFR BLD CALC: 45.6 % (ref 34–48)
HDLC SERPL-MCNC: 69 MG/DL
HEMOGLOBIN: 14.4 G/DL (ref 11.5–15.5)
IMMATURE GRANULOCYTES: 1 % (ref 0–5)
LDL CHOLESTEROL: 107 MG/DL
LYMPHOCYTES ABSOLUTE: 2.62 K/UL (ref 1.5–4)
LYMPHOCYTES RELATIVE PERCENT: 21 % (ref 20–42)
MCH RBC QN AUTO: 29.2 PG (ref 26–35)
MCHC RBC AUTO-ENTMCNC: 31.6 G/DL (ref 32–34.5)
MCV RBC AUTO: 92.5 FL (ref 80–99.9)
MONOCYTES ABSOLUTE: 1.02 K/UL (ref 0.1–0.95)
MONOCYTES RELATIVE PERCENT: 8 % (ref 2–12)
NEUTROPHILS ABSOLUTE: 8.68 K/UL (ref 1.8–7.3)
NEUTROPHILS RELATIVE PERCENT: 69 % (ref 43–80)
PDW BLD-RTO: 15.3 % (ref 11.5–15)
PLATELET # BLD: 347 K/UL (ref 130–450)
PMV BLD AUTO: 11 FL (ref 7–12)
POTASSIUM SERPL-SCNC: 4.5 MMOL/L (ref 3.5–5)
RBC # BLD: 4.93 M/UL (ref 3.5–5.5)
SODIUM BLD-SCNC: 140 MMOL/L (ref 132–146)
T4 FREE: 1.2 NG/DL (ref 0.9–1.7)
TOTAL PROTEIN: 7.1 G/DL (ref 6.4–8.3)
TRIGL SERPL-MCNC: 139 MG/DL
TSH SERPL DL<=0.05 MIU/L-ACNC: 1.2 UIU/ML (ref 0.27–4.2)
VLDLC SERPL CALC-MCNC: 28 MG/DL
WBC # BLD: 12.6 K/UL (ref 4.5–11.5)

## 2024-01-31 NOTE — TELEPHONE ENCOUNTER
Patient needs cardiac clearance for a knee surgery scheduled on 2/20 with , she takes Eliquis BID, patient was seen in 1/2023 and she denies any chest pain,SOB or palpitations and she is able to perform daily activities without any cardiac symptoms, please advise

## 2024-01-31 NOTE — TELEPHONE ENCOUNTER
Patient is an acceptable risk to proceed with surgery.    Okay to hold eliquis for 3 days prior.    Brissa Alford D.O.  Cardiologist  Cardiology, [facility]

## 2024-02-06 ENCOUNTER — HOSPITAL ENCOUNTER (OUTPATIENT)
Dept: AUDIOLOGY | Age: 69
Discharge: HOME | End: 2024-02-06
Payer: MEDICARE

## 2024-02-06 PROCEDURE — 92557 COMPREHENSIVE HEARING TEST: CPT | Performed by: AUDIOLOGIST

## 2024-02-06 PROCEDURE — 92567 TYMPANOMETRY: CPT | Performed by: AUDIOLOGIST

## 2024-02-06 NOTE — PROGRESS NOTES
AUDIOMETRIC EVALUATION    REASON FOR REFERRAL:  This patient was referred for audiometric testing by [facility] due to hearing loss.  She reports \"selective hearing\"  and her sister complains she can not hear.  She notes occasional dizziness but no tinnitus.          RESULTS:  Pure tone audiometric testing using insert earphones  was carried out. Results for the left ear revealed air conduction thresholds averaging 30 dBHL through 1000 Hz sloping to 70 dBHL at 8000 Hz.  Results for the right ear averaged 85 dBHL across frequencies. Speech reception thresholds were obtained utilizing headphones at 25 dBHL in the left ear and 80 dBHL masked in the right ear . Speech discrimination testing was performed at 65 dBHL for the left ear and 105 dBHL masked for the right ear. Obtained were scores of 96% in the left ear and 12 % in the right ear. (Un)Masked Bone Conduction Testing revealed thresholds equal to air conduction thresholds for the left ear and no response to masked bone conduction at the limits of the bone oscillator for the right ear.   Tympanometry was administered and revealed normal tympanograms.        It should be noted the patient did not notice a big difference between her ears when talking to her.   I switched headphones around during speech testing to verify results.  I also switched to insert phones for pure tones and same results.        IMPRESSION:  Today’s results revealed a mild sloping to severe sensorineural hearing loss for the left ear and a profound hearing loss in the right ear.  Speech reception thresholds were in agreement with the pure tone averages.  Speech discrimination was excellent for the left ear and poor for the right ear.  Tympanometry was within normal limits.         Due to the asymmetry and dizziness, an ENT consult is recommended and contact information was given for [facility].  A re-evaluation is recommended if a change in hearing is noted or if

## 2024-02-07 ENCOUNTER — TELEPHONE (OUTPATIENT)
Dept: ENT CLINIC | Age: 69
End: 2024-02-07

## 2024-02-07 DIAGNOSIS — H91.93 BILATERAL HEARING LOSS, UNSPECIFIED HEARING LOSS TYPE: Primary | ICD-10-CM

## 2024-02-07 DIAGNOSIS — R42 DIZZINESS: ICD-10-CM

## 2024-02-07 NOTE — TELEPHONE ENCOUNTER
----- Message from Daniele Saha sent at 2/6/2024  3:19 PM EST -----  Needs ENT referral for asymmetrical hearing loss and dizziness.  I gave her contact information to call, but also told her I would send referral for ENT to schedule her.  TY

## 2024-02-08 ENCOUNTER — HOSPITAL ENCOUNTER (OUTPATIENT)
Dept: PREADMISSION TESTING | Age: 69
Discharge: HOME | End: 2024-02-08
Payer: MEDICARE

## 2024-02-08 ENCOUNTER — ANESTHESIA EVENT (OUTPATIENT)
Dept: OPERATING ROOM | Age: 69
End: 2024-02-08
Payer: MEDICARE

## 2024-02-08 VITALS
BODY MASS INDEX: 44.73 KG/M2 | RESPIRATION RATE: 18 BRPM | WEIGHT: 262 LBS | DIASTOLIC BLOOD PRESSURE: 75 MMHG | HEART RATE: 69 BPM | HEIGHT: 64 IN | OXYGEN SATURATION: 96 % | TEMPERATURE: 97.7 F | SYSTOLIC BLOOD PRESSURE: 128 MMHG

## 2024-02-08 LAB
ANION GAP SERPL CALCULATED.3IONS-SCNC: 8 MMOL/L (ref 7–16)
BUN SERPL-MCNC: 23 MG/DL (ref 6–23)
CALCIUM SERPL-MCNC: 9.8 MG/DL (ref 8.6–10.2)
CHLORIDE SERPL-SCNC: 104 MMOL/L (ref 98–107)
CO2 SERPL-SCNC: 27 MMOL/L (ref 22–29)
CREAT SERPL-MCNC: 1 MG/DL (ref 0.5–1)
ERYTHROCYTE [DISTWIDTH] IN BLOOD BY AUTOMATED COUNT: 15.1 % (ref 11.5–15)
GFR SERPL CREATININE-BSD FRML MDRD: 60 ML/MIN/1.73M2
GLUCOSE SERPL-MCNC: 89 MG/DL (ref 74–99)
HCT VFR BLD AUTO: 45.3 % (ref 34–48)
HGB BLD-MCNC: 14.2 G/DL (ref 11.5–15.5)
MCH RBC QN AUTO: 29.1 PG (ref 26–35)
MCHC RBC AUTO-ENTMCNC: 31.3 G/DL (ref 32–34.5)
MCV RBC AUTO: 92.8 FL (ref 80–99.9)
PLATELET # BLD AUTO: 316 K/UL (ref 130–450)
PMV BLD AUTO: 10.4 FL (ref 7–12)
POTASSIUM SERPL-SCNC: 4.4 MMOL/L (ref 3.5–5)
PREALB SERPL-MCNC: 21 MG/DL (ref 20–40)
RBC # BLD AUTO: 4.88 M/UL (ref 3.5–5.5)
SODIUM SERPL-SCNC: 139 MMOL/L (ref 132–146)
WBC OTHER # BLD: 9.7 K/UL (ref 4.5–11.5)

## 2024-02-08 PROCEDURE — 87081 CULTURE SCREEN ONLY: CPT

## 2024-02-08 PROCEDURE — 36415 COLL VENOUS BLD VENIPUNCTURE: CPT

## 2024-02-08 PROCEDURE — 84134 ASSAY OF PREALBUMIN: CPT

## 2024-02-08 PROCEDURE — 80048 BASIC METABOLIC PNL TOTAL CA: CPT

## 2024-02-08 PROCEDURE — 85027 COMPLETE CBC AUTOMATED: CPT

## 2024-02-08 RX ORDER — ROPIVACAINE HYDROCHLORIDE 5 MG/ML
30 INJECTION, SOLUTION EPIDURAL; INFILTRATION; PERINEURAL ONCE
OUTPATIENT
Start: 2024-02-08 | End: 2024-02-08

## 2024-02-08 RX ORDER — MIDAZOLAM HYDROCHLORIDE 2 MG/2ML
2 INJECTION, SOLUTION INTRAMUSCULAR; INTRAVENOUS ONCE
OUTPATIENT
Start: 2024-02-08 | End: 2024-02-08

## 2024-02-08 ASSESSMENT — PAIN DESCRIPTION - LOCATION: LOCATION: KNEE

## 2024-02-08 ASSESSMENT — KOOS JR
HOW SEVERE IS YOUR KNEE STIFFNESS AFTER FIRST WAKING IN MORNING: 4
STRAIGHTENING KNEE FULLY: 2
BENDING TO THE FLOOR TO PICK UP OBJECT: 4
STANDING UPRIGHT: 4
RISING FROM SITTING: 4
TWISING OR PIVOTING ON KNEE: 3
KOOS JR TOTAL INTERVAL SCORE: 20.941
GOING UP OR DOWN STAIRS: 4

## 2024-02-08 ASSESSMENT — PAIN - FUNCTIONAL ASSESSMENT: PAIN_FUNCTIONAL_ASSESSMENT: PREVENTS OR INTERFERES SOME ACTIVE ACTIVITIES AND ADLS

## 2024-02-08 ASSESSMENT — PAIN DESCRIPTION - ORIENTATION: ORIENTATION: LEFT

## 2024-02-08 ASSESSMENT — PAIN DESCRIPTION - DESCRIPTORS: DESCRIPTORS: ACHING;SHARP

## 2024-02-08 ASSESSMENT — PAIN DESCRIPTION - PAIN TYPE: TYPE: CHRONIC PAIN

## 2024-02-08 ASSESSMENT — PAIN SCALES - GENERAL: PAINLEVEL_OUTOF10: 10

## 2024-02-08 NOTE — PROGRESS NOTES
[facility] PRE-ADMISSION TESTING INSTRUCTIONS    The Preadmission Testing patient is instructed accordingly using the following criteria (check applicable):    ARRIVAL INSTRUCTIONS:  [x] Parking the day of Surgery is located in the Main Entrance lot.  Upon entering the door, make an immediate right to the surgery reception desk    [x] Bring photo ID and insurance card    [x] Bring in a copy of Living will or Durable Power of  papers.    [x] Please be sure to arrange for responsible adult to provide transportation to and from the hospital    [x] Please arrange for responsible adult to be with you for the 24 hour period post procedure due to having anesthesia    [x] If you awake am of surgery not feeling well or have temperature >100 please call [phone]    GENERAL INSTRUCTIONS:    [x] Follow instructions for hydration that have been provided to you at your Pre-Admission Visit. Solid food until midnight then clear liquids. No gum, candy or mints.    [x] You may brush your teeth, but do not swallow any water    [x] Take medications as instructed with 1-2 oz of water    [x] Stop herbal supplements and vitamins 5 days prior to procedure    [x] Follow preop dosing of blood thinners per physician instructions    [] Take 1/2 dose of evening insulin, but no insulin after midnight    [] No oral diabetic medications after midnight    [] If diabetic and have low blood sugar or feel symptomatic, take 1-2oz apple juice only    [] Bring inhalers day of surgery    [x] Bring C-PAP/ Bi-Pap day of surgery    [] Bring urine specimen day of surgery    [x] Shower or bath with soap, lather and rinse well, AM of Surgery, no lotion, powders or creams to surgical site    [] Follow bowel prep as instructed per surgeon    [x] No tobacco products within 24 hours of surgery     [x] No alcohol or illegal drug use within 24 hours of surgery.    [x] Jewelry, body piercing's, eyeglasses, contact lenses and

## 2024-02-08 NOTE — ANESTHESIA PRE PROCEDURE
Department of Anesthesiology  Preprocedure Note       Name:  Yajaira Akers   Age:  68 y.o.  :  1955                                          MRN:  [id number]         Date:  2024      Surgeon: Surgeon(s):  Sergio Bennett MD    Procedure: Procedure(s):  LEFT KNEE DEE DEE ROBOTIC ASSISTED TOTAL JOINT ARTHROPLASTY   +++ASHLEY+++  ++PNB++    Medications prior to admission:   Prior to Admission medications    Medication Sig Start Date End Date Taking? Authorizing Provider   apixaban (ELIQUIS) 5 MG TABS tablet TAKE 1 TABLET BY MOUTH TWICE DAILY 24   Kimberly Quach,    metoprolol succinate (TOPROL XL) 25 MG extended release tablet TAKE ONE-HALF TABLET BY MOUTH ONCE DAILY 23   Kimberly Quach, DO   famotidine (PEPCID) 20 MG tablet TAKE 1 TABLET BY MOUTH ONCE DAILY 23   Kimberly Quach,    escitalopram (LEXAPRO) 20 MG tablet TAKE 1 TABLET BY MOUTH ONCE DAILY 23   Kimberly Quach, DO   buPROPion (WELLBUTRIN XL) 150 MG extended release tablet TAKE 1 TABLET BY MOUTH EVERY MORNING 23   Kimberly Quach, DO   calcium citrate (CALCITRATE) 950 (200 Ca) MG tablet Take 2 tablets by mouth daily OTC 23   Kimberly Quach,    magnesium oxide (MAG-OX) 400 (240 Mg) MG tablet Take 1 tablet by mouth daily 23   Kimberly Quach,    pravastatin (PRAVACHOL) 20 MG tablet Take 1 tablet by mouth daily 23   Kimberly Quach, DO   tolterodine (DETROL LA) 4 MG extended release capsule Take by mouth daily 5/10/23   Provider, MD Janneth   Incontinence Supply Disposable (DEPEND UNDERWEAR LARGE) MISC Use as directed 9/15/22   Kimberly Quach DO       Current medications:    Current Outpatient Medications   Medication Sig Dispense Refill    apixaban (ELIQUIS) 5 MG TABS tablet TAKE 1 TABLET BY MOUTH TWICE DAILY 60 tablet 5    metoprolol succinate (TOPROL XL) 25 MG extended release tablet TAKE ONE-HALF TABLET BY MOUTH ONCE DAILY 15 tablet 5    famotidine (PEPCID) 20 MG

## 2024-02-08 NOTE — PROGRESS NOTES
Yamel at Dr potter's office notified pt requesting to be discharged to [facility] rehab after knee surgery on 2/20/24. Yamel stated care coordinator will arrange rehab after surgery when pt in hospital.

## 2024-02-10 LAB
MICROORGANISM SPEC CULT: NORMAL
SPECIMEN DESCRIPTION: NORMAL

## 2024-02-14 ENCOUNTER — OFFICE VISIT (OUTPATIENT)
Dept: ORTHOPEDIC SURGERY | Age: 69
End: 2024-02-14
Payer: MEDICARE

## 2024-02-14 VITALS — WEIGHT: 262 LBS | HEIGHT: 64 IN | BODY MASS INDEX: 44.73 KG/M2

## 2024-02-14 DIAGNOSIS — M17.11 PRIMARY OSTEOARTHRITIS OF RIGHT KNEE: Primary | ICD-10-CM

## 2024-02-14 PROCEDURE — G8484 FLU IMMUNIZE NO ADMIN: HCPCS | Performed by: ORTHOPAEDIC SURGERY

## 2024-02-14 PROCEDURE — 3017F COLORECTAL CA SCREEN DOC REV: CPT | Performed by: ORTHOPAEDIC SURGERY

## 2024-02-14 PROCEDURE — 99214 OFFICE O/P EST MOD 30 MIN: CPT | Performed by: ORTHOPAEDIC SURGERY

## 2024-02-14 PROCEDURE — G8399 PT W/DXA RESULTS DOCUMENT: HCPCS | Performed by: ORTHOPAEDIC SURGERY

## 2024-02-14 PROCEDURE — 1090F PRES/ABSN URINE INCON ASSESS: CPT | Performed by: ORTHOPAEDIC SURGERY

## 2024-02-14 PROCEDURE — G8417 CALC BMI ABV UP PARAM F/U: HCPCS | Performed by: ORTHOPAEDIC SURGERY

## 2024-02-14 PROCEDURE — 1036F TOBACCO NON-USER: CPT | Performed by: ORTHOPAEDIC SURGERY

## 2024-02-14 PROCEDURE — 1123F ACP DISCUSS/DSCN MKR DOCD: CPT | Performed by: ORTHOPAEDIC SURGERY

## 2024-02-14 PROCEDURE — G8427 DOCREV CUR MEDS BY ELIG CLIN: HCPCS | Performed by: ORTHOPAEDIC SURGERY

## 2024-02-14 NOTE — PROGRESS NOTES
Department of Orthopedic Surgery  Attending Pre-operative History and Physical        DIAGNOSIS: Left knee osteoarthritis    INDICATION:  Failed Conservative Management      PROCEDURE:  LEFT DEE DEE ROBOTIC ASSISTED TOTAL JOINT ARTHOPLASTY      CHIEF COMPLAINT: Left knee pain    History Obtained From:  patient    HISTORY OF PRESENT ILLNESS:      The patient is a 68 y.o. female with significant past medical history of left knee end-stage osteoarthritis that has been refractory to conservative treatment.  She previously underwent right total knee arthroplasty and has done very well.  She is now interested in proceeding with the left.  Risks of surgery were discussed in detail including but not limited to infection, neurovascular injury, pain, stiffness, need for revision surgery, unforeseen risks.  She understands would like to proceed     Past Medical History:        Diagnosis Date    Anxiety     Arthritis     Deep vein blood clot of left lower extremity (HCC) 05/27/2019    Family history of early CAD     Hyperlipemia     Hypertension     Knee pain     left    Multinodular goiter     Sleep apnea     cpap    SOBOE (shortness of breath on exertion)        Past Surgical History:        Procedure Laterality Date    BREAST SURGERY Left 2014    biopsy    CARDIOVASCULAR STRESS TEST      COLONOSCOPY      COLONOSCOPY N/A 03/29/2022    COLONOSCOPY POLYPECTOMY SNARE/COLD BIOPSY performed by Karlo Soto MD at [facility]    COLONOSCOPY  03/29/2022    COLONOSCOPY CONTROL HEMORRHAGE performed by Karlo Soto MD at [facility]    CYST REMOVAL  05/2017    mouth/under tongue    ENDOSCOPY, COLON, DIAGNOSTIC      HYSTERECTOMY, TOTAL ABDOMINAL (CERVIX REMOVED)  1990    KNEE ARTHROSCOPY Left 2005    LEG SURGERY Left 1963    ligament repair    SALIVARY GLAND SURGERY Left 05/24/2017    TONSILLECTOMY      childhood    TOTAL KNEE ARTHROPLASTY Right 07/25/2023    RIGHT KNEE DEE DEE ROBOTIC ASSISTED TOTAL JOINT ARTHROPLASTY + PNB

## 2024-02-20 ENCOUNTER — ANESTHESIA (OUTPATIENT)
Dept: OPERATING ROOM | Age: 69
End: 2024-02-20
Payer: MEDICARE

## 2024-02-20 ENCOUNTER — APPOINTMENT (OUTPATIENT)
Dept: GENERAL RADIOLOGY | Age: 69
End: 2024-02-20
Attending: ORTHOPAEDIC SURGERY
Payer: MEDICARE

## 2024-02-20 ENCOUNTER — HOSPITAL ENCOUNTER (OUTPATIENT)
Age: 69
Setting detail: OBSERVATION
Discharge: NURSING, REHAB OR LONG-TERM CARE | End: 2024-02-22
Attending: ORTHOPAEDIC SURGERY | Admitting: ORTHOPAEDIC SURGERY
Payer: MEDICARE

## 2024-02-20 DIAGNOSIS — M17.12 OSTEOARTHRITIS OF LEFT KNEE: ICD-10-CM

## 2024-02-20 DIAGNOSIS — Z01.818 PRE-OP TESTING: ICD-10-CM

## 2024-02-20 DIAGNOSIS — Z96.652 STATUS POST LEFT KNEE REPLACEMENT: Primary | ICD-10-CM

## 2024-02-20 PROCEDURE — 64447 NJX AA&/STRD FEMORAL NRV IMG: CPT | Performed by: ANESTHESIOLOGY

## 2024-02-20 PROCEDURE — 6360000002 HC RX W HCPCS: Performed by: NURSE PRACTITIONER

## 2024-02-20 PROCEDURE — 6360000002 HC RX W HCPCS: Performed by: ORTHOPAEDIC SURGERY

## 2024-02-20 PROCEDURE — 7100000001 HC PACU RECOVERY - ADDTL 15 MIN: Performed by: ORTHOPAEDIC SURGERY

## 2024-02-20 PROCEDURE — 3600000005 HC SURGERY LEVEL 5 BASE: Performed by: ORTHOPAEDIC SURGERY

## 2024-02-20 PROCEDURE — 2580000003 HC RX 258: Performed by: NURSE PRACTITIONER

## 2024-02-20 PROCEDURE — C1713 ANCHOR/SCREW BN/BN,TIS/BN: HCPCS | Performed by: ORTHOPAEDIC SURGERY

## 2024-02-20 PROCEDURE — 6370000000 HC RX 637 (ALT 250 FOR IP): Performed by: ORTHOPAEDIC SURGERY

## 2024-02-20 PROCEDURE — 6360000002 HC RX W HCPCS

## 2024-02-20 PROCEDURE — 6360000002 HC RX W HCPCS: Performed by: ANESTHESIOLOGY

## 2024-02-20 PROCEDURE — 36415 COLL VENOUS BLD VENIPUNCTURE: CPT

## 2024-02-20 PROCEDURE — 3700000001 HC ADD 15 MINUTES (ANESTHESIA): Performed by: ORTHOPAEDIC SURGERY

## 2024-02-20 PROCEDURE — 73560 X-RAY EXAM OF KNEE 1 OR 2: CPT

## 2024-02-20 PROCEDURE — 6370000000 HC RX 637 (ALT 250 FOR IP): Performed by: NURSE PRACTITIONER

## 2024-02-20 PROCEDURE — 2720000010 HC SURG SUPPLY STERILE: Performed by: ORTHOPAEDIC SURGERY

## 2024-02-20 PROCEDURE — 2580000003 HC RX 258: Performed by: ORTHOPAEDIC SURGERY

## 2024-02-20 PROCEDURE — 3700000000 HC ANESTHESIA ATTENDED CARE: Performed by: ORTHOPAEDIC SURGERY

## 2024-02-20 PROCEDURE — 2709999900 HC NON-CHARGEABLE SUPPLY: Performed by: ORTHOPAEDIC SURGERY

## 2024-02-20 PROCEDURE — 88311 DECALCIFY TISSUE: CPT

## 2024-02-20 PROCEDURE — 97165 OT EVAL LOW COMPLEX 30 MIN: CPT

## 2024-02-20 PROCEDURE — 97530 THERAPEUTIC ACTIVITIES: CPT

## 2024-02-20 PROCEDURE — 6360000002 HC RX W HCPCS: Performed by: STUDENT IN AN ORGANIZED HEALTH CARE EDUCATION/TRAINING PROGRAM

## 2024-02-20 PROCEDURE — 3600000015 HC SURGERY LEVEL 5 ADDTL 15MIN: Performed by: ORTHOPAEDIC SURGERY

## 2024-02-20 PROCEDURE — C1776 JOINT DEVICE (IMPLANTABLE): HCPCS | Performed by: ORTHOPAEDIC SURGERY

## 2024-02-20 PROCEDURE — 2500000003 HC RX 250 WO HCPCS: Performed by: ORTHOPAEDIC SURGERY

## 2024-02-20 PROCEDURE — 88305 TISSUE EXAM BY PATHOLOGIST: CPT

## 2024-02-20 PROCEDURE — 97161 PT EVAL LOW COMPLEX 20 MIN: CPT

## 2024-02-20 PROCEDURE — G0378 HOSPITAL OBSERVATION PER HR: HCPCS

## 2024-02-20 PROCEDURE — 7100000000 HC PACU RECOVERY - FIRST 15 MIN: Performed by: ORTHOPAEDIC SURGERY

## 2024-02-20 PROCEDURE — 2500000003 HC RX 250 WO HCPCS

## 2024-02-20 DEVICE — PATELLA
Type: IMPLANTABLE DEVICE | Site: KNEE | Status: FUNCTIONAL
Brand: TRIATHLON

## 2024-02-20 DEVICE — CRUCIATE RETAINING FEMORAL
Type: IMPLANTABLE DEVICE | Site: KNEE | Status: FUNCTIONAL
Brand: TRIATHLON

## 2024-02-20 DEVICE — TIBIAL BEARING INSERT - CR
Type: IMPLANTABLE DEVICE | Site: KNEE | Status: FUNCTIONAL
Brand: TRIATHLON

## 2024-02-20 DEVICE — TIBIAL COMPONENT
Type: IMPLANTABLE DEVICE | Site: KNEE | Status: FUNCTIONAL
Brand: TRIATHLON

## 2024-02-20 RX ORDER — MIDAZOLAM HYDROCHLORIDE 2 MG/2ML
2 INJECTION, SOLUTION INTRAMUSCULAR; INTRAVENOUS ONCE
Status: COMPLETED | OUTPATIENT
Start: 2024-02-20 | End: 2024-02-20

## 2024-02-20 RX ORDER — KETOROLAC TROMETHAMINE 30 MG/ML
15 INJECTION, SOLUTION INTRAMUSCULAR; INTRAVENOUS EVERY 6 HOURS PRN
Status: DISCONTINUED | OUTPATIENT
Start: 2024-02-20 | End: 2024-02-22 | Stop reason: HOSPADM

## 2024-02-20 RX ORDER — METOPROLOL SUCCINATE 25 MG/1
25 TABLET, EXTENDED RELEASE ORAL DAILY
Status: DISCONTINUED | OUTPATIENT
Start: 2024-02-21 | End: 2024-02-22 | Stop reason: HOSPADM

## 2024-02-20 RX ORDER — ONDANSETRON 2 MG/ML
4 INJECTION INTRAMUSCULAR; INTRAVENOUS
Status: DISCONTINUED | OUTPATIENT
Start: 2024-02-20 | End: 2024-02-20 | Stop reason: HOSPADM

## 2024-02-20 RX ORDER — ONDANSETRON 2 MG/ML
4 INJECTION INTRAMUSCULAR; INTRAVENOUS EVERY 6 HOURS PRN
Status: DISCONTINUED | OUTPATIENT
Start: 2024-02-20 | End: 2024-02-22 | Stop reason: HOSPADM

## 2024-02-20 RX ORDER — OXYCODONE HYDROCHLORIDE 5 MG/1
10 TABLET ORAL EVERY 4 HOURS PRN
Status: DISCONTINUED | OUTPATIENT
Start: 2024-02-20 | End: 2024-02-22 | Stop reason: HOSPADM

## 2024-02-20 RX ORDER — FENTANYL CITRATE 50 UG/ML
INJECTION, SOLUTION INTRAMUSCULAR; INTRAVENOUS
Status: DISPENSED
Start: 2024-02-20 | End: 2024-02-20

## 2024-02-20 RX ORDER — OXYCODONE HYDROCHLORIDE AND ACETAMINOPHEN 5; 325 MG/1; MG/1
1 TABLET ORAL EVERY 6 HOURS PRN
Qty: 28 TABLET | Refills: 0 | Status: SHIPPED | OUTPATIENT
Start: 2024-02-20 | End: 2024-02-21

## 2024-02-20 RX ORDER — ONDANSETRON 2 MG/ML
INJECTION INTRAMUSCULAR; INTRAVENOUS PRN
Status: DISCONTINUED | OUTPATIENT
Start: 2024-02-20 | End: 2024-02-20 | Stop reason: SDUPTHER

## 2024-02-20 RX ORDER — DEXAMETHASONE SODIUM PHOSPHATE 10 MG/ML
8 INJECTION, SOLUTION INTRAMUSCULAR; INTRAVENOUS ONCE
Status: DISCONTINUED | OUTPATIENT
Start: 2024-02-20 | End: 2024-02-20 | Stop reason: HOSPADM

## 2024-02-20 RX ORDER — SODIUM CHLORIDE 0.9 % (FLUSH) 0.9 %
5-40 SYRINGE (ML) INJECTION EVERY 12 HOURS SCHEDULED
Status: DISCONTINUED | OUTPATIENT
Start: 2024-02-20 | End: 2024-02-20 | Stop reason: HOSPADM

## 2024-02-20 RX ORDER — DEXAMETHASONE SODIUM PHOSPHATE 4 MG/ML
INJECTION, SOLUTION INTRA-ARTICULAR; INTRALESIONAL; INTRAMUSCULAR; INTRAVENOUS; SOFT TISSUE PRN
Status: DISCONTINUED | OUTPATIENT
Start: 2024-02-20 | End: 2024-02-20 | Stop reason: SDUPTHER

## 2024-02-20 RX ORDER — PRAVASTATIN SODIUM 20 MG
20 TABLET ORAL NIGHTLY
Status: DISCONTINUED | OUTPATIENT
Start: 2024-02-20 | End: 2024-02-22 | Stop reason: HOSPADM

## 2024-02-20 RX ORDER — PROPOFOL 10 MG/ML
INJECTION, EMULSION INTRAVENOUS PRN
Status: DISCONTINUED | OUTPATIENT
Start: 2024-02-20 | End: 2024-02-20 | Stop reason: SDUPTHER

## 2024-02-20 RX ORDER — FENTANYL CITRATE 50 UG/ML
100 INJECTION, SOLUTION INTRAMUSCULAR; INTRAVENOUS ONCE
Status: COMPLETED | OUTPATIENT
Start: 2024-02-20 | End: 2024-02-20

## 2024-02-20 RX ORDER — SODIUM CHLORIDE 9 MG/ML
INJECTION, SOLUTION INTRAVENOUS PRN
Status: DISCONTINUED | OUTPATIENT
Start: 2024-02-20 | End: 2024-02-20 | Stop reason: HOSPADM

## 2024-02-20 RX ORDER — FAMOTIDINE 20 MG/1
20 TABLET, FILM COATED ORAL DAILY
Status: DISCONTINUED | OUTPATIENT
Start: 2024-02-21 | End: 2024-02-22 | Stop reason: HOSPADM

## 2024-02-20 RX ORDER — KETAMINE HYDROCHLORIDE 10 MG/ML
INJECTION, SOLUTION INTRAMUSCULAR; INTRAVENOUS PRN
Status: DISCONTINUED | OUTPATIENT
Start: 2024-02-20 | End: 2024-02-20 | Stop reason: SDUPTHER

## 2024-02-20 RX ORDER — SODIUM CHLORIDE 0.9 % (FLUSH) 0.9 %
5-40 SYRINGE (ML) INJECTION PRN
Status: DISCONTINUED | OUTPATIENT
Start: 2024-02-20 | End: 2024-02-22 | Stop reason: HOSPADM

## 2024-02-20 RX ORDER — ROCURONIUM BROMIDE 10 MG/ML
INJECTION, SOLUTION INTRAVENOUS PRN
Status: DISCONTINUED | OUTPATIENT
Start: 2024-02-20 | End: 2024-02-20 | Stop reason: SDUPTHER

## 2024-02-20 RX ORDER — SODIUM CHLORIDE 0.9 % (FLUSH) 0.9 %
5-40 SYRINGE (ML) INJECTION PRN
Status: DISCONTINUED | OUTPATIENT
Start: 2024-02-20 | End: 2024-02-20 | Stop reason: HOSPADM

## 2024-02-20 RX ORDER — CALCIUM CARBONATE 500(1250)
500 TABLET ORAL DAILY
Status: DISCONTINUED | OUTPATIENT
Start: 2024-02-20 | End: 2024-02-22 | Stop reason: HOSPADM

## 2024-02-20 RX ORDER — ROPIVACAINE HYDROCHLORIDE 5 MG/ML
30 INJECTION, SOLUTION EPIDURAL; INFILTRATION; PERINEURAL ONCE
Status: COMPLETED | OUTPATIENT
Start: 2024-02-20 | End: 2024-02-20

## 2024-02-20 RX ORDER — ESMOLOL HYDROCHLORIDE 10 MG/ML
INJECTION INTRAVENOUS PRN
Status: DISCONTINUED | OUTPATIENT
Start: 2024-02-20 | End: 2024-02-20 | Stop reason: SDUPTHER

## 2024-02-20 RX ORDER — SODIUM CHLORIDE 9 MG/ML
INJECTION, SOLUTION INTRAVENOUS PRN
Status: DISCONTINUED | OUTPATIENT
Start: 2024-02-20 | End: 2024-02-22 | Stop reason: HOSPADM

## 2024-02-20 RX ORDER — OXYCODONE HYDROCHLORIDE 5 MG/1
5 TABLET ORAL EVERY 4 HOURS PRN
Status: DISCONTINUED | OUTPATIENT
Start: 2024-02-20 | End: 2024-02-22 | Stop reason: HOSPADM

## 2024-02-20 RX ORDER — ONDANSETRON 4 MG/1
4 TABLET, ORALLY DISINTEGRATING ORAL EVERY 8 HOURS PRN
Status: DISCONTINUED | OUTPATIENT
Start: 2024-02-20 | End: 2024-02-22 | Stop reason: HOSPADM

## 2024-02-20 RX ORDER — KETOROLAC TROMETHAMINE 30 MG/ML
15 INJECTION, SOLUTION INTRAMUSCULAR; INTRAVENOUS ONCE
Status: COMPLETED | OUTPATIENT
Start: 2024-02-20 | End: 2024-02-20

## 2024-02-20 RX ORDER — PHENYLEPHRINE HCL IN 0.9% NACL 1 MG/10 ML
SYRINGE (ML) INTRAVENOUS PRN
Status: DISCONTINUED | OUTPATIENT
Start: 2024-02-20 | End: 2024-02-20 | Stop reason: SDUPTHER

## 2024-02-20 RX ORDER — ESCITALOPRAM OXALATE 10 MG/1
20 TABLET ORAL DAILY
Status: DISCONTINUED | OUTPATIENT
Start: 2024-02-21 | End: 2024-02-22 | Stop reason: HOSPADM

## 2024-02-20 RX ORDER — TOLTERODINE 4 MG/1
4 CAPSULE, EXTENDED RELEASE ORAL DAILY
Status: DISCONTINUED | OUTPATIENT
Start: 2024-02-20 | End: 2024-02-20

## 2024-02-20 RX ORDER — FENTANYL CITRATE 50 UG/ML
INJECTION, SOLUTION INTRAMUSCULAR; INTRAVENOUS PRN
Status: DISCONTINUED | OUTPATIENT
Start: 2024-02-20 | End: 2024-02-20 | Stop reason: SDUPTHER

## 2024-02-20 RX ORDER — SODIUM CHLORIDE 0.9 % (FLUSH) 0.9 %
5-40 SYRINGE (ML) INJECTION EVERY 12 HOURS SCHEDULED
Status: DISCONTINUED | OUTPATIENT
Start: 2024-02-20 | End: 2024-02-22 | Stop reason: HOSPADM

## 2024-02-20 RX ORDER — TROSPIUM CHLORIDE 20 MG/1
20 TABLET, FILM COATED ORAL
Status: DISCONTINUED | OUTPATIENT
Start: 2024-02-20 | End: 2024-02-22 | Stop reason: HOSPADM

## 2024-02-20 RX ORDER — LIDOCAINE HYDROCHLORIDE 20 MG/ML
INJECTION, SOLUTION EPIDURAL; INFILTRATION; INTRACAUDAL; PERINEURAL PRN
Status: DISCONTINUED | OUTPATIENT
Start: 2024-02-20 | End: 2024-02-20 | Stop reason: SDUPTHER

## 2024-02-20 RX ORDER — ACETAMINOPHEN 325 MG/1
650 TABLET ORAL EVERY 6 HOURS
Status: DISCONTINUED | OUTPATIENT
Start: 2024-02-20 | End: 2024-02-22 | Stop reason: HOSPADM

## 2024-02-20 RX ORDER — LANOLIN ALCOHOL/MO/W.PET/CERES
400 CREAM (GRAM) TOPICAL DAILY
Status: DISCONTINUED | OUTPATIENT
Start: 2024-02-20 | End: 2024-02-22 | Stop reason: HOSPADM

## 2024-02-20 RX ORDER — VANCOMYCIN HYDROCHLORIDE 1 G/20ML
INJECTION, POWDER, LYOPHILIZED, FOR SOLUTION INTRAVENOUS PRN
Status: DISCONTINUED | OUTPATIENT
Start: 2024-02-20 | End: 2024-02-20 | Stop reason: ALTCHOICE

## 2024-02-20 RX ORDER — BUPROPION HYDROCHLORIDE 150 MG/1
150 TABLET ORAL EVERY MORNING
Status: DISCONTINUED | OUTPATIENT
Start: 2024-02-21 | End: 2024-02-22 | Stop reason: HOSPADM

## 2024-02-20 RX ORDER — ACETAMINOPHEN 500 MG
1000 TABLET ORAL ONCE
Status: COMPLETED | OUTPATIENT
Start: 2024-02-20 | End: 2024-02-20

## 2024-02-20 RX ORDER — HYDRALAZINE HYDROCHLORIDE 20 MG/ML
INJECTION INTRAMUSCULAR; INTRAVENOUS PRN
Status: DISCONTINUED | OUTPATIENT
Start: 2024-02-20 | End: 2024-02-20 | Stop reason: SDUPTHER

## 2024-02-20 RX ADMIN — ROCURONIUM BROMIDE 50 MG: 10 INJECTION, SOLUTION INTRAVENOUS at 07:08

## 2024-02-20 RX ADMIN — ROCURONIUM BROMIDE 15 MG: 10 INJECTION, SOLUTION INTRAVENOUS at 07:58

## 2024-02-20 RX ADMIN — TRANEXAMIC ACID 1000 MG: 100 INJECTION, SOLUTION INTRAVENOUS at 07:15

## 2024-02-20 RX ADMIN — CALCIUM 500 MG: 500 TABLET ORAL at 12:45

## 2024-02-20 RX ADMIN — SUGAMMADEX 475 MG: 100 INJECTION, SOLUTION INTRAVENOUS at 08:32

## 2024-02-20 RX ADMIN — WATER 2000 MG: 1 INJECTION INTRAMUSCULAR; INTRAVENOUS; SUBCUTANEOUS at 21:08

## 2024-02-20 RX ADMIN — ACETAMINOPHEN 650 MG: 325 TABLET ORAL at 18:31

## 2024-02-20 RX ADMIN — PROPOFOL 100 MG: 10 INJECTION, EMULSION INTRAVENOUS at 07:07

## 2024-02-20 RX ADMIN — ACETAMINOPHEN 1000 MG: 500 TABLET ORAL at 06:02

## 2024-02-20 RX ADMIN — ONDANSETRON 4 MG: 2 INJECTION INTRAMUSCULAR; INTRAVENOUS at 08:21

## 2024-02-20 RX ADMIN — TROSPIUM CHLORIDE 20 MG: 20 TABLET, FILM COATED ORAL at 17:05

## 2024-02-20 RX ADMIN — WATER 2000 MG: 1 INJECTION INTRAMUSCULAR; INTRAVENOUS; SUBCUTANEOUS at 07:15

## 2024-02-20 RX ADMIN — FENTANYL CITRATE 50 MCG: 50 INJECTION, SOLUTION INTRAMUSCULAR; INTRAVENOUS at 07:15

## 2024-02-20 RX ADMIN — FENTANYL CITRATE 50 MCG: 50 INJECTION, SOLUTION INTRAMUSCULAR; INTRAVENOUS at 06:31

## 2024-02-20 RX ADMIN — HYDRALAZINE HYDROCHLORIDE 5 MG: 20 INJECTION INTRAMUSCULAR; INTRAVENOUS at 08:01

## 2024-02-20 RX ADMIN — SODIUM CHLORIDE: 9 INJECTION, SOLUTION INTRAVENOUS at 06:51

## 2024-02-20 RX ADMIN — OXYCODONE 10 MG: 5 TABLET ORAL at 18:30

## 2024-02-20 RX ADMIN — FENTANYL CITRATE 50 MCG: 50 INJECTION, SOLUTION INTRAMUSCULAR; INTRAVENOUS at 07:07

## 2024-02-20 RX ADMIN — PRAVASTATIN SODIUM 20 MG: 20 TABLET ORAL at 21:08

## 2024-02-20 RX ADMIN — Medication 400 MG: at 12:45

## 2024-02-20 RX ADMIN — OXYCODONE 10 MG: 5 TABLET ORAL at 12:45

## 2024-02-20 RX ADMIN — Medication 100 MCG: at 08:42

## 2024-02-20 RX ADMIN — HYDROMORPHONE HYDROCHLORIDE 0.5 MG: 1 INJECTION, SOLUTION INTRAMUSCULAR; INTRAVENOUS; SUBCUTANEOUS at 09:39

## 2024-02-20 RX ADMIN — HYDRALAZINE HYDROCHLORIDE 5 MG: 20 INJECTION INTRAMUSCULAR; INTRAVENOUS at 07:35

## 2024-02-20 RX ADMIN — PROPOFOL 30 MG: 10 INJECTION, EMULSION INTRAVENOUS at 07:16

## 2024-02-20 RX ADMIN — ESMOLOL HYDROCHLORIDE 20 MG: 10 INJECTION, SOLUTION INTRAVENOUS at 07:31

## 2024-02-20 RX ADMIN — MIDAZOLAM HYDROCHLORIDE 2 MG: 1 INJECTION, SOLUTION INTRAMUSCULAR; INTRAVENOUS at 06:30

## 2024-02-20 RX ADMIN — KETAMINE HYDROCHLORIDE 25 MG: 10 INJECTION INTRAMUSCULAR; INTRAVENOUS at 07:07

## 2024-02-20 RX ADMIN — DEXAMETHASONE SODIUM PHOSPHATE 8 MG: 4 INJECTION, SOLUTION INTRAMUSCULAR; INTRAVENOUS at 07:15

## 2024-02-20 RX ADMIN — TRANEXAMIC ACID 1000 MG: 100 INJECTION, SOLUTION INTRAVENOUS at 09:58

## 2024-02-20 RX ADMIN — PROPOFOL 20 MG: 10 INJECTION, EMULSION INTRAVENOUS at 07:13

## 2024-02-20 RX ADMIN — FENTANYL CITRATE 50 MCG: 50 INJECTION, SOLUTION INTRAMUSCULAR; INTRAVENOUS at 07:31

## 2024-02-20 RX ADMIN — ESMOLOL HYDROCHLORIDE 30 MG: 10 INJECTION, SOLUTION INTRAVENOUS at 07:19

## 2024-02-20 RX ADMIN — WATER 2000 MG: 1 INJECTION INTRAMUSCULAR; INTRAVENOUS; SUBCUTANEOUS at 14:20

## 2024-02-20 RX ADMIN — LIDOCAINE HYDROCHLORIDE 100 MG: 20 INJECTION, SOLUTION EPIDURAL; INFILTRATION; INTRACAUDAL; PERINEURAL at 07:07

## 2024-02-20 RX ADMIN — SODIUM CHLORIDE, PRESERVATIVE FREE 10 ML: 5 INJECTION INTRAVENOUS at 21:08

## 2024-02-20 RX ADMIN — KETOROLAC TROMETHAMINE 15 MG: 30 INJECTION, SOLUTION INTRAMUSCULAR; INTRAVENOUS at 06:02

## 2024-02-20 RX ADMIN — ROPIVACAINE HYDROCHLORIDE 30 ML: 5 INJECTION EPIDURAL; INFILTRATION; PERINEURAL at 06:45

## 2024-02-20 ASSESSMENT — PAIN DESCRIPTION - LOCATION
LOCATION: KNEE

## 2024-02-20 ASSESSMENT — PAIN DESCRIPTION - ORIENTATION
ORIENTATION: LEFT

## 2024-02-20 ASSESSMENT — PAIN SCALES - GENERAL
PAINLEVEL_OUTOF10: 8
PAINLEVEL_OUTOF10: 9
PAINLEVEL_OUTOF10: 7
PAINLEVEL_OUTOF10: 8
PAINLEVEL_OUTOF10: 9

## 2024-02-20 ASSESSMENT — PAIN - FUNCTIONAL ASSESSMENT
PAIN_FUNCTIONAL_ASSESSMENT: ACTIVITIES ARE NOT PREVENTED
PAIN_FUNCTIONAL_ASSESSMENT: PREVENTS OR INTERFERES SOME ACTIVE ACTIVITIES AND ADLS
PAIN_FUNCTIONAL_ASSESSMENT: 0-10
PAIN_FUNCTIONAL_ASSESSMENT: PREVENTS OR INTERFERES SOME ACTIVE ACTIVITIES AND ADLS
PAIN_FUNCTIONAL_ASSESSMENT: ACTIVITIES ARE NOT PREVENTED
PAIN_FUNCTIONAL_ASSESSMENT: PREVENTS OR INTERFERES SOME ACTIVE ACTIVITIES AND ADLS
PAIN_FUNCTIONAL_ASSESSMENT: PREVENTS OR INTERFERES SOME ACTIVE ACTIVITIES AND ADLS

## 2024-02-20 ASSESSMENT — PAIN DESCRIPTION - DESCRIPTORS
DESCRIPTORS: DISCOMFORT
DESCRIPTORS: DISCOMFORT
DESCRIPTORS: ACHING;DISCOMFORT;SORE
DESCRIPTORS: ACHING;DISCOMFORT
DESCRIPTORS: ACHING;DISCOMFORT
DESCRIPTORS: DISCOMFORT
DESCRIPTORS: DISCOMFORT
DESCRIPTORS: ACHING;DISCOMFORT;SORE
DESCRIPTORS: ACHING;DISCOMFORT;SORE

## 2024-02-20 ASSESSMENT — LIFESTYLE VARIABLES: SMOKING_STATUS: 0

## 2024-02-20 ASSESSMENT — PAIN DESCRIPTION - FREQUENCY
FREQUENCY: CONTINUOUS

## 2024-02-20 ASSESSMENT — PAIN DESCRIPTION - PAIN TYPE
TYPE: SURGICAL PAIN

## 2024-02-20 ASSESSMENT — ENCOUNTER SYMPTOMS: SHORTNESS OF BREATH: 0

## 2024-02-20 ASSESSMENT — PAIN DESCRIPTION - ONSET
ONSET: ON-GOING

## 2024-02-20 NOTE — ANESTHESIA PROCEDURE NOTES
Peripheral Block    Patient location during procedure: procedure area  Reason for block: post-op pain management and at surgeon's request  Start time: 2/20/2024 6:38 AM  End time: 2/20/2024 6:45 AM  Staffing  Performed: anesthesiologist   Anesthesiologist: Tiburcio Balderas DO  Performed by: Tiburcio Balderas DO  Authorized by: Tiburcio Balderas DO    Preanesthetic Checklist  Completed: patient identified, IV checked, site marked, risks and benefits discussed, surgical/procedural consents, equipment checked, pre-op evaluation, timeout performed, anesthesia consent given, oxygen available, monitors applied/VS acknowledged, fire risk safety assessment completed and verbalized and blood product R/B/A discussed and consented  Peripheral Block   Patient position: supine  Prep: ChloraPrep  Provider prep: mask and sterile gloves  Patient monitoring: cardiac monitor, continuous pulse ox, frequent blood pressure checks, IV access, oxygen and responsive to questions  Block type: Femoral  Adductor canal  Laterality: left  Injection technique: single-shot  Guidance: ultrasound guided  Local infiltration: ropivacaine  Infiltration strength: 0.5 %  Local infiltration: ropivacaine  Dose: 30 mL    Needle   Needle type: insulated echogenic nerve stimulator needle (Stimuplex)   Needle gauge: 20 G  Needle localization: ultrasound guidance  Needle length: 10 cm  Assessment   Injection assessment: negative aspiration for heme, no paresthesia on injection and local visualized surrounding nerve on ultrasound  Slow fractionated injection: yes  Hemodynamics: stable  Real-time US image taken/store: yes  Outcomes: uncomplicated and patient tolerated procedure well

## 2024-02-20 NOTE — OP NOTE
OPERATIVE REPORT    PATIENT:  Yajaira Akers  [id number]    DATE OF PROCEDURE:  2/20/24    SURGEON: Sergio Bennett MD    ASSISTANT:  Malvin Allison CNP.  No qualified resident available to assist.  CNP was necessary for positioning, prepping/draping, intra-operative assistance, and wound closure.  His assistance expedited the procedure and decreased operating room time.      PREOPERATIVE DIAGNOSIS: Degenerative joint disease , Left knee.     POSTOPERATIVE DIAGNOSIS: Degenerative joint disease,  Leftknee.     OPERATION: Sam Robot Assisted Left total knee arthroplasty     ANESTHESIA: . general and ACB    OPERATIVE INDICATIONS:  The patient is a 68 year old female with end stage degenerative joint disease involving the knee, for which more conservative non operative management has failed.  The patient is thus indicated for total knee arthroplasty.  We discussed use of the Sam robot for assistance.  The patient was agreeable.  The risks and benefits, as well as alternatives were discussed at length with the patient in detail.  These risks include, but are not limited to infection, nerve and/or blood vessel injury, intra or post operative fracture, need for revision surgery, failure of implants, deep vein thrombosis and pulmonary embolism, athrofibrosis, and the risks of general anesthesia provided by the anesthesiologist.   The patient understood these risks, signed an informed consent, and elected to proceed    OPERATIVE FINDINGS:   There was extensive eburnation of bone, and full thickness cartilage loss over the femoral and tibial condyles.      OPERATIVE PROCEDURE: After satisfactory anesthesia, the patient was placed supine on the operative table.  A Bump was placed under the operative leg and a tourniquet was placed high on the operative thigh.  The operative extremity was prepped and draped in sterile fashion.  Prior to procedure start, a time out was performed including confirmation of operative site

## 2024-02-20 NOTE — ACP (ADVANCE CARE PLANNING)
Advance Care Planning   Healthcare Decision Maker:    Primary Decision Maker: Treva Aden - Brother/Sister - [phone]    Supplemental (Other) Decision Maker: Elliott Nolan - Chel - [phone]    Click here to complete Healthcare Decision Makers including selection of the Healthcare Decision Maker Relationship (ie \"Primary\").

## 2024-02-20 NOTE — H&P
Department of Orthopedic Surgery  Attending Pre-operative History and Physical        DIAGNOSIS: Left knee osteoarthritis    INDICATION:  Failed Conservative Management      PROCEDURE:  LEFT DEE DEE ROBOTIC ASSISTED TOTAL JOINT ARTHOPLASTY      CHIEF COMPLAINT: Left knee pain    History Obtained From:  patient    HISTORY OF PRESENT ILLNESS:      The patient is a 68 y.o. female with significant past medical history of left knee end-stage osteoarthritis that has been refractory to conservative treatment.  She previously underwent right total knee arthroplasty and has done very well.  She is now interested in proceeding with the left.  Risks of surgery were discussed in detail including but not limited to infection, neurovascular injury, pain, stiffness, need for revision surgery, unforeseen risks.  She understands would like to proceed     Past Medical History:        Diagnosis Date    Anxiety     Arthritis     Deep vein blood clot of left lower extremity (HCC) 05/27/2019    Family history of early CAD     Hyperlipemia     Hypertension     Knee pain     left    Multinodular goiter     Sleep apnea     cpap    SOBOE (shortness of breath on exertion)        Past Surgical History:        Procedure Laterality Date    BREAST SURGERY Left 2014    biopsy    CARDIOVASCULAR STRESS TEST      COLONOSCOPY      COLONOSCOPY N/A 03/29/2022    COLONOSCOPY POLYPECTOMY SNARE/COLD BIOPSY performed by Karlo Soto MD at [facility] ENDOSCOPY    COLONOSCOPY  03/29/2022    COLONOSCOPY CONTROL HEMORRHAGE performed by Karlo Soto MD at [facility] ENDOSCOPY    CYST REMOVAL  05/2017    mouth/under tongue    ENDOSCOPY, COLON, DIAGNOSTIC      HYSTERECTOMY, TOTAL ABDOMINAL (CERVIX REMOVED)  1990    KNEE ARTHROSCOPY Left 2005    LEG SURGERY Left 1963    ligament repair    SALIVARY GLAND SURGERY Left 05/24/2017    TONSILLECTOMY      childhood    TOTAL KNEE ARTHROPLASTY Right 07/25/2023    RIGHT KNEE DEE DEE ROBOTIC ASSISTED TOTAL JOINT ARTHROPLASTY + PNB

## 2024-02-20 NOTE — CARE COORDINATION
Met with patient about diagnosis and discharge plan of care. Post op left TKA. Pt had right knee done July 2023. Lives alone in ranch home, 2 steps in. Pt has wheeled walker, walk in shower with seat, high commode and toilet riser, quad cane. Pt wants rehab and want [facility]-referral called. Awaiting therapies. Will need pre-cert. PCP is Dr Quach. Will follow-mjo

## 2024-02-21 LAB
ERYTHROCYTE [DISTWIDTH] IN BLOOD BY AUTOMATED COUNT: 15 % (ref 11.5–15)
HCT VFR BLD AUTO: 34.2 % (ref 34–48)
HGB BLD-MCNC: 10.7 G/DL (ref 11.5–15.5)
MCH RBC QN AUTO: 28.7 PG (ref 26–35)
MCHC RBC AUTO-ENTMCNC: 31.3 G/DL (ref 32–34.5)
MCV RBC AUTO: 91.7 FL (ref 80–99.9)
PLATELET # BLD AUTO: 245 K/UL (ref 130–450)
PMV BLD AUTO: 10.3 FL (ref 7–12)
RBC # BLD AUTO: 3.73 M/UL (ref 3.5–5.5)
WBC OTHER # BLD: 12 K/UL (ref 4.5–11.5)

## 2024-02-21 PROCEDURE — 85027 COMPLETE CBC AUTOMATED: CPT

## 2024-02-21 PROCEDURE — 97530 THERAPEUTIC ACTIVITIES: CPT

## 2024-02-21 PROCEDURE — 36415 COLL VENOUS BLD VENIPUNCTURE: CPT

## 2024-02-21 PROCEDURE — G0378 HOSPITAL OBSERVATION PER HR: HCPCS

## 2024-02-21 PROCEDURE — 97110 THERAPEUTIC EXERCISES: CPT

## 2024-02-21 PROCEDURE — 6370000000 HC RX 637 (ALT 250 FOR IP): Performed by: NURSE PRACTITIONER

## 2024-02-21 PROCEDURE — 2580000003 HC RX 258: Performed by: NURSE PRACTITIONER

## 2024-02-21 RX ORDER — OXYCODONE HYDROCHLORIDE AND ACETAMINOPHEN 5; 325 MG/1; MG/1
1 TABLET ORAL EVERY 6 HOURS PRN
Qty: 28 TABLET | Refills: 0 | Status: SHIPPED | OUTPATIENT
Start: 2024-02-21 | End: 2024-02-28

## 2024-02-21 RX ADMIN — SODIUM CHLORIDE, PRESERVATIVE FREE 10 ML: 5 INJECTION INTRAVENOUS at 08:49

## 2024-02-21 RX ADMIN — FAMOTIDINE 20 MG: 20 TABLET ORAL at 08:48

## 2024-02-21 RX ADMIN — Medication 400 MG: at 08:48

## 2024-02-21 RX ADMIN — ACETAMINOPHEN 650 MG: 325 TABLET ORAL at 23:54

## 2024-02-21 RX ADMIN — SODIUM CHLORIDE, PRESERVATIVE FREE 10 ML: 5 INJECTION INTRAVENOUS at 20:13

## 2024-02-21 RX ADMIN — CALCIUM 500 MG: 500 TABLET ORAL at 08:48

## 2024-02-21 RX ADMIN — ACETAMINOPHEN 650 MG: 325 TABLET ORAL at 18:26

## 2024-02-21 RX ADMIN — PRAVASTATIN SODIUM 20 MG: 20 TABLET ORAL at 20:13

## 2024-02-21 RX ADMIN — APIXABAN 5 MG: 5 TABLET, FILM COATED ORAL at 20:13

## 2024-02-21 RX ADMIN — ACETAMINOPHEN 650 MG: 325 TABLET ORAL at 12:19

## 2024-02-21 RX ADMIN — TROSPIUM CHLORIDE 20 MG: 20 TABLET, FILM COATED ORAL at 06:05

## 2024-02-21 RX ADMIN — ESCITALOPRAM OXALATE 20 MG: 10 TABLET ORAL at 08:48

## 2024-02-21 RX ADMIN — TROSPIUM CHLORIDE 20 MG: 20 TABLET, FILM COATED ORAL at 15:57

## 2024-02-21 RX ADMIN — OXYCODONE 10 MG: 5 TABLET ORAL at 04:22

## 2024-02-21 RX ADMIN — ACETAMINOPHEN 650 MG: 325 TABLET ORAL at 06:05

## 2024-02-21 RX ADMIN — BUPROPION HYDROCHLORIDE 150 MG: 150 TABLET, EXTENDED RELEASE ORAL at 08:51

## 2024-02-21 RX ADMIN — APIXABAN 5 MG: 5 TABLET, FILM COATED ORAL at 08:48

## 2024-02-21 RX ADMIN — OXYCODONE 5 MG: 5 TABLET ORAL at 08:48

## 2024-02-21 RX ADMIN — METOPROLOL SUCCINATE 25 MG: 25 TABLET, EXTENDED RELEASE ORAL at 08:48

## 2024-02-21 ASSESSMENT — PAIN DESCRIPTION - DESCRIPTORS
DESCRIPTORS: ACHING
DESCRIPTORS: DISCOMFORT
DESCRIPTORS: ACHING
DESCRIPTORS: ACHING;DISCOMFORT

## 2024-02-21 ASSESSMENT — PAIN SCALES - GENERAL
PAINLEVEL_OUTOF10: 5
PAINLEVEL_OUTOF10: 4
PAINLEVEL_OUTOF10: 5
PAINLEVEL_OUTOF10: 5
PAINLEVEL_OUTOF10: 4
PAINLEVEL_OUTOF10: 7

## 2024-02-21 ASSESSMENT — PAIN - FUNCTIONAL ASSESSMENT
PAIN_FUNCTIONAL_ASSESSMENT: ACTIVITIES ARE NOT PREVENTED

## 2024-02-21 ASSESSMENT — PAIN DESCRIPTION - LOCATION
LOCATION: KNEE

## 2024-02-21 ASSESSMENT — PAIN DESCRIPTION - ORIENTATION
ORIENTATION: LEFT

## 2024-02-21 NOTE — PLAN OF CARE
Problem: Discharge Planning  Goal: Discharge to home or other facility with appropriate resources  2/20/2024 2341 by Monica Recio, RN  Outcome: Progressing  2/20/2024 1602 by Dale Ramos, RN  Outcome: Progressing     Problem: Pain  Goal: Verbalizes/displays adequate comfort level or baseline comfort level  2/20/2024 2341 by Monica Recio, RN  Outcome: Progressing  2/20/2024 1602 by Dale Ramos, RN  Outcome: Progressing     Problem: Safety - Adult  Goal: Free from fall injury  2/20/2024 2341 by Monica Recio, RN  Outcome: Progressing  2/20/2024 1602 by Dale Ramos, RN  Outcome: Progressing

## 2024-02-21 NOTE — CARE COORDINATION
Updated plan of care. POD#1 left TKA. Pt accepted at [facility]. Pre-cert initiated. PASSR and wheelchair transport forms done, JULIAN initiated-mjo

## 2024-02-22 VITALS
OXYGEN SATURATION: 97 % | HEIGHT: 64 IN | SYSTOLIC BLOOD PRESSURE: 134 MMHG | BODY MASS INDEX: 44.73 KG/M2 | RESPIRATION RATE: 16 BRPM | WEIGHT: 262 LBS | TEMPERATURE: 99 F | DIASTOLIC BLOOD PRESSURE: 55 MMHG | HEART RATE: 81 BPM

## 2024-02-22 LAB — SURGICAL PATHOLOGY REPORT: NORMAL

## 2024-02-22 PROCEDURE — G0378 HOSPITAL OBSERVATION PER HR: HCPCS

## 2024-02-22 PROCEDURE — 6370000000 HC RX 637 (ALT 250 FOR IP): Performed by: NURSE PRACTITIONER

## 2024-02-22 PROCEDURE — 6360000002 HC RX W HCPCS: Performed by: NURSE PRACTITIONER

## 2024-02-22 PROCEDURE — 97530 THERAPEUTIC ACTIVITIES: CPT

## 2024-02-22 PROCEDURE — 2580000003 HC RX 258: Performed by: NURSE PRACTITIONER

## 2024-02-22 PROCEDURE — 97110 THERAPEUTIC EXERCISES: CPT

## 2024-02-22 RX ADMIN — APIXABAN 5 MG: 5 TABLET, FILM COATED ORAL at 09:01

## 2024-02-22 RX ADMIN — TROSPIUM CHLORIDE 20 MG: 20 TABLET, FILM COATED ORAL at 06:05

## 2024-02-22 RX ADMIN — BUPROPION HYDROCHLORIDE 150 MG: 150 TABLET, EXTENDED RELEASE ORAL at 09:01

## 2024-02-22 RX ADMIN — METOPROLOL SUCCINATE 25 MG: 25 TABLET, EXTENDED RELEASE ORAL at 09:01

## 2024-02-22 RX ADMIN — OXYCODONE 10 MG: 5 TABLET ORAL at 06:04

## 2024-02-22 RX ADMIN — ESCITALOPRAM OXALATE 20 MG: 10 TABLET ORAL at 09:02

## 2024-02-22 RX ADMIN — ONDANSETRON 4 MG: 2 INJECTION INTRAMUSCULAR; INTRAVENOUS at 02:40

## 2024-02-22 RX ADMIN — FAMOTIDINE 20 MG: 20 TABLET ORAL at 09:01

## 2024-02-22 RX ADMIN — CALCIUM 500 MG: 500 TABLET ORAL at 09:01

## 2024-02-22 RX ADMIN — SODIUM CHLORIDE, PRESERVATIVE FREE 10 ML: 5 INJECTION INTRAVENOUS at 09:02

## 2024-02-22 RX ADMIN — ACETAMINOPHEN 650 MG: 325 TABLET ORAL at 12:27

## 2024-02-22 RX ADMIN — ACETAMINOPHEN 650 MG: 325 TABLET ORAL at 06:05

## 2024-02-22 RX ADMIN — Medication 400 MG: at 09:02

## 2024-02-22 ASSESSMENT — PAIN DESCRIPTION - ORIENTATION: ORIENTATION: LEFT

## 2024-02-22 ASSESSMENT — PAIN SCALES - GENERAL
PAINLEVEL_OUTOF10: 4
PAINLEVEL_OUTOF10: 4
PAINLEVEL_OUTOF10: 7
PAINLEVEL_OUTOF10: 6
PAINLEVEL_OUTOF10: 3

## 2024-02-22 ASSESSMENT — PAIN DESCRIPTION - LOCATION
LOCATION: KNEE
LOCATION: KNEE

## 2024-02-22 ASSESSMENT — PAIN DESCRIPTION - DESCRIPTORS
DESCRIPTORS: ACHING;DISCOMFORT
DESCRIPTORS: ACHING

## 2024-02-22 ASSESSMENT — PAIN - FUNCTIONAL ASSESSMENT: PAIN_FUNCTIONAL_ASSESSMENT: ACTIVITIES ARE NOT PREVENTED

## 2024-02-22 NOTE — PROGRESS NOTES
[facility] Quality Flow/Interdisciplinary Rounds Progress Note        Quality Flow Rounds held on February 22, 2024    Disciplines Attending:  Bedside Nurse, , , and Nursing Unit Leadership    Yajaira Akers was admitted on 2/20/2024  5:02 AM    Anticipated Discharge Date:       Disposition:    Nii Score:  Nii Scale Score: 20    Readmission Risk              Risk of Unplanned Readmission:  0           Discussed patient goal for the day, patient clinical progression, and barriers to discharge.  The following Goal(s) of the Day/Commitment(s) have been identified:   Discharge planning      Macrie Quiroz RN  February 22, 2024        
4 Eyes Skin Assessment     NAME:  Yajaira Akers  YOB: 1955  MEDICAL RECORD NUMBER:  [id number]    The patient is being assessed for  Admission    I agree that at least one RN has performed a thorough Head to Toe Skin Assessment on the patient. ALL assessment sites listed below have been assessed.      Areas assessed by both nurses:    Head, Face, Ears, Shoulders, Back, Chest, Arms, Elbows, Hands, Sacrum. Buttock, Coccyx, Ischium, and Legs. Feet and Heels        Does the Patient have a Wound? No noted wound(s)       Nii Prevention initiated by RN: Yes  Wound Care Orders initiated by RN: No    Pressure Injury (Stage 3,4, Unstageable, DTI, NWPT, and Complex wounds) if present, place Wound referral order by RN under : No    New Ostomies, if present place, Ostomy referral order under : No     Nurse 1 eSignature: Electronically signed by Dale Singleton RN on 2/20/24 at 1:14 PM EST    **SHARE this note so that the co-signing nurse can place an eSignature**    Nurse 2 eSignature: Electronically signed by Phyllis Beharry, RN on 2/20/24 at 1:15 PM EST    
Department of Orthopedic Surgery  Resident Progress Note    Patient seen and examined. Pain controlled improving compared to yesterday. No new complaints.  Denies chest pain, shortness of breath, dizziness/lightheadedness.Patient awaiting to be placed to a rehab facility     VITALS:  BP (!) 152/69   Pulse 78   Temp 98.5 °F (36.9 °C) (Oral)   Resp 16   Ht 1.626 m (5' 4\")   Wt 118.8 kg (262 lb)   SpO2 96%   BMI 44.97 kg/m²     General: Awake alert resting bed comfortably    MUSCULOSKELETAL:   left lower extremity:  Dressing C/D/I  Compartments soft and compressible  +PF/DF/EHL  +2/4 DP & PT pulses, Brisk Cap refill, Toes warm and perfused  Distal sensation grossly intact to Peroneals, Sural, Saphenous, and tibial nrs    CBC:   Lab Results   Component Value Date/Time    WBC 12.0 02/21/2024 03:54 AM    HGB 10.7 02/21/2024 03:54 AM    HCT 34.2 02/21/2024 03:54 AM     02/21/2024 03:54 AM     PT/INR:    Lab Results   Component Value Date/Time    PROTIME 13.8 05/30/2022 12:34 AM    PROTIME 11.1 02/10/2012 09:00 PM    INR 1.2 05/30/2022 12:34 AM       ASSESSMENT  S/P left total knee arthroplasty 2-20    PLAN    Full weightbearing  Multimodal pain management  DVT Eliquis 5 mg twice daily  PT/OT  Patient has completed 24-hour antibiotic prophylaxis  Hemoglobin not drawn this morning  Patient is okay for discharge, follow-up in office with Dr. Bennett   
Department of Orthopedic Surgery  Resident Progress Note    Patient seen and examined. Pain controlled. No new complaints.  Denies chest pain, shortness of breath, dizziness/lightheadedness.    VITALS:  BP (!) 126/95   Pulse 89   Temp 97.9 °F (36.6 °C) (Oral)   Resp 18   Ht 1.626 m (5' 4\")   Wt 118.8 kg (262 lb)   SpO2 97%   BMI 44.97 kg/m²     General: Awake alert resting bed comfortably    MUSCULOSKELETAL:   left lower extremity:  Dressing C/D/I  Compartments soft and compressible  +PF/DF/EHL  +2/4 DP & PT pulses, Brisk Cap refill, Toes warm and perfused  Distal sensation grossly intact to Peroneals, Sural, Saphenous, and tibial nrs    CBC:   Lab Results   Component Value Date/Time    WBC 12.0 02/21/2024 03:54 AM    HGB 10.7 02/21/2024 03:54 AM    HCT 34.2 02/21/2024 03:54 AM     02/21/2024 03:54 AM     PT/INR:    Lab Results   Component Value Date/Time    PROTIME 13.8 05/30/2022 12:34 AM    PROTIME 11.1 02/10/2012 09:00 PM    INR 1.2 05/30/2022 12:34 AM       ASSESSMENT  S/P left total knee arthroplasty 2-20    PLAN    Full weightbearing  Multimodal pain management  DVT Eliquis 5 mg twice daily  PT/OT  Patient's completed 24-hour antibiotic prophylaxis  Hemoglobin 10.7 this morning  Patient is okay for discharge, follow-up in office with Dr. Bennett   
Nurse to nurse report called to Rodriguez canas [facility]  
Nursing Transfer Note    Data:  Summary of patients progress: s/p L total knee arthroplasty  Reason for transfer: inpatient hospital stay - pacu discharge criteria met    Action:  Explained reason for transfer to Patient.  Report given to: 7S RN, using RN Handoff Navigator.  Mode of transportation: cart    Response:  RN Recommendations: see notes/orders/MAR    
Occupational Therapy  OCCUPATIONAL THERAPY INITIAL EVALUATION  [facility]  [address], [place]    Date: 2024     Patient Name: Yajaira Akers  MRN: [id number]  : 1955  Room: [address] [place] [address]    Evaluating OT: Lauren Helton, OTR/L - [id number]    Referring Provider: Malvin Allison APRN - CNP  Specific Provider Orders/Date: \"OT eval and treat\" - 2024    Diagnosis: Osteoarthritis of left knee [M17.12]  Status post left knee replacement [Z96.652]      Surgery: Patient underwent L TKA on 2024.    Pertinent Medical History: h/o R TKA (2023), HTN, anxiety, arthritis, sleep apnea     Precautions: fall risk, FWBAT, bed/chair alarms    Assessment of Current Deficits:    [x] Functional mobility   [x] ADLs  [x] Strength               [] Cognition   [x] Functional transfers   [x] IADLs         [x] Safety Awareness   [x] Endurance   [] Fine Motor Coordination  [x] Balance      [] Vision/Perception   [x] Sensation    [] Gross Motor Coordination [] ROM          [] Delirium                  [] Motor Control     OT PLAN OF CARE   OT POC is based on physician orders, patient diagnosis, and results of clinical assessment.  Frequency/Duration 2-5 days/week for 2 weeks PRN   Specific OT Treatment Interventions to Include:   * Instruction/training on adapted ADL techniques and AE recommendations to increase functional independence within precautions       * Training on energy conservation strategies, correct breathing pattern and techniques to improve independence/tolerance for self-care routine  * Functional transfer/mobility training/DME recommendations for increased independence, safety, and fall prevention  * Patient/Family education to increase follow through with safety techniques and functional independence  * Recommendation of environmental modifications for increased safety with functional transfers/mobility and ADLs  * 
Percocet script put on hold.  Patient going to a facility.  
Physical Therapy  Facility/Department: [address]  Physical Therapy Treatment Note  Name: Yajaira Akers  : 1955  MRN: [id number]  Date of Service: 2024             Patient Diagnosis(es): The primary encounter diagnosis was Status post left knee replacement. Diagnoses of Pre-op testing and Osteoarthritis of left knee were also pertinent to this visit.  Past Medical History:  has a past medical history of Anxiety, Arthritis, Deep vein blood clot of left lower extremity (HCC), Family history of early CAD, Hyperlipemia, Hypertension, Knee pain, Multinodular goiter, Sleep apnea, and SOBOE (shortness of breath on exertion).  Past Surgical History:  has a past surgical history that includes Tonsillectomy; Leg Surgery (Left, ); Colonoscopy; cardiovascular stress test; Hysterectomy, total abdominal (); Knee arthroscopy (Left, ); Breast surgery (Left, ); Salivary gland surgery (Left, 2017); cyst removal (2017); Upper gastrointestinal endoscopy (N/A, 2022); Colonoscopy (N/A, 2022); Colonoscopy (2022); Total knee arthroplasty (Right, 2023); Endoscopy, colon, diagnostic; and Total knee arthroplasty (Left, 2024).               Evaluating Therapist: More Ambriz PT     Referring Provider:  Malvin Allison, GENTRY - CNP    PT order : PT eval and treat     Room #: 746  DIAGNOSIS: The primary encounter diagnosis was Status post left knee replacement. Diagnoses of Pre-op testing and Osteoarthritis of left knee were also pertinent to this visit.    PRECAUTIONS: falls, FWBAT     Social:  Pt lives alone  in a  1  floor plan 1+2  steps and 0+2  rails to enter.  Prior to admission pt walked with  no AD. Has ww, SPC      Initial Evaluation  Date: 2024  Treatment  2024    Short Term/ Long Term   Goals   Was pt agreeable to Eval/treatment?  Yes  yes    Does pt have pain?  L knee  5 of 10 L knee pain    Bed Mobility  Rolling:  NT   Supine to 
Physical Therapy  Facility/Department: [address]  Physical Therapy Treatment Note  Name: Yajaira Akers  : 1955  MRN: [id number]  Date of Service: 2024             Patient Diagnosis(es): The primary encounter diagnosis was Status post left knee replacement. Diagnoses of Pre-op testing and Osteoarthritis of left knee were also pertinent to this visit.  Past Medical History:  has a past medical history of Anxiety, Arthritis, Deep vein blood clot of left lower extremity (HCC), Family history of early CAD, Hyperlipemia, Hypertension, Knee pain, Multinodular goiter, Sleep apnea, and SOBOE (shortness of breath on exertion).  Past Surgical History:  has a past surgical history that includes Tonsillectomy; Leg Surgery (Left, ); Colonoscopy; cardiovascular stress test; Hysterectomy, total abdominal (); Knee arthroscopy (Left, ); Breast surgery (Left, ); Salivary gland surgery (Left, 2017); cyst removal (2017); Upper gastrointestinal endoscopy (N/A, 2022); Colonoscopy (N/A, 2022); Colonoscopy (2022); Total knee arthroplasty (Right, 2023); Endoscopy, colon, diagnostic; and Total knee arthroplasty (Left, 2024).               Evaluating Therapist: More Ambriz PT     Referring Provider:  Malvin Allison, GENTRY - CNP    PT order : PT eval and treat     Room #: 746  DIAGNOSIS: The primary encounter diagnosis was Status post left knee replacement. Diagnoses of Pre-op testing and Osteoarthritis of left knee were also pertinent to this visit.    PRECAUTIONS: falls, FWBAT     Social:  Pt lives alone  in a  1  floor plan 1+2  steps and 0+2  rails to enter.  Prior to admission pt walked with  no AD. Has ww, SPC      Initial Evaluation  Date: 2024  Treatment  2024  PM Short Term/ Long Term   Goals   Was pt agreeable to Eval/treatment?  Yes  yes    Does pt have pain?  L knee  6 of 10 L quad pain    Bed Mobility  Rolling:  NT   Supine to 
SPIRITUAL HEALTH SERVICES - [facility] Encounter    Name: Yajaira Akers                  Referral: Routine Visit    Sacraments  Anointed (Last Rites): Yes  Apostolic [place]: No  Confession: No  Communion: Yes     Assessment:  Patient receptive to  visit.      Intervention:   provided spiritual support and sacramental ministry for patient.     Outcome:  Patient expressed gratitude for visit.    Plan:  Chaplains will remain available to offer spiritual and emotional support as needed.      Electronically signed by Chaplain Graham, on 2/20/2024 at 5:33 PM.  Spiritual Care Department  [facility]  [phone]   
ADLs/functional transfers  * Therapeutic activities to facilitate/challenge dynamic balance, stand tolerance for increased safety and independence with ADLs  * Neuro-muscular re-education: facilitation of righting/equilibrium reactions, midline orientation, scapular stability/mobility, normalization of muscle tone, and facilitation of volitional active controled movement  * Positioning to improve skin integrity, interaction with environment and functional independence     Recommended Adaptive Equipment: TBD      Home Living: Patient lives alone in a one-floor home; laundry is in the basement.  Bathroom Setup: walk-in shower (with seat and grab bars) and elevated toilet seat (with grab bars)  Equipment Owned: walker, quad cane     Prior Level of Function (PLOF): Patient is typically independent with ADLs, IADLs, and functional mobility (without device). Patient noted that her granddaughter can provide intermittent assistance, as needed, with completion of most IADLs (e.g. laundry) upon her return home.  Driving: Yes     Pain Level: L quad pain  Cognition: Patient alert and oriented x3. WFL command follow demonstrated. Patient pleasant, cooperative, and motivated to return to her PLOF.  Memory: WFL  Sequencing: WFL   Problem Solving: WFL grossly  Judgement/Safety: WFL grossly     Functional Assessment:                  AM-PAC Daily Activity Raw Score: 15/24    Initial Eval Status  Date: 2/20/2024 Treatment Status  Date: 2/21/24 Short Term Goals = Long Term Goals   Feeding Setup   Independent   Grooming SBA CGA standing at sink  Mod I (seated/standing at sink)   UB Dressing Setup   Mod I (including item retrieval)   LB Dressing Mod A Mod A, patient familiar with AE however states her equipment broke, plans to obtain new AE in rehab  Mod I / Independent - with use of AE, as needed/appropriate   Bathing Mod A   Mod I / Independent - with use of AE/DME, as needed/appropriate   Toileting Mod A Mod A on BSC over toilet  Mod 
sit:  min assist   Sit to supine:  min assist   Scooting:  SBA in sit  NT  SBA    Transfers Sit to stand:  min assist   Stand to sit:  min assist   Stand pivot:  NT  Sit to stand: Mod A bedside chair  Stand to sit; Min A  SBA    Ambulation     10  feet with  ww  with  min assist  40 feet x 2 with WW CGA  100  feet with  ww  with  SBA        Stair negotiation: ascended and descended NT  NT  4  steps with  B  rail with  SBA    LE ROM  AAROM L knee 0-90 degrees      LE strength  3+/ 5 L knee      AM- PAC RAW score  15/ 24   15/24      Pt is alert, following instruction  Balance: poor dynamic with WW    Pt performed therapeutic exercise of the following: seated B ankle pumps AROM; B pillow case slides AAROM: L LE quad sets AROM x 20    Patient education/treatment  Pt was educated on therapeutic exercise for circulation/ROM/strengthening, UE usage for transfer safety, gait mechanics for posture/staying within WW base of support    Patient response to education:   Pt verbalized understanding Pt demonstrated skill Pt requires further education in this area   yes With prompt transfers yes     ASSESSMENT:   Comments: Pt found in a bedside chair. Gait remains slow, inconsistent this session, step to reciprocal pattern displayed throughout, no shortness of breath or dizziness noted. Pt unsteady, requires constant hands on assist for balance/safety, remains  unsafe to gait/transfer alone presently.    Pt was left in a bedside chair as found with call light in reach    Time in 0805  Time out 0835   Total Treatment Time 30 minutes   CPT codes:     Therapeutic activities 94389 15 minutes   Therapeutic exercises 36258 15 minutes       Pt is making consistent progress toward established Physical Therapy goals.  Continue with physical therapy current plan of care.    Basim Dumont PTA   License Number: [id number]                             
    PLAN  PT care will be provided in accordance with the objectives noted above.  Whenever appropriate, clear delegation orders will be provided for nursing staff.  Exercises and functional mobility practice will be used as well as appropriate assistive devices or modalities to obtain goals. Patient and family education will also be administered as needed.        PLAN OF CARE:    Current Treatment Recommendations     [x] Strengthening to improve independence with functional mobility   [x] ROM to improve independence with functional mobility   [x] Balance Training to improve static/dynamic balance and to reduce fall risk  [x] Endurance Training to improve activity tolerance during functional mobility   [x] Transfer Training to improve safety and independence with all functional transfers   [x] Gait Training to improve gait mechanics, endurance and assess need for appropriate assistive device  [x] Stair Training in preparation for safe discharge home and/or into the community   [x] Positioning to prevent skin breakdown and contractures  [x] Safety and Education Training   [x] Patient/Caregiver Education   [] HEP  [] Other     Frequency of treatments will be BID x 3 days.    Time in:  1340   Time out: 1404       Evaluation Time includes thorough review of current medical information, gathering information on past medical history/social history and prior level of function, completion of standardized testing/informal observation of tasks, assessment of data and education on plan of care and goals.    CPT codes:  [x] Low Complexity PT evaluation 80064  [] Moderate Complexity PT evaluation 29815  [] High Complexity PT evaluation 49507  [] PT Re-evaluation 99904  [] Gait training 35160  minutes  [x] Therapeutic activities 89931 8 minutes  [] Therapeutic exercises 07313  minutes  [] Neuromuscular reeducation 86297  minutes       More Ambriz  License number:  [id number]

## 2024-02-22 NOTE — DISCHARGE INSTR - COC
Continuity of Care Form    Patient Name: Yajaira Akers   :  1955  MRN:  [id number]    Admit date:  2024  Discharge date:  2024    Code Status Order: Full Code   Advance Directives:     Admitting Physician:  Sergio Bennett MD  PCP: Kimberly Quach DO    Discharging Nurse: Catracho Tidwell  Discharging Hospital Unit/Room#: 0746/0746-A  Discharging Unit Phone Number: [phone]    Emergency Contact:   Extended Emergency Contact Information  Primary Emergency Contact: Elliott Nolan  Address: [address] [place] [address] [facility]  Home Phone: [phone]  Mobile Phone: [phone]  Relation: Child  Secondary Emergency Contact: Treva Aden  Address: [address] [place] [address] [facility]  Home Phone: [phone]  Mobile Phone: [phone]  Relation: Brother/Sister    Past Surgical History:  Past Surgical History:   Procedure Laterality Date    BREAST SURGERY Left     biopsy    CARDIOVASCULAR STRESS TEST      COLONOSCOPY      COLONOSCOPY N/A 2022    COLONOSCOPY POLYPECTOMY SNARE/COLD BIOPSY performed by Karlo Soto MD at [facility] ENDOSCOPY    COLONOSCOPY  2022    COLONOSCOPY CONTROL HEMORRHAGE performed by Karlo Soto MD at [facility] ENDOSCOPY    CYST REMOVAL  2017    mouth/under tongue    ENDOSCOPY, COLON, DIAGNOSTIC      HYSTERECTOMY, TOTAL ABDOMINAL (CERVIX REMOVED)      KNEE ARTHROSCOPY Left     LEG SURGERY Left 1963    ligament repair    SALIVARY GLAND SURGERY Left 2017    TONSILLECTOMY      childhood    TOTAL KNEE ARTHROPLASTY Right 2023    RIGHT KNEE DEE DEE ROBOTIC ASSISTED TOTAL JOINT ARTHROPLASTY + PNB performed by Sergio Bennett MD at [facility] OR    TOTAL KNEE ARTHROPLASTY Left 2024    LEFT KNEE DEE DEE ROBOTIC ASSISTED TOTAL JOINT ARTHROPLASTY performed by Sergio Bennett MD at [facility] OR    UPPER GASTROINTESTINAL ENDOSCOPY N/A 2022    EGD BIOPSY performed by Karlo Soto MD at

## 2024-02-22 NOTE — PLAN OF CARE
Problem: Discharge Planning  Goal: Discharge to home or other facility with appropriate resources  2/22/2024 1119 by Catracho Tidwell RN  Outcome: Adequate for Discharge  2/22/2024 1119 by Catracho Tidwell RN  Outcome: Progressing  2/22/2024 0154 by Monica Recio RN  Outcome: Progressing     Problem: Pain  Goal: Verbalizes/displays adequate comfort level or baseline comfort level  2/22/2024 1119 by Catracho Tidwell RN  Outcome: Adequate for Discharge  2/22/2024 1119 by Catracho Tidwell RN  Outcome: Progressing  2/22/2024 0154 by Monica Recio RN  Outcome: Progressing     Problem: Safety - Adult  Goal: Free from fall injury  2/22/2024 1119 by Catracho Tidwell RN  Outcome: Adequate for Discharge  2/22/2024 1119 by Catracho Tidwell RN  Outcome: Progressing  2/22/2024 0154 by Monica Recio RN  Outcome: Progressing

## 2024-02-28 ENCOUNTER — OFFICE VISIT (OUTPATIENT)
Dept: ORTHOPEDIC SURGERY | Age: 69
End: 2024-02-28

## 2024-02-28 VITALS — WEIGHT: 262 LBS | BODY MASS INDEX: 44.73 KG/M2 | HEIGHT: 64 IN

## 2024-02-28 DIAGNOSIS — Z96.652 STATUS POST LEFT KNEE REPLACEMENT: Primary | ICD-10-CM

## 2024-02-28 PROCEDURE — 99024 POSTOP FOLLOW-UP VISIT: CPT | Performed by: ORTHOPAEDIC SURGERY

## 2024-02-28 NOTE — PROGRESS NOTES
Surgical dressings were removed s/p LEFT KNEE DEE DEE ROBOTIC ASSISTED TOTAL JOINT ARTHROPLASTY on 2/20/2024. Incision was cleaned with alcohol prep pads. Minimal swelling and bleeding in area. Steri stripes were placed over the area to cover surgical sites x 1 week.    LARY

## 2024-02-28 NOTE — PROGRESS NOTES
[facility]   ORTHOPAEDIC SURGERY AND SPORTS MEDICINE  DATE OF VISIT: 02/28/24  Follow Up Post Operative Visit     CHIEF COMPLAINT:   Chief Complaint   Patient presents with    Follow Up After Procedure     2/20/2024 - LEFT KNEE DEE DEE ROBOTIC ASSISTED TOTAL JOINT ARTHROPLASTY    Post-Op Check     Left knee POD # 8    Suture / Staple Removal     Left knee    Wound Check     Left knee        Surgery: LEFT KNEE DEE DEE ROBOTIC ASSISTED TOTAL JOINT ARTHROPLASTY   Date: 2/20/2024    Subjective:    Yajaira Akers is here for follow up visit s/p above procedure.  She is doing well.  She has been at [facility], doing well     Controlled Substances Monitoring:        Physical Exam:    Height: 1.626 m (5' 4\"), Weight - Scale: 118.8 kg (262 lb)    General: Alert and oriented x3, no acute distress  Cardiovascular/pulmonary: No labored breathing, peripheral perfusion intact  Musculoskeletal:    Exam of the knee shows intact incision.  Expected level swelling.  Range of motion tolerable to moderate arcs.  Calf is soft and compressible      Imaging: X-rays of the knee show well aligned total knee arthroplasty    Assessment and Plan: Status post left total knee arthroplasty  She is doing well.  She will continue to progress with PT.  Follow-up in 6 weeks    Sergio Bennett MD  Orthopaedic Surgery   2/28/24  1:14 PM

## 2024-03-26 DIAGNOSIS — Z96.652 STATUS POST LEFT KNEE REPLACEMENT: Primary | ICD-10-CM

## 2024-03-28 DIAGNOSIS — R42 DIZZINESS: ICD-10-CM

## 2024-03-28 DIAGNOSIS — R42 VERTIGO: ICD-10-CM

## 2024-03-28 RX ORDER — MECLIZINE HCL 12.5 MG/1
12.5 TABLET ORAL 3 TIMES DAILY PRN
Qty: 60 TABLET | Refills: 0 | Status: SHIPPED | OUTPATIENT
Start: 2024-03-28

## 2024-03-28 NOTE — TELEPHONE ENCOUNTER
1/22/2024 6/12/2024    Pt called in stating she has been getting episodes of vertigo and is out of meclizine 12.5 mg and would like a refill. Medication pending with correct pharmacy. Please advise

## 2024-04-02 ENCOUNTER — OFFICE VISIT (OUTPATIENT)
Dept: ORTHOPEDIC SURGERY | Age: 69
End: 2024-04-02

## 2024-04-02 VITALS — HEIGHT: 64 IN | WEIGHT: 262 LBS | BODY MASS INDEX: 44.73 KG/M2

## 2024-04-02 DIAGNOSIS — Z96.652 STATUS POST LEFT KNEE REPLACEMENT: Primary | ICD-10-CM

## 2024-04-02 DIAGNOSIS — M17.12 PRIMARY OSTEOARTHRITIS OF LEFT KNEE: ICD-10-CM

## 2024-04-02 PROCEDURE — 99024 POSTOP FOLLOW-UP VISIT: CPT | Performed by: NURSE PRACTITIONER

## 2024-04-02 RX ORDER — GABAPENTIN 100 MG/1
CAPSULE ORAL
COMMUNITY
Start: 2024-03-04

## 2024-04-02 NOTE — PROGRESS NOTES
[facility]  ORTHOPAEDICS AND SPORTS MEDICINE  DATE OF VISIT: 04/02/24  Follow Up Post Operative Visit     CHIEF COMPLAINT:   Chief Complaint   Patient presents with    Follow Up After Procedure     Surgery: LEFT KNEE SAM ROBOTIC ASSISTED TOTAL JOINT ARTHROPLASTY   Date: 2/20/2024        Post-Op Check     Lt knee s/p 6 weeks     Pain     4 / 10 Lt knee - knee stiff 1st thing in the morning        Surgery: LEFT KNEE SAM ROBOTIC ASSISTED TOTAL JOINT ARTHROPLASTY   Date: 2/20/2024    Subjective:    Yajaira Akers is here for follow up visit s/p above procedure.  She is doing well.  She has been progressing well with PT.    Controlled Substances Monitoring:        Physical Exam:    Height: 1.626 m (5' 4\"), Weight - Scale: 118.8 kg (262 lb)    General: Alert and oriented x3, no acute distress  Cardiovascular/pulmonary: No labored breathing, peripheral perfusion intact  Musculoskeletal:    Left knee exam incision healed mature scars are present.  There are 2 facial scabs to the very anterior and posterior margins of the arthrotomy incision that are close to falling off.  No signs or symptoms of infection present.  Neurovascular sensation gross intact.  Range of motion displayed 0-100.  Stable to varus stress testing.  Patella tracked midline      Imaging: Reviewed    Assessment and Plan: Status post left Sam robotic assisted total knee arthroplasty    Patient is about 7 weeks out from procedure listed above doing well.  Patient recently completed home health PT and transition to outpatient therapy about 2 weeks ago.  She reports symptoms are continuing to improve.  She has good range of motion and stability on exam today.  She will continue to progress through physical therapy and transition to home exercises.  Follow-up in 6 weeks for reevaluation and imaging with GENTRY Packer-CNP  Orthopedic Surgery   04/02/24  1:29 PM

## 2024-04-11 ENCOUNTER — TELEPHONE (OUTPATIENT)
Dept: FAMILY MEDICINE CLINIC | Age: 69
End: 2024-04-11

## 2024-04-11 NOTE — TELEPHONE ENCOUNTER
Pt called in stating she is still having dizziness and meclizine is not helping. Pt stated BP has been running fine and was 132/72 but she is still experiencing dizziness. No appts available but pt is wondering if she needs to increase medication? Pt is currently taking 12.5 mg of meclizine. Please advise

## 2024-05-15 ENCOUNTER — OFFICE VISIT (OUTPATIENT)
Dept: ORTHOPEDIC SURGERY | Age: 69
End: 2024-05-15

## 2024-05-15 VITALS — HEIGHT: 64 IN | WEIGHT: 240 LBS | BODY MASS INDEX: 40.97 KG/M2

## 2024-05-15 DIAGNOSIS — Z96.652 STATUS POST LEFT KNEE REPLACEMENT: Primary | ICD-10-CM

## 2024-05-15 PROCEDURE — 99024 POSTOP FOLLOW-UP VISIT: CPT | Performed by: ORTHOPAEDIC SURGERY

## 2024-05-15 RX ORDER — LANOLIN ALCOHOL/MO/W.PET/CERES
400 CREAM (GRAM) TOPICAL DAILY
Qty: 30 TABLET | Refills: 0 | Status: SHIPPED | OUTPATIENT
Start: 2024-05-15

## 2024-05-15 NOTE — PROGRESS NOTES
[facility]   ORTHOPAEDIC SURGERY AND SPORTS MEDICINE  DATE OF VISIT: 05/15/24  Follow Up Post Operative Visit     CHIEF COMPLAINT:   Chief Complaint   Patient presents with    Follow Up After Procedure     Surgery: LEFT KNEE DEE DEE ROBOTIC ASSISTED TOTAL JOINT ARTHROPLASTY     Date: 2/20/2024    Post-Op Check     3 mo p/o L TKA 02/20/24    Pain       Surgery: LEFT KNEE DEE DEE ROBOTIC ASSISTED TOTAL JOINT ARTHROPLASTY     Date: 2/20/2024    Subjective:    Yajaira Akers is here for follow up visit s/p above procedure.  She is doing well.  She has been back to all of her normal activities.  She is having no issues.  Therapy notes good improvement in range of motion    Controlled Substances Monitoring:        Physical Exam:    No data recorded    General: Alert and oriented x3, no acute distress  Cardiovascular/pulmonary: No labored breathing, peripheral perfusion intact  Musculoskeletal:    Left knee exam shows healed incision.  0 range of motion 0-1 20.  Knee is stable.  There is no swelling      Imaging: X-rays show well aligned total knee arthroplasty    Assessment and Plan: 3 months out from left total knee arthroplasty  She is doing very well.  She looks good enough at this point that she can follow-up as needed.  She will call us if she has any issues.    Sergio Bennett MD  Orthopaedic Surgery   5/15/24  2:33 PM

## 2024-06-06 RX ORDER — LANOLIN ALCOHOL/MO/W.PET/CERES
400 CREAM (GRAM) TOPICAL DAILY
Qty: 30 TABLET | Refills: 5 | Status: SHIPPED | OUTPATIENT
Start: 2024-06-06

## 2024-06-24 ENCOUNTER — OFFICE VISIT (OUTPATIENT)
Dept: FAMILY MEDICINE CLINIC | Age: 69
End: 2024-06-24
Payer: MEDICARE

## 2024-06-24 VITALS
OXYGEN SATURATION: 98 % | TEMPERATURE: 97.4 F | DIASTOLIC BLOOD PRESSURE: 80 MMHG | SYSTOLIC BLOOD PRESSURE: 116 MMHG | HEIGHT: 64 IN | BODY MASS INDEX: 46.44 KG/M2 | HEART RATE: 68 BPM | RESPIRATION RATE: 18 BRPM | WEIGHT: 272 LBS

## 2024-06-24 DIAGNOSIS — Z00.00 MEDICARE ANNUAL WELLNESS VISIT, SUBSEQUENT: Primary | ICD-10-CM

## 2024-06-24 DIAGNOSIS — N18.32 STAGE 3B CHRONIC KIDNEY DISEASE (HCC): ICD-10-CM

## 2024-06-24 DIAGNOSIS — R73.03 PREDIABETES: ICD-10-CM

## 2024-06-24 DIAGNOSIS — I10 ESSENTIAL HYPERTENSION: ICD-10-CM

## 2024-06-24 DIAGNOSIS — R42 DIZZINESS: ICD-10-CM

## 2024-06-24 LAB
ALBUMIN: 3.8 G/DL (ref 3.5–5.2)
ALP BLD-CCNC: 129 U/L (ref 35–104)
ALT SERPL-CCNC: 7 U/L (ref 0–32)
ANION GAP SERPL CALCULATED.3IONS-SCNC: 10 MMOL/L (ref 7–16)
AST SERPL-CCNC: 14 U/L (ref 0–31)
BASOPHILS ABSOLUTE: 0.07 K/UL (ref 0–0.2)
BASOPHILS RELATIVE PERCENT: 1 % (ref 0–2)
BILIRUB SERPL-MCNC: 0.3 MG/DL (ref 0–1.2)
BUN BLDV-MCNC: 21 MG/DL (ref 6–23)
CALCIUM SERPL-MCNC: 9.6 MG/DL (ref 8.6–10.2)
CHLORIDE BLD-SCNC: 105 MMOL/L (ref 98–107)
CO2: 26 MMOL/L (ref 22–29)
CREAT SERPL-MCNC: 1.1 MG/DL (ref 0.5–1)
EOSINOPHILS ABSOLUTE: 0.09 K/UL (ref 0.05–0.5)
EOSINOPHILS RELATIVE PERCENT: 1 % (ref 0–6)
GFR, ESTIMATED: 55 ML/MIN/1.73M2
GLUCOSE BLD-MCNC: 99 MG/DL (ref 74–99)
HBA1C MFR BLD: 5.9 % (ref 4–5.6)
HCT VFR BLD CALC: 42.4 % (ref 34–48)
HEMOGLOBIN: 13.1 G/DL (ref 11.5–15.5)
IMMATURE GRANULOCYTES %: 0 % (ref 0–5)
IMMATURE GRANULOCYTES ABSOLUTE: 0.03 K/UL (ref 0–0.58)
LYMPHOCYTES ABSOLUTE: 2.56 K/UL (ref 1.5–4)
LYMPHOCYTES RELATIVE PERCENT: 33 % (ref 20–42)
MCH RBC QN AUTO: 27.5 PG (ref 26–35)
MCHC RBC AUTO-ENTMCNC: 30.9 G/DL (ref 32–34.5)
MCV RBC AUTO: 89.1 FL (ref 80–99.9)
MONOCYTES ABSOLUTE: 0.7 K/UL (ref 0.1–0.95)
MONOCYTES RELATIVE PERCENT: 9 % (ref 2–12)
NEUTROPHILS ABSOLUTE: 4.23 K/UL (ref 1.8–7.3)
NEUTROPHILS RELATIVE PERCENT: 55 % (ref 43–80)
PDW BLD-RTO: 16.4 % (ref 11.5–15)
PLATELET # BLD: 349 K/UL (ref 130–450)
PMV BLD AUTO: 11.1 FL (ref 7–12)
POTASSIUM SERPL-SCNC: 4.9 MMOL/L (ref 3.5–5)
RBC # BLD: 4.76 M/UL (ref 3.5–5.5)
SODIUM BLD-SCNC: 141 MMOL/L (ref 132–146)
TOTAL PROTEIN: 7.1 G/DL (ref 6.4–8.3)
TSH SERPL DL<=0.05 MIU/L-ACNC: 1.36 UIU/ML (ref 0.27–4.2)
WBC # BLD: 7.7 K/UL (ref 4.5–11.5)

## 2024-06-24 PROCEDURE — 36415 COLL VENOUS BLD VENIPUNCTURE: CPT | Performed by: FAMILY MEDICINE

## 2024-06-24 PROCEDURE — 3078F DIAST BP <80 MM HG: CPT | Performed by: FAMILY MEDICINE

## 2024-06-24 PROCEDURE — 1123F ACP DISCUSS/DSCN MKR DOCD: CPT | Performed by: FAMILY MEDICINE

## 2024-06-24 PROCEDURE — 3017F COLORECTAL CA SCREEN DOC REV: CPT | Performed by: FAMILY MEDICINE

## 2024-06-24 PROCEDURE — G0439 PPPS, SUBSEQ VISIT: HCPCS | Performed by: FAMILY MEDICINE

## 2024-06-24 PROCEDURE — 3074F SYST BP LT 130 MM HG: CPT | Performed by: FAMILY MEDICINE

## 2024-06-24 PROCEDURE — 93000 ELECTROCARDIOGRAM COMPLETE: CPT | Performed by: FAMILY MEDICINE

## 2024-06-24 SDOH — HEALTH STABILITY: PHYSICAL HEALTH: ON AVERAGE, HOW MANY MINUTES DO YOU ENGAGE IN EXERCISE AT THIS LEVEL?: 10 MIN

## 2024-06-24 SDOH — HEALTH STABILITY: PHYSICAL HEALTH: ON AVERAGE, HOW MANY DAYS PER WEEK DO YOU ENGAGE IN MODERATE TO STRENUOUS EXERCISE (LIKE A BRISK WALK)?: 4 DAYS

## 2024-06-24 ASSESSMENT — PATIENT HEALTH QUESTIONNAIRE - PHQ9
SUM OF ALL RESPONSES TO PHQ QUESTIONS 1-9: 0
5. POOR APPETITE OR OVEREATING: NOT AT ALL
SUM OF ALL RESPONSES TO PHQ QUESTIONS 1-9: 0
2. FEELING DOWN, DEPRESSED OR HOPELESS: NOT AT ALL
6. FEELING BAD ABOUT YOURSELF - OR THAT YOU ARE A FAILURE OR HAVE LET YOURSELF OR YOUR FAMILY DOWN: NOT AT ALL
4. FEELING TIRED OR HAVING LITTLE ENERGY: NOT AT ALL
8. MOVING OR SPEAKING SO SLOWLY THAT OTHER PEOPLE COULD HAVE NOTICED. OR THE OPPOSITE, BEING SO FIGETY OR RESTLESS THAT YOU HAVE BEEN MOVING AROUND A LOT MORE THAN USUAL: NOT AT ALL
SUM OF ALL RESPONSES TO PHQ QUESTIONS 1-9: 0
SUM OF ALL RESPONSES TO PHQ QUESTIONS 1-9: 0
10. IF YOU CHECKED OFF ANY PROBLEMS, HOW DIFFICULT HAVE THESE PROBLEMS MADE IT FOR YOU TO DO YOUR WORK, TAKE CARE OF THINGS AT HOME, OR GET ALONG WITH OTHER PEOPLE: NOT DIFFICULT AT ALL
9. THOUGHTS THAT YOU WOULD BE BETTER OFF DEAD, OR OF HURTING YOURSELF: NOT AT ALL
3. TROUBLE FALLING OR STAYING ASLEEP: NOT AT ALL
7. TROUBLE CONCENTRATING ON THINGS, SUCH AS READING THE NEWSPAPER OR WATCHING TELEVISION: NOT AT ALL
SUM OF ALL RESPONSES TO PHQ9 QUESTIONS 1 & 2: 0

## 2024-06-24 ASSESSMENT — LIFESTYLE VARIABLES
HOW OFTEN DO YOU HAVE A DRINK CONTAINING ALCOHOL: 1
HOW MANY STANDARD DRINKS CONTAINING ALCOHOL DO YOU HAVE ON A TYPICAL DAY: 0
HOW MANY STANDARD DRINKS CONTAINING ALCOHOL DO YOU HAVE ON A TYPICAL DAY: PATIENT DOES NOT DRINK
HOW OFTEN DO YOU HAVE SIX OR MORE DRINKS ON ONE OCCASION: 1
HOW OFTEN DO YOU HAVE A DRINK CONTAINING ALCOHOL: NEVER

## 2024-06-24 NOTE — PATIENT INSTRUCTIONS
solid fat--butter, margarine, and shortening--you eat. Use olive, peanut, or canola oil when you cook. Bake, broil, and steam foods instead of frying them.     Eat a variety of fruit and vegetables every day. Dark green, deep orange, red, or yellow fruits and vegetables are especially good for you. Examples include spinach, carrots, peaches, and berries.     Foods high in fiber can reduce your cholesterol and provide important vitamins and minerals. High-fiber foods include whole-grain cereals and breads, oatmeal, beans, brown rice, citrus fruits, and apples.     Eat lean proteins. Heart-healthy proteins include seafood, lean meats and poultry, eggs, beans, peas, nuts, seeds, and soy products.     Limit drinks and foods with added sugar. These include candy, desserts, and soda pop.   Heart-healthy lifestyle    If your doctor recommends it, get more exercise. For many people, walking is a good choice. Or you may want to swim, bike, or do other activities. Bit by bit, increase the time you're active every day. Try for at least 30 minutes on most days of the week.     Try to quit or cut back on using tobacco and other nicotine products. This includes smoking and vaping. If you need help quitting, talk to your doctor about stop-smoking programs and medicines. These can increase your chances of quitting for good. Quitting is one of the most important things you can do to protect your heart. It is never too late to quit. Try to avoid secondhand smoke too.     Stay at a weight that's healthy for you. Talk to your doctor if you need help losing weight.     Try to get 7 to 9 hours of sleep each night.     Limit alcohol to 2 drinks a day for men and 1 drink a day for women. Too much alcohol can cause health problems.     Manage other health problems such as diabetes, high blood pressure, and high cholesterol. If you think you may have a problem with alcohol or drug use, talk to your doctor.   Medicines    Take your medicines

## 2024-06-24 NOTE — PROGRESS NOTES
Medicare Annual Wellness Visit    Yajaira Akers is here for Medicare AWV    Assessment & Plan   Medicare annual wellness visit, subsequent  Dizziness  -     EKG 12 Lead  -     CBC with Auto Differential  -     Comprehensive Metabolic Panel  -     TSH  EKG showed sinus rhythm  Labs ordered.     Essential hypertension  -     EKG 12 Lead  -     CBC with Auto Differential  -     Comprehensive Metabolic Panel  -     TSH  Blood pressure well controlled  Labs ordered    Stage 3b chronic kidney disease (HCC)  -     CBC with Auto Differential  -     Comprehensive Metabolic Panel  -     TSH  Prediabetes  -     CBC with Auto Differential  -     Comprehensive Metabolic Panel  -     TSH  -     Hemoglobin A1C    Recommendations for Preventive Services Due: see orders and patient instructions/AVS.  Recommended screening schedule for the next 5-10 years is provided to the patient in written form: see Patient Instructions/AVS.     Return for Medicare Annual Wellness Visit in 1 year.     Subjective   The following acute and/or chronic problems were also addressed today:  Patient states that she has been getting lightheaded more frequently.  Alleviating factors: sitting down.  Exacerbating factors: nothing. She states it is intermittent.  She states that the room spins.  She states that she takes meclizine which does help improve her symptoms.  She denies nausea, vomiting, diarrhea, constipation, headache, shortness of breath.      Patient's complete Health Risk Assessment and screening values have been reviewed and are found in Flowsheets. The following problems were reviewed today and where indicated follow up appointments were made and/or referrals ordered.    Positive Risk Factor Screenings with Interventions:    Fall Risk:  Do you feel unsteady or are you worried about falling? : (!) yes  2 or more falls in past year?: no  Fall with injury in past year?: no     Interventions:    Reviewed medications, home hazards, visual

## 2024-06-24 NOTE — PATIENT INSTRUCTIONS
Patient Education        DASH Diet: Care Instructions  Your Care Instructions     The DASH diet is an eating plan that can help lower your blood pressure. DASH stands for Dietary Approaches to Stop Hypertension. Hypertension is high blood pressure. The DASH diet focuses on eating foods that are high in calcium, potassium, and magnesium. These nutrients can lower blood pressure. The foods that are highest in these nutrients are fruits, vegetables, low-fat dairy products, nuts, seeds, and legumes. But taking calcium, potassium, and magnesium supplements instead of eating foods that are high in those nutrients does not have the same effect. The DASH diet also includes whole grains, fish, and poultry. The DASH diet is one of several lifestyle changes your doctor may recommend to lower your high blood pressure. Your doctor may also want you to decrease the amount of sodium in your diet. Lowering sodium while following the DASH diet can lower blood pressure even further than just the DASH diet alone. Follow-up care is a key part of your treatment and safety. Be sure to make and go to all appointments, and call your doctor if you are having problems. It's also a good idea to know your test results and keep a list of the medicines you take. How can you care for yourself at home? Following the DASH diet  · Eat 4 to 5 servings of fruit each day. A serving is 1 medium-sized piece of fruit, ½ cup chopped or canned fruit, 1/4 cup dried fruit, or 4 ounces (½ cup) of fruit juice. Choose fruit more often than fruit juice. · Eat 4 to 5 servings of vegetables each day. A serving is 1 cup of lettuce or raw leafy vegetables, ½ cup of chopped or cooked vegetables, or 4 ounces (½ cup) of vegetable juice. Choose vegetables more often than vegetable juice. · Get 2 to 3 servings of low-fat and fat-free dairy each day. A serving is 8 ounces of milk, 1 cup of yogurt, or 1 ½ ounces of cheese. · Eat 6 to 8 servings of grains each day.  A serving is 1 slice of bread, 1 ounce of dry cereal, or ½ cup of cooked rice, pasta, or cooked cereal. Try to choose whole-grain products as much as possible. · Limit lean meat, poultry, and fish to 2 servings each day. A serving is 3 ounces, about the size of a deck of cards. · Eat 4 to 5 servings of nuts, seeds, and legumes (cooked dried beans, lentils, and split peas) each week. A serving is 1/3 cup of nuts, 2 tablespoons of seeds, or ½ cup of cooked beans or peas. · Limit fats and oils to 2 to 3 servings each day. A serving is 1 teaspoon of vegetable oil or 2 tablespoons of salad dressing. · Limit sweets and added sugars to 5 servings or less a week. A serving is 1 tablespoon jelly or jam, ½ cup sorbet, or 1 cup of lemonade. · Eat less than 2,300 milligrams (mg) of sodium a day. If you limit your sodium to 1,500 mg a day, you can lower your blood pressure even more. · Be aware that all of these are the suggested number of servings for people who eat 1,800 to 2,000 calories a day. Your recommended number of servings may be different if you need more or fewer calories. Tips for success  · Start small. Do not try to make dramatic changes to your diet all at once. You might feel that you are missing out on your favorite foods and then be more likely to not follow the plan. Make small changes, and stick with them. Once those changes become habit, add a few more changes. · Try some of the following:  ? Make it a goal to eat a fruit or vegetable at every meal and at snacks. This will make it easy to get the recommended amount of fruits and vegetables each day. ? Try yogurt topped with fruit and nuts for a snack or healthy dessert. ? Add lettuce, tomato, cucumber, and onion to sandwiches. ? Combine a ready-made pizza crust with low-fat mozzarella cheese and lots of vegetable toppings. Try using tomatoes, squash, spinach, broccoli, carrots, cauliflower, and onions. ?  Have a variety of cut-up vegetables with a Attempted, left message low-fat dip as an appetizer instead of chips and dip. ? Sprinkle sunflower seeds or chopped almonds over salads. Or try adding chopped walnuts or almonds to cooked vegetables. ? Try some vegetarian meals using beans and peas. Add garbanzo or kidney beans to salads. Make burritos and tacos with mashed morton beans or black beans. Where can you learn more? Go to https://[employer]ma[employer]. org and sign in to your [employer] account. Enter U316 in the Applika box to learn more about \"DASH Diet: Care Instructions. \"     If you do not have an account, please click on the \"Sign Up Now\" link. Current as of: April 29, 2021               Content Version: 13.0  © 7759-4280 Healthwise, Incorporated. Care instructions adapted under license by [facility] [address] If you have questions about a medical condition or this instruction, always ask your healthcare professional. [address]  any warranty or liability for your use of this information. Patient contacted

## 2024-06-26 ENCOUNTER — HOSPITAL ENCOUNTER (OUTPATIENT)
Age: 69
Discharge: HOME | End: 2024-06-26
Payer: MEDICARE

## 2024-06-26 LAB
ALBUMIN SERPL-MCNC: 3.9 G/DL (ref 3.5–5.2)
ALP SERPL-CCNC: 133 U/L (ref 35–104)
ALT SERPL-CCNC: 8 U/L (ref 0–32)
ANION GAP SERPL CALCULATED.3IONS-SCNC: 17 MMOL/L (ref 7–16)
AST SERPL-CCNC: 13 U/L (ref 0–31)
BASOPHILS # BLD: 0.07 K/UL (ref 0–0.2)
BASOPHILS NFR BLD: 1 % (ref 0–2)
BILIRUB SERPL-MCNC: 0.4 MG/DL (ref 0–1.2)
BUN SERPL-MCNC: 18 MG/DL (ref 6–23)
CALCIUM SERPL-MCNC: 9.6 MG/DL (ref 8.6–10.2)
CHLORIDE SERPL-SCNC: 104 MMOL/L (ref 98–107)
CO2 SERPL-SCNC: 20 MMOL/L (ref 22–29)
CREAT SERPL-MCNC: 1.1 MG/DL (ref 0.5–1)
EOSINOPHIL # BLD: 0.07 K/UL (ref 0.05–0.5)
EOSINOPHILS RELATIVE PERCENT: 1 % (ref 0–6)
ERYTHROCYTE [DISTWIDTH] IN BLOOD BY AUTOMATED COUNT: 16.1 % (ref 11.5–15)
GFR, ESTIMATED: 52 ML/MIN/1.73M2
GLUCOSE SERPL-MCNC: 114 MG/DL (ref 74–99)
HCT VFR BLD AUTO: 40.8 % (ref 34–48)
HGB BLD-MCNC: 12.6 G/DL (ref 11.5–15.5)
IMM GRANULOCYTES # BLD AUTO: <0.03 K/UL (ref 0–0.58)
IMM GRANULOCYTES NFR BLD: 0 % (ref 0–5)
LYMPHOCYTES NFR BLD: 2.08 K/UL (ref 1.5–4)
LYMPHOCYTES RELATIVE PERCENT: 29 % (ref 20–42)
MAGNESIUM SERPL-MCNC: 1.7 MG/DL (ref 1.6–2.6)
MCH RBC QN AUTO: 26.5 PG (ref 26–35)
MCHC RBC AUTO-ENTMCNC: 30.9 G/DL (ref 32–34.5)
MCV RBC AUTO: 85.7 FL (ref 80–99.9)
MONOCYTES NFR BLD: 0.57 K/UL (ref 0.1–0.95)
MONOCYTES NFR BLD: 8 % (ref 2–12)
NEUTROPHILS NFR BLD: 61 % (ref 43–80)
NEUTS SEG NFR BLD: 4.49 K/UL (ref 1.8–7.3)
PHOSPHATE SERPL-MCNC: 2.7 MG/DL (ref 2.5–4.5)
PLATELET # BLD AUTO: 336 K/UL (ref 130–450)
PMV BLD AUTO: 10.1 FL (ref 7–12)
POTASSIUM SERPL-SCNC: 4.2 MMOL/L (ref 3.5–5)
PROT SERPL-MCNC: 6.7 G/DL (ref 6.4–8.3)
PTH-INTACT SERPL-MCNC: 63.4 PG/ML (ref 15–65)
RBC # BLD AUTO: 4.76 M/UL (ref 3.5–5.5)
SODIUM SERPL-SCNC: 141 MMOL/L (ref 132–146)
WBC OTHER # BLD: 7.3 K/UL (ref 4.5–11.5)

## 2024-06-26 PROCEDURE — 80053 COMPREHEN METABOLIC PANEL: CPT

## 2024-06-26 PROCEDURE — 85025 COMPLETE CBC W/AUTO DIFF WBC: CPT

## 2024-06-26 PROCEDURE — 36415 COLL VENOUS BLD VENIPUNCTURE: CPT

## 2024-06-26 PROCEDURE — 83735 ASSAY OF MAGNESIUM: CPT

## 2024-06-26 PROCEDURE — 84100 ASSAY OF PHOSPHORUS: CPT

## 2024-06-26 PROCEDURE — 83970 ASSAY OF PARATHORMONE: CPT

## 2024-06-27 DIAGNOSIS — F32.1 CURRENT MODERATE EPISODE OF MAJOR DEPRESSIVE DISORDER WITHOUT PRIOR EPISODE (HCC): ICD-10-CM

## 2024-06-27 DIAGNOSIS — I10 ESSENTIAL HYPERTENSION: ICD-10-CM

## 2024-06-27 DIAGNOSIS — K21.9 GASTROESOPHAGEAL REFLUX DISEASE, UNSPECIFIED WHETHER ESOPHAGITIS PRESENT: ICD-10-CM

## 2024-06-27 RX ORDER — FAMOTIDINE 20 MG/1
TABLET, FILM COATED ORAL
Qty: 30 TABLET | Refills: 5 | Status: SHIPPED | OUTPATIENT
Start: 2024-06-27

## 2024-06-27 RX ORDER — ESCITALOPRAM OXALATE 20 MG/1
TABLET ORAL
Qty: 30 TABLET | Refills: 5 | Status: SHIPPED | OUTPATIENT
Start: 2024-06-27

## 2024-06-27 RX ORDER — METOPROLOL SUCCINATE 25 MG/1
TABLET, EXTENDED RELEASE ORAL
Qty: 15 TABLET | Refills: 5 | Status: SHIPPED | OUTPATIENT
Start: 2024-06-27

## 2024-06-27 RX ORDER — BUPROPION HYDROCHLORIDE 150 MG/1
150 TABLET ORAL EVERY MORNING
Qty: 30 TABLET | Refills: 5 | Status: SHIPPED | OUTPATIENT
Start: 2024-06-27

## 2024-06-28 ENCOUNTER — TELEPHONE (OUTPATIENT)
Dept: ENT CLINIC | Age: 69
End: 2024-06-28

## 2024-06-28 ENCOUNTER — PROCEDURE VISIT (OUTPATIENT)
Dept: AUDIOLOGY | Age: 69
End: 2024-06-28

## 2024-06-28 ENCOUNTER — OFFICE VISIT (OUTPATIENT)
Dept: ENT CLINIC | Age: 69
End: 2024-06-28
Payer: MEDICARE

## 2024-06-28 VITALS
DIASTOLIC BLOOD PRESSURE: 76 MMHG | BODY MASS INDEX: 46.61 KG/M2 | HEIGHT: 64 IN | SYSTOLIC BLOOD PRESSURE: 120 MMHG | WEIGHT: 273 LBS | TEMPERATURE: 97.3 F | HEART RATE: 78 BPM

## 2024-06-28 DIAGNOSIS — Z01.818 PREOP TESTING: ICD-10-CM

## 2024-06-28 DIAGNOSIS — H91.8X3 ASYMMETRICAL HEARING LOSS: Primary | ICD-10-CM

## 2024-06-28 PROCEDURE — 3017F COLORECTAL CA SCREEN DOC REV: CPT

## 2024-06-28 PROCEDURE — 1036F TOBACCO NON-USER: CPT

## 2024-06-28 PROCEDURE — 1090F PRES/ABSN URINE INCON ASSESS: CPT

## 2024-06-28 PROCEDURE — 99204 OFFICE O/P NEW MOD 45 MIN: CPT

## 2024-06-28 PROCEDURE — 3074F SYST BP LT 130 MM HG: CPT

## 2024-06-28 PROCEDURE — 3078F DIAST BP <80 MM HG: CPT

## 2024-06-28 PROCEDURE — G8417 CALC BMI ABV UP PARAM F/U: HCPCS

## 2024-06-28 PROCEDURE — G8427 DOCREV CUR MEDS BY ELIG CLIN: HCPCS

## 2024-06-28 PROCEDURE — 1123F ACP DISCUSS/DSCN MKR DOCD: CPT

## 2024-06-28 PROCEDURE — G8399 PT W/DXA RESULTS DOCUMENT: HCPCS

## 2024-06-28 RX ORDER — DIAZEPAM 5 MG/1
5 TABLET ORAL ONCE
Qty: 1 TABLET | Refills: 0 | Status: CANCELLED | OUTPATIENT
Start: 2024-06-28 | End: 2024-06-28

## 2024-06-28 NOTE — PROGRESS NOTES
This patient was referred for audiometric/tympanometric testing by KAIT Adams due to asymmetrical hearing loss.      Audiometry using pure tone air conduction (air conduction pure tone screening) revealed a mild hearing loss, sloping to moderate in the left ear. In the right ear, pure tone air conduction results revealed a severe hearing loss.  Reliability was fair. Speech reception thresholds were in good agreement with the pure tone average, in the left ear. In the right ear, patient only heard \"buzzing\" when attempting to presenting speech reception thresholds at 90dBHL, so results were unable to be obtained.    Tympanometry revealed normal middle ear peak pressure and compliance, bilaterally.    The results were reviewed with the patient and CNP.     Recommendations for follow up will be made pending ordering provider consult.    Daniele Pierce/CCC-A  [place] Lic # [id number]    LEOY Moralez.  Third Year Doctoral Student

## 2024-06-28 NOTE — PROGRESS NOTES
noted.      Nose: Nose normal.      Right Turbinates: Not swollen or pale.      Left Turbinates: Not swollen or pale.      Mouth/Throat:      Lips: Pink.      Mouth: Mucous membranes are moist.      Pharynx: Oropharynx is clear.   Eyes:      General: Lids are normal.      Conjunctiva/sclera: Conjunctivae normal.      Pupils: Pupils are equal, round, and reactive to light.   Cardiovascular:      Rate and Rhythm: Normal rate and regular rhythm.      Pulses: Normal pulses.   Pulmonary:      Effort: Pulmonary effort is normal. No respiratory distress.      Breath sounds: No stridor.   Abdominal:      General: Abdomen is flat.      Palpations: Abdomen is soft.   Musculoskeletal:         General: Normal range of motion.      Cervical back: Normal range of motion. No rigidity.   Skin:     General: Skin is warm and dry.   Neurological:      General: No focal deficit present.      Mental Status: She is alert and oriented to person, place, and time.   Psychiatric:         Attention and Perception: Attention normal.         Mood and Affect: Affect normal.         Behavior: Behavior normal. Behavior is cooperative.         Thought Content: Thought content normal.         Judgment: Judgment normal.           Audiogram (my review)-  An audiogram was ordered, obtained and reviewed by me, in order to evaluate the hearing loss associated with abnormal auditory perception.              Mild to moderate descending sensorineural hearing loss of the left ear and moderately severe to profound descending sensorineural hearing loss of the right ear.  There was noted asymmetry to the right ear.    Discrimination    Right -  Pt only heard buzzing      Left -  not doccumented      Tympanogram -    Right - Type A    Volume - small    Pressure - normal   Left   - Type A  Volume - small    Pressure - normal            Assessment:       Diagnosis Orders   1. Asymmetrical hearing loss  MRI IAC POSTERIOR FOSSA W WO CONTRAST      2. Preop testing

## 2024-06-28 NOTE — TELEPHONE ENCOUNTER
[facility] to authorize order with patient insurance. Patient is scheduled for MRI IAC with radiology on 7/18/24 @ 10:15am. Patient has been notified of date and time and that they need to arrive at 9:30am. Patient was informed of her prep prior to procedure. Patient instructed to park in [facility] parking lot and report to registration. Detail voicemail left for patient, including radiology scheduling's phone number.     Electronically signed by Mary Frias MA on 6/28/24 at 11:38 AM EDT

## 2024-07-02 ENCOUNTER — PATIENT MESSAGE (OUTPATIENT)
Dept: FAMILY MEDICINE CLINIC | Age: 69
End: 2024-07-02

## 2024-07-02 DIAGNOSIS — R73.03 PREDIABETES: Primary | ICD-10-CM

## 2024-07-02 NOTE — TELEPHONE ENCOUNTER
From: Yajaira Akers  To: Dr. Kimberly Quach  Sent: 7/2/2024 1:04 PM EDT  Subject: Medicine     Good morning this is Yajaira Akers calling there was a question. I had asked Doctor but she did not respond to my answer. l It was an appeal for my stomach. I acid pill and one depression pill and I it was an appeal for my stomach. I acid pill and one depression pill and I have another question to ask I am a pre-diabetic patient. Is there a possible possible that she could order? medicine for me it is OZEMPIC the drug store will be [facility] on [address] thank you and have a nice day

## 2024-07-03 ENCOUNTER — HOSPITAL ENCOUNTER (OUTPATIENT)
Age: 69
Discharge: HOME | End: 2024-07-03
Payer: MEDICARE

## 2024-07-03 ENCOUNTER — TELEPHONE (OUTPATIENT)
Dept: ENT CLINIC | Age: 69
End: 2024-07-03

## 2024-07-03 DIAGNOSIS — H91.8X3 ASYMMETRICAL HEARING LOSS: Primary | ICD-10-CM

## 2024-07-03 DIAGNOSIS — Z01.818 PREOP TESTING: ICD-10-CM

## 2024-07-03 LAB
BUN SERPL-MCNC: 26 MG/DL (ref 6–23)
CREAT SERPL-MCNC: 1.2 MG/DL (ref 0.5–1)
GFR, ESTIMATED: 51 ML/MIN/1.73M2

## 2024-07-03 PROCEDURE — 82565 ASSAY OF CREATININE: CPT

## 2024-07-03 PROCEDURE — 84520 ASSAY OF UREA NITROGEN: CPT

## 2024-07-03 PROCEDURE — 36415 COLL VENOUS BLD VENIPUNCTURE: CPT

## 2024-07-03 RX ORDER — DIAZEPAM 5 MG/1
5 TABLET ORAL ONCE
Qty: 1 TABLET | Refills: 0 | Status: SHIPPED | OUTPATIENT
Start: 2024-07-03 | End: 2024-07-03

## 2024-07-03 NOTE — TELEPHONE ENCOUNTER
Due to patient history of claustrophobia, Valium 5 mg prescribed for patient.  Patient advised to take Valium 1 hour prior to scheduled MRI appointment.  Please advise the patient that she will now need someone to take her to and from her MRI appointment and is advised not to drive while taking the medication.

## 2024-07-03 NOTE — TELEPHONE ENCOUNTER
Pt called office. Said she is to have some medication called in for her for MRI on 7/18 to relax her. [address]  Please advise. Electronically signed by Sharri Feldman on 7/3/2024 at 11:20 AM

## 2024-07-08 RX ORDER — SEMAGLUTIDE 0.68 MG/ML
0.25 INJECTION, SOLUTION SUBCUTANEOUS WEEKLY
Qty: 6 ML | Refills: 1 | Status: SHIPPED | OUTPATIENT
Start: 2024-07-08

## 2024-07-11 ENCOUNTER — TELEPHONE (OUTPATIENT)
Dept: FAMILY MEDICINE CLINIC | Age: 69
End: 2024-07-11

## 2024-07-18 ENCOUNTER — HOSPITAL ENCOUNTER (OUTPATIENT)
Dept: MRI IMAGING | Age: 69
Discharge: HOME | End: 2024-07-20
Payer: MEDICARE

## 2024-07-18 DIAGNOSIS — H91.8X3 ASYMMETRICAL HEARING LOSS: ICD-10-CM

## 2024-07-18 PROCEDURE — A9579 GAD-BASE MR CONTRAST NOS,1ML: HCPCS | Performed by: RADIOLOGY

## 2024-07-18 PROCEDURE — 70553 MRI BRAIN STEM W/O & W/DYE: CPT

## 2024-07-18 PROCEDURE — 6360000004 HC RX CONTRAST MEDICATION: Performed by: RADIOLOGY

## 2024-07-18 RX ADMIN — GADOTERIDOL 20 ML: 279.3 INJECTION, SOLUTION INTRAVENOUS at 11:20

## 2024-07-19 DIAGNOSIS — R73.03 PREDIABETES: Primary | ICD-10-CM

## 2024-07-19 DIAGNOSIS — R73.03 PREDIABETES: ICD-10-CM

## 2024-07-24 ENCOUNTER — TELEPHONE (OUTPATIENT)
Dept: ENT CLINIC | Age: 69
End: 2024-07-24

## 2024-07-24 RX ORDER — IBUPROFEN 200 MG
2 CAPSULE ORAL DAILY
Qty: 180 TABLET | Refills: 1 | Status: SHIPPED | OUTPATIENT
Start: 2024-07-24

## 2024-07-24 NOTE — TELEPHONE ENCOUNTER
Patient called regarding MRI results. JAVIER Millard would like patient to follow up at scheduled apt to discuss results. Patient notified.

## 2024-07-25 ENCOUNTER — OFFICE VISIT (OUTPATIENT)
Dept: FAMILY MEDICINE CLINIC | Age: 69
End: 2024-07-25
Payer: MEDICARE

## 2024-07-25 VITALS
HEIGHT: 64 IN | OXYGEN SATURATION: 96 % | TEMPERATURE: 97.2 F | SYSTOLIC BLOOD PRESSURE: 130 MMHG | BODY MASS INDEX: 46.1 KG/M2 | WEIGHT: 270 LBS | HEART RATE: 72 BPM | DIASTOLIC BLOOD PRESSURE: 80 MMHG | RESPIRATION RATE: 18 BRPM

## 2024-07-25 DIAGNOSIS — F32.1 CURRENT MODERATE EPISODE OF MAJOR DEPRESSIVE DISORDER WITHOUT PRIOR EPISODE (HCC): ICD-10-CM

## 2024-07-25 DIAGNOSIS — R73.03 PREDIABETES: ICD-10-CM

## 2024-07-25 DIAGNOSIS — I10 ESSENTIAL HYPERTENSION: Primary | ICD-10-CM

## 2024-07-25 PROCEDURE — 1036F TOBACCO NON-USER: CPT | Performed by: FAMILY MEDICINE

## 2024-07-25 PROCEDURE — G8399 PT W/DXA RESULTS DOCUMENT: HCPCS | Performed by: FAMILY MEDICINE

## 2024-07-25 PROCEDURE — 3017F COLORECTAL CA SCREEN DOC REV: CPT | Performed by: FAMILY MEDICINE

## 2024-07-25 PROCEDURE — 3075F SYST BP GE 130 - 139MM HG: CPT | Performed by: FAMILY MEDICINE

## 2024-07-25 PROCEDURE — G8417 CALC BMI ABV UP PARAM F/U: HCPCS | Performed by: FAMILY MEDICINE

## 2024-07-25 PROCEDURE — 99214 OFFICE O/P EST MOD 30 MIN: CPT | Performed by: FAMILY MEDICINE

## 2024-07-25 PROCEDURE — 3079F DIAST BP 80-89 MM HG: CPT | Performed by: FAMILY MEDICINE

## 2024-07-25 PROCEDURE — 1123F ACP DISCUSS/DSCN MKR DOCD: CPT | Performed by: FAMILY MEDICINE

## 2024-07-25 PROCEDURE — G8427 DOCREV CUR MEDS BY ELIG CLIN: HCPCS | Performed by: FAMILY MEDICINE

## 2024-07-25 PROCEDURE — 1090F PRES/ABSN URINE INCON ASSESS: CPT | Performed by: FAMILY MEDICINE

## 2024-07-25 RX ORDER — ESCITALOPRAM OXALATE 10 MG/1
10 TABLET ORAL DAILY
Qty: 30 TABLET | Refills: 2 | Status: SHIPPED | OUTPATIENT
Start: 2024-07-25

## 2024-07-25 NOTE — PROGRESS NOTES
Hypertension:  Patient is here for follow up chronic hypertension.  This is  generally controlled on current medication regimen. Takes meds as directed and tolerates them well.  Most recent labs reviewed with patient and are not remarkable.  No symptoms from htn standpoint per ROS.  Patient is  compliant with lifestyle modifications.  Patient does not smoke.  Comorbid conditions include obesity, prediabetes, CKD.      She has been taking ozempic but just started it this week.  She denies any side effects.      She states that she would like to stop lexapro and pepcid.      Patient's past medical, surgical, social and/or family history reviewed, updated in chart, and are non-contributory (unless otherwise stated).  Medications and allergies also reviewed and updated in chart.        Review of Systems:  Constitutional:  No fever, no fatigue, no chills, no headaches, no weight change  Dermatology:  No rash, no mole, no dry or sensitive skin  ENT:  No cough, no sore throat, no sinus pain, no runny nose, no ear pain  Cardiology:  No chest pain, no palpitations, no leg edema, no shortness of breath, no PND  Gastroenterology:  No dysphagia, no abdominal pain, no nausea, no vomiting, no constipation, no diarrhea, no heartburn  Musculoskeletal:  No joint pain, no leg cramps, no back pain, no muscle aches  Respiratory:  No shortness of breath, no orthopnea, no wheezing, no SOTO, no hemoptysis  Urology:  No blood in the urine, no urinary frequency, no urinary incontinence, no urinary urgency, no nocturia, no dysuria      Vitals:    07/25/24 1258   BP: 130/80   Pulse: 72   Resp: 18   Temp: 97.2 °F (36.2 °C)   TempSrc: Temporal   SpO2: 96%   Weight: 122.5 kg (270 lb)   Height: 1.626 m (5' 4\")       Physical Exam  Vitals and nursing note reviewed.   Constitutional:       Appearance: She is well-developed.   HENT:      Head: Normocephalic and atraumatic.      Right Ear: External ear normal.      Left Ear: External ear normal.

## 2024-08-09 ENCOUNTER — OFFICE VISIT (OUTPATIENT)
Dept: ENT CLINIC | Age: 69
End: 2024-08-09
Payer: MEDICARE

## 2024-08-09 VITALS
DIASTOLIC BLOOD PRESSURE: 69 MMHG | HEIGHT: 64 IN | BODY MASS INDEX: 45.58 KG/M2 | SYSTOLIC BLOOD PRESSURE: 121 MMHG | HEART RATE: 65 BPM | TEMPERATURE: 98 F | WEIGHT: 267 LBS

## 2024-08-09 DIAGNOSIS — H91.8X3 ASYMMETRICAL HEARING LOSS: Primary | ICD-10-CM

## 2024-08-09 DIAGNOSIS — H90.3 SENSORINEURAL HEARING LOSS (SNHL) OF BOTH EARS: ICD-10-CM

## 2024-08-09 PROCEDURE — G8417 CALC BMI ABV UP PARAM F/U: HCPCS

## 2024-08-09 PROCEDURE — 1123F ACP DISCUSS/DSCN MKR DOCD: CPT

## 2024-08-09 PROCEDURE — 3074F SYST BP LT 130 MM HG: CPT

## 2024-08-09 PROCEDURE — 3078F DIAST BP <80 MM HG: CPT

## 2024-08-09 PROCEDURE — 99212 OFFICE O/P EST SF 10 MIN: CPT

## 2024-08-09 PROCEDURE — 1090F PRES/ABSN URINE INCON ASSESS: CPT

## 2024-08-09 PROCEDURE — 1036F TOBACCO NON-USER: CPT

## 2024-08-09 PROCEDURE — 3017F COLORECTAL CA SCREEN DOC REV: CPT

## 2024-08-09 PROCEDURE — G8427 DOCREV CUR MEDS BY ELIG CLIN: HCPCS

## 2024-08-09 PROCEDURE — G8399 PT W/DXA RESULTS DOCUMENT: HCPCS

## 2024-08-09 NOTE — PROGRESS NOTES
Subjective:      Patient ID:  Yajaira Akers is a 69 y.o. female.    HPI:    Pt returns for review of MRI of the head for right asymmetrical sensorineural hearing loss.  There are not changes since last visit. Patient states she is doing well is just continuing ot have difficulty hearing out of the right ear.  Denies dizziness since the last appointment.     Past Medical History:   Diagnosis Date    Anxiety     Arthritis     COPD (chronic obstructive pulmonary disease) (HCC)     Deep vein blood clot of left lower extremity (HCC) 05/27/2019    Family history of early CAD     Hyperlipemia     Hypertension     Knee pain     left    Multinodular goiter     Sleep apnea     cpap    SOBOE (shortness of breath on exertion)      Past Surgical History:   Procedure Laterality Date    BREAST SURGERY Left 2014    biopsy    CARDIOVASCULAR STRESS TEST      COLONOSCOPY      COLONOSCOPY N/A 03/29/2022    COLONOSCOPY POLYPECTOMY SNARE/COLD BIOPSY performed by Karlo Soto MD at [facility]    COLONOSCOPY  03/29/2022    COLONOSCOPY CONTROL HEMORRHAGE performed by Karlo Soto MD at [facility]    CYST REMOVAL  05/2017    mouth/under tongue    ENDOSCOPY, COLON, DIAGNOSTIC      HYSTERECTOMY, TOTAL ABDOMINAL (CERVIX REMOVED)  1990    JOINT REPLACEMENT  7/25/2023    KNEE ARTHROSCOPY Left 2005    LEG SURGERY Left 1963    ligament repair    SALIVARY GLAND SURGERY Left 05/24/2017    TONSILLECTOMY      childhood    TOTAL KNEE ARTHROPLASTY Right 07/25/2023    RIGHT KNEE DEE DEE ROBOTIC ASSISTED TOTAL JOINT ARTHROPLASTY + PNB performed by Sergio Bennett MD at [facility] OR    TOTAL KNEE ARTHROPLASTY Left 02/20/2024    LEFT KNEE DEE DEE ROBOTIC ASSISTED TOTAL JOINT ARTHROPLASTY performed by Sergio Bennett MD at [facility] OR    UPPER GASTROINTESTINAL ENDOSCOPY N/A 03/29/2022    EGD BIOPSY performed by Karlo Soto MD at [facility]     Family History   Problem Relation Age of Onset    Heart Disease Mother     Pacemaker Mother

## 2024-08-26 ENCOUNTER — OFFICE VISIT (OUTPATIENT)
Dept: FAMILY MEDICINE CLINIC | Age: 69
End: 2024-08-26
Payer: MEDICARE

## 2024-08-26 VITALS
HEART RATE: 76 BPM | DIASTOLIC BLOOD PRESSURE: 80 MMHG | TEMPERATURE: 97.2 F | WEIGHT: 268 LBS | SYSTOLIC BLOOD PRESSURE: 116 MMHG | OXYGEN SATURATION: 97 % | BODY MASS INDEX: 45.75 KG/M2 | RESPIRATION RATE: 18 BRPM | HEIGHT: 64 IN

## 2024-08-26 DIAGNOSIS — F32.1 CURRENT MODERATE EPISODE OF MAJOR DEPRESSIVE DISORDER WITHOUT PRIOR EPISODE (HCC): ICD-10-CM

## 2024-08-26 PROCEDURE — 1036F TOBACCO NON-USER: CPT | Performed by: FAMILY MEDICINE

## 2024-08-26 PROCEDURE — 3079F DIAST BP 80-89 MM HG: CPT | Performed by: FAMILY MEDICINE

## 2024-08-26 PROCEDURE — 99213 OFFICE O/P EST LOW 20 MIN: CPT | Performed by: FAMILY MEDICINE

## 2024-08-26 PROCEDURE — 3074F SYST BP LT 130 MM HG: CPT | Performed by: FAMILY MEDICINE

## 2024-08-26 PROCEDURE — 3017F COLORECTAL CA SCREEN DOC REV: CPT | Performed by: FAMILY MEDICINE

## 2024-08-26 PROCEDURE — G8427 DOCREV CUR MEDS BY ELIG CLIN: HCPCS | Performed by: FAMILY MEDICINE

## 2024-08-26 PROCEDURE — 1123F ACP DISCUSS/DSCN MKR DOCD: CPT | Performed by: FAMILY MEDICINE

## 2024-08-26 PROCEDURE — 1090F PRES/ABSN URINE INCON ASSESS: CPT | Performed by: FAMILY MEDICINE

## 2024-08-26 PROCEDURE — G8417 CALC BMI ABV UP PARAM F/U: HCPCS | Performed by: FAMILY MEDICINE

## 2024-08-26 PROCEDURE — G8399 PT W/DXA RESULTS DOCUMENT: HCPCS | Performed by: FAMILY MEDICINE

## 2024-08-26 RX ORDER — ESCITALOPRAM OXALATE 5 MG/1
5 TABLET ORAL DAILY
Qty: 30 TABLET | Refills: 2 | Status: SHIPPED | OUTPATIENT
Start: 2024-08-26

## 2024-08-26 NOTE — PROGRESS NOTES
Anxiety and/or depression:  Patient is here with complaints of anxiety and/or depression.  This is a/an chronic problem.  This has been going on for many years.  Treatment in the past includes wellbutrin and lexapro.  Anxiety symptoms include none.  Depressive symptoms include none.  Patient does not have suicidal or homicidal ideation.  Patient is not having issues falling or staying asleep.  Patient is not having associated panic attacks.  The above is not interfering with quality of life.  Patient has seen a Counselor before.  Patient does not use alcohol and/or drugs.   She would like to start to wean off of her medications.      Patient's past medical, surgical, social and/or family history reviewed, updated in chart, and are non-contributory (unless otherwise stated).  Medications and allergies also reviewed and updated in chart.         Review of Systems:  Constitutional:  No fever, no fatigue, no chills, no headaches, no weight change  Dermatology:  No rash, no mole, no dry or sensitive skin  ENT:  No cough, no sore throat, no sinus pain, no runny nose, no ear pain  Cardiology:  No chest pain, no palpitations, no leg edema, no shortness of breath, no PND  Gastroenterology:  No dysphagia, no abdominal pain, no nausea, no vomiting, no constipation, no diarrhea, no heartburn  Musculoskeletal:  No joint pain, no leg cramps, no back pain, no muscle aches  Respiratory:  No shortness of breath, no orthopnea, no wheezing, no SOTO, no hemoptysis  Urology:  No blood in the urine, no urinary frequency, no urinary incontinence, no urinary urgency, no nocturia, no dysuria      Vitals:    08/26/24 1133   BP: 116/80   Pulse: 76   Resp: 18   Temp: 97.2 °F (36.2 °C)   TempSrc: Temporal   SpO2: 97%   Weight: 121.6 kg (268 lb)   Height: 1.626 m (5' 4\")       Physical Exam  Vitals and nursing note reviewed.   Constitutional:       Appearance: She is well-developed.   HENT:      Head: Normocephalic and atraumatic.

## 2024-09-05 ENCOUNTER — TELEPHONE (OUTPATIENT)
Dept: SLEEP MEDICINE | Age: 69
End: 2024-09-05

## 2024-09-24 ENCOUNTER — OFFICE VISIT (OUTPATIENT)
Dept: FAMILY MEDICINE CLINIC | Age: 69
End: 2024-09-24
Payer: MEDICARE

## 2024-09-24 VITALS
WEIGHT: 268 LBS | RESPIRATION RATE: 18 BRPM | HEIGHT: 64 IN | HEART RATE: 82 BPM | TEMPERATURE: 97.4 F | OXYGEN SATURATION: 98 % | SYSTOLIC BLOOD PRESSURE: 112 MMHG | BODY MASS INDEX: 45.75 KG/M2 | DIASTOLIC BLOOD PRESSURE: 80 MMHG

## 2024-09-24 DIAGNOSIS — Z23 FLU VACCINE NEED: ICD-10-CM

## 2024-09-24 DIAGNOSIS — F32.1 CURRENT MODERATE EPISODE OF MAJOR DEPRESSIVE DISORDER WITHOUT PRIOR EPISODE (HCC): ICD-10-CM
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
## 2024-09-24 PROCEDURE — G0008 ADMIN INFLUENZA VIRUS VAC: HCPCS | Performed by: FAMILY MEDICINE

## 2024-09-24 PROCEDURE — 1123F ACP DISCUSS/DSCN MKR DOCD: CPT | Performed by: FAMILY MEDICINE

## 2024-09-24 PROCEDURE — 3079F DIAST BP 80-89 MM HG: CPT | Performed by: FAMILY MEDICINE

## 2024-09-24 PROCEDURE — 99214 OFFICE O/P EST MOD 30 MIN: CPT | Performed by: FAMILY MEDICINE

## 2024-09-24 PROCEDURE — 3074F SYST BP LT 130 MM HG: CPT | Performed by: FAMILY MEDICINE

## 2024-09-24 PROCEDURE — 1090F PRES/ABSN URINE INCON ASSESS: CPT | Performed by: FAMILY MEDICINE

## 2024-09-24 PROCEDURE — G8427 DOCREV CUR MEDS BY ELIG CLIN: HCPCS | Performed by: FAMILY MEDICINE

## 2024-09-24 PROCEDURE — 3017F COLORECTAL CA SCREEN DOC REV: CPT | Performed by: FAMILY MEDICINE

## 2024-09-24 PROCEDURE — G8417 CALC BMI ABV UP PARAM F/U: HCPCS | Performed by: FAMILY MEDICINE

## 2024-09-24 PROCEDURE — 90653 IIV ADJUVANT VACCINE IM: CPT | Performed by: FAMILY MEDICINE

## 2024-09-24 PROCEDURE — 1036F TOBACCO NON-USER: CPT | Performed by: FAMILY MEDICINE

## 2024-09-24 PROCEDURE — G8399 PT W/DXA RESULTS DOCUMENT: HCPCS | Performed by: FAMILY MEDICINE

## 2024-09-24 RX ORDER — BENZOCAINE/MENTHOL 6 MG-10 MG
LOZENGE MUCOUS MEMBRANE
Qty: 30 G | Refills: 1 | Status: SHIPPED | OUTPATIENT
Start: 2024-09-24 | End: 2024-10-01

## 2024-09-24 RX ORDER — BUPROPION HYDROCHLORIDE 150 MG/1
150 TABLET ORAL EVERY OTHER DAY
Qty: 7 TABLET | Refills: 0
Start: 2024-09-24 | End: 2024-10-08

## 2024-09-24 RX ORDER — BENZOCAINE/MENTHOL 6 MG-10 MG
LOZENGE MUCOUS MEMBRANE
Qty: 30 G | Refills: 1 | Status: SHIPPED
Start: 2024-09-24 | End: 2024-09-24

## 2024-09-27 ENCOUNTER — PATIENT MESSAGE (OUTPATIENT)
Dept: FAMILY MEDICINE CLINIC | Age: 69
End: 2024-09-27

## 2024-10-08 DIAGNOSIS — R73.03 PREDIABETES: ICD-10-CM

## 2024-11-18 ENCOUNTER — FOLLOWUP TELEPHONE ENCOUNTER (OUTPATIENT)
Dept: AUDIOLOGY | Age: 69
End: 2024-11-18

## 2024-11-18 NOTE — PROGRESS NOTES
Pt called has referral from [employer] for HA's however has [facility] dual complete.  She was instructed to call customer service and ask specifically for HA benefits and where they can be obtained.  [facility] dual complete benefits are usually [facility] hearing.  I also told her to ask for the [facility] # and she can take her [employer] referral to that office.  She can call back as needed. .Electronically signed by Daniele Saha on 11/18/2024 at 10:16 AM

## 2024-12-06 ENCOUNTER — PATIENT MESSAGE (OUTPATIENT)
Dept: FAMILY MEDICINE CLINIC | Age: 69
End: 2024-12-06

## 2024-12-06 DIAGNOSIS — Z12.31 ENCOUNTER FOR SCREENING MAMMOGRAM FOR MALIGNANT NEOPLASM OF BREAST: Primary | ICD-10-CM

## 2025-01-14 DIAGNOSIS — I10 ESSENTIAL HYPERTENSION: ICD-10-CM

## 2025-01-14 RX ORDER — METOPROLOL SUCCINATE 25 MG/1
TABLET, EXTENDED RELEASE ORAL
Qty: 15 TABLET | Refills: 5 | Status: SHIPPED | OUTPATIENT
Start: 2025-01-14

## 2025-01-14 NOTE — TELEPHONE ENCOUNTER
Name of Medication(s) Requested:  Requested Prescriptions     Pending Prescriptions Disp Refills    metoprolol succinate (TOPROL XL) 25 MG extended release tablet 15 tablet 5     Sig: TAKE ONE-HALF TABLET BY MOUTH ONCE DAILY       Medication is on current medication list Yes    Dosage and directions were verified? Yes    Quantity verified: 30 day supply     Pharmacy Verified?  Yes    Last Appointment:  9/24/2024    Future appts:  Future Appointments   Date Time Provider Department Center   1/29/2025  9:45 AM YZ ABDU [facility] 2 SEYZ ABDU [facility] Rad/Car   1/29/2025  2:15 PM Kimberly Quach DO [facility] ECC DEP   2/10/2025  2:20 PM Karime You DO POLAND SLEEP [facility]   3/27/2025  2:20 PM David Elizabeth APRN - CNS BDM ENDO [facility]   6/26/2025  1:00 PM Kimberly Quach DO [facility] ECC DEP        (If no appt send self scheduling link. .REFILLAPPT)  Scheduling request sent?     [] Yes  [x] No    Does patient need updated?  [] Yes  [x] No

## 2025-01-15 ENCOUNTER — PATIENT MESSAGE (OUTPATIENT)
Dept: FAMILY MEDICINE CLINIC | Age: 70
End: 2025-01-15

## 2025-01-15 NOTE — TELEPHONE ENCOUNTER
Advised pt we did not send her a message. Advised pt the only thing we did was refill medication and advise her that she needed an appt to discuss a lift chair. Pt stated understanding.

## 2025-01-16 DIAGNOSIS — R73.03 PREDIABETES: ICD-10-CM

## 2025-01-16 NOTE — TELEPHONE ENCOUNTER
Name of Medication(s) Requested:  Requested Prescriptions     Pending Prescriptions Disp Refills    metFORMIN (GLUCOPHAGE) 500 MG tablet 90 tablet 2     Sig: Take 1 tablet by mouth daily (with breakfast)       Medication is on current medication list Yes    Dosage and directions were verified? Yes    Quantity verified: 90 day supply     Pharmacy Verified?  Yes    Last Appointment:  9/24/2024    Future appts:  Future Appointments   Date Time Provider Department Center   1/29/2025  9:45 AM SEYZ ABDU [facility] 2 SEYZ ABDU [facility] Rad/Car   1/29/2025  2:15 PM Kimberly Quach DO [facility] ECC DEP   2/10/2025  2:20 PM Karime You DO POLAND SLEEP [facility]   3/27/2025  2:20 PM David Elizabeth APRN - CNS BDM ENDO [facility]   6/26/2025  1:00 PM Kimberly Quach DO [facility] DEP        (If no appt send self scheduling link. .REFILLAPPT)  Scheduling request sent?     [] Yes  [x] No    Does patient need updated?  [] Yes  [x] No

## 2025-01-21 ENCOUNTER — PATIENT MESSAGE (OUTPATIENT)
Dept: FAMILY MEDICINE CLINIC | Age: 70
End: 2025-01-21

## 2025-01-29 ENCOUNTER — HOSPITAL ENCOUNTER (OUTPATIENT)
Age: 70
Discharge: HOME | End: 2025-01-29
Attending: FAMILY MEDICINE
Payer: MEDICARE

## 2025-01-29 ENCOUNTER — HOSPITAL ENCOUNTER (OUTPATIENT)
Dept: GENERAL RADIOLOGY | Age: 70
Discharge: HOME | End: 2025-01-31
Attending: FAMILY MEDICINE
Payer: MEDICARE

## 2025-01-29 DIAGNOSIS — Z12.31 ENCOUNTER FOR SCREENING MAMMOGRAM FOR MALIGNANT NEOPLASM OF BREAST: ICD-10-CM

## 2025-01-29 LAB
25(OH)D3 SERPL-MCNC: 26.4 NG/ML (ref 30–100)
ANION GAP SERPL CALCULATED.3IONS-SCNC: 15 MMOL/L (ref 7–16)
BACTERIA URNS QL MICRO: ABNORMAL
BASOPHILS # BLD: 0.07 K/UL (ref 0–0.2)
BASOPHILS NFR BLD: 1 % (ref 0–2)
BILIRUB UR QL STRIP: NEGATIVE
BUN SERPL-MCNC: 19 MG/DL (ref 6–23)
CALCIUM SERPL-MCNC: 9.9 MG/DL (ref 8.6–10.2)
CHLORIDE SERPL-SCNC: 104 MMOL/L (ref 98–107)
CLARITY UR: CLEAR
CO2 SERPL-SCNC: 21 MMOL/L (ref 22–29)
COLOR UR: YELLOW
CREAT SERPL-MCNC: 1 MG/DL (ref 0.5–1)
CREAT UR-MCNC: 236.9 MG/DL (ref 29–226)
EOSINOPHIL # BLD: 0.06 K/UL (ref 0.05–0.5)
EOSINOPHILS RELATIVE PERCENT: 1 % (ref 0–6)
EPI CELLS #/AREA URNS HPF: ABNORMAL /HPF
ERYTHROCYTE [DISTWIDTH] IN BLOOD BY AUTOMATED COUNT: 14.4 % (ref 11.5–15)
GFR, ESTIMATED: 62 ML/MIN/1.73M2
GLUCOSE SERPL-MCNC: 92 MG/DL (ref 74–99)
GLUCOSE UR STRIP-MCNC: NEGATIVE MG/DL
HCT VFR BLD AUTO: 44.3 % (ref 34–48)
HGB BLD-MCNC: 14.2 G/DL (ref 11.5–15.5)
HGB UR QL STRIP.AUTO: ABNORMAL
IMM GRANULOCYTES # BLD AUTO: 0.03 K/UL (ref 0–0.58)
IMM GRANULOCYTES NFR BLD: 0 % (ref 0–5)
KETONES UR STRIP-MCNC: ABNORMAL MG/DL
LEUKOCYTE ESTERASE UR QL STRIP: NEGATIVE
LYMPHOCYTES NFR BLD: 2.37 K/UL (ref 1.5–4)
LYMPHOCYTES RELATIVE PERCENT: 29 % (ref 20–42)
MAGNESIUM SERPL-MCNC: 1.6 MG/DL (ref 1.6–2.6)
MCH RBC QN AUTO: 28.3 PG (ref 26–35)
MCHC RBC AUTO-ENTMCNC: 32.1 G/DL (ref 32–34.5)
MCV RBC AUTO: 88.4 FL (ref 80–99.9)
MICROALBUMIN UR-MCNC: 53 MG/L (ref 0–19)
MICROALBUMIN/CREAT UR-RTO: 22 MCG/MG CREAT (ref 0–30)
MONOCYTES NFR BLD: 0.57 K/UL (ref 0.1–0.95)
MONOCYTES NFR BLD: 7 % (ref 2–12)
NEUTROPHILS NFR BLD: 62 % (ref 43–80)
NEUTS SEG NFR BLD: 5.06 K/UL (ref 1.8–7.3)
NITRITE UR QL STRIP: NEGATIVE
PH UR STRIP: 5.5 [PH] (ref 5–9)
PHOSPHATE SERPL-MCNC: 2.7 MG/DL (ref 2.5–4.5)
PLATELET # BLD AUTO: 298 K/UL (ref 130–450)
PMV BLD AUTO: 10.4 FL (ref 7–12)
POTASSIUM SERPL-SCNC: 4.5 MMOL/L (ref 3.5–5)
PROT UR STRIP-MCNC: NEGATIVE MG/DL
PTH-INTACT SERPL-MCNC: 92 PG/ML (ref 15–65)
RBC # BLD AUTO: 5.01 M/UL (ref 3.5–5.5)
RBC #/AREA URNS HPF: ABNORMAL /HPF
SODIUM SERPL-SCNC: 140 MMOL/L (ref 132–146)
SP GR UR STRIP: >1.03 (ref 1–1.03)
URATE SERPL-MCNC: 4.9 MG/DL (ref 2.4–5.7)
UROBILINOGEN UR STRIP-ACNC: 0.2 EU/DL (ref 0–1)
WBC #/AREA URNS HPF: ABNORMAL /HPF
WBC OTHER # BLD: 8.2 K/UL (ref 4.5–11.5)

## 2025-01-29 PROCEDURE — 81001 URINALYSIS AUTO W/SCOPE: CPT

## 2025-01-29 PROCEDURE — 82043 UR ALBUMIN QUANTITATIVE: CPT

## 2025-01-29 PROCEDURE — 77063 BREAST TOMOSYNTHESIS BI: CPT

## 2025-01-29 PROCEDURE — 80048 BASIC METABOLIC PNL TOTAL CA: CPT

## 2025-01-29 PROCEDURE — 36415 COLL VENOUS BLD VENIPUNCTURE: CPT

## 2025-01-29 PROCEDURE — 84100 ASSAY OF PHOSPHORUS: CPT

## 2025-01-29 PROCEDURE — 82306 VITAMIN D 25 HYDROXY: CPT

## 2025-01-29 PROCEDURE — 83970 ASSAY OF PARATHORMONE: CPT

## 2025-01-29 PROCEDURE — 83735 ASSAY OF MAGNESIUM: CPT

## 2025-01-29 PROCEDURE — 84550 ASSAY OF BLOOD/URIC ACID: CPT

## 2025-01-29 PROCEDURE — 85025 COMPLETE CBC W/AUTO DIFF WBC: CPT

## 2025-01-29 PROCEDURE — 82570 ASSAY OF URINE CREATININE: CPT

## 2025-01-31 ENCOUNTER — PATIENT MESSAGE (OUTPATIENT)
Dept: FAMILY MEDICINE CLINIC | Age: 70
End: 2025-01-31

## 2025-01-31 DIAGNOSIS — E78.00 PURE HYPERCHOLESTEROLEMIA: ICD-10-CM

## 2025-01-31 NOTE — TELEPHONE ENCOUNTER
You said you no longer wanted to take it so we removed it from med list. You should be taking it though

## 2025-02-03 RX ORDER — PRAVASTATIN SODIUM 20 MG
20 TABLET ORAL DAILY
Qty: 90 TABLET | Refills: 1 | Status: SHIPPED | OUTPATIENT
Start: 2025-02-03

## 2025-02-03 NOTE — TELEPHONE ENCOUNTER
Name of Medication(s) Requested:  Requested Prescriptions     Pending Prescriptions Disp Refills    pravastatin (PRAVACHOL) 20 MG tablet 30 tablet 2     Sig: Take 1 tablet by mouth daily       Medication is on current medication list Yes and No    Dosage and directions were verified? Yes    Quantity verified: 90 day supply     Pharmacy Verified?  Yes    Last Appointment:  9/24/2024    Future appts:  Future Appointments   Date Time Provider Department Center   2/10/2025  2:20 PM Karime You DO POLAND SLEEP [facility]   2/25/2025 11:15 AM Kimberly Quach DO [facility] DEP   3/27/2025  2:20 PM David Elizabeth APRN - CNS BDM ENDO [facility]   6/26/2025  1:00 PM Kimberly Quach DO [facility] DEP        (If no appt send self scheduling link. .REFILLAPPT)  Scheduling request sent?     [] Yes  [x] No    Does patient need updated?  [] Yes  [x] No

## 2025-02-10 RX ORDER — IBUPROFEN 200 MG
2 CAPSULE ORAL DAILY
Qty: 180 TABLET | Refills: 1 | Status: SHIPPED | OUTPATIENT
Start: 2025-02-10

## 2025-02-10 NOTE — TELEPHONE ENCOUNTER
Name of Medication(s) Requested:  Requested Prescriptions     Pending Prescriptions Disp Refills    calcium citrate (CALCITRATE) 950 (200 Ca) MG tablet 180 tablet 1     Sig: Take 2 tablets by mouth daily       Medication is on current medication list Yes    Dosage and directions were verified? Yes    Quantity verified: 90 day supply     Pharmacy Verified?  Yes    Last Appointment:  9/24/2024    Future appts:  Future Appointments   Date Time Provider Department Center   2/25/2025 11:15 AM Kimberly Quach DO [facility] DEP   3/27/2025  2:20 PM David Elizabeth APRN - CNS BDM ENDO [facility]   6/26/2025  1:00 PM Kimberly Quach DO [facility] DEP        (If no appt send self scheduling link. .REFILLAPPT)  Scheduling request sent?     [] Yes  [x] No    Does patient need updated?  [] Yes  [x] No

## 2025-02-22 SDOH — ECONOMIC STABILITY: INCOME INSECURITY: IN THE LAST 12 MONTHS, WAS THERE A TIME WHEN YOU WERE NOT ABLE TO PAY THE MORTGAGE OR RENT ON TIME?: NO

## 2025-02-22 SDOH — ECONOMIC STABILITY: FOOD INSECURITY: WITHIN THE PAST 12 MONTHS, THE FOOD YOU BOUGHT JUST DIDN'T LAST AND YOU DIDN'T HAVE MONEY TO GET MORE.: NEVER TRUE

## 2025-02-22 SDOH — ECONOMIC STABILITY: FOOD INSECURITY: WITHIN THE PAST 12 MONTHS, YOU WORRIED THAT YOUR FOOD WOULD RUN OUT BEFORE YOU GOT MONEY TO BUY MORE.: NEVER TRUE

## 2025-02-22 SDOH — ECONOMIC STABILITY: TRANSPORTATION INSECURITY
IN THE PAST 12 MONTHS, HAS THE LACK OF TRANSPORTATION KEPT YOU FROM MEDICAL APPOINTMENTS OR FROM GETTING MEDICATIONS?: NO

## 2025-02-22 SDOH — HEALTH STABILITY: PHYSICAL HEALTH: ON AVERAGE, HOW MANY DAYS PER WEEK DO YOU ENGAGE IN MODERATE TO STRENUOUS EXERCISE (LIKE A BRISK WALK)?: 1 DAY

## 2025-02-22 SDOH — ECONOMIC STABILITY: TRANSPORTATION INSECURITY
IN THE PAST 12 MONTHS, HAS LACK OF TRANSPORTATION KEPT YOU FROM MEETINGS, WORK, OR FROM GETTING THINGS NEEDED FOR DAILY LIVING?: NO

## 2025-02-22 ASSESSMENT — PATIENT HEALTH QUESTIONNAIRE - PHQ9
8. MOVING OR SPEAKING SO SLOWLY THAT OTHER PEOPLE COULD HAVE NOTICED. OR THE OPPOSITE, BEING SO FIGETY OR RESTLESS THAT YOU HAVE BEEN MOVING AROUND A LOT MORE THAN USUAL: NOT AT ALL
9. THOUGHTS THAT YOU WOULD BE BETTER OFF DEAD, OR OF HURTING YOURSELF: NOT AT ALL
SUM OF ALL RESPONSES TO PHQ QUESTIONS 1-9: 1
2. FEELING DOWN, DEPRESSED OR HOPELESS: NOT AT ALL
10. IF YOU CHECKED OFF ANY PROBLEMS, HOW DIFFICULT HAVE THESE PROBLEMS MADE IT FOR YOU TO DO YOUR WORK, TAKE CARE OF THINGS AT HOME, OR GET ALONG WITH OTHER PEOPLE: NOT DIFFICULT AT ALL
7. TROUBLE CONCENTRATING ON THINGS, SUCH AS READING THE NEWSPAPER OR WATCHING TELEVISION: NOT AT ALL
5. POOR APPETITE OR OVEREATING: NOT AT ALL
6. FEELING BAD ABOUT YOURSELF - OR THAT YOU ARE A FAILURE OR HAVE LET YOURSELF OR YOUR FAMILY DOWN: NOT AT ALL
SUM OF ALL RESPONSES TO PHQ9 QUESTIONS 1 & 2: 0
3. TROUBLE FALLING OR STAYING ASLEEP: SEVERAL DAYS
SUM OF ALL RESPONSES TO PHQ QUESTIONS 1-9: 1
SUM OF ALL RESPONSES TO PHQ QUESTIONS 1-9: 1
1. LITTLE INTEREST OR PLEASURE IN DOING THINGS: NOT AT ALL
4. FEELING TIRED OR HAVING LITTLE ENERGY: NOT AT ALL
SUM OF ALL RESPONSES TO PHQ QUESTIONS 1-9: 1

## 2025-02-22 ASSESSMENT — LIFESTYLE VARIABLES
HOW OFTEN DO YOU HAVE A DRINK CONTAINING ALCOHOL: NEVER
HOW OFTEN DO YOU HAVE A DRINK CONTAINING ALCOHOL: 1
HOW OFTEN DO YOU HAVE SIX OR MORE DRINKS ON ONE OCCASION: 1
HOW MANY STANDARD DRINKS CONTAINING ALCOHOL DO YOU HAVE ON A TYPICAL DAY: 0
HOW MANY STANDARD DRINKS CONTAINING ALCOHOL DO YOU HAVE ON A TYPICAL DAY: PATIENT DOES NOT DRINK

## 2025-02-25 ENCOUNTER — OFFICE VISIT (OUTPATIENT)
Dept: FAMILY MEDICINE CLINIC | Age: 70
End: 2025-02-25
Payer: MEDICARE

## 2025-02-25 ENCOUNTER — TELEPHONE (OUTPATIENT)
Dept: FAMILY MEDICINE CLINIC | Age: 70
End: 2025-02-25

## 2025-02-25 VITALS
RESPIRATION RATE: 18 BRPM | HEIGHT: 64 IN | DIASTOLIC BLOOD PRESSURE: 78 MMHG | WEIGHT: 271 LBS | BODY MASS INDEX: 46.26 KG/M2 | TEMPERATURE: 97.5 F | OXYGEN SATURATION: 98 % | SYSTOLIC BLOOD PRESSURE: 124 MMHG | HEART RATE: 78 BPM

## 2025-02-25 DIAGNOSIS — I10 ESSENTIAL HYPERTENSION: Chronic | ICD-10-CM

## 2025-02-25 DIAGNOSIS — E78.00 PURE HYPERCHOLESTEROLEMIA: Chronic | ICD-10-CM

## 2025-02-25 DIAGNOSIS — E66.813 OBESITY, CLASS 3: ICD-10-CM

## 2025-02-25 DIAGNOSIS — Z96.651 STATUS POST RIGHT KNEE REPLACEMENT: ICD-10-CM

## 2025-02-25 DIAGNOSIS — R73.03 PREDIABETES: ICD-10-CM

## 2025-02-25 DIAGNOSIS — I71.019 DISSECTION OF THORACIC AORTA, UNSPECIFIED PART (HCC): ICD-10-CM

## 2025-02-25 DIAGNOSIS — Z00.00 MEDICARE ANNUAL WELLNESS VISIT, SUBSEQUENT: Primary | ICD-10-CM

## 2025-02-25 DIAGNOSIS — F32.1 CURRENT MODERATE EPISODE OF MAJOR DEPRESSIVE DISORDER WITHOUT PRIOR EPISODE (HCC): ICD-10-CM

## 2025-02-25 DIAGNOSIS — K51.40 PSEUDOPOLYPOSIS OF COLON WITHOUT COMPLICATION, UNSPECIFIED PART OF COLON (HCC): ICD-10-CM

## 2025-02-25 DIAGNOSIS — Z78.0 POST-MENOPAUSAL: Primary | ICD-10-CM

## 2025-02-25 DIAGNOSIS — Z96.652 STATUS POST LEFT KNEE REPLACEMENT: ICD-10-CM

## 2025-02-25 DIAGNOSIS — N18.32 STAGE 3B CHRONIC KIDNEY DISEASE (HCC): ICD-10-CM

## 2025-02-25 PROBLEM — H91.20 SUDDEN HEARING LOSS: Status: ACTIVE | Noted: 2025-02-25

## 2025-02-25 LAB
CHOLESTEROL, TOTAL: 182 MG/DL
HBA1C MFR BLD: 5.6 % (ref 4–5.6)
HDLC SERPL-MCNC: 65 MG/DL
LDL CHOLESTEROL: 91 MG/DL
TRIGL SERPL-MCNC: 129 MG/DL
VLDLC SERPL CALC-MCNC: 26 MG/DL

## 2025-02-25 PROCEDURE — 1123F ACP DISCUSS/DSCN MKR DOCD: CPT | Performed by: FAMILY MEDICINE

## 2025-02-25 PROCEDURE — G0439 PPPS, SUBSEQ VISIT: HCPCS | Performed by: FAMILY MEDICINE

## 2025-02-25 PROCEDURE — 3078F DIAST BP <80 MM HG: CPT | Performed by: FAMILY MEDICINE

## 2025-02-25 PROCEDURE — 3017F COLORECTAL CA SCREEN DOC REV: CPT | Performed by: FAMILY MEDICINE

## 2025-02-25 PROCEDURE — 36415 COLL VENOUS BLD VENIPUNCTURE: CPT | Performed by: FAMILY MEDICINE

## 2025-02-25 PROCEDURE — 1160F RVW MEDS BY RX/DR IN RCRD: CPT | Performed by: FAMILY MEDICINE

## 2025-02-25 PROCEDURE — 3074F SYST BP LT 130 MM HG: CPT | Performed by: FAMILY MEDICINE

## 2025-02-25 PROCEDURE — 1159F MED LIST DOCD IN RCRD: CPT | Performed by: FAMILY MEDICINE

## 2025-02-25 NOTE — PATIENT INSTRUCTIONS
\"A Healthy Heart: Care Instructions.\"  Current as of: July 31, 2024  Content Version: 14.3  © 2024 [employer].   Care instructions adapted under license by [employer]. If you have questions about a medical condition or this instruction, always ask your healthcare professional. [employer] disclaims any warranty or liability for your use of this information.    Personalized Preventive Plan for Yajaira Akers - 2/25/2025  Medicare offers a range of preventive health benefits. Some of the tests and screenings are paid in full while other may be subject to a deductible, co-insurance, and/or copay.  Some of these benefits include a comprehensive review of your medical history including lifestyle, illnesses that may run in your family, and various assessments and screenings as appropriate.  After reviewing your medical record and screening and assessments performed today your provider may have ordered immunizations, labs, imaging, and/or referrals for you.  A list of these orders (if applicable) as well as your Preventive Care list are included within your After Visit Summary for your review.

## 2025-02-25 NOTE — PROGRESS NOTES
Medicare Annual Wellness Visit    Yajaira Akers is here for Medicare AWV    Assessment & Plan   Medicare annual wellness visit, subsequent  Essential hypertension  Pure hypercholesterolemia  -     Lipid Panel  Status post right knee replacement  -     Lift Chair MISC; Starting Tue 2/25/2025, Disp-1 each, R-0, Print  -     External Referral To Physical Therapy  Status post left knee replacement  -     Lift Chair MISC; Starting Tue 2/25/2025, Disp-1 each, R-0, Print  -     External Referral To Physical Therapy  Prediabetes  -     Hemoglobin A1C  -     metFORMIN (GLUCOPHAGE) 500 MG tablet; Take 1 tablet by mouth 2 times daily (with meals), Disp-60 tablet, R-5Normal  Dissection of thoracic aorta, unspecified part (HCC)  Stable at this time.    Current moderate episode of major depressive disorder without prior episode (HCC)  Well controlled.   No longer on medication    Pseudopolyposis of colon without complication, unspecified part of colon (HCC)  Follows with GI  Stage 3b chronic kidney disease (HCC)  Stable  Reviewed recent labs.     Body mass index [BMI] 45.0-49.9, adult (Z68.42)  Diet and exercise were discussed and recommended to the patient.    Obesity, class 3 (E66.813)  Diet and exercise were discussed and recommended to the patient.       Return for Medicare Annual Wellness Visit in 1 year.     Subjective   The following acute and/or chronic problems were also addressed today:  Hypertension:   Patient is here for follow up chronic hypertension. Patient is  compliant with lifestyle modifications. Patient is  well controlled. Patient denies chest pain, diaphoresis, dyspnea, dyspnea on exertion, peripheral edema, palpitations, HA, visual issues. Cardiovascular risk factors: advanced age (older than 55 for men, 65 for women), dyslipidemia, hypertension, and obesity (BMI >= 30 kg/m2).  Patient does not smoke. Is currently on metoprolol. Taking as prescribed. No adverse effects.    Hyperlipidemia:  Patient is

## 2025-02-26 ENCOUNTER — PATIENT MESSAGE (OUTPATIENT)
Dept: FAMILY MEDICINE CLINIC | Age: 70
End: 2025-02-26

## 2025-03-07 LAB — DIABETIC RETINOPATHY: NEGATIVE

## 2025-03-10 NOTE — TELEPHONE ENCOUNTER
Name of Medication(s) Requested:  Requested Prescriptions     Pending Prescriptions Disp Refills    apixaban (ELIQUIS) 5 MG TABS tablet [Pharmacy Med Name: Eliquis 5 mg tablet] 180 tablet 0     Sig: TAKE 1 TABLET BY MOUTH TWICE DAILY       Medication is on current medication list Yes    Dosage and directions were verified? Yes    Quantity verified: 90 day supply     Pharmacy Verified?  Yes    Last Appointment:  2/25/2025    Future appts:  Future Appointments   Date Time Provider Department Center   3/17/2025 10:15 AM YZ RICH [facility]UNNER [facility] Rad/Car   3/27/2025  2:20 PM David Elizabeth, GENTRY - CNS BDM ENDO [facility]   6/2/2025 10:30 AM Kimberly Quach DO [facility] ECC DEP   6/2/2025  1:40 PM Karime You DO [facility]        (If no appt send self scheduling link. .REFILLAPPT)  Scheduling request sent?     [] Yes  [x] No    Does patient need updated?  [] Yes  [x] No  
Breath sounds clear and equal bilaterally.

## 2025-03-17 ENCOUNTER — HOSPITAL ENCOUNTER (OUTPATIENT)
Dept: GENERAL RADIOLOGY | Age: 70
Discharge: HOME | End: 2025-03-19
Attending: FAMILY MEDICINE
Payer: MEDICARE

## 2025-03-17 DIAGNOSIS — Z78.0 POST-MENOPAUSAL: ICD-10-CM

## 2025-03-17 PROCEDURE — 77080 DXA BONE DENSITY AXIAL: CPT

## 2025-03-24 ENCOUNTER — TELEPHONE (OUTPATIENT)
Dept: SLEEP MEDICINE | Age: 70
End: 2025-03-24

## 2025-03-24 DIAGNOSIS — G47.33 OSA (OBSTRUCTIVE SLEEP APNEA): Primary | ICD-10-CM

## 2025-03-24 NOTE — TELEPHONE ENCOUNTER
Patient called in stating she needs a supply order sent to [facility]. They said a new order was needed.

## 2025-03-25 ENCOUNTER — RESULTS FOLLOW-UP (OUTPATIENT)
Dept: FAMILY MEDICINE CLINIC | Age: 70
End: 2025-03-25

## 2025-03-26 ENCOUNTER — PATIENT MESSAGE (OUTPATIENT)
Dept: FAMILY MEDICINE CLINIC | Age: 70
End: 2025-03-26

## 2025-03-26 DIAGNOSIS — M81.0 AGE-RELATED OSTEOPOROSIS WITHOUT CURRENT PATHOLOGICAL FRACTURE: Primary | ICD-10-CM

## 2025-03-27 RX ORDER — IBANDRONATE SODIUM 150 MG/1
150 TABLET, FILM COATED ORAL
Qty: 3 TABLET | Refills: 3 | Status: SHIPPED | OUTPATIENT
Start: 2025-03-27

## 2025-04-26 ASSESSMENT — SLEEP AND FATIGUE QUESTIONNAIRES
HOW LIKELY ARE YOU TO NOD OFF OR FALL ASLEEP WHILE SITTING AND READING: SLIGHT CHANCE OF DOZING
HOW LIKELY ARE YOU TO NOD OFF OR FALL ASLEEP WHEN YOU ARE A PASSENGER IN A CAR FOR AN HOUR WITHOUT A BREAK: SLIGHT CHANCE OF DOZING
HOW LIKELY ARE YOU TO NOD OFF OR FALL ASLEEP WHILE SITTING AND READING: SLIGHT CHANCE OF DOZING
HOW LIKELY ARE YOU TO NOD OFF OR FALL ASLEEP WHILE SITTING INACTIVE IN A PUBLIC PLACE: WOULD NEVER DOZE
ESS TOTAL SCORE: 5
HOW LIKELY ARE YOU TO NOD OFF OR FALL ASLEEP WHEN YOU ARE A PASSENGER IN A CAR FOR AN HOUR WITHOUT A BREAK: SLIGHT CHANCE OF DOZING
HOW LIKELY ARE YOU TO NOD OFF OR FALL ASLEEP WHILE SITTING INACTIVE IN A PUBLIC PLACE: WOULD NEVER DOZE
HOW LIKELY ARE YOU TO NOD OFF OR FALL ASLEEP IN A CAR, WHILE STOPPED FOR A FEW MINUTES IN TRAFFIC: WOULD NEVER DOZE
HOW LIKELY ARE YOU TO NOD OFF OR FALL ASLEEP IN A CAR, WHILE STOPPED FOR A FEW MINUTES IN TRAFFIC: WOULD NEVER DOZE
HOW LIKELY ARE YOU TO NOD OFF OR FALL ASLEEP WHILE SITTING QUIETLY AFTER LUNCH WITHOUT ALCOHOL: WOULD NEVER DOZE
HOW LIKELY ARE YOU TO NOD OFF OR FALL ASLEEP WHILE SITTING AND TALKING TO SOMEONE: WOULD NEVER DOZE
HOW LIKELY ARE YOU TO NOD OFF OR FALL ASLEEP WHILE LYING DOWN TO REST IN THE AFTERNOON WHEN CIRCUMSTANCES PERMIT: MODERATE CHANCE OF DOZING
HOW LIKELY ARE YOU TO NOD OFF OR FALL ASLEEP WHILE WATCHING TV: SLIGHT CHANCE OF DOZING
HOW LIKELY ARE YOU TO NOD OFF OR FALL ASLEEP WHILE WATCHING TV: SLIGHT CHANCE OF DOZING
HOW LIKELY ARE YOU TO NOD OFF OR FALL ASLEEP WHILE SITTING AND TALKING TO SOMEONE: WOULD NEVER DOZE
HOW LIKELY ARE YOU TO NOD OFF OR FALL ASLEEP WHILE LYING DOWN TO REST IN THE AFTERNOON WHEN CIRCUMSTANCES PERMIT: MODERATE CHANCE OF DOZING
HOW LIKELY ARE YOU TO NOD OFF OR FALL ASLEEP WHILE SITTING QUIETLY AFTER LUNCH WITHOUT ALCOHOL: WOULD NEVER DOZE

## 2025-04-28 ENCOUNTER — TELEMEDICINE (OUTPATIENT)
Dept: SLEEP MEDICINE | Age: 70
End: 2025-04-28
Payer: MEDICARE

## 2025-04-28 DIAGNOSIS — G47.33 OSA (OBSTRUCTIVE SLEEP APNEA): Primary | ICD-10-CM

## 2025-04-28 DIAGNOSIS — E66.813 CLASS 3 SEVERE OBESITY WITH SERIOUS COMORBIDITY AND BODY MASS INDEX (BMI) OF 40.0 TO 44.9 IN ADULT, UNSPECIFIED OBESITY TYPE (HCC): ICD-10-CM

## 2025-04-28 PROCEDURE — 99214 OFFICE O/P EST MOD 30 MIN: CPT | Performed by: INTERNAL MEDICINE

## 2025-04-28 ASSESSMENT — ENCOUNTER SYMPTOMS
ALLERGIC/IMMUNOLOGIC NEGATIVE: 1
EYES NEGATIVE: 1
RESPIRATORY NEGATIVE: 1
GASTROINTESTINAL NEGATIVE: 1

## 2025-04-28 NOTE — PROGRESS NOTES
Yajaira Akers, was evaluated through a synchronous (real-time) audio-video encounter. The patient (or guardian if applicable) is aware that this is a billable service, which includes applicable co-pays. This Virtual Visit was conducted with patient's (and/or legal guardian's) consent. Patient identification was verified, and a caregiver was present when appropriate.   The patient was located at Home: [address] [place], [place] [id number]  Provider was located at Facility (Appt Dept):   [address] [place] [address]    STOP! Confirm you are appropriately licensed, registered, or certified to deliver care in the state where the patient is located as indicated above. If you are not or unsure, please re-schedule the visit.    Are you appropriately licensed in the patient's state?: Yes          [facility]    Patient Name: Yajaira Akers  Age: 69 y.o.   : 1955    Date of Visit: 2025       Assessment:      Yajaira was seen today for sleep apnea.    Diagnoses and all orders for this visit:    JES (obstructive sleep apnea)  -     Home Sleep Study; Future    Class 3 severe obesity with serious comorbidity and body mass index (BMI) of 40.0 to 44.9 in adult, unspecified obesity type (HCC)       Plan:      Pt prev dx severe JES  States she has lost significant amt weight and she is interested in a repeat sleep study to determine if she still needs CPAP  Will get hst, f/u results      Return in about 3 months (around 2025).     Karime You,   Sleep Medicine   [facility]  Office Phone -[phone], option 2  Fax- [phone]     HPI   Yajaira Akers is a 69 y.o. female with  has a past medical history of Anxiety, Arthritis, COPD (chronic obstructive pulmonary disease) (HCC), Deep vein blood clot of left lower extremity (HCC) (2019), Family history of early CAD, Hyperlipemia, Hypertension, Knee pain, Multinodular goiter, Sleep apnea, and SOBOE

## 2025-04-30 ENCOUNTER — TRANSCRIBE ORDERS (OUTPATIENT)
Dept: SLEEP CENTER | Age: 70
End: 2025-04-30

## 2025-04-30 DIAGNOSIS — G47.33 OBSTRUCTIVE SLEEP APNEA (ADULT) (PEDIATRIC): Primary | ICD-10-CM

## 2025-05-09 RX ORDER — IBUPROFEN 200 MG
2 CAPSULE ORAL DAILY
Qty: 180 TABLET | Refills: 1 | Status: SHIPPED | OUTPATIENT
Start: 2025-05-09

## 2025-05-09 RX ORDER — IBUPROFEN 200 MG
2 CAPSULE ORAL DAILY
Qty: 180 TABLET | Refills: 1 | OUTPATIENT
Start: 2025-05-09

## 2025-05-09 NOTE — TELEPHONE ENCOUNTER
Name of Medication(s) Requested:  Requested Prescriptions     Pending Prescriptions Disp Refills    calcium citrate (CALCITRATE) 950 (200 Ca) MG tablet [Pharmacy Med Name: calcium 200 mg (as calcium citrate 950 mg) tablet] 180 tablet 1     Sig: TAKE 2 TABLETS BY MOUTH ONCE DAILY       Medication is on current medication list Yes    Dosage and directions were verified? Yes    Quantity verified: 90 day supply     Pharmacy Verified?  Yes    Last Appointment:  2/25/2025    Future appts:  Future Appointments   Date Time Provider Department Center   5/15/2025  1:45 PM Abner Anaya MD SE BDM ORTHO [facility]   5/16/2025 11:00 AM SEB SLEEP LAB BEDROOM 5 [facility]   6/30/2025 11:15 AM Kimberly Quach DO [facility] DEP   7/7/2025  2:00 PM Karime You DO POLAND SLEEP [facility]   9/4/2025  3:40 PM David Elizabeth, GENTRY - CNS BDM ENDO [facility]        (If no appt send self scheduling link. .REFILLAPPT)  Scheduling request sent?     [] Yes  [x] No    Does patient need updated?  [] Yes  [x] No

## 2025-05-13 ENCOUNTER — OFFICE VISIT (OUTPATIENT)
Dept: FAMILY MEDICINE CLINIC | Age: 70
End: 2025-05-13
Payer: MEDICARE

## 2025-05-13 ENCOUNTER — TELEPHONE (OUTPATIENT)
Dept: FAMILY MEDICINE CLINIC | Age: 70
End: 2025-05-13

## 2025-05-13 ENCOUNTER — HOSPITAL ENCOUNTER (OUTPATIENT)
Dept: ULTRASOUND IMAGING | Age: 70
Discharge: HOME | End: 2025-05-15
Payer: MEDICARE

## 2025-05-13 VITALS
HEART RATE: 66 BPM | HEIGHT: 64 IN | BODY MASS INDEX: 47.12 KG/M2 | OXYGEN SATURATION: 95 % | RESPIRATION RATE: 18 BRPM | WEIGHT: 276 LBS | DIASTOLIC BLOOD PRESSURE: 80 MMHG | SYSTOLIC BLOOD PRESSURE: 126 MMHG | TEMPERATURE: 97.3 F

## 2025-05-13 DIAGNOSIS — Z86.718 HISTORY OF DVT (DEEP VEIN THROMBOSIS): ICD-10-CM

## 2025-05-13 DIAGNOSIS — R60.0 BILATERAL LOWER EXTREMITY EDEMA: Primary | ICD-10-CM

## 2025-05-13 DIAGNOSIS — R60.0 BILATERAL LOWER EXTREMITY EDEMA: ICD-10-CM

## 2025-05-13 LAB
ANION GAP SERPL CALCULATED.3IONS-SCNC: 11 MMOL/L (ref 7–16)
BUN BLDV-MCNC: 22 MG/DL (ref 8–23)
CALCIUM SERPL-MCNC: 10 MG/DL (ref 8.8–10.2)
CHLORIDE BLD-SCNC: 106 MMOL/L (ref 98–107)
CO2: 23 MMOL/L (ref 22–29)
CREAT SERPL-MCNC: 0.9 MG/DL (ref 0.5–1)
GFR, ESTIMATED: 66 ML/MIN/1.73M2
GLUCOSE BLD-MCNC: 105 MG/DL (ref 74–99)
POTASSIUM SERPL-SCNC: 4.5 MMOL/L (ref 3.5–5.1)
SODIUM BLD-SCNC: 140 MMOL/L (ref 136–145)

## 2025-05-13 PROCEDURE — G8399 PT W/DXA RESULTS DOCUMENT: HCPCS | Performed by: FAMILY MEDICINE

## 2025-05-13 PROCEDURE — 1159F MED LIST DOCD IN RCRD: CPT | Performed by: FAMILY MEDICINE

## 2025-05-13 PROCEDURE — 3079F DIAST BP 80-89 MM HG: CPT | Performed by: FAMILY MEDICINE

## 2025-05-13 PROCEDURE — 3074F SYST BP LT 130 MM HG: CPT | Performed by: FAMILY MEDICINE

## 2025-05-13 PROCEDURE — G8427 DOCREV CUR MEDS BY ELIG CLIN: HCPCS | Performed by: FAMILY MEDICINE

## 2025-05-13 PROCEDURE — 1090F PRES/ABSN URINE INCON ASSESS: CPT | Performed by: FAMILY MEDICINE

## 2025-05-13 PROCEDURE — 1123F ACP DISCUSS/DSCN MKR DOCD: CPT | Performed by: FAMILY MEDICINE

## 2025-05-13 PROCEDURE — 99214 OFFICE O/P EST MOD 30 MIN: CPT | Performed by: FAMILY MEDICINE

## 2025-05-13 PROCEDURE — 36415 COLL VENOUS BLD VENIPUNCTURE: CPT | Performed by: FAMILY MEDICINE

## 2025-05-13 PROCEDURE — 1036F TOBACCO NON-USER: CPT | Performed by: FAMILY MEDICINE

## 2025-05-13 PROCEDURE — 3017F COLORECTAL CA SCREEN DOC REV: CPT | Performed by: FAMILY MEDICINE

## 2025-05-13 PROCEDURE — G8417 CALC BMI ABV UP PARAM F/U: HCPCS | Performed by: FAMILY MEDICINE

## 2025-05-13 PROCEDURE — 93970 EXTREMITY STUDY: CPT

## 2025-05-13 PROCEDURE — 1160F RVW MEDS BY RX/DR IN RCRD: CPT | Performed by: FAMILY MEDICINE

## 2025-05-13 NOTE — PROGRESS NOTES
Yajaira Akers (:  1955) is a 69 y.o. female, Established patient, here for evaluation of the following chief complaint(s):  Leg Swelling (Bilateral leg swelling x about 2 weeks ) and Using HONEY         Assessment & Plan  1. Lower extremity edema.  - Swelling in the legs for a couple of weeks without associated pain, chest pain, shortness of breath, nausea, vomiting, diarrhea, or changes in diet or medication.  - Physical examination reveals significant swelling in the lower legs, particularly in the foot with a history of DVT.  - Concern for potential thrombus formation given the history of DVT and current use of Eliquis. An ultrasound of the lower extremities will be ordered. Blood work will be conducted today to assess renal function, liver function, and electrolyte levels.  - Referral for an ultrasound of the lower extremities and blood work ordered today.    2. Preoperative evaluation for cataract surgery.  - Scheduled for cataract surgery at the end of  or beginning of July.  - Preoperative examination to be scheduled for the  to ensure fitness for surgery.  - Advised to talk to Karly about scheduling the preoperative visit.    Results    Yajaira was seen today for leg swelling and using honey.    Diagnoses and all orders for this visit:    Bilateral lower extremity edema  -     Basic Metabolic Panel  -     Vascular duplex lower extremity venous bilateral; Future    History of DVT (deep vein thrombosis)  -     Vascular duplex lower extremity venous bilateral; Future      1. Bilateral lower extremity edema  -     Basic Metabolic Panel  -     Vascular duplex lower extremity venous bilateral; Future  2. History of DVT (deep vein thrombosis)  -     Vascular duplex lower extremity venous bilateral; Future    No follow-ups on file.         Subjective   History of Present Illness  The patient is a 69-year-old female presenting today for swelling in her legs.    She has been

## 2025-05-13 NOTE — TELEPHONE ENCOUNTER
Bilateral lower extremity swelling  Foot all the way up to her mid thigh  Denies SOB  Some redness and it is slightly warm to the touch

## 2025-05-14 ENCOUNTER — RESULTS FOLLOW-UP (OUTPATIENT)
Dept: FAMILY MEDICINE CLINIC | Age: 70
End: 2025-05-14

## 2025-05-14 DIAGNOSIS — R60.0 BILATERAL LOWER EXTREMITY EDEMA: ICD-10-CM

## 2025-05-14 DIAGNOSIS — R60.0 BILATERAL LOWER EXTREMITY EDEMA: Primary | ICD-10-CM

## 2025-05-14 RX ORDER — FUROSEMIDE 20 MG/1
20 TABLET ORAL DAILY
Qty: 30 TABLET | Refills: 1 | Status: SHIPPED | OUTPATIENT
Start: 2025-05-14 | End: 2025-05-15 | Stop reason: SDUPTHER

## 2025-05-14 RX ORDER — FUROSEMIDE 20 MG/1
20 TABLET ORAL DAILY
Qty: 30 TABLET | Refills: 1 | Status: SHIPPED | OUTPATIENT
Start: 2025-05-14 | End: 2025-05-14 | Stop reason: SDUPTHER

## 2025-05-15 ENCOUNTER — OFFICE VISIT (OUTPATIENT)
Dept: ORTHOPEDIC SURGERY | Age: 70
End: 2025-05-15
Payer: MEDICARE

## 2025-05-15 VITALS
TEMPERATURE: 97.7 F | HEIGHT: 64 IN | OXYGEN SATURATION: 95 % | BODY MASS INDEX: 47.12 KG/M2 | RESPIRATION RATE: 18 BRPM | WEIGHT: 276 LBS

## 2025-05-15 DIAGNOSIS — M65.321 TRIGGER INDEX FINGER OF RIGHT HAND: ICD-10-CM

## 2025-05-15 DIAGNOSIS — M79.641 RIGHT HAND PAIN: Primary | ICD-10-CM

## 2025-05-15 DIAGNOSIS — R60.0 BILATERAL LOWER EXTREMITY EDEMA: ICD-10-CM

## 2025-05-15 PROCEDURE — 76942 ECHO GUIDE FOR BIOPSY: CPT | Performed by: FAMILY MEDICINE

## 2025-05-15 PROCEDURE — 20550 NJX 1 TENDON SHEATH/LIGAMENT: CPT | Performed by: FAMILY MEDICINE

## 2025-05-15 RX ORDER — FUROSEMIDE 20 MG/1
20 TABLET ORAL DAILY
Qty: 90 TABLET | Refills: 1 | Status: SHIPPED | OUTPATIENT
Start: 2025-05-15

## 2025-05-15 RX ORDER — BETAMETHASONE SODIUM PHOSPHATE AND BETAMETHASONE ACETATE 3; 3 MG/ML; MG/ML
6 INJECTION, SUSPENSION INTRA-ARTICULAR; INTRALESIONAL; INTRAMUSCULAR; SOFT TISSUE ONCE
Status: COMPLETED | OUTPATIENT
Start: 2025-05-15 | End: 2025-05-15

## 2025-05-15 RX ORDER — LIDOCAINE HYDROCHLORIDE 10 MG/ML
3 INJECTION, SOLUTION INFILTRATION; PERINEURAL ONCE
Status: COMPLETED | OUTPATIENT
Start: 2025-05-15 | End: 2025-05-15

## 2025-05-15 RX ADMIN — BETAMETHASONE SODIUM PHOSPHATE AND BETAMETHASONE ACETATE 6 MG: 3; 3 INJECTION, SUSPENSION INTRA-ARTICULAR; INTRALESIONAL; INTRAMUSCULAR; SOFT TISSUE at 15:21

## 2025-05-15 RX ADMIN — LIDOCAINE HYDROCHLORIDE 3 ML: 10 INJECTION, SOLUTION INFILTRATION; PERINEURAL at 15:21

## 2025-05-15 NOTE — TELEPHONE ENCOUNTER
Name of Medication(s) Requested:  Requested Prescriptions     Pending Prescriptions Disp Refills    furosemide (LASIX) 20 MG tablet 90 tablet 1     Sig: Take 1 tablet by mouth daily       Medication is on current medication list Yes    Dosage and directions were verified? Yes    Quantity verified: 90 day supply     Pharmacy Verified?  Yes    Last Appointment:  5/13/2025    Future appts:  Future Appointments   Date Time Provider Department Center   5/15/2025  1:45 PM Abner Anaya MD SE BDM ORTHO [facility]   5/16/2025 11:00 AM SEB SLEEP LAB BEDROOM [address] [place] [address]   6/25/2025  8:45 AM Kimberly Quach DO [facility] DEP   6/30/2025 11:15 AM Kimberly Quach DO [facility] DEP   7/7/2025  2:00 PM Karime You DO POLAND SLEEP [facility]   9/4/2025  3:40 PM David Elizabeth, GENTRY - CNS BDM ENDO [facility]        (If no appt send self scheduling link. .REFILLAPPT)  Scheduling request sent?     [] Yes  [x] No    Does patient need updated?  [] Yes  [x] No

## 2025-05-16 ENCOUNTER — HOSPITAL ENCOUNTER (OUTPATIENT)
Dept: SLEEP CENTER | Age: 70
Discharge: HOME | End: 2025-05-16
Payer: MEDICARE

## 2025-05-16 DIAGNOSIS — G47.33 OBSTRUCTIVE SLEEP APNEA (ADULT) (PEDIATRIC): ICD-10-CM

## 2025-05-16 PROCEDURE — 95800 SLP STDY UNATTENDED: CPT

## 2025-05-27 NOTE — PROGRESS NOTES
PROCEDURE NOTE:    DIAGNOSIS    RIGHT trigger finger, INDEX finger    PROCEDURE    Ultrasound-guided RIGHT INDEX finger flexor tendon sheath steroid injection at the A1 pulley.    PROCEDURAL PAUSE    Procedural pause conducted to verify: correct patient identity, procedure to be performed, and as applicable, correct side and site, correct patient position, and availability of implants, special equipment, or special requirements.    PROCEDURE DETAILS    The procedure was carried out under sterile technique.    Patient Position: Seated    Localization Process: The A1 pulley was evaluated under ultrasound prior to starting the procedure. The skin was prepped with Betadine and Alcohol.    Approach: In-plane.    Injection: A 25-gauge 2-inch needle was advanced from an in-plane, distal to proximal approach into the flexor tendon sheath at the a1 pulley. After visualization of the needle tip in the target area and negative aspiration for blood, a mixture of 0.5 cc of 1% lidocaine and 0.5 cc of betamethasone (6 mg/cc) was injected into the tendon sheath at the a1 pulley with excellent sonographic flow. Images of procedure were permanently recorded.    Postprocedure Care: The patient will avoid heavy exertion and avoid soaking the area under water for two days. The patient will contact me with any problems related to the injection.    PATIENT EDUCATION    Ready to learn, no apparent learning barriers were identified; learning preferences include listening. Explained diagnosis and treatment plan; patient expressed understanding of the content.    INFORMED CONSENT    Discussed the risks, benefits, alternatives, and the necessity of other members of the healthcare team participating in the procedure. All questions answered and consent given.    Following denial of allergy and review of potential side effects and complications including but not necessarily limited to infection, allergic reaction, local tissue breakdown, aseptic

## 2025-06-09 RX ORDER — APIXABAN 5 MG/1
5 TABLET, FILM COATED ORAL 2 TIMES DAILY
Qty: 180 TABLET | Refills: 0 | OUTPATIENT
Start: 2025-06-09

## 2025-06-09 NOTE — TELEPHONE ENCOUNTER
Name of Medication(s) Requested:  Requested Prescriptions     Pending Prescriptions Disp Refills    ELIQUIS 5 MG TABS tablet [Pharmacy Med Name: Eliquis 5 mg tablet] 180 tablet 0     Sig: TAKE ONE TABLET BY MOUTH TWICE DAILY       Medication is on current medication list Yes    Dosage and directions were verified? Yes    Quantity verified: 90 day supply     Pharmacy Verified?  Yes    Last Appointment:  5/13/2025    Future appts:  Future Appointments   Date Time Provider Department Center   7/7/2025  2:00 PM Karime You DO POLAND SLEEP [facility]   7/9/2025 10:15 AM Kimberly Quach DO [facility]   9/4/2025  3:40 PM David Elizabeth, GENTRY - CNS BD ENDO [facility]        (If no appt send self scheduling link. .REFILLAPPT)  Scheduling request sent?     [] Yes  [x] No    Does patient need updated?  [] Yes  [x] No

## 2025-07-07 ENCOUNTER — TELEMEDICINE (OUTPATIENT)
Dept: SLEEP MEDICINE | Age: 70
End: 2025-07-07
Payer: MEDICARE

## 2025-07-07 DIAGNOSIS — Z78.9 INTOLERANCE OF CONTINUOUS POSITIVE AIRWAY PRESSURE (CPAP) VENTILATION: ICD-10-CM

## 2025-07-07 DIAGNOSIS — G47.33 OSA (OBSTRUCTIVE SLEEP APNEA): Primary | ICD-10-CM

## 2025-07-07 PROCEDURE — 99214 OFFICE O/P EST MOD 30 MIN: CPT | Performed by: INTERNAL MEDICINE

## 2025-07-07 ASSESSMENT — SLEEP AND FATIGUE QUESTIONNAIRES
HOW LIKELY ARE YOU TO NOD OFF OR FALL ASLEEP WHILE SITTING AND READING: MODERATE CHANCE OF DOZING
HOW LIKELY ARE YOU TO NOD OFF OR FALL ASLEEP WHILE SITTING INACTIVE IN A PUBLIC PLACE: MODERATE CHANCE OF DOZING
HOW LIKELY ARE YOU TO NOD OFF OR FALL ASLEEP WHILE SITTING AND TALKING TO SOMEONE: WOULD NEVER DOZE
ESS TOTAL SCORE: 6
HOW LIKELY ARE YOU TO NOD OFF OR FALL ASLEEP WHEN YOU ARE A PASSENGER IN A CAR FOR AN HOUR WITHOUT A BREAK: WOULD NEVER DOZE
HOW LIKELY ARE YOU TO NOD OFF OR FALL ASLEEP WHILE LYING DOWN TO REST IN THE AFTERNOON WHEN CIRCUMSTANCES PERMIT: WOULD NEVER DOZE
HOW LIKELY ARE YOU TO NOD OFF OR FALL ASLEEP IN A CAR, WHILE STOPPED FOR A FEW MINUTES IN TRAFFIC: WOULD NEVER DOZE
HOW LIKELY ARE YOU TO NOD OFF OR FALL ASLEEP WHILE WATCHING TV: MODERATE CHANCE OF DOZING
HOW LIKELY ARE YOU TO NOD OFF OR FALL ASLEEP WHILE SITTING QUIETLY AFTER LUNCH WITHOUT ALCOHOL: WOULD NEVER DOZE

## 2025-07-07 NOTE — PROGRESS NOTES
Yajaira Akers, was evaluated through a synchronous (real-time) audio-video encounter. The patient (or guardian if applicable) is aware that this is a billable service, which includes applicable co-pays. This Virtual Visit was conducted with patient's (and/or legal guardian's) consent. Patient identification was verified, and a caregiver was present when appropriate.   The patient was located at Home: [address] [place], [place] [address]  Provider was located at Facility (Appt Dept):   [address] [place] [address]    STOP! Confirm you are appropriately licensed, registered, or certified to deliver care in the state where the patient is located as indicated above. If you are not or unsure, please re-schedule the visit.    Are you appropriately licensed in the patient's state?: Yes          [facility]    Patient Name: Yajaira Akers  Age: 69 y.o.   : 1955    Date of Visit: 2025       Assessment:      Yajaira was seen today for sleep apnea.    Diagnoses and all orders for this visit:    JES (obstructive sleep apnea)  -     Amb External Referral To Dentistry    Intolerance of continuous positive airway pressure (CPAP) ventilation         Plan:      Pt prev dx severe JES  States she has lost significant amt weight and she is interested in a repeat sleep study to determine if she still needs CPAP  Repeat HST still shows moderate JES, discussed cardiovascular risks/CPAP as first line therapy   Pt interested in CPAP alternatives, OAT  Will send referral to sleep dentistry      Return in about 4 months (around 2025).     Karime You,   Sleep Medicine   [facility]  Office Phone -[phone], option 2  Fax- [phone]     HPI   Yajaira Akers is a 69 y.o. female with  has a past medical history of Anxiety, Arthritis, COPD (chronic obstructive pulmonary disease) (HCC), Deep vein blood clot of left lower extremity (HCC) (2019), Family

## 2025-07-08 DIAGNOSIS — I10 ESSENTIAL HYPERTENSION: ICD-10-CM

## 2025-07-08 RX ORDER — METOPROLOL SUCCINATE 25 MG/1
TABLET, EXTENDED RELEASE ORAL
Qty: 45 TABLET | Refills: 1 | Status: SHIPPED | OUTPATIENT
Start: 2025-07-08

## 2025-07-08 RX ORDER — METOPROLOL SUCCINATE 25 MG/1
TABLET, EXTENDED RELEASE ORAL
Qty: 15 TABLET | Refills: 4 | Status: SHIPPED | OUTPATIENT
Start: 2025-07-08

## 2025-07-08 NOTE — TELEPHONE ENCOUNTER
Name of Medication(s) Requested:  Requested Prescriptions     Pending Prescriptions Disp Refills    metoprolol succinate (TOPROL XL) 25 MG extended release tablet 15 tablet 5     Sig: TAKE ONE-HALF TABLET BY MOUTH ONCE DAILY       Medication is on current medication list Yes    Dosage and directions were verified? Yes    Quantity verified: 90 day supply     Pharmacy Verified?  Yes    Last Appointment:  5/13/2025    Future appts:  Future Appointments   Date Time Provider Department Center   8/6/2025  1:30 PM Kimberly Quach DO [facility] DEP   9/4/2025  3:40 PM David Elizabeth, APRN - CNS BDM ENDO [facility]   12/8/2025 12:40 PM Karime You DO POLAND SLEEP [facility]        (If no appt send self scheduling link. .REFILLAPPT)  Scheduling request sent?     [] Yes  [x] No    Does patient need updated?  [] Yes  [x] No

## 2025-07-08 NOTE — TELEPHONE ENCOUNTER
Name of Medication(s) Requested:  Requested Prescriptions     Pending Prescriptions Disp Refills    metoprolol succinate (TOPROL XL) 25 MG extended release tablet [Pharmacy Med Name: metoprolol succinate ER 25 mg tablet,extended release 24 hr] 15 tablet 4     Sig: TAKE ONE-HALF TABLET BY MOUTH ONCE DAILY       Medication is on current medication list Yes    Dosage and directions were verified? Yes    Quantity verified: 30 day supply     Pharmacy Verified?  Yes    Last Appointment:  5/13/2025    Future appts:  Future Appointments   Date Time Provider Department Center   9/4/2025  3:40 PM David Elizabeth, [facility] - [facility] ENDO [facility]   12/8/2025 12:40 PM Karime You, DO [facility]        (If no appt send self scheduling link. .REFILLAPPT)  Scheduling request sent?     [] Yes  [x] No    Does patient need updated?  [] Yes  [x] No

## 2025-07-15 DIAGNOSIS — I10 ESSENTIAL HYPERTENSION: ICD-10-CM

## 2025-07-15 RX ORDER — METOPROLOL SUCCINATE 25 MG/1
TABLET, EXTENDED RELEASE ORAL
Qty: 45 TABLET | Refills: 5 | Status: SHIPPED | OUTPATIENT
Start: 2025-07-15

## 2025-07-15 NOTE — TELEPHONE ENCOUNTER
Patient called in asking about her Metoprolol. I looked at the scripts that were sent and they were sent to her mail away pharmacy and she is currently out.  Would you be able to send a short script over to her local pharmacy to hold her over until she gets her script in the mail?  I have the medication pended and the correct pharmacy selected.

## 2025-08-06 ENCOUNTER — OFFICE VISIT (OUTPATIENT)
Dept: FAMILY MEDICINE CLINIC | Age: 70
End: 2025-08-06

## 2025-08-06 ENCOUNTER — PATIENT MESSAGE (OUTPATIENT)
Dept: FAMILY MEDICINE CLINIC | Age: 70
End: 2025-08-06

## 2025-08-06 VITALS
TEMPERATURE: 97.6 F | HEART RATE: 67 BPM | HEIGHT: 64 IN | WEIGHT: 267.4 LBS | BODY MASS INDEX: 45.65 KG/M2 | OXYGEN SATURATION: 95 % | SYSTOLIC BLOOD PRESSURE: 122 MMHG | RESPIRATION RATE: 20 BRPM | DIASTOLIC BLOOD PRESSURE: 82 MMHG

## 2025-08-06 DIAGNOSIS — E55.9 VITAMIN D INSUFFICIENCY: ICD-10-CM

## 2025-08-06 DIAGNOSIS — I10 ESSENTIAL HYPERTENSION: Primary | Chronic | ICD-10-CM

## 2025-08-06 DIAGNOSIS — R73.03 PREDIABETES: ICD-10-CM

## 2025-08-06 DIAGNOSIS — E78.00 PURE HYPERCHOLESTEROLEMIA: Chronic | ICD-10-CM

## 2025-08-06 DIAGNOSIS — D23.39 DERMOID CYST OF FOREHEAD: ICD-10-CM

## 2025-08-06 LAB
ALBUMIN: 4 G/DL (ref 3.5–5.2)
ALP BLD-CCNC: 99 U/L (ref 35–104)
ALT SERPL-CCNC: 11 U/L (ref 0–35)
ANION GAP SERPL CALCULATED.3IONS-SCNC: 16 MMOL/L (ref 7–16)
AST SERPL-CCNC: 23 U/L (ref 0–35)
BASOPHILS ABSOLUTE: 0.04 K/UL (ref 0–0.2)
BASOPHILS RELATIVE PERCENT: 0 % (ref 0–2)
BILIRUB SERPL-MCNC: 0.5 MG/DL (ref 0–1.2)
BUN BLDV-MCNC: 17 MG/DL (ref 8–23)
CALCIUM SERPL-MCNC: 9.6 MG/DL (ref 8.8–10.2)
CHLORIDE BLD-SCNC: 102 MMOL/L (ref 98–107)
CHOLESTEROL, TOTAL: 202 MG/DL
CO2: 24 MMOL/L (ref 22–29)
CREAT SERPL-MCNC: 1 MG/DL (ref 0.5–1)
EOSINOPHILS ABSOLUTE: 0.09 K/UL (ref 0.05–0.5)
EOSINOPHILS RELATIVE PERCENT: 1 % (ref 0–6)
GFR, ESTIMATED: 60 ML/MIN/1.73M2
GLUCOSE BLD-MCNC: 97 MG/DL (ref 74–99)
HBA1C MFR BLD: 5.7 % (ref 4–5.6)
HCT VFR BLD CALC: 44.5 % (ref 34–48)
HDLC SERPL-MCNC: 70 MG/DL
HEMOGLOBIN: 14.3 G/DL (ref 11.5–15.5)
IMMATURE GRANULOCYTES %: 0 % (ref 0–5)
IMMATURE GRANULOCYTES ABSOLUTE: <0.03 K/UL (ref 0–0.58)
LDL CHOLESTEROL: 111 MG/DL
LYMPHOCYTES ABSOLUTE: 2.63 K/UL (ref 1.5–4)
LYMPHOCYTES RELATIVE PERCENT: 30 % (ref 20–42)
MCH RBC QN AUTO: 29.6 PG (ref 26–35)
MCHC RBC AUTO-ENTMCNC: 32.1 G/DL (ref 32–34.5)
MCV RBC AUTO: 92.1 FL (ref 80–99.9)
MONOCYTES ABSOLUTE: 0.62 K/UL (ref 0.1–0.95)
MONOCYTES RELATIVE PERCENT: 7 % (ref 2–12)
NEUTROPHILS ABSOLUTE: 5.52 K/UL (ref 1.8–7.3)
NEUTROPHILS RELATIVE PERCENT: 62 % (ref 43–80)
PDW BLD-RTO: 14.6 % (ref 11.5–15)
PLATELET # BLD: 289 K/UL (ref 130–450)
PMV BLD AUTO: 11.1 FL (ref 7–12)
POTASSIUM SERPL-SCNC: 4 MMOL/L (ref 3.5–5.1)
RBC # BLD: 4.83 M/UL (ref 3.5–5.5)
SODIUM BLD-SCNC: 142 MMOL/L (ref 136–145)
TOTAL PROTEIN: 7.1 G/DL (ref 6.4–8.3)
TRIGL SERPL-MCNC: 106 MG/DL
TSH SERPL DL<=0.05 MIU/L-ACNC: 0.79 UIU/ML (ref 0.27–4.2)
VITAMIN D 25-HYDROXY: 37.5 NG/ML (ref 30–100)
VLDLC SERPL CALC-MCNC: 21 MG/DL
WBC # BLD: 8.9 K/UL (ref 4.5–11.5)

## 2025-08-26 ENCOUNTER — PATIENT MESSAGE (OUTPATIENT)
Dept: FAMILY MEDICINE CLINIC | Age: 70
End: 2025-08-26

## 2025-08-26 DIAGNOSIS — D23.39 DERMOID CYST OF FOREHEAD: Primary | ICD-10-CM

## (undated) DEVICE — DRAPE,TOP,102X53,STERILE: Brand: MEDLINE

## (undated) DEVICE — SYRINGE MED 30ML STD CLR PLAS LUERLOCK TIP N CTRL DISP

## (undated) DEVICE — GOWN,SIRUS,FABRNF,XL,20/CS: Brand: MEDLINE

## (undated) DEVICE — DOUBLE BASIN SET: Brand: MEDLINE INDUSTRIES, INC.

## (undated) DEVICE — TAPE ADH W3INXL10YD WHT COT WVN BK POWERFUL RUB BASE HIGHLY

## (undated) DEVICE — BLADE SURG SAW S STL NAR OSC W/ SERR EDGE DISP

## (undated) DEVICE — GOWN,SIRUS,POLYRNF,BRTHSLV,XLN/XL,20/CS: Brand: MEDLINE

## (undated) DEVICE — SUTURE STRATAFIX SYMMETRIC SZ 1 L18IN ABSRB VLT CT1 L36CM SXPP1A404

## (undated) DEVICE — TOTAL KNEE PK

## (undated) DEVICE — BOOT POS LEG DEMAYO

## (undated) DEVICE — KIT TRK KNEE PROC VIZADISC

## (undated) DEVICE — CORD RETRCT SIL

## (undated) DEVICE — BLUNTFILL: Brand: MONOJECT

## (undated) DEVICE — SOLUTION IRRIG 3000ML 0.9% SOD CHL USP UROMATIC PLAS CONT

## (undated) DEVICE — 4-PORT MANIFOLD: Brand: NEPTUNE 2

## (undated) DEVICE — ELECTRODE PT RET AD L9FT HI MOIST COND ADH HYDRGEL CORDED

## (undated) DEVICE — LIQUIBAND RAPID ADHESIVE 36/CS 0.8ML: Brand: MEDLINE

## (undated) DEVICE — CONNECTOR IRRIGATION AUXILIARY H2O JET W/ PRT MTL THRD HYDR

## (undated) DEVICE — PACK PROCEDURE SURG GEN CUST

## (undated) DEVICE — CONTAINER SPEC 60ML PH 7NEUTRAL BUFF FRMLN RDY TO USE

## (undated) DEVICE — TOWEL,OR,DSP,ST,BLUE,STD,6/PK,12PK/CS: Brand: MEDLINE

## (undated) DEVICE — SPONGE LAP W18XL18IN WHT COT 4 PLY FLD STRUNG RADPQ DISP ST 2 PER PACK

## (undated) DEVICE — 3M™ STERI-DRAPE™ U-DRAPE 1015: Brand: STERI-DRAPE™

## (undated) DEVICE — PIN BNE FIX TEMP L140MM DIA4MM MAKO

## (undated) DEVICE — STRYKER PERFORMANCE SERIES SAGITTAL BLADE: Brand: STRYKER PERFORMANCE SERIES

## (undated) DEVICE — GAUZE,SPONGE,POST-OP,4X3,STRL,LF: Brand: MEDLINE

## (undated) DEVICE — GLOVE SURG SZ 8 CRM LTX FREE POLYISOPRENE POLYMER BEAD ANTI

## (undated) DEVICE — GLOVE ORTHO 8   MSG9480

## (undated) DEVICE — APPLICATOR MEDICATED 26 CC SOLUTION HI LT ORNG CHLORAPREP

## (undated) DEVICE — SOLUTION IRRIG 1000ML 0.9% SOD CHL USP POUR PLAS BTL

## (undated) DEVICE — SYRINGE MED 50ML LUERLOCK TIP

## (undated) DEVICE — HANDPIECE SET WITH COAXIAL HIGH FLOW TIP AND SUCTION TUBE: Brand: INTERPULSE

## (undated) DEVICE — TUBING, SUCTION, 9/32" X 10', STRAIGHT: Brand: MEDLINE

## (undated) DEVICE — BANDAGE COMPR W6INXL12FT SMOOTH FOR LIMB EXSANG ESMARCH

## (undated) DEVICE — STANDARD HYPODERMIC NEEDLE,POLYPROPYLENE HUB: Brand: MONOJECT

## (undated) DEVICE — Z INACTIVE NO ACTIVE SUPPLIER APPLICATOR MEDICATED 26 CC TINT HI-LITE ORNG STRL CHLORAPREP

## (undated) DEVICE — DRAPE,REIN 53X77,STERILE: Brand: MEDLINE

## (undated) DEVICE — BLADE CLIPPER GEN PURP NS

## (undated) DEVICE — PIN BNE FIX TEMP L110MM DIA4MM MAKO

## (undated) DEVICE — BNDG,ELSTC,MATRIX,STRL,6"X5YD,LF,HOOK&LP: Brand: MEDLINE

## (undated) DEVICE — GLOVE SURG SZ 8 L12IN FNGR THK79MIL GRN LTX FREE

## (undated) DEVICE — KIT SURG W7XL11IN 2 PKT UNTREATED NA

## (undated) DEVICE — NEEDLE HYPO 18GA L1.5IN PNK POLYPR HUB S STL REG BVL STR

## (undated) DEVICE — PADDING CAST W6INXL4YD COT LO LINTING WYTEX

## (undated) DEVICE — 3M™ IOBAN™ 2 ANTIMICROBIAL INCISE DRAPE 6651EZ: Brand: IOBAN™ 2

## (undated) DEVICE — BITEBLOCK 54FR W/ DENT RIM BLOX

## (undated) DEVICE — KIT INT FIX FEM TIB CKPT MAKOPLASTY

## (undated) DEVICE — SOLUTION IRRIG 1000ML STRL H2O USP PLAS POUR BTL

## (undated) DEVICE — DRESSING HYDROFIBER AQUACEL AG ADVANTAGE 3.5X6 IN

## (undated) DEVICE — FORCEPS BX L240CM JAW DIA2.4MM WRK CHN 2.8MM ORNG L CAP W/

## (undated) DEVICE — KIT DRP FOR RIO ROBOTIC ARM ASST SYS

## (undated) DEVICE — GLOVE ORANGE PI 8   MSG9080

## (undated) DEVICE — CATHETER SCLERO L240CM NDL 25GA L4MM SHTH DIA2.3MM CNTRST

## (undated) DEVICE — 3M™ COBAN™ NL STERILE NON-LATEX SELF-ADHERENT WRAP, 2084S, 4 IN X 5 YD (10 CM X 4,5 M), 18 ROLLS/CASE: Brand: 3M™ COBAN™

## (undated) DEVICE — SYRINGE, LUER LOCK, 1ML: Brand: MEDLINE

## (undated) DEVICE — ZIMMER® STERILE DISPOSABLE TOURNIQUET CUFF WITH PLC, DUAL PORT, SINGLE BLADDER, 34 IN. (86 CM)

## (undated) DEVICE — TRAP POLYP ETRAP

## (undated) DEVICE — Z DISCONTINUED NO SUB IDED TUBING ETCO2 AD L6.5FT NSL ORAL CVD PRNG NONFLARED TIP OVR

## (undated) DEVICE — DRESSING FOAM ADH POLYUR W/ SIL WND SHT 4IN LEN 4IN W

## (undated) DEVICE — DEFENDO AIR WATER SUCTION AND BIOPSY VALVE KIT FOR  OLYMPUS: Brand: DEFENDO AIR/WATER/SUCTION AND BIOPSY VALVE

## (undated) DEVICE — DRESSING HYDROFIBER AQUACEL AG ADVANTAGE 3.5X10 IN

## (undated) DEVICE — SNARE VASC L240CM LOOP W10MM SHTH DIA2.4MM RND STIFF CLD